# Patient Record
Sex: FEMALE | Race: WHITE | NOT HISPANIC OR LATINO | Employment: FULL TIME | URBAN - METROPOLITAN AREA
[De-identification: names, ages, dates, MRNs, and addresses within clinical notes are randomized per-mention and may not be internally consistent; named-entity substitution may affect disease eponyms.]

---

## 2022-03-27 ENCOUNTER — APPOINTMENT (OUTPATIENT)
Dept: CARDIOLOGY | Facility: CLINIC | Age: 52
End: 2022-03-27
Attending: EMERGENCY MEDICINE
Payer: COMMERCIAL

## 2022-03-27 ENCOUNTER — TRANSFERRED RECORDS (OUTPATIENT)
Dept: HEALTH INFORMATION MANAGEMENT | Facility: CLINIC | Age: 52
End: 2022-03-27

## 2022-03-27 ENCOUNTER — HOSPITAL ENCOUNTER (INPATIENT)
Facility: CLINIC | Age: 52
LOS: 22 days | Discharge: NURSING, REHAB OR LONG-TERM CARE | End: 2022-04-18
Attending: EMERGENCY MEDICINE | Admitting: STUDENT IN AN ORGANIZED HEALTH CARE EDUCATION/TRAINING PROGRAM
Payer: COMMERCIAL

## 2022-03-27 DIAGNOSIS — I16.1 HYPERTENSIVE EMERGENCY: ICD-10-CM

## 2022-03-27 DIAGNOSIS — I21.3 ST ELEVATION MI (STEMI) (H): ICD-10-CM

## 2022-03-27 DIAGNOSIS — Z95.1 S/P CABG X 4: ICD-10-CM

## 2022-03-27 DIAGNOSIS — I47.29 PAROXYSMAL VENTRICULAR TACHYCARDIA (H): Primary | ICD-10-CM

## 2022-03-27 DIAGNOSIS — I21.4 NSTEMI (NON-ST ELEVATED MYOCARDIAL INFARCTION) (H): ICD-10-CM

## 2022-03-27 LAB
ATRIAL RATE - MUSE: 85 BPM
CHOLEST SERPL-MCNC: 172 MG/DL
DIASTOLIC BLOOD PRESSURE - MUSE: NORMAL MMHG
HCG SER QL IA.RAPID: NEGATIVE
HDLC SERPL-MCNC: 57 MG/DL
HOLD SPECIMEN: NORMAL
INTERPRETATION ECG - MUSE: NORMAL
LDLC SERPL CALC-MCNC: 106 MG/DL
LVEF ECHO: NORMAL
NONHDLC SERPL-MCNC: 115 MG/DL
P AXIS - MUSE: 40 DEGREES
PR INTERVAL - MUSE: 162 MS
QRS DURATION - MUSE: 82 MS
QT - MUSE: 410 MS
QTC - MUSE: 487 MS
R AXIS - MUSE: -15 DEGREES
SYSTOLIC BLOOD PRESSURE - MUSE: NORMAL MMHG
T AXIS - MUSE: -87 DEGREES
TRIGL SERPL-MCNC: 47 MG/DL
TROPONIN I SERPL HS-MCNC: 4328 NG/L
TROPONIN I SERPL HS-MCNC: 7023 NG/L
TROPONIN I SERPL HS-MCNC: 7262 NG/L
UFH PPP CHRO-ACNC: 0.27 IU/ML
VENTRICULAR RATE- MUSE: 85 BPM

## 2022-03-27 PROCEDURE — P9016 RBC LEUKOCYTES REDUCED: HCPCS

## 2022-03-27 PROCEDURE — 99223 1ST HOSP IP/OBS HIGH 75: CPT | Mod: AI | Performed by: STUDENT IN AN ORGANIZED HEALTH CARE EDUCATION/TRAINING PROGRAM

## 2022-03-27 PROCEDURE — 80061 LIPID PANEL: CPT | Performed by: STUDENT IN AN ORGANIZED HEALTH CARE EDUCATION/TRAINING PROGRAM

## 2022-03-27 PROCEDURE — 258N000003 HC RX IP 258 OP 636: Performed by: EMERGENCY MEDICINE

## 2022-03-27 PROCEDURE — 96365 THER/PROPH/DIAG IV INF INIT: CPT

## 2022-03-27 PROCEDURE — 36415 COLL VENOUS BLD VENIPUNCTURE: CPT | Performed by: EMERGENCY MEDICINE

## 2022-03-27 PROCEDURE — 999N000208 ECHOCARDIOGRAM LIMITED

## 2022-03-27 PROCEDURE — 84702 CHORIONIC GONADOTROPIN TEST: CPT

## 2022-03-27 PROCEDURE — 86923 COMPATIBILITY TEST ELECTRIC: CPT

## 2022-03-27 PROCEDURE — 93321 DOPPLER ECHO F-UP/LMTD STD: CPT | Mod: 26 | Performed by: INTERNAL MEDICINE

## 2022-03-27 PROCEDURE — 210N000002 HC R&B HEART CARE

## 2022-03-27 PROCEDURE — 93308 TTE F-UP OR LMTD: CPT | Mod: 26 | Performed by: INTERNAL MEDICINE

## 2022-03-27 PROCEDURE — 250N000013 HC RX MED GY IP 250 OP 250 PS 637: Performed by: EMERGENCY MEDICINE

## 2022-03-27 PROCEDURE — 85520 HEPARIN ASSAY: CPT | Performed by: STUDENT IN AN ORGANIZED HEALTH CARE EDUCATION/TRAINING PROGRAM

## 2022-03-27 PROCEDURE — 93325 DOPPLER ECHO COLOR FLOW MAPG: CPT | Mod: 26 | Performed by: INTERNAL MEDICINE

## 2022-03-27 PROCEDURE — 84484 ASSAY OF TROPONIN QUANT: CPT | Performed by: EMERGENCY MEDICINE

## 2022-03-27 PROCEDURE — 250N000011 HC RX IP 250 OP 636: Performed by: EMERGENCY MEDICINE

## 2022-03-27 PROCEDURE — 93321 DOPPLER ECHO F-UP/LMTD STD: CPT

## 2022-03-27 PROCEDURE — 99291 CRITICAL CARE FIRST HOUR: CPT | Mod: 25

## 2022-03-27 PROCEDURE — 36415 COLL VENOUS BLD VENIPUNCTURE: CPT | Performed by: STUDENT IN AN ORGANIZED HEALTH CARE EDUCATION/TRAINING PROGRAM

## 2022-03-27 PROCEDURE — 99292 CRITICAL CARE ADDL 30 MIN: CPT

## 2022-03-27 PROCEDURE — 250N000011 HC RX IP 250 OP 636: Performed by: INTERNAL MEDICINE

## 2022-03-27 PROCEDURE — 250N000011 HC RX IP 250 OP 636: Performed by: STUDENT IN AN ORGANIZED HEALTH CARE EDUCATION/TRAINING PROGRAM

## 2022-03-27 PROCEDURE — 255N000002 HC RX 255 OP 636: Performed by: EMERGENCY MEDICINE

## 2022-03-27 PROCEDURE — 96368 THER/DIAG CONCURRENT INF: CPT

## 2022-03-27 PROCEDURE — 84484 ASSAY OF TROPONIN QUANT: CPT | Performed by: STUDENT IN AN ORGANIZED HEALTH CARE EDUCATION/TRAINING PROGRAM

## 2022-03-27 PROCEDURE — 96366 THER/PROPH/DIAG IV INF ADDON: CPT

## 2022-03-27 PROCEDURE — 99222 1ST HOSP IP/OBS MODERATE 55: CPT | Performed by: INTERNAL MEDICINE

## 2022-03-27 PROCEDURE — 93005 ELECTROCARDIOGRAM TRACING: CPT

## 2022-03-27 PROCEDURE — 250N000013 HC RX MED GY IP 250 OP 250 PS 637: Performed by: STUDENT IN AN ORGANIZED HEALTH CARE EDUCATION/TRAINING PROGRAM

## 2022-03-27 RX ORDER — ASPIRIN 81 MG/1
324 TABLET, CHEWABLE ORAL ONCE
Status: DISCONTINUED | OUTPATIENT
Start: 2022-03-27 | End: 2022-03-28

## 2022-03-27 RX ORDER — CARVEDILOL 6.25 MG/1
6.25 TABLET ORAL ONCE
Status: COMPLETED | OUTPATIENT
Start: 2022-03-27 | End: 2022-03-27

## 2022-03-27 RX ORDER — HEPARIN SODIUM 10000 [USP'U]/100ML
0-5000 INJECTION, SOLUTION INTRAVENOUS CONTINUOUS
Status: DISCONTINUED | OUTPATIENT
Start: 2022-03-27 | End: 2022-03-28

## 2022-03-27 RX ORDER — ASPIRIN 81 MG/1
81 TABLET, CHEWABLE ORAL DAILY
Status: DISCONTINUED | OUTPATIENT
Start: 2022-03-28 | End: 2022-03-30

## 2022-03-27 RX ORDER — ATORVASTATIN CALCIUM 40 MG/1
80 TABLET, FILM COATED ORAL DAILY
Status: DISCONTINUED | OUTPATIENT
Start: 2022-03-27 | End: 2022-04-05

## 2022-03-27 RX ORDER — ONDANSETRON 4 MG/1
4 TABLET, ORALLY DISINTEGRATING ORAL EVERY 6 HOURS PRN
Status: DISCONTINUED | OUTPATIENT
Start: 2022-03-27 | End: 2022-03-30

## 2022-03-27 RX ORDER — HEPARIN SODIUM 10000 [USP'U]/100ML
0-5000 INJECTION, SOLUTION INTRAVENOUS CONTINUOUS
Status: DISCONTINUED | OUTPATIENT
Start: 2022-03-27 | End: 2022-03-27

## 2022-03-27 RX ORDER — LORAZEPAM 2 MG/ML
0.5 INJECTION INTRAMUSCULAR EVERY 6 HOURS PRN
Status: DISCONTINUED | OUTPATIENT
Start: 2022-03-27 | End: 2022-03-30

## 2022-03-27 RX ORDER — NITROGLYCERIN 20 MG/100ML
10-200 INJECTION INTRAVENOUS CONTINUOUS
Status: DISCONTINUED | OUTPATIENT
Start: 2022-03-27 | End: 2022-03-28

## 2022-03-27 RX ORDER — LORAZEPAM 0.5 MG/1
0.5 TABLET ORAL EVERY 6 HOURS PRN
Status: DISCONTINUED | OUTPATIENT
Start: 2022-03-27 | End: 2022-03-30

## 2022-03-27 RX ORDER — MORPHINE SULFATE 2 MG/ML
1 INJECTION, SOLUTION INTRAMUSCULAR; INTRAVENOUS
Status: DISCONTINUED | OUTPATIENT
Start: 2022-03-27 | End: 2022-03-30

## 2022-03-27 RX ORDER — NALOXONE HYDROCHLORIDE 0.4 MG/ML
0.4 INJECTION, SOLUTION INTRAMUSCULAR; INTRAVENOUS; SUBCUTANEOUS
Status: DISCONTINUED | OUTPATIENT
Start: 2022-03-27 | End: 2022-03-30

## 2022-03-27 RX ORDER — CARVEDILOL 6.25 MG/1
6.25 TABLET ORAL 2 TIMES DAILY WITH MEALS
Status: DISCONTINUED | OUTPATIENT
Start: 2022-03-27 | End: 2022-03-29

## 2022-03-27 RX ORDER — NALOXONE HYDROCHLORIDE 0.4 MG/ML
0.2 INJECTION, SOLUTION INTRAMUSCULAR; INTRAVENOUS; SUBCUTANEOUS
Status: DISCONTINUED | OUTPATIENT
Start: 2022-03-27 | End: 2022-03-30

## 2022-03-27 RX ORDER — LIDOCAINE 40 MG/G
CREAM TOPICAL
Status: DISCONTINUED | OUTPATIENT
Start: 2022-03-27 | End: 2022-03-30

## 2022-03-27 RX ORDER — NITROGLYCERIN 20 MG/100ML
10-200 INJECTION INTRAVENOUS CONTINUOUS
Status: DISCONTINUED | OUTPATIENT
Start: 2022-03-27 | End: 2022-03-27

## 2022-03-27 RX ORDER — ONDANSETRON 2 MG/ML
4 INJECTION INTRAMUSCULAR; INTRAVENOUS EVERY 6 HOURS PRN
Status: DISCONTINUED | OUTPATIENT
Start: 2022-03-27 | End: 2022-03-30

## 2022-03-27 RX ORDER — ACETAMINOPHEN 325 MG/1
650 TABLET ORAL EVERY 6 HOURS PRN
Status: DISCONTINUED | OUTPATIENT
Start: 2022-03-27 | End: 2022-03-29

## 2022-03-27 RX ORDER — ACETAMINOPHEN 650 MG/1
650 SUPPOSITORY RECTAL EVERY 6 HOURS PRN
Status: DISCONTINUED | OUTPATIENT
Start: 2022-03-27 | End: 2022-03-29

## 2022-03-27 RX ADMIN — HUMAN ALBUMIN MICROSPHERES AND PERFLUTREN 9 ML: 10; .22 INJECTION, SOLUTION INTRAVENOUS at 11:34

## 2022-03-27 RX ADMIN — NITROGLYCERIN 10 MCG/MIN: 20 INJECTION INTRAVENOUS at 10:41

## 2022-03-27 RX ADMIN — NITROGLYCERIN 50 MCG/MIN: 20 INJECTION INTRAVENOUS at 19:37

## 2022-03-27 RX ADMIN — ATORVASTATIN CALCIUM 80 MG: 40 TABLET, FILM COATED ORAL at 14:58

## 2022-03-27 RX ADMIN — ONDANSETRON 4 MG: 4 TABLET, ORALLY DISINTEGRATING ORAL at 15:46

## 2022-03-27 RX ADMIN — HEPARIN SODIUM 1200 UNITS/HR: 1000 INJECTION INTRAVENOUS; SUBCUTANEOUS at 11:02

## 2022-03-27 RX ADMIN — CARVEDILOL 6.25 MG: 6.25 TABLET, FILM COATED ORAL at 17:05

## 2022-03-27 RX ADMIN — ACETAMINOPHEN 650 MG: 325 TABLET, FILM COATED ORAL at 14:57

## 2022-03-27 RX ADMIN — CARVEDILOL 6.25 MG: 6.25 TABLET, FILM COATED ORAL at 12:34

## 2022-03-27 RX ADMIN — ACETAMINOPHEN 650 MG: 325 TABLET, FILM COATED ORAL at 21:02

## 2022-03-27 ASSESSMENT — ENCOUNTER SYMPTOMS
CHEST TIGHTNESS: 1
NUMBNESS: 1

## 2022-03-27 ASSESSMENT — ACTIVITIES OF DAILY LIVING (ADL)
ADLS_ACUITY_SCORE: 7
ADLS_ACUITY_SCORE: 3
ADLS_ACUITY_SCORE: 7
ADLS_ACUITY_SCORE: 3
ADLS_ACUITY_SCORE: 7
ADLS_ACUITY_SCORE: 7

## 2022-03-27 NOTE — PHARMACY-ADMISSION MEDICATION HISTORY
Pharmacy Medication History  Admission medication history interview status for the 3/27/2022  admission is complete. See EPIC admission navigator for prior to admission medications     Location of Interview: Patient room  Medication history sources: Patient and Surescripts    In the past week, patient estimated taking medication this percent of the time: greater than 90%    Additional medication history information:   --Patient states not currently on any prescription medications.     Medication reconciliation completed by provider prior to medication history? No    Time spent in this activity: 5 min    Prior to Admission medications    Medication Sig Last Dose Taking? Auth Provider   aspirin-acetaminophen-caffeine (EXCEDRIN MIGRAINE) 250-250-65 MG tablet Take 1 tablet by mouth every 6 hours as needed for headaches 3/26/2022 at Unknown time Yes Reported, Patient   Multiple Vitamin (MULTIVITAMIN ADULT PO)  Past Week at Unknown time Yes Reported, Patient       The information provided in this note is only as accurate as the sources available at the time of update(s)

## 2022-03-27 NOTE — ED PROVIDER NOTES
History   Chief Complaint:  Chest Pain       HPI   Angelica Robledo is a 51 year old female with history of MI at age 25 with angioplasty who presents with chest pain that has been waxing and waning for the past few weeks, but worsened last night. The patient was at the [facility] in [place] before being transferred to the ED. The patient's work-up included a troponin which was elevated to 2.960, labs further detailed below, along with a chest x-ray and EKG, see below. The EKG displayed some mild ST elevation and depression. She was given 324 mg Aspirin, 4000 units Heparin, 0.5 mg dilaudid, 0.4 mg nitroglycerin x3 (total 1.2 mg), and 50 mcg fentanyl. EMS was called to the clinic for transport to the emergency department. EKG done by EMS had slight improvement but still lateral changes. Per EMS, they spoke to urgent care and the patient stated having a pain level of 7 but this was reduced to a 2-3 out of 10 after fentanyl was administered. Last night, her pain was an 8/10. She presents to the ED with chest tightness, arm numbness, and chest pain. She denies any swelling of her extremities. She states that her chest pain has been constant since last night. In the ED, she states that her symptoms are similar to those she experienced when she had a myocardial infarction (at age 25); at that time, she was living in [place] and went to [facility]. She denies any stent placement but reports that she had angioplasty at that time. In addition, the patient had a family history of myocardial infarction (father) and aortic aneurysm (mother). She denies taking medication at home.    [facility] Work-[facility]  COVID-19 Molecular: Negative  Troponin: 2.960 (high)   D-Dimer: 0.36 WNL   CBC: MCH 26.0 (low), RDW 18.0 (high), o/w WNL (WBC 9.8, HGB 12.5, )  BMP: Cl 108 (high), eGFR 90 (low), o/w WNL  Glucose, Random: 122 (high)    XR Chest 2 Views PA and Lateral  Negative chest  Reading per radiology          Review of Systems   Respiratory: Positive for chest tightness.    Cardiovascular: Positive for chest pain. Negative for leg swelling.   Neurological: Positive for numbness (arms).   All other systems reviewed and are negative.    Allergies:Amoxicillin    Medications:  aspirin-acetaminophen-caffeine (EXCEDRIN MIGRAINE) 250-250-65 MG tablet  Multiple Vitamin (MULTIVITAMIN ADULT PO)      Past Medical History:       Past Medical History:   Diagnosis Date     Bilateral external ear infections      H/O lithotripsy      Kidney stone      Myocardial infarction (H)      Patient Active Problem List   Diagnosis     NSTEMI (non-ST elevated myocardial infarction) (H)     Hypertensive emergency         Past Surgical History:      Past Surgical History:   Procedure Laterality Date     ANGIOPLASTY        SECTION      ,      MYRINGOTOMY, INSERT TUBE BILATERAL, COMBINED       ORTHOPEDIC SURGERY Left 1995    left ankle        Family History:    myocardial infarction (father)   aortic aneurysm (mother)      Social History:  Social History     Socioeconomic History     Marital status:      Spouse name: Not on file     Number of children: Not on file     Years of education: Not on file     Highest education level: Not on file   Occupational History     Not on file   Tobacco Use     Smoking status: Never Smoker     Smokeless tobacco: Never Used   Substance and Sexual Activity     Alcohol use: Yes     Comment: socially     Drug use: Never     Sexual activity: Yes     Partners: Male     Birth control/protection: None   Other Topics Concern     Not on file   Social History Narrative     Not on file     Social Determinants of Health     Financial Resource Strain: Not on file   Food Insecurity: Not on file   Transportation Needs: Not on file   Physical Activity: Not on file   Stress: Not on file   Social Connections: Not on file   Intimate Partner Violence: Not on file   Housing Stability: Not on file        Physical Exam     Patient Vitals for the past 24 hrs:   BP Temp Temp src Pulse Resp SpO2 Height Weight   03/27/22 1400 (!) 144/97 -- -- 74 9 98 % -- --   03/27/22 1355 (!) 159/99 -- -- 77 -- -- -- --   03/27/22 1350 (!) 156/98 -- -- 77 14 97 % -- --   03/27/22 1345 (!) 153/97 -- -- 78 14 96 % -- --   03/27/22 1340 (!) 155/97 -- -- 81 13 99 % -- --   03/27/22 1330 (!) 149/94 -- -- 79 15 97 % -- --   03/27/22 1325 (!) 141/97 -- -- 75 11 98 % -- --   03/27/22 1320 (!) 147/94 -- -- 73 13 98 % -- --   03/27/22 1315 (!) 149/91 -- -- 70 -- -- -- --   03/27/22 1310 (!) 140/95 -- -- 71 -- -- -- --   03/27/22 1255 (!) 145/96 -- -- 73 9 98 % -- --   03/27/22 1250 (!) 160/97 -- -- 79 (!) 7 96 % -- --   03/27/22 1245 (!) 145/102 -- -- 76 15 97 % -- --   03/27/22 1240 (!) 161/99 -- -- 77 14 96 % -- --   03/27/22 1230 (!) 145/96 -- -- -- 11 96 % -- --   03/27/22 1225 (!) 148/95 -- -- -- 13 97 % -- --   03/27/22 1220 (!) 146/97 -- -- -- 12 96 % -- --   03/27/22 1215 (!) 157/99 -- -- 74 13 96 % -- --   03/27/22 1210 (!) 153/97 -- -- 75 10 97 % -- --   03/27/22 1205 (!) 153/100 -- -- 79 20 96 % -- --   03/27/22 1200 (!) 145/98 -- -- 71 9 94 % -- --   03/27/22 1155 (!) 146/95 -- -- 70 13 95 % -- --   03/27/22 1150 (!) 151/96 -- -- 75 12 95 % -- --   03/27/22 1145 (!) 155/97 -- -- 69 12 95 % -- --   03/27/22 1139 (!) 154/100 -- -- 70 -- 95 % -- --   03/27/22 1133 (!) 160/106 -- -- 68 -- 94 % -- --   03/27/22 1118 (!) 169/104 -- -- 73 16 93 % -- --   03/27/22 1115 (!) 157/100 -- -- 71 9 92 % -- --   03/27/22 1112 (!) 164/105 -- -- 70 12 95 % -- --   03/27/22 1109 (!) 172/103 -- -- 73 12 93 % -- --   03/27/22 1106 (!) 180/114 -- -- 75 13 93 % -- --   03/27/22 1103 (!) 184/112 -- -- -- 12 94 % -- --   03/27/22 1100 (!) 176/111 -- -- 75 13 95 % -- --   03/27/22 1057 (!) 177/112 -- -- 71 10 96 % -- --   03/27/22 1054 (!) 185/112 -- -- 70 (!) 7 98 % -- --   03/27/22 1052 (!) 189/112 -- -- 76 18 97 % -- --   03/27/22 1050 (!) 187/108 --  "-- 77 14 97 % -- --   03/27/22 1048 (!) 194/114 -- -- 78 9 96 % -- --   03/27/22 1030 (!) 187/106 -- -- 79 14 -- -- --   03/27/22 1017 (!) 205/116 98.5  F (36.9  C) Oral 76 18 97 % 1.651 m (5' 5\") 98.4 kg (216 lb 14.9 oz)       Physical Exam  General: Well-nourished, appears to be in pain  Eyes: PERRL, conjunctivae pink no scleral icterus or conjunctival injection  ENT:  Moist mucus membranes, posterior oropharynx clear without erythema or exudates  Respiratory:  Lungs clear to auscultation bilaterally, no crackles/rubs/wheezes.  Good air movement  CV: Normal rate and rhythm, no murmurs/rubs/gallops  GI:  Abdomen soft and non-distended.  Normoactive BS.  No tenderness, guarding or rebound  Skin: Warm, dry.  No rashes or petechiae  Musculoskeletal: No peripheral edema or calf tenderness  Neuro: Alert and oriented to person/place/time  Psychiatric: Normal affect    Emergency Department Course   ECG  ECG obtained at 1016, ECG read at 0936  Sinus rhythm with occasional premature ventricular complexes  Left ventricular hypertrophy with repolarization abnormalities  Cannot rule out septal infarct, age undetermined  Abnormal ECG    Changes noted above as compared to prior, dated 03/27/22.  Sinus rhythm  Premature ventricular complexes  Horizontal axis  Possible septal infarct  Moderate high-lateral repolarization disturbance, consider ischemia or LV overload  Abnormal ECG   Rate 85 bpm. PA interval 162 ms. QRS duration 82 ms. QT/QTc 410/487 ms. P-R-T axes 40 -15 -87.     Imaging:  Echocardiogram Limited   Final Result        Report per radiology    Laboratory:  Labs Ordered and Resulted from Time of ED Arrival to Time of ED Departure   TROPONIN I - Abnormal       Result Value    Troponin I High Sensitivity 4,328 (*)    ISTAT HCG QUALITATIVE PREGNANCY POCT - Normal    HCG Qualitative POCT Negative          Procedures  none    Emergency Department Course:    Reviewed:  I reviewed nursing notes, vitals and past medical " history    Assessments:  1011 EMS transported the patient to stabilization room 3. I obtained history and examined the patient as noted above.   1017 I ordered an EKG.  1023 I rechecked the patient.  1041 I rechecked the patient and explained findings.     Consults:  1022 I spoke over the phone with cath lab.   1147 I consulted Dr. Valdez of Interventional Cardiology.   1205    I consulted with Dr. Valdez in the patient room.  1233    I spoke with Dr. Valdez via telephone regarding the patient.  1255    I spoke with the echo cardiologist who provided feedback on the echo and he is going to call Dr. Valdez regarding the patient and echo findings.    Interventions:  1041 nitroGLYcerin 50 mg in D5W 250 mL (adult std) infusion CENTRAL, IV   1102 heparin infusion 1,200 units/hour in D5W 250 mL ANTICOAGULANT, IV   1134 perflutren diluted 9 mL with saline (OPTISON) diluted injection, IV   1234 carvedilol (COREG) tablet 6.25 mg, PO     Disposition:  The patient was admitted to the hospital under the care of Dr. Brady.     Impression & Plan     CMS Diagnoses: None    Medical Decision Making:  Angelica Robledo is a 51 year old female transferred from the urgency room for concern of NSTEMI.  EKG shows some ST depression but does not meet criteria for STEMI.  Her troponin was significantly elevated.  A chest x-ray had been clear.  Her D-dimer was normal.  Covid testing was negative.  On arrival she was very hypertensive.  Repeat EKG was obtained and showed no findings of STEMI.  I contacted our cardiologist on-call given the persistence of her pain.  We started a nitroglycerin drip to treat her blood pressure and her pain.  Target blood pressure was under 140.  Her pain did resolve.  Given that her pain resolved, the plan is for admission with catheterization tomorrow.  We started her on oral carvedilol so that the nitro can be titrated off.  Dr. Valdez with cardiology was aware of the echo imaging and was in communication  with Dr. Thornton's guard of interventional cardiology.  If the pain were to return or persist despite medical management in the hospital, we will activate the Cath Lab for emergent angiogram.  However, at this time, she is stable and without pain.  She will be admitted to Dr. Brady.    Critical Care Time: was 45 minutes for this patient excluding procedures    Diagnosis:    ICD-10-CM    1. NSTEMI (non-ST elevated myocardial infarction) (H)  I21.4    2. Hypertensive emergency  I16.1      Scribe Disclosure:  I, Maikel Boone, am serving as a scribe at 10:21 AM on 3/27/2022 to document services personally performed by Eneida Patel MD based on my observations and the provider's statements to me.     I, Sue Rodriguez, am serving as a scribe at 1:09 PM on 3/27/2022 to document services personally performed by Eneida Patel MD based on my observations and the provider's statements to me.      Eneida Patel MD  03/27/22 0696

## 2022-03-27 NOTE — Clinical Note
The first balloon was inserted into the right coronary artery and middle right coronary artery.Max pressure = 12 tommy. Total duration = 20 seconds.

## 2022-03-27 NOTE — ED NOTES
[facility]  ED Nurse Handoff Report    ED Chief complaint: Chest Pain      ED Diagnosis:   Final diagnoses:   NSTEMI (non-ST elevated myocardial infarction) (H)   Hypertensive emergency       Code Status: Full Code    Allergies:   Allergies   Allergen Reactions     Amoxicillin Rash       Patient Story: Pt presented via EMS from the urgency room w/CP and EKG changes (ST depression in 3 leads). She was started on a heparin drip and nitroglycerin drip. Pain currently 0/10. Hx of MI in her 20's, but that occurred in [place] and records could not be obtained.   Focused Assessment:  A&Ox4, NSR. Desats while sleeping so placed on 2L O2 via NC. Otherwise independent.    Treatments and/or interventions provided: heparin, nitroglycerin  Patient's response to treatments and/or interventions: pain stopped    To be done/followed up on inpatient unit:  see inpatient orders    Does this patient have any cognitive concerns?: none    Activity level - Baseline/Home:  Independent  Activity Level - Current:   Independent    Patient's Preferred language: English   Needed?: No    Isolation: None  Infection: Not Applicable  Patient tested for COVID 19 prior to admission: NO - tested negative at UR  Bariatric?: No    Vital Signs:   Vitals:    03/27/22 1215 03/27/22 1220 03/27/22 1225 03/27/22 1230   BP: (!) 157/99 (!) 146/97 (!) 148/95 (!) 145/96   Pulse: 74      Resp: 13 12 13 11   Temp:       TempSrc:       SpO2: 96% 96% 97% 96%   Weight:       Height:           Cardiac Rhythm:Cardiac Rhythm: Normal sinus rhythm    Was the PSS-3 completed:   Yes  What interventions are required if any?               Family Comments: not present  OBS brochure/video discussed/provided to patient/family: N/A              Name of person given brochure if not patient: NA              Relationship to patient: NA    For the majority of the shift this patient's behavior was Green.   Behavioral interventions performed were NA.    ED NURSE  PHONE NUMBER: [phone]

## 2022-03-27 NOTE — PLAN OF CARE
[facility] Nursing Note    Patient Information  Name: Angelica Robledo  Age: 51 year old    Assessment  Orientation/Neuro: Alert and Oriented x4  Cardiac/Tele: NSR  Resp: WDL no shortness of breath  GI/: WDL No nausea, vomiting, abdominal pain, diarrhea, constipation or change in bowel habits  CMS: WDL   Mobility: walks with assist for IV assistance only  Pain: denies chest pain   Diet: Orders Placed This Encounter      Clear Liquid Diet      NPO per Anesthesia Guidelines for Procedure/Surgery Except for: Meds    Vital Signs  B/P: 129/95, T: 98.5, P: 66, R: 16, O2: 96 on RA    Plan  Weaning down nitroglycerin gtt as able, pt denies CP, angiogram tomorrow, NPO at midnight    Janet Calles RN

## 2022-03-27 NOTE — H&P
[facility]    History and Physical - Hospitalist Service       Date of Admission:  3/27/2022    Assessment & Plan      Angelica Robldeo is a 51 year old female admitted on 3/27/2022 for NSTEMI.    NSTEMI  Hypertensive emergency  Patient is visiting from [place]. She reports starting about Thursday she noticed intermittent chest pain and pressure that progressively worsened last night. She has ongoing hx of chest pain and pressure with elevated BP at home that she self medicates with OTC medications.   She presented to urgent care today which noticed elevated BP and positive troponin and she was sent urgently via EMS to the ER.   Within the ER here, BP >200/100. Initial troponin positive at ~4300. EKG with mild ST changes but no obvious elevations. Cardiology evaluated at bedside and recommended nitroglycerin gtt, heparin gtt and they will plan to take patient for angiogram tomorrow.    > will admit on nitroglycerin gtt for chest pain, also started on coreg by cardiology  > continue heparin gtt  > continue to trend troponin until it peaks  > continue to follow closely for changes in chest pain with repeat EKG and modify nitro.  > PRN morphine available  > cardiology consulted  > NPO at midnight    Suspect untreated HTN  Patient reports hx of BP reading from 150s-200s at home. Occasionally she will feel chest pain when her BP is high but it usually self resolves. She also takes an herbal supplement with aspirin to help relieve the pain  - follow treatment above and will need better BP control on discharge    Covid Negative           Diet: NPO for Medical/Clinical Reasons Except for: Meds    DVT Prophylaxis: heparin gtt  Cooper Catheter: Not present  Central Lines: None  Cardiac Monitoring: None  Code Status:  FULL    Clinically Significant Risk Factors Present on Admission                # Platelet Defect: home medication list includes an antiplatelet medication   # Obesity: Estimated body mass  "index is 36.1 kg/m  as calculated from the following:    Height as of this encounter: 1.651 m (5' 5\").    Weight as of this encounter: 98.4 kg (216 lb 14.9 oz).      Disposition Plan   Expected Discharge: > 2 nights   Anticipated discharge location:  Awaiting care coordination huddle  Delays:              Alla Brady DO  Hospitalist Service  [facility]  Securely message with the Vocera Web Console (learn more here)  Text page via memloom Paging/Directory         ______________________________________________________________________    Chief Complaint   Chest pain    History is obtained from the patient ER doc and patient    History of Present Illness   Angelica Robledo is a 51 year old female who presented for chest pain.    Patient is visiting from [place]. She reports starting about Thursday she noticed intermittent chest pain and pressure that progressively worsened last night. She has ongoing hx of chest pain and pressure with elevated BP at home that she self medicates with OTC medications.   She presented to urgent care today which noticed elevated BP and positive troponin and she was sent urgently via EMS to the ER.   Within the ER here, BP >200/100. Initial troponin positive at ~4300. EKG with mild ST changes but no obvious elevations.     Upon my assessment patient is lying in ER bed. She appears uncomfortable but reports she is actually feeling better than before. Mild headache but not bad. She is tired. She had some nausea earlier that has resolved. No recent illness. No cough. NO swelling in her legs. Chest pain is still present but better. No rashes. No reports hiking in the mountains as activity. No joint swelling.    Review of Systems    The 10 point Review of Systems is negative other than noted in the HPI or here.     Past Medical History    I have reviewed this patient's medical history and updated it with pertinent information if needed.   Past Medical History: "   Diagnosis Date     Bilateral external ear infections     frequent infections as child     H/O lithotripsy      Kidney stone      Myocardial infarction (H)        Past Surgical History   I have reviewed this patient's surgical history and updated it with pertinent information if needed.  Past Surgical History:   Procedure Laterality Date     ANGIOPLASTY        SECTION      ,      MYRINGOTOMY, INSERT TUBE BILATERAL, COMBINED       ORTHOPEDIC SURGERY Left     left ankle       Social History   I have reviewed this patient's social history and updated it with pertinent information if needed.  Social History     Tobacco Use     Smoking status: Never Smoker     Smokeless tobacco: Never Used   Substance Use Topics     Alcohol use: Yes     Comment: socially     Drug use: Never       Family History     No significant family history    Prior to Admission Medications   Prior to Admission Medications   Prescriptions Last Dose Informant Patient Reported? Taking?   Multiple Vitamin (MULTIVITAMIN ADULT PO) Past Week at Unknown time  Yes Yes   aspirin-acetaminophen-caffeine (EXCEDRIN MIGRAINE) 250-250-65 MG tablet 3/26/2022 at Unknown time  Yes Yes   Sig: Take 1 tablet by mouth every 6 hours as needed for headaches      Facility-Administered Medications: None     Allergies   Allergies   Allergen Reactions     Amoxicillin Rash       Physical Exam   Vital Signs: Temp: 98.5  F (36.9  C) Temp src: Oral BP: (!) 157/99 Pulse: 74   Resp: 13 SpO2: 96 % O2 Device: None (Room air)    Weight: 216 lbs 14.92 oz     Constitutional: Awake, alert, cooperative, appears uncomfortable  Eyes: Conjunctiva and pupils examined and normal.  HEENT: Moist mucous membranes, normal dentition.  Respiratory: Clear to auscultation bilaterally, no crackles or wheezing.  Cardiovascular: Regular rate and rhythm, normal S1 and S2, and no murmur noted.  GI: Soft, non-distended, non-tender, normal bowel sounds.  Skin: No rashes, no  cyanosis, no edema.  Musculoskeletal: No joint swelling, erythema or tenderness.  Neurologic: Cranial nerves 2-12 intact, normal strength and sensation.  Psychiatric: Alert, oriented to person, place and time, no obvious anxiety or depression.    Data   Data reviewed today: I reviewed all medications, new labs and imaging results over the last 24 hours. I personally reviewed EKG with mild ST changes. No FLORESITA    No lab results found in last 7 days.    Recent Results (from the past 24 hour(s))   Echocardiogram Limited   Result Value    LVEF  50-55%    Narrative    [id number]  [id number]  [id number]  [id number]^TIESHA     [facility]  Echocardiography Laboratory  [address] [place] [id number]     Name: KVNG EDOUARD  MRN: [id number]  : 1970  Study Date: 2022 11:08 AM  Age: 51 yrs  Gender: Female  Patient Location: [facility]  Reason For Study: Chest Pain  Ordering Physician: JACK PATEL  Referring Physician: JACK PATEL  Performed By: Deejay Baugh RDCS     BSA: 2.0 m2  Height: 65 in  Weight: 216 lb  HR: 76  BP: 184/112 mmHg  ______________________________________________________________________________  Procedure  Limited Portable Echo Adult.  ______________________________________________________________________________  Interpretation Summary     1. Left ventricular systolic function is low normal. Ejection fraction is 50-  55%.  2. There is hypokinesis of LV apex and apical segments. Differential diagnosis  includes LAD infarct versus Takotsubo cardiomyopathy.  3. No hemodynamically significant valvular disease on limited evaluation.  No prior study available for comparison.  Findings discussed with Dr. Patel and Courtney at 12:30 PM.  ______________________________________________________________________________  Left Ventricle  The left ventricle is normal in size. Left ventricular systolic function is  low normal. The visual ejection fraction is 50-55%. There is hypokinesis of LV  apex and  apical segments of all the walls. Differential diagnosis includes  coronary disease involving LAD versus Takotsubo cardiomyopathy.     Right Ventricle  The right ventricle is not well visualized.     Mitral Valve  The mitral valve leaflets appear normal. There is no evidence of stenosis,  fluttering, or prolapse.     Tricuspid Valve  Normal tricuspid valve. There is trace tricuspid regurgitation.     Aortic Valve  The aortic valve is not well visualized. No aortic stenosis is present.     Pulmonic Valve  The pulmonic valve is not well visualized.     Vessels  Normal size aorta. Inferior vena cava not well visualized for estimation of  right atrial pressure.     Pericardium  There is no pericardial effusion.     Rhythm  Sinus rhythm was noted.     ______________________________________________________________________________  MMode/2D Measurements & Calculations  IVSd: 1.5 cm  LVIDd: 4.4 cm  LVIDs: 3.5 cm  LVPWd: 1.4 cm  FS: 20.7 %  LV mass(C)d: 242.7 grams  LV mass(C)dI: 118.7 grams/m2     Ao root diam: 2.8 cm  LA dimension: 3.5 cm  LA/Ao: 1.3  RWT: 0.62     ______________________________________________________________________________  Report approved by: Chad Pope 03/27/2022 12:14 PM

## 2022-03-27 NOTE — Clinical Note
The first balloon was inserted into the right coronary artery and RPLB.Max pressure = 6 tommy. Total duration = 20 seconds.     Max pressure = 6 tommy. Total duration = 20 seconds.    Balloon reinflated a second time: Max pressure = 6 tommy. Total duration = 20 seconds.  Balloon reinflated a third time: Max pressure = 8 tommy. Total duration = 25 seconds.  Balloon reinflated a fourth time: Max pressure = 6 tommy. Total duration = 15 seconds.

## 2022-03-27 NOTE — ED NOTES
Bed: ST03  Expected date:   Expected time:   Means of arrival:   Comments:  [facility] transfer from urgency room, 51 F CP EKG changes ETA 1015

## 2022-03-27 NOTE — Clinical Note
Potential access sites were evaluated for patency using ultrasound.   The left femoral artery was selected. Access was obtained under with Sonosite guidance using a micropuncture 21 gauge needle with direct visualization of needle entry.

## 2022-03-27 NOTE — Clinical Note
The first balloon was inserted into the right coronary artery and middle right coronary artery.Max pressure = 8 tommy. Total duration = 25 seconds.     2.5.

## 2022-03-27 NOTE — Clinical Note
Hemodynamic equipment used: 5 lead ECG, [employer] With 3 Leads, Machine BP Cuff and pulse oximeter probe.

## 2022-03-27 NOTE — Clinical Note
Stent deployed in the middle right coronary artery. Max pressure = 10 tommy. Total duration = 30 seconds.

## 2022-03-27 NOTE — Clinical Note
Hemodynamic equipment used: 5 lead ECG, [employer] With 3 Leads, Machine BP Cuff and pulse oximeter probe.

## 2022-03-27 NOTE — ED NOTES
Patient's oxygen was dipping to 88%. Patient was sleepy with her mask on. 2L O2 was administered via nasal canula and mask was removed. Sats went up to 95%.

## 2022-03-28 ENCOUNTER — APPOINTMENT (OUTPATIENT)
Dept: ULTRASOUND IMAGING | Facility: CLINIC | Age: 52
End: 2022-03-28
Attending: SURGERY
Payer: COMMERCIAL

## 2022-03-28 ENCOUNTER — APPOINTMENT (OUTPATIENT)
Dept: GENERAL RADIOLOGY | Facility: CLINIC | Age: 52
End: 2022-03-28
Attending: SURGERY
Payer: COMMERCIAL

## 2022-03-28 LAB
ABO/RH(D): NORMAL
ACT BLD: 207 SECONDS (ref 74–150)
ALBUMIN SERPL-MCNC: 3.3 G/DL (ref 3.4–5)
ALBUMIN UR-MCNC: 20 MG/DL
ALP SERPL-CCNC: 67 U/L (ref 40–150)
ALT SERPL W P-5'-P-CCNC: 36 U/L (ref 0–50)
ANION GAP SERPL CALCULATED.3IONS-SCNC: 7 MMOL/L (ref 3–14)
ANTIBODY SCREEN: NEGATIVE
APPEARANCE UR: ABNORMAL
AST SERPL W P-5'-P-CCNC: 54 U/L (ref 0–45)
BACTERIA #/AREA URNS HPF: ABNORMAL /HPF
BASOPHILS # BLD AUTO: 0 10E3/UL (ref 0–0.2)
BASOPHILS NFR BLD AUTO: 0 %
BILIRUB DIRECT SERPL-MCNC: <0.1 MG/DL (ref 0–0.2)
BILIRUB SERPL-MCNC: 0.4 MG/DL (ref 0.2–1.3)
BILIRUB UR QL STRIP: NEGATIVE
BUN SERPL-MCNC: 5 MG/DL (ref 7–30)
CALCIUM SERPL-MCNC: 9.1 MG/DL (ref 8.5–10.1)
CHLORIDE BLD-SCNC: 107 MMOL/L (ref 94–109)
CO2 SERPL-SCNC: 24 MMOL/L (ref 20–32)
COLOR UR AUTO: ABNORMAL
CREAT SERPL-MCNC: 0.75 MG/DL (ref 0.52–1.04)
EOSINOPHIL # BLD AUTO: 0 10E3/UL (ref 0–0.7)
EOSINOPHIL NFR BLD AUTO: 0 %
ERYTHROCYTE [DISTWIDTH] IN BLOOD BY AUTOMATED COUNT: 17 % (ref 10–15)
ERYTHROCYTE [DISTWIDTH] IN BLOOD BY AUTOMATED COUNT: 17.2 % (ref 10–15)
GFR SERPL CREATININE-BSD FRML MDRD: >90 ML/MIN/1.73M2
GLUCOSE BLD-MCNC: 124 MG/DL (ref 70–99)
GLUCOSE UR STRIP-MCNC: NEGATIVE MG/DL
HBA1C MFR BLD: 5.7 % (ref 0–5.6)
HCT VFR BLD AUTO: 34.6 % (ref 35–47)
HCT VFR BLD AUTO: 37.1 % (ref 35–47)
HGB BLD-MCNC: 10.6 G/DL (ref 11.7–15.7)
HGB BLD-MCNC: 11.2 G/DL (ref 11.7–15.7)
HGB UR QL STRIP: ABNORMAL
IMM GRANULOCYTES # BLD: 0.1 10E3/UL
IMM GRANULOCYTES NFR BLD: 1 %
KETONES UR STRIP-MCNC: NEGATIVE MG/DL
LEUKOCYTE ESTERASE UR QL STRIP: ABNORMAL
LYMPHOCYTES # BLD AUTO: 1.2 10E3/UL (ref 0.8–5.3)
LYMPHOCYTES NFR BLD AUTO: 10 %
MCH RBC QN AUTO: 24.6 PG (ref 26.5–33)
MCH RBC QN AUTO: 25.2 PG (ref 26.5–33)
MCHC RBC AUTO-ENTMCNC: 30.2 G/DL (ref 31.5–36.5)
MCHC RBC AUTO-ENTMCNC: 30.6 G/DL (ref 31.5–36.5)
MCV RBC AUTO: 80 FL (ref 78–100)
MCV RBC AUTO: 84 FL (ref 78–100)
MONOCYTES # BLD AUTO: 1 10E3/UL (ref 0–1.3)
MONOCYTES NFR BLD AUTO: 8 %
MUCOUS THREADS #/AREA URNS LPF: PRESENT /LPF
NEUTROPHILS # BLD AUTO: 9.5 10E3/UL (ref 1.6–8.3)
NEUTROPHILS NFR BLD AUTO: 81 %
NITRATE UR QL: NEGATIVE
NRBC # BLD AUTO: 0 10E3/UL
NRBC BLD AUTO-RTO: 0 /100
PH UR STRIP: 6 [PH] (ref 5–7)
PLATELET # BLD AUTO: 228 10E3/UL (ref 150–450)
PLATELET # BLD AUTO: 249 10E3/UL (ref 150–450)
POTASSIUM BLD-SCNC: 3.3 MMOL/L (ref 3.4–5.3)
POTASSIUM BLD-SCNC: 3.5 MMOL/L (ref 3.4–5.3)
POTASSIUM BLD-SCNC: 3.6 MMOL/L (ref 3.4–5.3)
PROT SERPL-MCNC: 6.6 G/DL (ref 6.8–8.8)
RBC # BLD AUTO: 4.31 10E6/UL (ref 3.8–5.2)
RBC # BLD AUTO: 4.44 10E6/UL (ref 3.8–5.2)
RBC URINE: 32 /HPF
SODIUM SERPL-SCNC: 138 MMOL/L (ref 133–144)
SP GR UR STRIP: 1.04 (ref 1–1.03)
SPECIMEN EXPIRATION DATE: NORMAL
SQUAMOUS EPITHELIAL: 4 /HPF
TROPONIN I SERPL HS-MCNC: 4062 NG/L
UFH PPP CHRO-ACNC: 0.26 IU/ML
UROBILINOGEN UR STRIP-MCNC: NORMAL MG/DL
WBC # BLD AUTO: 11.8 10E3/UL (ref 4–11)
WBC # BLD AUTO: 8.9 10E3/UL (ref 4–11)
WBC URINE: >182 /HPF

## 2022-03-28 PROCEDURE — 93454 CORONARY ARTERY ANGIO S&I: CPT | Performed by: INTERNAL MEDICINE

## 2022-03-28 PROCEDURE — 250N000011 HC RX IP 250 OP 636: Performed by: STUDENT IN AN ORGANIZED HEALTH CARE EDUCATION/TRAINING PROGRAM

## 2022-03-28 PROCEDURE — 85027 COMPLETE CBC AUTOMATED: CPT | Performed by: PHYSICIAN ASSISTANT

## 2022-03-28 PROCEDURE — 250N000011 HC RX IP 250 OP 636: Performed by: PHYSICIAN ASSISTANT

## 2022-03-28 PROCEDURE — 87186 SC STD MICRODIL/AGAR DIL: CPT | Performed by: SURGERY

## 2022-03-28 PROCEDURE — 272N000001 HC OR GENERAL SUPPLY STERILE: Performed by: INTERNAL MEDICINE

## 2022-03-28 PROCEDURE — 99152 MOD SED SAME PHYS/QHP 5/>YRS: CPT | Performed by: INTERNAL MEDICINE

## 2022-03-28 PROCEDURE — 36415 COLL VENOUS BLD VENIPUNCTURE: CPT | Performed by: INTERNAL MEDICINE

## 2022-03-28 PROCEDURE — 84132 ASSAY OF SERUM POTASSIUM: CPT | Performed by: STUDENT IN AN ORGANIZED HEALTH CARE EDUCATION/TRAINING PROGRAM

## 2022-03-28 PROCEDURE — 93010 ELECTROCARDIOGRAM REPORT: CPT | Performed by: INTERNAL MEDICINE

## 2022-03-28 PROCEDURE — 82248 BILIRUBIN DIRECT: CPT | Performed by: SURGERY

## 2022-03-28 PROCEDURE — 85025 COMPLETE CBC W/AUTO DIFF WBC: CPT | Performed by: STUDENT IN AN ORGANIZED HEALTH CARE EDUCATION/TRAINING PROGRAM

## 2022-03-28 PROCEDURE — 85347 COAGULATION TIME ACTIVATED: CPT

## 2022-03-28 PROCEDURE — B2111ZZ FLUOROSCOPY OF MULTIPLE CORONARY ARTERIES USING LOW OSMOLAR CONTRAST: ICD-10-PCS | Performed by: INTERNAL MEDICINE

## 2022-03-28 PROCEDURE — 83036 HEMOGLOBIN GLYCOSYLATED A1C: CPT | Performed by: SURGERY

## 2022-03-28 PROCEDURE — 36415 COLL VENOUS BLD VENIPUNCTURE: CPT | Performed by: PHYSICIAN ASSISTANT

## 2022-03-28 PROCEDURE — 250N000011 HC RX IP 250 OP 636: Performed by: INTERNAL MEDICINE

## 2022-03-28 PROCEDURE — C1769 GUIDE WIRE: HCPCS | Performed by: INTERNAL MEDICINE

## 2022-03-28 PROCEDURE — 93931 UPPER EXTREMITY STUDY: CPT | Mod: LT

## 2022-03-28 PROCEDURE — 71046 X-RAY EXAM CHEST 2 VIEWS: CPT

## 2022-03-28 PROCEDURE — 99233 SBSQ HOSP IP/OBS HIGH 50: CPT | Performed by: STUDENT IN AN ORGANIZED HEALTH CARE EDUCATION/TRAINING PROGRAM

## 2022-03-28 PROCEDURE — 81001 URINALYSIS AUTO W/SCOPE: CPT | Performed by: SURGERY

## 2022-03-28 PROCEDURE — 258N000003 HC RX IP 258 OP 636: Performed by: PHYSICIAN ASSISTANT

## 2022-03-28 PROCEDURE — C1887 CATHETER, GUIDING: HCPCS | Performed by: INTERNAL MEDICINE

## 2022-03-28 PROCEDURE — 93880 EXTRACRANIAL BILAT STUDY: CPT

## 2022-03-28 PROCEDURE — C1894 INTRO/SHEATH, NON-LASER: HCPCS | Performed by: INTERNAL MEDICINE

## 2022-03-28 PROCEDURE — 99207 PR SC NO CHARGE VISIT: CPT | Performed by: INTERNAL MEDICINE

## 2022-03-28 PROCEDURE — 93458 L HRT ARTERY/VENTRICLE ANGIO: CPT | Performed by: INTERNAL MEDICINE

## 2022-03-28 PROCEDURE — 84484 ASSAY OF TROPONIN QUANT: CPT | Performed by: STUDENT IN AN ORGANIZED HEALTH CARE EDUCATION/TRAINING PROGRAM

## 2022-03-28 PROCEDURE — 93970 EXTREMITY STUDY: CPT

## 2022-03-28 PROCEDURE — 86850 RBC ANTIBODY SCREEN: CPT | Performed by: SURGERY

## 2022-03-28 PROCEDURE — 36415 COLL VENOUS BLD VENIPUNCTURE: CPT | Performed by: STUDENT IN AN ORGANIZED HEALTH CARE EDUCATION/TRAINING PROGRAM

## 2022-03-28 PROCEDURE — 99233 SBSQ HOSP IP/OBS HIGH 50: CPT | Mod: 25 | Performed by: INTERNAL MEDICINE

## 2022-03-28 PROCEDURE — 86923 COMPATIBILITY TEST ELECTRIC: CPT

## 2022-03-28 PROCEDURE — 4A023N7 MEASUREMENT OF CARDIAC SAMPLING AND PRESSURE, LEFT HEART, PERCUTANEOUS APPROACH: ICD-10-PCS | Performed by: INTERNAL MEDICINE

## 2022-03-28 PROCEDURE — 250N000013 HC RX MED GY IP 250 OP 250 PS 637: Performed by: INTERNAL MEDICINE

## 2022-03-28 PROCEDURE — 93005 ELECTROCARDIOGRAM TRACING: CPT

## 2022-03-28 PROCEDURE — 258N000003 HC RX IP 258 OP 636: Performed by: STUDENT IN AN ORGANIZED HEALTH CARE EDUCATION/TRAINING PROGRAM

## 2022-03-28 PROCEDURE — 93458 L HRT ARTERY/VENTRICLE ANGIO: CPT | Mod: 26 | Performed by: INTERNAL MEDICINE

## 2022-03-28 PROCEDURE — 258N000003 HC RX IP 258 OP 636: Performed by: INTERNAL MEDICINE

## 2022-03-28 PROCEDURE — 250N000013 HC RX MED GY IP 250 OP 250 PS 637: Performed by: STUDENT IN AN ORGANIZED HEALTH CARE EDUCATION/TRAINING PROGRAM

## 2022-03-28 PROCEDURE — 210N000002 HC R&B HEART CARE

## 2022-03-28 PROCEDURE — 82310 ASSAY OF CALCIUM: CPT | Performed by: STUDENT IN AN ORGANIZED HEALTH CARE EDUCATION/TRAINING PROGRAM

## 2022-03-28 PROCEDURE — C1725 CATH, TRANSLUMIN NON-LASER: HCPCS | Performed by: INTERNAL MEDICINE

## 2022-03-28 PROCEDURE — 99152 MOD SED SAME PHYS/QHP 5/>YRS: CPT | Mod: GC | Performed by: INTERNAL MEDICINE

## 2022-03-28 PROCEDURE — 85520 HEPARIN ASSAY: CPT | Performed by: INTERNAL MEDICINE

## 2022-03-28 PROCEDURE — 250N000009 HC RX 250: Performed by: INTERNAL MEDICINE

## 2022-03-28 RX ORDER — ACETAMINOPHEN 325 MG/1
650 TABLET ORAL EVERY 4 HOURS PRN
Status: DISCONTINUED | OUTPATIENT
Start: 2022-03-28 | End: 2022-03-29

## 2022-03-28 RX ORDER — NALOXONE HYDROCHLORIDE 0.4 MG/ML
0.2 INJECTION, SOLUTION INTRAMUSCULAR; INTRAVENOUS; SUBCUTANEOUS
Status: DISCONTINUED | OUTPATIENT
Start: 2022-03-28 | End: 2022-03-28

## 2022-03-28 RX ORDER — NALOXONE HYDROCHLORIDE 0.4 MG/ML
0.4 INJECTION, SOLUTION INTRAMUSCULAR; INTRAVENOUS; SUBCUTANEOUS
Status: DISCONTINUED | OUTPATIENT
Start: 2022-03-28 | End: 2022-03-28

## 2022-03-28 RX ORDER — NITROGLYCERIN 5 MG/ML
VIAL (ML) INTRAVENOUS
Status: DISCONTINUED | OUTPATIENT
Start: 2022-03-28 | End: 2022-03-28

## 2022-03-28 RX ORDER — FLUMAZENIL 0.1 MG/ML
0.2 INJECTION, SOLUTION INTRAVENOUS
Status: ACTIVE | OUTPATIENT
Start: 2022-03-28 | End: 2022-03-28

## 2022-03-28 RX ORDER — LORAZEPAM 2 MG/ML
0.5 INJECTION INTRAMUSCULAR
Status: DISCONTINUED | OUTPATIENT
Start: 2022-03-28 | End: 2022-03-29

## 2022-03-28 RX ORDER — POTASSIUM CHLORIDE 1500 MG/1
20 TABLET, EXTENDED RELEASE ORAL
Status: COMPLETED | OUTPATIENT
Start: 2022-03-28 | End: 2022-03-28

## 2022-03-28 RX ORDER — SODIUM CHLORIDE 9 MG/ML
INJECTION, SOLUTION INTRAVENOUS CONTINUOUS
Status: DISCONTINUED | OUTPATIENT
Start: 2022-03-28 | End: 2022-03-28

## 2022-03-28 RX ORDER — HEPARIN SODIUM 10000 [USP'U]/100ML
0-5000 INJECTION, SOLUTION INTRAVENOUS CONTINUOUS
Status: DISCONTINUED | OUTPATIENT
Start: 2022-03-28 | End: 2022-03-30

## 2022-03-28 RX ORDER — LIDOCAINE 40 MG/G
CREAM TOPICAL
Status: DISCONTINUED | OUTPATIENT
Start: 2022-03-28 | End: 2022-03-28

## 2022-03-28 RX ORDER — VERAPAMIL HYDROCHLORIDE 2.5 MG/ML
INJECTION, SOLUTION INTRAVENOUS
Status: DISCONTINUED | OUTPATIENT
Start: 2022-03-28 | End: 2022-03-30 | Stop reason: HOSPADM

## 2022-03-28 RX ORDER — ASPIRIN 81 MG/1
243 TABLET, CHEWABLE ORAL ONCE
Status: COMPLETED | OUTPATIENT
Start: 2022-03-28 | End: 2022-03-28

## 2022-03-28 RX ORDER — ATROPINE SULFATE 0.1 MG/ML
0.5 INJECTION INTRAVENOUS
Status: ACTIVE | OUTPATIENT
Start: 2022-03-28 | End: 2022-03-28

## 2022-03-28 RX ORDER — HEPARIN SODIUM 1000 [USP'U]/ML
INJECTION, SOLUTION INTRAVENOUS; SUBCUTANEOUS
Status: DISCONTINUED | OUTPATIENT
Start: 2022-03-28 | End: 2022-03-30 | Stop reason: HOSPADM

## 2022-03-28 RX ORDER — FENTANYL CITRATE 50 UG/ML
INJECTION, SOLUTION INTRAMUSCULAR; INTRAVENOUS
Status: DISCONTINUED | OUTPATIENT
Start: 2022-03-28 | End: 2022-03-30 | Stop reason: HOSPADM

## 2022-03-28 RX ORDER — LORAZEPAM 0.5 MG/1
0.5 TABLET ORAL
Status: DISCONTINUED | OUTPATIENT
Start: 2022-03-28 | End: 2022-03-29

## 2022-03-28 RX ORDER — IOPAMIDOL 755 MG/ML
INJECTION, SOLUTION INTRAVASCULAR
Status: DISCONTINUED | OUTPATIENT
Start: 2022-03-28 | End: 2022-03-30 | Stop reason: HOSPADM

## 2022-03-28 RX ORDER — OXYCODONE HYDROCHLORIDE 5 MG/1
5 TABLET ORAL EVERY 4 HOURS PRN
Status: DISCONTINUED | OUTPATIENT
Start: 2022-03-28 | End: 2022-03-30

## 2022-03-28 RX ORDER — SODIUM CHLORIDE 9 MG/ML
75 INJECTION, SOLUTION INTRAVENOUS CONTINUOUS
Status: ACTIVE | OUTPATIENT
Start: 2022-03-28 | End: 2022-03-28

## 2022-03-28 RX ORDER — ASPIRIN 325 MG
325 TABLET ORAL ONCE
Status: COMPLETED | OUTPATIENT
Start: 2022-03-28 | End: 2022-03-28

## 2022-03-28 RX ORDER — FENTANYL CITRATE 50 UG/ML
25 INJECTION, SOLUTION INTRAMUSCULAR; INTRAVENOUS
Status: DISCONTINUED | OUTPATIENT
Start: 2022-03-28 | End: 2022-03-30

## 2022-03-28 RX ORDER — OXYCODONE HYDROCHLORIDE 5 MG/1
10 TABLET ORAL EVERY 4 HOURS PRN
Status: DISCONTINUED | OUTPATIENT
Start: 2022-03-28 | End: 2022-03-30

## 2022-03-28 RX ADMIN — ATORVASTATIN CALCIUM 80 MG: 40 TABLET, FILM COATED ORAL at 08:08

## 2022-03-28 RX ADMIN — MORPHINE SULFATE 1 MG: 2 INJECTION, SOLUTION INTRAMUSCULAR; INTRAVENOUS at 00:29

## 2022-03-28 RX ADMIN — SODIUM CHLORIDE: 9 INJECTION, SOLUTION INTRAVENOUS at 08:04

## 2022-03-28 RX ADMIN — POTASSIUM CHLORIDE 20 MEQ: 1500 TABLET, EXTENDED RELEASE ORAL at 08:08

## 2022-03-28 RX ADMIN — HEPARIN SODIUM 1200 UNITS/HR: 1000 INJECTION INTRAVENOUS; SUBCUTANEOUS at 08:02

## 2022-03-28 RX ADMIN — CARVEDILOL 6.25 MG: 6.25 TABLET, FILM COATED ORAL at 19:09

## 2022-03-28 RX ADMIN — CARVEDILOL 6.25 MG: 6.25 TABLET, FILM COATED ORAL at 08:08

## 2022-03-28 RX ADMIN — ACETAMINOPHEN 650 MG: 325 TABLET, FILM COATED ORAL at 04:29

## 2022-03-28 RX ADMIN — ASPIRIN 325 MG ORAL TABLET 325 MG: 325 PILL ORAL at 08:08

## 2022-03-28 RX ADMIN — ONDANSETRON 4 MG: 2 INJECTION INTRAMUSCULAR; INTRAVENOUS at 00:29

## 2022-03-28 RX ADMIN — HEPARIN SODIUM 1200 UNITS/HR: 1000 INJECTION INTRAVENOUS; SUBCUTANEOUS at 18:56

## 2022-03-28 RX ADMIN — ACETAMINOPHEN 650 MG: 325 TABLET ORAL at 19:49

## 2022-03-28 ASSESSMENT — ACTIVITIES OF DAILY LIVING (ADL)
ADLS_ACUITY_SCORE: 3

## 2022-03-28 NOTE — PROGRESS NOTES
[facility]    Medicine Progress Note - Hospitalist Service    Date of Admission:  3/27/2022    Assessment & Plan         Angelica Robledo is a 51 year old female admitted on 3/27/2022 for NSTEMI.    NSTEMI  Hypertensive emergency  Severe 3 vessel CAD  Patient is visiting from [place]. She reports starting about Thursday she noticed intermittent chest pain and pressure that progressively worsened last night. She has ongoing hx of chest pain and pressure with elevated BP at home that she self medicates with OTC medications.   She presented to urgent care today which noticed elevated BP and positive troponin and she was sent urgently via EMS to the ER.   Within the ER here, BP >200/100. Initial troponin positive at ~4300. EKG with mild ST changes but no obvious elevations. Cardiology evaluated at bedside and recommended nitroglycerin gtt, heparin gtt and they will plan to take patient for angiogram tomorrow.    > angiogram today with severe 3 vessel disease and CV surgery consulted. Planning for CABG this hospitalization  > remains on heparin gtt however off nitroglycerin and she has been chest pain free since over the night.  > appreciate cardiology's assistance and comanagement    Suspect untreated HTN  Patient reports hx of BP reading from 150s-200s at home. Occasionally she will feel chest pain when her BP is high but it usually self resolves. She also takes an herbal supplement with aspirin to help relieve the pain  - follow treatment above and will need better BP control on discharge    Covid Negative         Diet: 2 Gram Sodium Diet    DVT Prophylaxis: Heparin gtt  Cooper Catheter: Not present  Central Lines: None  Cardiac Monitoring: ACTIVE order. Indication: Post- PCI/Angiogram (24 hours)  Code Status: Full Code      Disposition Plan   Expected Discharge: TBD post CBAG  Anticipated discharge location:  Awaiting care coordination huddle  Delays:            The patient's care was discussed  "with the patient and her family.    Alla Brady DO  Hospitalist Service  [facility]  Securely message with the [employer] Web Console (learn more here)  Text page via [email or web address] Paging/Directory         Clinically Significant Risk Factors Present on Admission             # Obesity: Estimated body mass index is 35.11 kg/m  as calculated from the following:    Height as of this encounter: 1.651 m (5' 5\").    Weight as of this encounter: 95.7 kg (211 lb).      ______________________________________________________________________    Interval History   Patient resting comfortably in bed. She is feeling much better since admission. No further chest pain since over the night. No nausea and she is hungry but aware she can't eat and fine with it. No cough. No edema.     Data reviewed today: I reviewed all medications, new labs and imaging results over the last 24 hours. I personally reviewed cath report.    Physical Exam   Vital Signs: Temp: 98  F (36.7  C) Temp src: Oral BP: 100/72 Pulse: 73   Resp: 18 SpO2: 95 % O2 Device: Nasal cannula Oxygen Delivery: 2 LPM  Weight: 211 lbs 0 oz     Constitutional: Awake, alert, cooperative, no apparent distress  Respiratory: Clear to auscultation bilaterally, no crackles or wheezing  Cardiovascular: Regular rate and rhythm, normal S1 and S2, and no murmur noted  GI: Normal bowel sounds, soft, non-distended, non-tender  Skin/Integumen: No rashes, no cyanosis, no edema  MSK no joint swelling or pain    Data   Recent Labs   Lab 03/28/22  0826 03/28/22  0600   WBC  --  11.8*   HGB  --  10.6*   MCV  --  80   PLT  --  228   NA  --  138   POTASSIUM 3.5 3.3*   CHLORIDE  --  107   CO2  --  24   BUN  --  5*   CR  --  0.75   ANIONGAP  --  7   TOMAS  --  9.1   GLC  --  124*   ALBUMIN 3.3*  --    PROTTOTAL 6.6*  --    BILITOTAL 0.4  --    ALKPHOS 67  --    ALT 36  --    AST 54*  --      Recent Results (from the past 24 hour(s))   Cardiac Catheterization    Narrative      " Multivessel coronary artery disease identified angiographically.    Left main: No significant lesion.    LAD: 50% lesion at the takeoff of the second diagonal and about 80%   lesion at the takeoff of the third diagonal. Proximal D3 has a 50% lesion.    LCx:  after takeoff of the first major obtuse marginal. Distal OM and   LPL branches are supplied by epicardial collaterals from the apical LAD.    RCA: 99% lesion in the mid RCA.    Left ventricular filling pressures are moderately elevated (about 25   mmHg).    Cardiovascular surgery has been consulted for CABG evaluation.        Medications     - MEDICATION INSTRUCTIONS -       - MEDICATION INSTRUCTIONS -       - MEDICATION INSTRUCTIONS -       - MEDICATION INSTRUCTIONS -       ACE/ARB/ARNI NOT PRESCRIBED       sodium chloride 75 mL/hr (03/28/22 1200)     sodium chloride 75 mL/hr (03/28/22 1442)       aspirin  81 mg Oral Daily     atorvastatin  80 mg Oral Daily     carvedilol  6.25 mg Oral BID w/meals     sodium chloride (PF)  3 mL Intracatheter Q8H

## 2022-03-28 NOTE — CONSULTS
Cardiology Consultation     Date of Admission:  3/27/2022    Assessment & Plan   Angelica Robledo is a 51 year old female who was admitted on 3/27/2022. I was asked to see the patient for a non-STEMI.    Patient has had stuttering chest pain has been accelerating for multiple weeks.  She presented to the ER with these complaints.  The patient was given nitroglycerin sublingual as well as Dilaudid and her pain was still 4 out of 10.  I was called to the emergency room.  In the emergency room the patient appeared comfortable but was still complaining of pain.  I noted that her blood pressure was 205 systolic.  I discussed with the ER providers that the EKG did not note a STEMI and that we should work to control her blood pressure.    In the emergency department we started a nitroglycerin infusion and obtain better control of the patient's blood pressure.  Her chest pain then abated.  I discussed with the patient that she will most likely need a coronary angiogram tomorrow.  She agrees to proceed with this.    It should be noted that in her 20s she also had a myocardial infarction.  The details of this are unclear.  She reports that she underwent angioplasty but no stenting was performed.  The details regarding this are very limited .    I then reassessed patient for.  She was chest pain-free and I visited with her and her  again.  Initial troponin was 4000 and has trended up to 7000.  The patient is hemodynamically stable without complaints.    Echocardiogram notes hypokinesis of the LV apex with an ejection fraction of 50 to 55%.    EKG noted slight ST depression in the V4 and I.    Problems:  Non-STEMI  Prior history of myocardial infarction in her 20s, details unclear  Hypertensive emergency    Plan:  Continue nitroglycerin infusion with a goal blood pressure of below 130 mmHg  Trend troponin until peak  Start carvedilol 6.25 twice daily, suspect patient has untreated  hypertension  Lipitor 80 mg daily  Aspirin 81 mg daily    Patient will need to have a coronary angiogram tomorrow.  I discussed the procedure in detail with patient.  She is keen to proceed.    Please call cardiology with any decompensation as we can emergently perform angiogram at any time if the patient worsens.    Magnus Valdez MD     Code Status    Full Code    Reason for Consult   Reason for consult: Non-STEMI    Primary Care Physician   Physician No Ref-Primary    Chief Complaint   Chest pain    History is obtained from the patient    History of Present Illness     Angelica Robledo is a 51 year old female who was admitted on 3/27/2022. I was asked to see the patient for a non-STEMI.    Patient has had stuttering chest pain has been accelerating for multiple weeks.  She presented to the ER with these complaints.  The patient was given nitroglycerin sublingual as well as Dilaudid and her pain was still 4 out of 10.  I was called to the emergency room.  In the emergency room the patient appeared comfortable but was still complaining of pain.  I noted that her blood pressure was 205 systolic.  I discussed with the ER providers that the EKG did not note a STEMI and that we should work to control her blood pressure.    In the emergency department we started a nitroglycerin infusion and obtain better control of the patient's blood pressure.  Her chest pain then abated.  I discussed with the patient that she will most likely need a coronary angiogram tomorrow.  She agrees to proceed with this.    It should be noted that in her 20s she also had a myocardial infarction.  The details of this are unclear.  She reports that she underwent angioplasty but no stenting was performed.  The details regarding this are very limited .    I then reassessed patient for.  She was chest pain-free and I visited with her and her  again.  Initial troponin was 4000 and has trended up to 7000.  The patient is hemodynamically  stable without complaints.    Echocardiogram notes hypokinesis of the LV apex with an ejection fraction of 50 to 55%.    EKG noted slight ST depression in the V4 and I.  Past Medical History   I have reviewed this patient's medical history and updated it with pertinent information if needed.   Past Medical History:   Diagnosis Date     Bilateral external ear infections     frequent infections as child     H/O lithotripsy      Kidney stone      Myocardial infarction (H)        Past Surgical History   I have reviewed this patient's surgical history and updated it with pertinent information if needed.  Past Surgical History:   Procedure Laterality Date     ANGIOPLASTY        SECTION      ,      MYRINGOTOMY, INSERT TUBE BILATERAL, COMBINED       ORTHOPEDIC SURGERY Left     left ankle       Prior to Admission Medications   Prior to Admission Medications   Prescriptions Last Dose Informant Patient Reported? Taking?   Multiple Vitamin (MULTIVITAMIN ADULT PO) Past Week at Unknown time  Yes Yes   aspirin-acetaminophen-caffeine (EXCEDRIN MIGRAINE) 250-250-65 MG tablet 3/26/2022 at Unknown time  Yes Yes   Sig: Take 1 tablet by mouth every 6 hours as needed for headaches      Facility-Administered Medications: None     Allergies   Allergies   Allergen Reactions     Amoxicillin Rash       Social History   I have reviewed this patient's social history and updated it with pertinent information if needed. Angelica Robledo  reports that she has never smoked. She has never used smokeless tobacco. She reports current alcohol use. She reports that she does not use drugs.    Family History   I have reviewed this patient's family history and updated it with pertinent information if needed.   History reviewed. No pertinent family history.    Review of Systems   The 12 point Review of Systems is negative other than noted in the HPI or here.     Physical Exam   Temp: 97.9  F (36.6  C) Temp src: Oral BP: 138/88  Pulse: 74   Resp: 15 SpO2: 96 % O2 Device: Nasal cannula Oxygen Delivery: 2 LPM  Vital Signs with Ranges  Temp:  [97.9  F (36.6  C)-98.5  F (36.9  C)] 97.9  F (36.6  C)  Pulse:  [64-86] 74  Resp:  [7-20] 15  BP: (129-205)/() 138/88  SpO2:  [84 %-100 %] 96 %  209 lbs 14.4 oz    GENERAL APPEARANCE: Alert and oriented x3. No acute distress.  SKIN: Inspection of the skin reveals no rashes, ulcerations, warm, dry  NECK: Supple and symmetric.   Normal JVP  LUNGS: Clear breath sounds throughout bilaterally, no wheezes, no rhonchi  CARDIOVASCULAR: S1, S2, regular rate and rhythm without any murmurs, gallops, rubs. .  ABDOMEN: Soft, non-tender, non-distended with normal bowel sounds.  No ascites noted.  EXTREMITIES: no edema.  NEUROLOGIC:  Normal mood and affect.  Sensation to touch was normal.      Data   Reviewed all labs and testing

## 2022-03-28 NOTE — PLAN OF CARE
Goal Outcome Evaluation:    Plan of Care Reviewed With: patient, daughter      Denies chest pain or sob. NPO  for cor. Angio. NS infusing at 75/hr. Family at bedside

## 2022-03-28 NOTE — PLAN OF CARE
Goal Outcome Evaluation:    Plan of Care Reviewed With: patient, daughter      Back from angio--has MVD. CV to see. Cont. On NS iv until taking adequate intake fluids. L wrist area c/d/I.  Air releasesd.

## 2022-03-28 NOTE — CONSULTS
Cardiovascular and Thoracic Surgery Consultation     Angelica Robledo MRN# [id number]   YOB: 1970 Age: 51 year old   Date of Admission: 3/27/2022     Reason for consult: Multivessel CAD           Assessment and Plan:   Coronary Angiogram: Circ , LAD and RCA lesions, final report pending.     Echo: Left ventricular systolic function is low normal. Ejection fraction is 50-  55%. There is hypokinesis of LV apex and apical segments. Differential diagnosis  includes LAD infarct versus Takotsubo cardiomyopathy. No hemodynamically significant valvular disease on limited evaluation. No prior study available for comparison. Findings discussed with Dr. Patel and Courtney at 12:30 PM.     Surgeon to see later this am               Chief Complaint:   Presented to the ED with Chest pain    History is obtained from the patient and Chart review         History of Present Illness:   Angelica Robledo is a 51 year old female with history of MI at age 25 with angioplasty who presents with chest pain that has been waxing and waning for the past few weeks, but worsened last night. The patient was at the [facility] in [place] before being transferred to the ED. The patient's work-up included a troponin which was elevated to 2.960, labs further detailed below, along with a chest x-ray and EKG, see below. The EKG displayed some mild ST elevation and depression. She was given 324 mg Aspirin, 4000 units Heparin, 0.5 mg dilaudid, 0.4 mg nitroglycerin x3 (total 1.2 mg), and 50 mcg fentanyl. EMS was called to the clinic for transport to the emergency department. EKG done by EMS had slight improvement but still lateral changes. Per EMS, they spoke to urgent care and the patient stated having a pain level of 7 but this was reduced to a 2-3 out of 10 after fentanyl was administered. Last night, her pain was an 8/10. She presents to the ED with chest tightness, arm numbness, and chest pain. She denies any swelling of her extremities.  She states that her chest pain has been constant since last night. In the ED, she states that her symptoms are similar to those she experienced when she had a myocardial infarction (at age 25); at that time, she was living in [place] and went to [facility]. She denies any stent placement but reports that she had angioplasty at that time. In addition, the patient had a family history of myocardial infarction (father) and aortic aneurysm (mother). She denies taking medication at home.            Past Medical History:          Birth History:   No birth history on file.            Past Surgical History:     Past Surgical History:   Procedure Laterality Date     ANGIOPLASTY        SECTION      ,      MYRINGOTOMY, INSERT TUBE BILATERAL, COMBINED       ORTHOPEDIC SURGERY Left     left ankle          Social History:     Social History     Tobacco Use     Smoking status: Never Smoker     Smokeless tobacco: Never Used   Substance Use Topics     Alcohol use: Yes     Comment: socially             Family History:   History reviewed. No pertinent family history.          Immunizations:     There is no immunization history on file for this patient.          Allergies:     Allergies   Allergen Reactions     Amoxicillin Rash             Medications:     Current Facility-Administered Medications Ordered in Epic   Medication Dose Route Frequency Last Rate Last Admin     acetaminophen (TYLENOL) tablet 650 mg  650 mg Oral Q6H PRN   650 mg at 22 0429    Or     acetaminophen (TYLENOL) Suppository 650 mg  650 mg Rectal Q6H PRN         aspirin (ASA) chewable tablet 81 mg  81 mg Oral Daily         atorvastatin (LIPITOR) tablet 80 mg  80 mg Oral Daily   80 mg at 22 0808     carvedilol (COREG) tablet 6.25 mg  6.25 mg Oral BID w/meals   6.25 mg at 22 0808     Continuing beta blocker from home medication list OR beta blocker order already placed during this visit   Does not apply DOES NOT  GO TO MAR         fentaNYL (PF) (SUBLIMAZE) injection    Once PRN   50 mcg at 03/28/22 1118     heparin (porcine) injection    Once PRN   3,000 Units at 03/28/22 1123     heparin infusion 25,000 units in D5W 250 mL ANTICOAGULANT  0-5,000 Units/hr Intravenous Continuous   Stopped at 03/28/22 1109     HOLD: nitroGLYcerin IF   Does not apply HOLD         iopamidol (ISOVUE-370) solution    Once PRN   40 mL at 03/28/22 1129     lidocaine (LMX4) cream   Topical Q1H PRN         lidocaine 1 % 0.1-1 mL  0.1-1 mL Other Q1H PRN         lidocaine 1 %    Once PRN   2 mL at 03/28/22 1118     LORazepam (ATIVAN) injection 0.5 mg  0.5 mg Intravenous Q2H PRN        Or     LORazepam (ATIVAN) tablet 0.5 mg  0.5 mg Oral Q2H PRN         LORazepam (ATIVAN) injection 0.5 mg  0.5 mg Intravenous Q6H PRN        Or     LORazepam (ATIVAN) tablet 0.5 mg  0.5 mg Oral Q6H PRN         medication instruction   Does not apply Continuous PRN         melatonin tablet 1 mg  1 mg Oral At Bedtime PRN         midazolam (VERSED) injection    Once PRN   1 mg at 03/28/22 1118     morphine (PF) injection 1 mg  1 mg Intravenous Q2H PRN   1 mg at 03/28/22 0029     naloxone (NARCAN) injection 0.2 mg  0.2 mg Intravenous Q2 Min PRN        Or     naloxone (NARCAN) injection 0.4 mg  0.4 mg Intravenous Q2 Min PRN        Or     naloxone (NARCAN) injection 0.2 mg  0.2 mg Intramuscular Q2 Min PRN        Or     naloxone (NARCAN) injection 0.4 mg  0.4 mg Intramuscular Q2 Min PRN         nitroGLYcerin 50 mg in D5W 250 mL (adult std) infusion CENTRAL   mcg/min Intravenous Continuous   Stopped at 03/28/22 0544     nitroGLYcerin in D5W injection    Once PRN   200 mcg at 03/28/22 1120     ondansetron (ZOFRAN-ODT) ODT tab 4 mg  4 mg Oral Q6H PRN   4 mg at 03/27/22 1546    Or     ondansetron (ZOFRAN) injection 4 mg  4 mg Intravenous Q6H PRN   4 mg at 03/28/22 0029     Patient is already receiving anticoagulation with heparin, enoxaparin (LOVENOX), warfarin (COUMADIN)  or  other anticoagulant medication   Does not apply Continuous PRN         Patient is NOT allergic to contrast dye OR was given pre-procedure oral steroids for treatment   Does not apply DOES NOT GO TO MAR         Reason ACE/ARB/ARNI order not selected   Other DOES NOT GO TO MAR         sodium chloride (PF) 0.9% PF flush 3 mL  3 mL Intracatheter Q1H PRN         sodium chloride (PF) 0.9% PF flush 3 mL  3 mL Intracatheter Q8H   3 mL at 03/28/22 0028     sodium chloride (PF) 0.9% PF flush 3 mL  3 mL Intracatheter q1 min prn         sodium chloride 0.9% infusion   Intravenous Continuous 75 mL/hr at 03/28/22 1100 75 mL/hr at 03/28/22 1100     verapamil (ISOPTIN) injection    Once PRN   2.5 mg at 03/28/22 1120     No current [facility]-ordered outpatient medications on file.             Review of Systems:   The 5 point Review of Systems is negative other than noted in the HPI           Physical Exam:   Vitals were reviewed  Temp: 98.1  F (36.7  C) Temp src: Oral BP: (!) 151/89 Pulse: 78   Resp: 18 SpO2: 95 % O2 Device: Nasal cannula Oxygen Delivery: 2 LPM  Constitutional:   awake, alert, cooperative, no apparent distress, and appears stated age     Eyes:   Lids and lashes normal, pupils equal      Neck:   Supple, symmetrical, trachea midline      Hematologic / Lymphatic:   no cervical lymphadenopathy     Back:   Symmetric, no curvature      Lungs:   No increased work of breathing, good air exchange, clear to auscultation bilaterally      Cardiovascular:   Normal apical impulse, regular rate and rhythm, normal S1 and S2, no S3 or S4, and no murmur noted     Abdomen:   No scars, normal bowel sounds, soft, non-distended, non-tender              Data:     Lab Results   Component Value Date    WBC 11.8 (H) 03/28/2022    HGB 10.6 (L) 03/28/2022    HCT 34.6 (L) 03/28/2022     03/28/2022     03/28/2022    POTASSIUM 3.5 03/28/2022    CHLORIDE 107 03/28/2022    CO2 24 03/28/2022    BUN 5 (L) 03/28/2022    CR 0.75 03/28/2022      (H) 03/28/2022

## 2022-03-29 ENCOUNTER — PREP FOR PROCEDURE (OUTPATIENT)
Dept: CARDIOLOGY | Facility: CLINIC | Age: 52
End: 2022-03-29
Payer: COMMERCIAL

## 2022-03-29 ENCOUNTER — APPOINTMENT (OUTPATIENT)
Dept: PHYSICAL THERAPY | Facility: CLINIC | Age: 52
End: 2022-03-29
Attending: STUDENT IN AN ORGANIZED HEALTH CARE EDUCATION/TRAINING PROGRAM
Payer: COMMERCIAL

## 2022-03-29 DIAGNOSIS — I25.10 CAD (CORONARY ARTERY DISEASE): Primary | ICD-10-CM

## 2022-03-29 LAB
ANION GAP SERPL CALCULATED.3IONS-SCNC: 5 MMOL/L (ref 3–14)
BUN SERPL-MCNC: 8 MG/DL (ref 7–30)
CALCIUM SERPL-MCNC: 8.8 MG/DL (ref 8.5–10.1)
CHLORIDE BLD-SCNC: 109 MMOL/L (ref 94–109)
CO2 SERPL-SCNC: 25 MMOL/L (ref 20–32)
CREAT SERPL-MCNC: 0.82 MG/DL (ref 0.52–1.04)
GFR SERPL CREATININE-BSD FRML MDRD: 87 ML/MIN/1.73M2
GLUCOSE BLD-MCNC: 108 MG/DL (ref 70–99)
POTASSIUM BLD-SCNC: 3.5 MMOL/L (ref 3.4–5.3)
SODIUM SERPL-SCNC: 139 MMOL/L (ref 133–144)
UFH PPP CHRO-ACNC: 0.24 IU/ML
UFH PPP CHRO-ACNC: 0.44 IU/ML
UFH PPP CHRO-ACNC: <0.1 IU/ML
UFH PPP CHRO-ACNC: <0.1 IU/ML

## 2022-03-29 PROCEDURE — 97530 THERAPEUTIC ACTIVITIES: CPT | Mod: GP

## 2022-03-29 PROCEDURE — 85520 HEPARIN ASSAY: CPT | Performed by: INTERNAL MEDICINE

## 2022-03-29 PROCEDURE — 258N000003 HC RX IP 258 OP 636: Performed by: PHYSICIAN ASSISTANT

## 2022-03-29 PROCEDURE — 250N000011 HC RX IP 250 OP 636: Performed by: INTERNAL MEDICINE

## 2022-03-29 PROCEDURE — 85520 HEPARIN ASSAY: CPT | Performed by: STUDENT IN AN ORGANIZED HEALTH CARE EDUCATION/TRAINING PROGRAM

## 2022-03-29 PROCEDURE — 36415 COLL VENOUS BLD VENIPUNCTURE: CPT | Performed by: STUDENT IN AN ORGANIZED HEALTH CARE EDUCATION/TRAINING PROGRAM

## 2022-03-29 PROCEDURE — 250N000013 HC RX MED GY IP 250 OP 250 PS 637: Performed by: PHYSICIAN ASSISTANT

## 2022-03-29 PROCEDURE — 36415 COLL VENOUS BLD VENIPUNCTURE: CPT | Performed by: INTERNAL MEDICINE

## 2022-03-29 PROCEDURE — 87641 MR-STAPH DNA AMP PROBE: CPT | Performed by: SURGERY

## 2022-03-29 PROCEDURE — 80048 BASIC METABOLIC PNL TOTAL CA: CPT | Performed by: STUDENT IN AN ORGANIZED HEALTH CARE EDUCATION/TRAINING PROGRAM

## 2022-03-29 PROCEDURE — 250N000011 HC RX IP 250 OP 636: Performed by: PHYSICIAN ASSISTANT

## 2022-03-29 PROCEDURE — 99233 SBSQ HOSP IP/OBS HIGH 50: CPT | Performed by: STUDENT IN AN ORGANIZED HEALTH CARE EDUCATION/TRAINING PROGRAM

## 2022-03-29 PROCEDURE — 250N000013 HC RX MED GY IP 250 OP 250 PS 637: Performed by: STUDENT IN AN ORGANIZED HEALTH CARE EDUCATION/TRAINING PROGRAM

## 2022-03-29 PROCEDURE — 97162 PT EVAL MOD COMPLEX 30 MIN: CPT | Mod: GP

## 2022-03-29 PROCEDURE — 210N000002 HC R&B HEART CARE

## 2022-03-29 PROCEDURE — 99233 SBSQ HOSP IP/OBS HIGH 50: CPT | Performed by: INTERNAL MEDICINE

## 2022-03-29 RX ORDER — FAMOTIDINE 20 MG/1
20 TABLET, FILM COATED ORAL
Status: COMPLETED | OUTPATIENT
Start: 2022-03-30 | End: 2022-03-30

## 2022-03-29 RX ORDER — HYDRALAZINE HYDROCHLORIDE 20 MG/ML
10 INJECTION INTRAMUSCULAR; INTRAVENOUS EVERY 4 HOURS PRN
Status: DISCONTINUED | OUTPATIENT
Start: 2022-03-29 | End: 2022-03-30

## 2022-03-29 RX ORDER — ASPIRIN 81 MG/1
162 TABLET, CHEWABLE ORAL
Status: COMPLETED | OUTPATIENT
Start: 2022-03-30 | End: 2022-03-30

## 2022-03-29 RX ORDER — POLYETHYLENE GLYCOL 3350 17 G/17G
17 POWDER, FOR SOLUTION ORAL DAILY
Status: DISCONTINUED | OUTPATIENT
Start: 2022-03-29 | End: 2022-03-30

## 2022-03-29 RX ORDER — CHLORHEXIDINE GLUCONATE ORAL RINSE 1.2 MG/ML
10 SOLUTION DENTAL ONCE
Status: COMPLETED | OUTPATIENT
Start: 2022-03-30 | End: 2022-03-30

## 2022-03-29 RX ORDER — ACETAMINOPHEN 650 MG/1
650 SUPPOSITORY RECTAL EVERY 6 HOURS PRN
Status: DISCONTINUED | OUTPATIENT
Start: 2022-03-29 | End: 2022-03-30

## 2022-03-29 RX ORDER — ACETAMINOPHEN 325 MG/1
975 TABLET ORAL EVERY 6 HOURS PRN
Status: DISCONTINUED | OUTPATIENT
Start: 2022-03-29 | End: 2022-03-30

## 2022-03-29 RX ORDER — CARVEDILOL 6.25 MG/1
6.25 TABLET ORAL ONCE
Status: COMPLETED | OUTPATIENT
Start: 2022-03-29 | End: 2022-03-29

## 2022-03-29 RX ORDER — CHLORHEXIDINE GLUCONATE ORAL RINSE 1.2 MG/ML
10 SOLUTION DENTAL ONCE
Status: DISCONTINUED | OUTPATIENT
Start: 2022-03-29 | End: 2022-03-29

## 2022-03-29 RX ORDER — LIDOCAINE 40 MG/G
CREAM TOPICAL
Status: DISCONTINUED | OUTPATIENT
Start: 2022-03-29 | End: 2022-03-30 | Stop reason: HOSPADM

## 2022-03-29 RX ORDER — CARVEDILOL 6.25 MG/1
12.5 TABLET ORAL 2 TIMES DAILY WITH MEALS
Status: DISCONTINUED | OUTPATIENT
Start: 2022-03-29 | End: 2022-03-30

## 2022-03-29 RX ORDER — DEXTROSE MONOHYDRATE 25 G/50ML
25-50 INJECTION, SOLUTION INTRAVENOUS
Status: DISCONTINUED | OUTPATIENT
Start: 2022-03-29 | End: 2022-03-30

## 2022-03-29 RX ORDER — NICOTINE POLACRILEX 4 MG
15-30 LOZENGE BUCCAL
Status: DISCONTINUED | OUTPATIENT
Start: 2022-03-29 | End: 2022-03-30

## 2022-03-29 RX ORDER — ISOSORBIDE DINITRATE 20 MG/1
20 TABLET ORAL
Status: DISCONTINUED | OUTPATIENT
Start: 2022-03-29 | End: 2022-03-29

## 2022-03-29 RX ORDER — LISINOPRIL 2.5 MG/1
2.5 TABLET ORAL DAILY
Status: DISCONTINUED | OUTPATIENT
Start: 2022-03-29 | End: 2022-03-30

## 2022-03-29 RX ORDER — ASPIRIN 81 MG/1
81 TABLET, CHEWABLE ORAL
Status: COMPLETED | OUTPATIENT
Start: 2022-03-30 | End: 2022-03-30

## 2022-03-29 RX ADMIN — ATORVASTATIN CALCIUM 80 MG: 40 TABLET, FILM COATED ORAL at 08:45

## 2022-03-29 RX ADMIN — ACETAMINOPHEN 650 MG: 325 TABLET, FILM COATED ORAL at 08:58

## 2022-03-29 RX ADMIN — ASPIRIN 81 MG 81 MG: 81 TABLET ORAL at 08:44

## 2022-03-29 RX ADMIN — CARVEDILOL 12.5 MG: 12.5 TABLET, FILM COATED ORAL at 18:34

## 2022-03-29 RX ADMIN — OXYCODONE HYDROCHLORIDE 10 MG: 5 TABLET ORAL at 04:46

## 2022-03-29 RX ADMIN — HYDRALAZINE HYDROCHLORIDE 10 MG: 20 INJECTION INTRAMUSCULAR; INTRAVENOUS at 06:10

## 2022-03-29 RX ADMIN — CARVEDILOL 6.25 MG: 6.25 TABLET, FILM COATED ORAL at 08:45

## 2022-03-29 RX ADMIN — POLYETHYLENE GLYCOL 3350 17 G: 17 POWDER, FOR SOLUTION ORAL at 11:54

## 2022-03-29 RX ADMIN — AMIODARONE HYDROCHLORIDE 1 MG/MIN: 50 INJECTION, SOLUTION INTRAVENOUS at 16:36

## 2022-03-29 RX ADMIN — CARVEDILOL 6.25 MG: 6.25 TABLET, FILM COATED ORAL at 11:53

## 2022-03-29 RX ADMIN — LISINOPRIL 2.5 MG: 2.5 TABLET ORAL at 11:53

## 2022-03-29 RX ADMIN — HEPARIN SODIUM 1500 UNITS/HR: 1000 INJECTION INTRAVENOUS; SUBCUTANEOUS at 13:35

## 2022-03-29 ASSESSMENT — ACTIVITIES OF DAILY LIVING (ADL)
ADLS_ACUITY_SCORE: 3

## 2022-03-29 NOTE — PLAN OF CARE
.Goal Outcome Evaluation:    Plan of Care Reviewed With: patient, family     Overall Patient Progress: no change     Neuro:intact  CV/Rhythm:SR, HTN  Resp/02:RA  GI/Diet:LSF  :UA sent  Skin/Incisions/Sites:intact, R TR site CDI tender  Pulses/CMS:intact  Edema:none  Activity/Falls Risk:SBA  Lines/Drains/IVs:PIV, old R TR site  Labs/BGM:K 3.5 recheck at 2000  Test/Procedures:US/CXR done, angio today 3 VD  VS/Pain:HTN, denies pain  DC Plan:[facility] saw, would like surgery in [place]  Other:family at bedside

## 2022-03-29 NOTE — PROGRESS NOTES
"Cards quick note      Missed page (while in clinic) from RN stating that pt had a 38 beat run of VT at 2:20 this afternoon. Reviewed tele, -150 bpm with VT. Called RN who interviewed pt with me on the phone, pt states she noted palpitations and \"heart beating out of my chest\" at the time, no worsening chest tightness. Of note, her Coreg was increased earlier today.     Dr. Davenport reviewed this with Dr. Sotelo who is planning to take her surgery for tomorrow.     In the meantime, will start amiodarone drip at 1 mg/hr x 6h, then reduce to 0.5 mg/hr.     If she develops worsening symptoms overnight please notify CV surgery urgently.     Melania Chung PA-C  [facility]  Pager: [phone]  Text Page  (7:30am - 4pm M-F)     "

## 2022-03-29 NOTE — PLAN OF CARE
Pt c/o persistent HA throughout the day. 38 beat run VT @1420, cardiology made aware. Amiodarone gtt initiated, heparin gtt continued. Plan for surgery tomorrow, likely afternoon per CT surgery. Chest pain has remained mild throughout the day. BP, HR stable, no SOB.

## 2022-03-29 NOTE — PROGRESS NOTES
[facility] Cardiology Progress Note  Date of Service: 03/29/2022  Primary Cardiologist: Will be Dr. Davenport    Angelica Robledo is a 51 year old female without routine medical care and on no medications, who was admitted on 3/27/2022 with chest pain since Thursday of last week.    Subjective: Slight persistent chest tightness, palpitations, and progressive headache. Isordil started earlier today.    Assessment:  1. Severe Multivessel CAD. Bypass surgery recommended.   2. Non-STEMI, troponin peak @ 7200.  3. Prior history of myocardial infarction in her 20s, details unclear.  4. Hypertensive emergency, previously untreated hypertension.  5. LV apical hypokinesis, LAD infarct versus Takotsubo cardiomyopathy.  EF 50-55%.  6. .  7. Mild Anemia.  Denies symptoms of bleeding.    Plan:   1. STOP isordil, INCREASE Coreg to 12.5 mg BID.  2. Continue aspirin, statin, ACEI, heparin.  3. Hospital service to work-up anemia.  4. Bedrest with  privileges.   5. CV surgery date pending.   6. Will ask SW to assist family in arranging cardiology follow ups in [facility].     Plan was formulated under direction of Dr. Davenport.   Melania Chung PA-C  [facility]  Pager: [phone]  Text Page  (7:30am - 4pm M-F)   __________________________________________________________________________  Physical Exam   Temp: 98.8  F (37.1  C) Temp src: Oral BP: (!) 153/102 Pulse: 73   Resp: 8 SpO2: 95 % O2 Device: None (Room air) Oxygen Delivery: 2 LPM  Vitals:    03/27/22 1452 03/28/22 0500 03/29/22 0500   Weight: 95.2 kg (209 lb 14.4 oz) 95.7 kg (211 lb) 95.8 kg (211 lb 3.2 oz)       GENERAL:  The patient is in no apparent distress.   NECK: CVP appears normal, no masses or thyromegaly.  PULMONARY:  There is a normal respiratory effort. Clear lungs to auscultation bilaterally.   CARDIOVASCULAR:  RRR, normal S1 S2, no m/r/g.  GI:  Non tender abdomen with normoactive bowel sounds and no  hepatosplenomegaly. There are no masses palpable.   EXTREMITIES:  No clubbing, cyanosis or edema.  VASCULAR: 2+ Pulses bilaterally in upper and lower extremities.    Medications     - MEDICATION INSTRUCTIONS -       heparin 1,500 Units/hr (03/29/22 0850)     - MEDICATION INSTRUCTIONS -       - MEDICATION INSTRUCTIONS -       ACE/ARB/ARNI NOT PRESCRIBED         aspirin  81 mg Oral Daily     atorvastatin  80 mg Oral Daily     carvedilol  12.5 mg Oral BID w/meals     carvedilol  6.25 mg Oral Once     heparin ANTICOAGULANT Loading dose  60 Units/kg Intravenous Once     lisinopril  2.5 mg Oral Daily     polyethylene glycol  17 g Oral Daily     sodium chloride (PF)  3 mL Intracatheter Q8H       Data   Most Recent 3 CBC's:  Recent Labs   Lab Test 03/28/22 1953 03/28/22  0600   WBC 8.9 11.8*   HGB 11.2* 10.6*   MCV 84 80    228     Most Recent 3 BMP's:  Recent Labs   Lab Test 03/29/22  0547 03/28/22 1953 03/28/22  0826 03/28/22  0600     --   --  138   POTASSIUM 3.5 3.6 3.5 3.3*   CHLORIDE 109  --   --  107   CO2 25  --   --  24   BUN 8  --   --  5*   CR 0.82  --   --  0.75   ANIONGAP 5  --   --  7   TOMAS 8.8  --   --  9.1   *  --   --  124*     Most Recent Cholesterol Panel:  Recent Labs   Lab Test 03/27/22  1028   CHOL 172   *   HDL 57   TRIG 47     Most Recent Hemoglobin A1c:  Recent Labs   Lab Test 03/28/22  0600   A1C 5.7*       A total of 30 minutes was spent face-to-face or coordinating care of Angelica Robledo. Over 50% of our time was spent counseling the patient and/or coordinating care.

## 2022-03-29 NOTE — PROVIDER NOTIFICATION
"Hospitalist Cross Cover  3/29/2022    Notified of /106. Chart reviewed.     /89 (BP Location: Left arm)   Pulse 84   Temp 97.9  F (36.6  C) (Oral)   Resp 11   Ht 1.651 m (5' 5\")   Wt 95.8 kg (211 lb 3.2 oz)   LMP 02/27/2022   SpO2 98%   BMI 35.15 kg/m      Plan: hydralazine 10 mg IV q 4 hours prn for SBP>170 and DBP>100. Defer to primary team later today for further medication adjustments.     Mary Mancilla MD        "

## 2022-03-29 NOTE — PROGRESS NOTES
[facility]    Medicine Progress Note - Hospitalist Service    Date of Admission:  3/27/2022    Assessment & Plan         Angelica Robledo is a 51 year old female admitted on 3/27/2022 for NSTEMI.    NSTEMI  Hypertensive emergency  Severe 3 vessel CAD, premature  Patient is visiting from [place]. She reports starting about Thursday she noticed intermittent chest pain and pressure that progressively worsened. She has ongoing hx of stuttering chest pain typically associated with elevated BP at home.  She presented to urgent care 03/27 which noticed elevated BP and positive troponin and she was sent urgently via EMS to the ER.   Within the ER here, BP >200/100. Initial troponin positive at ~4300. EKG with mild ST changes but no obvious elevations. Cardiology evaluated at bedside and recommended admission for NSTEMI  - echo returned with EF of 55-60% and hypokinesis of the LV apex and apical segements  - angiogram returned with  of LCx, 99% occlusion or RCA and up to 80% occlusion of LAD    > CV surgery evaluating for CABG. Date TBD  > remains on heparin gtt  > BP remains uncontrolled and she was given IV hydralazine over the night. Currently on coreg with addition of low dose lisinopril today. Cardiology following, appreciate their management assistance.  > continue statin    Prediabetes  A1C at 5.7   - with her significant cardiac hx patient will need aggressive lifestyle changes on discharge to reduced risk of diabetes.    Suspect untreated HTN  Patient reports hx of BP reading from 150s-200s at home. Occasionally she will feel chest pain when her BP is high but it usually self resolves. She also takes an herbal supplement with aspirin to help relieve the pain  - will need better BP control on discharge    Covid Negative         Diet: 2 Gram Sodium Diet    DVT Prophylaxis: Heparin gtt  Cooper Catheter: Not present  Central Lines: None  Cardiac Monitoring: ACTIVE order. Indication: Post-  "PCI/Angiogram (24 hours)  Code Status: Full Code      Disposition Plan   Expected Discharge: TBD post CBAG  Anticipated discharge location:  Awaiting care coordination huddle  Delays:            The patient's care was discussed with the patient and her nurse.    Alla Brady DO  Hospitalist Service  [facility]  Securely message with the Vocera Web Console (learn more here)  Text page via [employer] Paging/Directory         Clinically Significant Risk Factors Present on Admission             # Obesity: Estimated body mass index is 35.15 kg/m  as calculated from the following:    Height as of this encounter: 1.651 m (5' 5\").    Weight as of this encounter: 95.8 kg (211 lb 3.2 oz).      ______________________________________________________________________    Interval History   Patient lying in bed. She reports mild chest pain over the night that improved on its own. Probably associated with high BP. No GI complaints however no BM since Saturday. No cough. No dizziness. She was updated on her severe disease.    Data reviewed today: I reviewed all medications, new labs and imaging results over the last 24 hours.     Physical Exam   Vital Signs: Temp: 98.8  F (37.1  C) Temp src: Oral BP: (!) 153/102 Pulse: 73   Resp: 8 SpO2: 95 % O2 Device: None (Room air) Oxygen Delivery: 2 LPM  Weight: 211 lbs 3.2 oz     Constitutional: Awake, alert, cooperative, no apparent distress  Respiratory: Clear to auscultation bilaterally, no crackles or wheezing  Cardiovascular: Regular rate and rhythm, normal S1 and S2, and no murmur noted  GI: Normal bowel sounds, soft, non-distended, non-tender  Skin/Integumen: No rashes, no cyanosis, no edema  MSK no joint swelling or pain    Data   Recent Labs   Lab 03/29/22  0547 03/28/22  1953 03/28/22  0826 03/28/22  0600   WBC  --  8.9  --  11.8*   HGB  --  11.2*  --  10.6*   MCV  --  84  --  80   PLT  --  249  --  228     --   --  138   POTASSIUM 3.5 3.6 3.5 3.3* "   CHLORIDE 109  --   --  107   CO2 25  --   --  24   BUN 8  --   --  5*   CR 0.82  --   --  0.75   ANIONGAP 5  --   --  7   TOMAS 8.8  --   --  9.1   *  --   --  124*   ALBUMIN  --   --  3.3*  --    PROTTOTAL  --   --  6.6*  --    BILITOTAL  --   --  0.4  --    ALKPHOS  --   --  67  --    ALT  --   --  36  --    AST  --   --  54*  --      Recent Results (from the past 24 hour(s))   Cardiac Catheterization    Narrative      Multivessel coronary artery disease identified angiographically.    Left main: No significant lesion.    LAD: 50% lesion at the takeoff of the second diagonal and about 80%   lesion at the takeoff of the third diagonal. Proximal D3 has a 50% lesion.    LCx:  after takeoff of the first major obtuse marginal. Distal OM and   LPL branches are supplied by epicardial collaterals from the apical LAD.    RCA: 99% lesion in the mid RCA.    Left ventricular filling pressures are moderately elevated (about 25   mmHg).    Cardiovascular surgery has been consulted for CABG evaluation.      XR Chest 2 Views    Narrative    EXAM: XR CHEST 2 VW  LOCATION: [facility]  DATE/TIME: 3/28/2022 6:41 PM    INDICATION: Pre op.  COMPARISON: Chest x-ray 03/27/2022.      Impression    IMPRESSION: No acute cardiopulmonary disease. Minor bibasilar atelectasis.   US Carotid Bilateral    Narrative    EXAM: US CAROTID BILATERAL  LOCATION: [facility]  DATE/TIME: 3/28/2022 5:55 PM    INDICATION: Coronary artery disease. Preoperative evaluation prior to planned coronary artery bypass grafting  COMPARISON: None.  TECHNIQUE: Duplex exam performed utilizing 2D gray-scale imaging, Doppler interrogation with color-flow and spectral waveform analysis. The percent diameter stenosis is determined using NASCET criteria and Society of Radiologists in Ultrasound Consensus   Criteria.    FINDINGS:    RIGHT: Mild plaque at the bifurcation. The peak systolic velocity in the ICA is less  than 125 cm/sec, consistent with less than 50% stenosis. Normal velocities in the ECA. Antegrade flow within the vertebral artery.     LEFT: Moderate plaque at the bifurcation. The peak systolic velocity in the ICA is 125-230 cm/sec, consistent with 50-69% stenosis. Normal velocities in the ECA. Antegrade flow within the vertebral artery.    VELOCITY CHART:  CCA   Right: 122 cm/s   Left: 128 cm/s  ICA   Right: 113 cm/s   Left: 149 cm/s  ECA   Right: 84 cm/s   Left: 99 cm/s  ICA/CCA PSV Ratio   Right: 0.9   Left: 1.2      Impression    IMPRESSION:  1.  Right carotid: Mild plaque formation, velocities consistent with less than 50% stenosis in the right internal carotid artery.  2.  Left carotid: Moderate plaque formation, velocities consistent with 50-69% stenosis in the left internal carotid artery.  3.  Flow within the vertebral arteries is antegrade.   US Lower Extremity Venous Mapping Bilateral    Narrative      EXAM: Bilateral lower extremity superficial venous mapping  LOCATION: [facility]  DATE/RYAN: 3/28/2022 7:15 PM    INDICATION: Coronary artery disease, preoperative evaluation for great saphenous vein bypass prior to coronary artery bypass grafting.      TECHNIQUE: Grayscale imaging was performed of the bilateral great and small saphenous veins.    FINDINGS: Patent and compressible bilateral great saphenous veins visualized from the upper thighs to the ankles. Patent and compressible bilateral small saphenous veins visualized from the upper calf to the ankles. Mild bilateral lower calf edema.    VENOUS DIAMETERS    RIGHT GREATER SAPHENOUS VEIN    Upper Thigh: 2.6 mm  Mid Thigh: 3.8 mm  Lower Thigh: 3.2 mm  Knee: 2.6 mm  Upper Calf: 2.0 mm  Mid Calf: 1.7 mm  Lower Calf: 2.1 mm  Ankle: 2.5 mm    LEFT GREATER SAPHENOUS VEIN    Upper Thigh: 4.3 mm  Mid Thigh: 3.1 mm  Lower Thigh: 3.5 mm  Knee: 3.3 mm  Upper Calf: 2.9 mm  Mid Calf: 2.2 mm  Lower Calf: 2.8 mm  Ankle: 3.3 mm         Impression    IMPRESSION:   The great saphenous veins are patent and compressible bilaterally. Venous diameters as described above.     US Upper Extremity Arterial Duplex Left    Narrative    EXAM: US UPPER EXTREMITY ARTERIAL DUPLEX LEFT  LOCATION: [facility]  DATE/TIME: 3/28/2022 5:56 PM    INDICATION: Severe coronary artery disease, preoperative evaluation of brachial and radial arteries for bypass grafting wire to planned coronary artery bypass graft.      COMPARISON: None.  TECHNIQUE: Arterial Duplex ultrasound of the left arm. Color flow and spectral Doppler with waveform analysis performed.    FINDINGS:  Waveforms in the left brachial, radial, and ulnar arteries are normal and multiphasic. No significant atherosclerotic plaque in the radial or ulnar arteries.    DIAMETERS ARE AS FOLLOWS:  Radial artery proximal forearm: 2.2 mm  mid forearm: 1.8-2.3 mm  distal forearm: poorly visualized  wrist: 2.3 mm    PEAK SYSTOLIC VELOCITIES ARE AS FOLLOWS:  Brachial artery: 74 7 m/s  radial artery wrist: 41 cm/s  ulnar artery wrist: 42 7 m/s        Impression    IMPRESSION:   1.  The left radial artery is widely patent; diameters as described above.     Medications     - MEDICATION INSTRUCTIONS -       heparin 1,500 Units/hr (03/29/22 0850)     - MEDICATION INSTRUCTIONS -       - MEDICATION INSTRUCTIONS -       ACE/ARB/ARNI NOT PRESCRIBED         aspirin  81 mg Oral Daily     atorvastatin  80 mg Oral Daily     carvedilol  6.25 mg Oral BID w/meals     heparin ANTICOAGULANT Loading dose  60 Units/kg Intravenous Once     sodium chloride (PF)  3 mL Intracatheter Q8H

## 2022-03-29 NOTE — PROGRESS NOTES
03/29/22 0849   Quick Adds   Type of Visit Initial PT Evaluation   Living Environment   People in Home spouse;child(costa), adult   Current Living Arrangements house   Transportation Anticipated car, drives self;family or friend will provide   Living Environment Comments Pt lives with spouse and 2 sons ages 22 and 19 years old in home, does have stairs but no difficulty with them at this time.   Self-Care   Usual Activity Tolerance good   Current Activity Tolerance fair   Regular Exercise No   Equipment Currently Used at Home none   Fall history within last six months no   Activity/Exercise/Self-Care Comment Pt is independent and active at baseline, no formal exercise but does walk dog daily, works as  for [employer] school in [place].   General Information   Onset of Illness/Injury or Date of Surgery 03/27/22   Referring Physician Alla Brady, DO   Patient/Family Therapy Goals Statement (PT) To get better and go home   Pertinent History of Current Problem (include personal factors and/or comorbidities that impact the POC) Pt is 51 year old female adm on 3/27/22 with chest pain, underwent angio which showed multi-vessel disease. Pt has a history of MI at age 25 and has a family history of heart disease. Pt seen by CV surgery, plans to have surgery this admission vs returning to [place] for surgery.   Existing Precautions/Restrictions cardiac   Cognition   Affect/Mental Status (Cognition) WFL   Orientation Status (Cognition) oriented x 4   Pain Assessment   Patient Currently in Pain No   Posture    Posture Not impaired   Range of Motion (ROM)   ROM Comment B LE ROM WFL   Strength (Manual Muscle Testing)   Strength Comments B LE strength WFL   Bed Mobility   Bed Mobility no deficits identified   Transfers   Transfers no deficits identified   Gait/Stairs (Locomotion)   Nobles Level (Gait) independent   Balance   Balance Comments No LOB with functional tasks   Clinical Impression    Criteria for Skilled Therapeutic Intervention Yes, treatment indicated   PT Diagnosis (PT) Impaired mobility   Influenced by the following impairments Decreased activity tolerance   Functional limitations due to impairments Decreased ability to participate in daily tasks   Clinical Presentation (PT Evaluation Complexity) Evolving/Changing   Clinical Presentation Rationale Current presentation, PMH, need for CV surgery   Clinical Decision Making (Complexity) low complexity   Planned Therapy Interventions (PT) home exercise program;patient/family education   Risk & Benefits of therapy have been explained evaluation/treatment results reviewed;care plan/treatment goals reviewed;risks/benefits reviewed;current/potential barriers reviewed;participants voiced agreement with care plan;participants included;patient   PT Discharge Planning   PT Discharge Recommendation (DC Rec) home with assist;home with outpatient cardiac rehab   PT Rationale for DC Rec At this time pt is independent with mobility, denies cardiac symptoms with ambulation in room. Will sign off for cardiac rehab at this time and re-evaluate post-surgery if pt has surgery this hospital stay. Pt will benefit from phase II outpatient cardiac rehab after surgery whether here or in [place].   PT Brief overview of current status Indepndent   Total Evaluation Time   Total Evaluation Time (Minutes) 10   Physical Therapy Goals   PT Frequency One time eval and treatment only   PT Predicated Duration/Target Date for Goal Attainment 03/29/22   PT Goals Cardiac Phase 1   PT: Understanding of cardiac education to maximize quality of life, condition management, and health outcomes Patient;Verbalize;Demonstrate   PT: Perform aerobic activity with stable cardiovascular response continuous;5 minutes   PT: Functional/aerobic ambulation tolerance with stable cardiovascular response in order to return to home and community environment Independent   PT: Navigation of stairs  simulating home set up with stable cardiovascular response in order to return to home and community environment Independent

## 2022-03-30 ENCOUNTER — ANESTHESIA EVENT (OUTPATIENT)
Dept: SURGERY | Facility: CLINIC | Age: 52
End: 2022-03-30
Payer: COMMERCIAL

## 2022-03-30 ENCOUNTER — ANESTHESIA (OUTPATIENT)
Dept: SURGERY | Facility: CLINIC | Age: 52
End: 2022-03-30
Payer: COMMERCIAL

## 2022-03-30 ENCOUNTER — APPOINTMENT (OUTPATIENT)
Dept: GENERAL RADIOLOGY | Facility: CLINIC | Age: 52
End: 2022-03-30
Attending: SURGERY
Payer: COMMERCIAL

## 2022-03-30 PROBLEM — I47.29 PAROXYSMAL VENTRICULAR TACHYCARDIA (H): Status: ACTIVE | Noted: 2022-03-30

## 2022-03-30 LAB
ALBUMIN SERPL-MCNC: 2.8 G/DL (ref 3.4–5)
ALP SERPL-CCNC: 54 U/L (ref 40–150)
ALT SERPL W P-5'-P-CCNC: 50 U/L (ref 0–50)
ANION GAP SERPL CALCULATED.3IONS-SCNC: 5 MMOL/L (ref 3–14)
ANION GAP SERPL CALCULATED.3IONS-SCNC: 6 MMOL/L (ref 3–14)
ANION GAP SERPL CALCULATED.3IONS-SCNC: 6 MMOL/L (ref 3–14)
APTT PPP: 24 SECONDS (ref 22–38)
APTT PPP: 25 SECONDS (ref 22–38)
AST SERPL W P-5'-P-CCNC: 142 U/L (ref 0–45)
BACTERIA UR CULT: ABNORMAL
BASE EXCESS BLDA CALC-SCNC: -2.6 MMOL/L (ref -9–1.8)
BASE EXCESS BLDV CALC-SCNC: -1.1 MMOL/L (ref -7.7–1.9)
BILIRUB SERPL-MCNC: 0.8 MG/DL (ref 0.2–1.3)
BUN SERPL-MCNC: 6 MG/DL (ref 7–30)
BUN SERPL-MCNC: 6 MG/DL (ref 7–30)
BUN SERPL-MCNC: 7 MG/DL (ref 7–30)
CA-I BLD-MCNC: 4.6 MG/DL (ref 4.4–5.2)
CALCIUM SERPL-MCNC: 9 MG/DL (ref 8.5–10.1)
CALCIUM SERPL-MCNC: 9.3 MG/DL (ref 8.5–10.1)
CALCIUM SERPL-MCNC: 9.3 MG/DL (ref 8.5–10.1)
CHLORIDE BLD-SCNC: 108 MMOL/L (ref 94–109)
CHLORIDE BLD-SCNC: 113 MMOL/L (ref 94–109)
CHLORIDE BLD-SCNC: 115 MMOL/L (ref 94–109)
CO2 SERPL-SCNC: 23 MMOL/L (ref 20–32)
CO2 SERPL-SCNC: 23 MMOL/L (ref 20–32)
CO2 SERPL-SCNC: 25 MMOL/L (ref 20–32)
CREAT SERPL-MCNC: 0.76 MG/DL (ref 0.52–1.04)
CREAT SERPL-MCNC: 0.8 MG/DL (ref 0.52–1.04)
CREAT SERPL-MCNC: 0.81 MG/DL (ref 0.52–1.04)
ERYTHROCYTE [DISTWIDTH] IN BLOOD BY AUTOMATED COUNT: 17.2 % (ref 10–15)
ERYTHROCYTE [DISTWIDTH] IN BLOOD BY AUTOMATED COUNT: 17.3 % (ref 10–15)
FIBRINOGEN PPP-MCNC: 353 MG/DL (ref 170–490)
GFR SERPL CREATININE-BSD FRML MDRD: 88 ML/MIN/1.73M2
GFR SERPL CREATININE-BSD FRML MDRD: 89 ML/MIN/1.73M2
GFR SERPL CREATININE-BSD FRML MDRD: >90 ML/MIN/1.73M2
GLUCOSE BLD-MCNC: 105 MG/DL (ref 70–99)
GLUCOSE BLD-MCNC: 175 MG/DL (ref 70–99)
GLUCOSE BLD-MCNC: 175 MG/DL (ref 70–99)
GLUCOSE BLDC GLUCOMTR-MCNC: 137 MG/DL (ref 70–99)
GLUCOSE BLDC GLUCOMTR-MCNC: 140 MG/DL (ref 70–99)
GLUCOSE BLDC GLUCOMTR-MCNC: 175 MG/DL (ref 70–99)
HCO3 BLD-SCNC: 21 MMOL/L (ref 21–28)
HCO3 BLDV-SCNC: 24 MMOL/L (ref 21–28)
HCT VFR BLD AUTO: 25.2 % (ref 35–47)
HCT VFR BLD AUTO: 30.1 % (ref 35–47)
HGB BLD-MCNC: 7.7 G/DL (ref 11.7–15.7)
HGB BLD-MCNC: 9.3 G/DL (ref 11.7–15.7)
INR PPP: 1.24 (ref 0.85–1.15)
INR PPP: 1.44 (ref 0.85–1.15)
LACTATE SERPL-SCNC: 1.4 MMOL/L (ref 0.7–2)
MAGNESIUM SERPL-MCNC: 2.6 MG/DL (ref 1.6–2.3)
MCH RBC QN AUTO: 24.9 PG (ref 26.5–33)
MCH RBC QN AUTO: 25.3 PG (ref 26.5–33)
MCHC RBC AUTO-ENTMCNC: 30.6 G/DL (ref 31.5–36.5)
MCHC RBC AUTO-ENTMCNC: 30.9 G/DL (ref 31.5–36.5)
MCV RBC AUTO: 82 FL (ref 78–100)
MCV RBC AUTO: 82 FL (ref 78–100)
MRSA DNA SPEC QL NAA+PROBE: NEGATIVE
O2/TOTAL GAS SETTING VFR VENT: 80 %
O2/TOTAL GAS SETTING VFR VENT: 80 %
OXYHGB MFR BLD: 96 % (ref 92–100)
OXYHGB MFR BLDV: 48 % (ref 70–75)
PCO2 BLD: 31 MM HG (ref 35–45)
PCO2 BLDV: 38 MM HG (ref 40–50)
PH BLD: 7.44 [PH] (ref 7.35–7.45)
PH BLDV: 7.4 [PH] (ref 7.32–7.43)
PHOSPHATE SERPL-MCNC: 3.1 MG/DL (ref 2.5–4.5)
PLATELET # BLD AUTO: 156 10E3/UL (ref 150–450)
PLATELET # BLD AUTO: 177 10E3/UL (ref 150–450)
PO2 BLD: 87 MM HG (ref 80–105)
PO2 BLDV: 28 MM HG (ref 25–47)
POTASSIUM BLD-SCNC: 3.5 MMOL/L (ref 3.4–5.3)
POTASSIUM BLD-SCNC: 3.7 MMOL/L (ref 3.4–5.3)
POTASSIUM BLD-SCNC: 3.9 MMOL/L (ref 3.4–5.3)
POTASSIUM BLD-SCNC: 3.9 MMOL/L (ref 3.4–5.3)
PROT SERPL-MCNC: 5.8 G/DL (ref 6.8–8.8)
RBC # BLD AUTO: 3.09 10E6/UL (ref 3.8–5.2)
RBC # BLD AUTO: 3.68 10E6/UL (ref 3.8–5.2)
SA TARGET DNA: POSITIVE
SODIUM SERPL-SCNC: 139 MMOL/L (ref 133–144)
SODIUM SERPL-SCNC: 142 MMOL/L (ref 133–144)
SODIUM SERPL-SCNC: 143 MMOL/L (ref 133–144)
UFH PPP CHRO-ACNC: <0.1 IU/ML
WBC # BLD AUTO: 13.5 10E3/UL (ref 4–11)
WBC # BLD AUTO: 14.3 10E3/UL (ref 4–11)

## 2022-03-30 PROCEDURE — 85610 PROTHROMBIN TIME: CPT | Performed by: SURGERY

## 2022-03-30 PROCEDURE — 250N000009 HC RX 250: Performed by: SURGERY

## 2022-03-30 PROCEDURE — 250N000013 HC RX MED GY IP 250 OP 250 PS 637: Performed by: INTERNAL MEDICINE

## 2022-03-30 PROCEDURE — 999N000141 HC STATISTIC PRE-PROCEDURE NURSING ASSESSMENT: Performed by: SURGERY

## 2022-03-30 PROCEDURE — 258N000003 HC RX IP 258 OP 636: Performed by: SURGERY

## 2022-03-30 PROCEDURE — 02100Z9 BYPASS CORONARY ARTERY, ONE ARTERY FROM LEFT INTERNAL MAMMARY, OPEN APPROACH: ICD-10-PCS | Performed by: SURGERY

## 2022-03-30 PROCEDURE — 82330 ASSAY OF CALCIUM: CPT | Performed by: SURGERY

## 2022-03-30 PROCEDURE — 93010 ELECTROCARDIOGRAM REPORT: CPT | Performed by: INTERNAL MEDICINE

## 2022-03-30 PROCEDURE — 250N000011 HC RX IP 250 OP 636: Performed by: INTERNAL MEDICINE

## 2022-03-30 PROCEDURE — 85520 HEPARIN ASSAY: CPT | Performed by: STUDENT IN AN ORGANIZED HEALTH CARE EDUCATION/TRAINING PROGRAM

## 2022-03-30 PROCEDURE — 250N000011 HC RX IP 250 OP 636

## 2022-03-30 PROCEDURE — 80048 BASIC METABOLIC PNL TOTAL CA: CPT | Performed by: SURGERY

## 2022-03-30 PROCEDURE — 84132 ASSAY OF SERUM POTASSIUM: CPT | Performed by: SURGERY

## 2022-03-30 PROCEDURE — 999N000065 XR ABDOMEN PORT 1 VIEWS

## 2022-03-30 PROCEDURE — 250N000024 HC ISOFLURANE, PER MIN: Performed by: SURGERY

## 2022-03-30 PROCEDURE — 250N000011 HC RX IP 250 OP 636: Performed by: ANESTHESIOLOGY

## 2022-03-30 PROCEDURE — 250N000011 HC RX IP 250 OP 636: Performed by: SURGERY

## 2022-03-30 PROCEDURE — 250N000009 HC RX 250

## 2022-03-30 PROCEDURE — 33518 CABG ARTERY-VEIN TWO: CPT | Performed by: SURGERY

## 2022-03-30 PROCEDURE — 999N000157 HC STATISTIC RCP TIME EA 10 MIN

## 2022-03-30 PROCEDURE — 03BC4ZZ EXCISION OF LEFT RADIAL ARTERY, PERCUTANEOUS ENDOSCOPIC APPROACH: ICD-10-PCS | Performed by: SURGERY

## 2022-03-30 PROCEDURE — 258N000003 HC RX IP 258 OP 636

## 2022-03-30 PROCEDURE — 5A1221Z PERFORMANCE OF CARDIAC OUTPUT, CONTINUOUS: ICD-10-PCS | Performed by: SURGERY

## 2022-03-30 PROCEDURE — 258N000003 HC RX IP 258 OP 636: Performed by: ANESTHESIOLOGY

## 2022-03-30 PROCEDURE — 83605 ASSAY OF LACTIC ACID: CPT | Performed by: SURGERY

## 2022-03-30 PROCEDURE — 200N000001 HC R&B ICU

## 2022-03-30 PROCEDURE — 250N000012 HC RX MED GY IP 250 OP 636 PS 637: Performed by: SURGERY

## 2022-03-30 PROCEDURE — 33534 CABG ARTERIAL TWO: CPT | Performed by: SURGERY

## 2022-03-30 PROCEDURE — 36415 COLL VENOUS BLD VENIPUNCTURE: CPT | Performed by: ANESTHESIOLOGY

## 2022-03-30 PROCEDURE — 250N000013 HC RX MED GY IP 250 OP 250 PS 637: Performed by: SURGERY

## 2022-03-30 PROCEDURE — 021009W BYPASS CORONARY ARTERY, ONE ARTERY FROM AORTA WITH AUTOLOGOUS VENOUS TISSUE, OPEN APPROACH: ICD-10-PCS | Performed by: SURGERY

## 2022-03-30 PROCEDURE — 82805 BLOOD GASES W/O2 SATURATION: CPT | Performed by: SURGERY

## 2022-03-30 PROCEDURE — 85384 FIBRINOGEN ACTIVITY: CPT | Performed by: SURGERY

## 2022-03-30 PROCEDURE — 82040 ASSAY OF SERUM ALBUMIN: CPT | Performed by: SURGERY

## 2022-03-30 PROCEDURE — 94002 VENT MGMT INPAT INIT DAY: CPT

## 2022-03-30 PROCEDURE — 99291 CRITICAL CARE FIRST HOUR: CPT | Mod: 24 | Performed by: INTERNAL MEDICINE

## 2022-03-30 PROCEDURE — P9041 ALBUMIN (HUMAN),5%, 50ML: HCPCS

## 2022-03-30 PROCEDURE — 85027 COMPLETE CBC AUTOMATED: CPT | Performed by: SURGERY

## 2022-03-30 PROCEDURE — 250N000009 HC RX 250: Performed by: ANESTHESIOLOGY

## 2022-03-30 PROCEDURE — 85730 THROMBOPLASTIN TIME PARTIAL: CPT | Performed by: SURGERY

## 2022-03-30 PROCEDURE — 06BQ4ZZ EXCISION OF LEFT SAPHENOUS VEIN, PERCUTANEOUS ENDOSCOPIC APPROACH: ICD-10-PCS | Performed by: SURGERY

## 2022-03-30 PROCEDURE — 021109W BYPASS CORONARY ARTERY, TWO ARTERIES FROM AORTA WITH AUTOLOGOUS VENOUS TISSUE, OPEN APPROACH: ICD-10-PCS | Performed by: SURGERY

## 2022-03-30 PROCEDURE — 250N000013 HC RX MED GY IP 250 OP 250 PS 637: Performed by: STUDENT IN AN ORGANIZED HEALTH CARE EDUCATION/TRAINING PROGRAM

## 2022-03-30 PROCEDURE — 83735 ASSAY OF MAGNESIUM: CPT | Performed by: SURGERY

## 2022-03-30 PROCEDURE — 999N000063 XR CHEST PORT 1 VIEW

## 2022-03-30 PROCEDURE — 410N000005 HC PER-PERFUSION, SH ONLY, 1ST 30 MIN: Performed by: SURGERY

## 2022-03-30 PROCEDURE — 272N000001 HC OR GENERAL SUPPLY STERILE: Performed by: SURGERY

## 2022-03-30 PROCEDURE — 410N000006: Performed by: SURGERY

## 2022-03-30 PROCEDURE — 82310 ASSAY OF CALCIUM: CPT | Performed by: ANESTHESIOLOGY

## 2022-03-30 PROCEDURE — 93005 ELECTROCARDIOGRAM TRACING: CPT

## 2022-03-30 PROCEDURE — 370N000017 HC ANESTHESIA TECHNICAL FEE, PER MIN: Performed by: SURGERY

## 2022-03-30 PROCEDURE — 84100 ASSAY OF PHOSPHORUS: CPT | Performed by: SURGERY

## 2022-03-30 PROCEDURE — 360N000079 HC SURGERY LEVEL 6, PER MIN: Performed by: SURGERY

## 2022-03-30 PROCEDURE — 250N000013 HC RX MED GY IP 250 OP 250 PS 637: Performed by: PHYSICIAN ASSISTANT

## 2022-03-30 RX ORDER — HEPARIN SODIUM 5000 [USP'U]/.5ML
5000 INJECTION, SOLUTION INTRAVENOUS; SUBCUTANEOUS EVERY 8 HOURS
Status: DISCONTINUED | OUTPATIENT
Start: 2022-03-31 | End: 2022-04-06

## 2022-03-30 RX ORDER — LIDOCAINE HYDROCHLORIDE 10 MG/ML
INJECTION, SOLUTION INFILTRATION; PERINEURAL
Status: COMPLETED | OUTPATIENT
Start: 2022-03-30 | End: 2022-03-30

## 2022-03-30 RX ORDER — GLYCOPYRROLATE 0.2 MG/ML
INJECTION, SOLUTION INTRAMUSCULAR; INTRAVENOUS PRN
Status: DISCONTINUED | OUTPATIENT
Start: 2022-03-30 | End: 2022-03-30

## 2022-03-30 RX ORDER — NITROGLYCERIN 10 MG/100ML
INJECTION INTRAVENOUS PRN
Status: DISCONTINUED | OUTPATIENT
Start: 2022-03-30 | End: 2022-03-30

## 2022-03-30 RX ORDER — METHOCARBAMOL 750 MG/1
750 TABLET, FILM COATED ORAL EVERY 6 HOURS PRN
Status: DISCONTINUED | OUTPATIENT
Start: 2022-03-30 | End: 2022-04-18 | Stop reason: HOSPADM

## 2022-03-30 RX ORDER — NALOXONE HYDROCHLORIDE 0.4 MG/ML
0.4 INJECTION, SOLUTION INTRAMUSCULAR; INTRAVENOUS; SUBCUTANEOUS
Status: DISCONTINUED | OUTPATIENT
Start: 2022-03-30 | End: 2022-04-18 | Stop reason: HOSPADM

## 2022-03-30 RX ORDER — MUPIROCIN 20 MG/G
0.5 OINTMENT TOPICAL 2 TIMES DAILY
Status: DISPENSED | OUTPATIENT
Start: 2022-03-30 | End: 2022-04-04

## 2022-03-30 RX ORDER — OXYCODONE HYDROCHLORIDE 5 MG/1
5 TABLET ORAL EVERY 4 HOURS PRN
Status: DISCONTINUED | OUTPATIENT
Start: 2022-03-30 | End: 2022-04-16

## 2022-03-30 RX ORDER — BISACODYL 10 MG
10 SUPPOSITORY, RECTAL RECTAL DAILY PRN
Status: DISCONTINUED | OUTPATIENT
Start: 2022-03-30 | End: 2022-04-18 | Stop reason: HOSPADM

## 2022-03-30 RX ORDER — PHENYLEPHRINE HCL IN 0.9% NACL 50MG/250ML
.1-4 PLASTIC BAG, INJECTION (ML) INTRAVENOUS CONTINUOUS PRN
Status: DISCONTINUED | OUTPATIENT
Start: 2022-03-30 | End: 2022-04-03

## 2022-03-30 RX ORDER — ACETAMINOPHEN 325 MG/1
650 TABLET ORAL EVERY 4 HOURS PRN
Status: DISCONTINUED | OUTPATIENT
Start: 2022-03-30 | End: 2022-04-18 | Stop reason: HOSPADM

## 2022-03-30 RX ORDER — PROTAMINE SULFATE 10 MG/ML
INJECTION, SOLUTION INTRAVENOUS PRN
Status: DISCONTINUED | OUTPATIENT
Start: 2022-03-30 | End: 2022-03-30

## 2022-03-30 RX ORDER — NICOTINE POLACRILEX 4 MG
15-30 LOZENGE BUCCAL
Status: DISCONTINUED | OUTPATIENT
Start: 2022-03-30 | End: 2022-04-18 | Stop reason: HOSPADM

## 2022-03-30 RX ORDER — ONDANSETRON 4 MG/1
4 TABLET, ORALLY DISINTEGRATING ORAL EVERY 6 HOURS PRN
Status: DISCONTINUED | OUTPATIENT
Start: 2022-03-30 | End: 2022-04-18 | Stop reason: HOSPADM

## 2022-03-30 RX ORDER — AMOXICILLIN 250 MG
1 CAPSULE ORAL 2 TIMES DAILY
Status: DISCONTINUED | OUTPATIENT
Start: 2022-03-30 | End: 2022-04-04

## 2022-03-30 RX ORDER — NITROGLYCERIN 20 MG/100ML
10-200 INJECTION INTRAVENOUS CONTINUOUS PRN
Status: DISCONTINUED | OUTPATIENT
Start: 2022-03-30 | End: 2022-04-03

## 2022-03-30 RX ORDER — PROPOFOL 10 MG/ML
INJECTION, EMULSION INTRAVENOUS PRN
Status: DISCONTINUED | OUTPATIENT
Start: 2022-03-30 | End: 2022-03-30

## 2022-03-30 RX ORDER — DEXMEDETOMIDINE HYDROCHLORIDE 4 UG/ML
.2-.7 INJECTION, SOLUTION INTRAVENOUS CONTINUOUS
Status: DISCONTINUED | OUTPATIENT
Start: 2022-03-30 | End: 2022-04-03

## 2022-03-30 RX ORDER — ACETAMINOPHEN 650 MG/1
650 SUPPOSITORY RECTAL EVERY 4 HOURS PRN
Status: DISCONTINUED | OUTPATIENT
Start: 2022-03-30 | End: 2022-04-18 | Stop reason: HOSPADM

## 2022-03-30 RX ORDER — LIDOCAINE 40 MG/G
CREAM TOPICAL
Status: DISCONTINUED | OUTPATIENT
Start: 2022-03-30 | End: 2022-03-30 | Stop reason: HOSPADM

## 2022-03-30 RX ORDER — FENTANYL CITRATE 50 UG/ML
25-50 INJECTION, SOLUTION INTRAMUSCULAR; INTRAVENOUS
Status: DISCONTINUED | OUTPATIENT
Start: 2022-03-30 | End: 2022-04-02

## 2022-03-30 RX ORDER — SODIUM CHLORIDE, SODIUM LACTATE, POTASSIUM CHLORIDE, CALCIUM CHLORIDE 600; 310; 30; 20 MG/100ML; MG/100ML; MG/100ML; MG/100ML
INJECTION, SOLUTION INTRAVENOUS CONTINUOUS PRN
Status: DISCONTINUED | OUTPATIENT
Start: 2022-03-30 | End: 2022-03-30

## 2022-03-30 RX ORDER — NALOXONE HYDROCHLORIDE 0.4 MG/ML
0.2 INJECTION, SOLUTION INTRAMUSCULAR; INTRAVENOUS; SUBCUTANEOUS
Status: DISCONTINUED | OUTPATIENT
Start: 2022-03-30 | End: 2022-04-18 | Stop reason: HOSPADM

## 2022-03-30 RX ORDER — DEXTROSE MONOHYDRATE, SODIUM CHLORIDE, AND POTASSIUM CHLORIDE 50; 1.49; 4.5 G/1000ML; G/1000ML; G/1000ML
INJECTION, SOLUTION INTRAVENOUS CONTINUOUS
Status: DISCONTINUED | OUTPATIENT
Start: 2022-03-30 | End: 2022-04-03

## 2022-03-30 RX ORDER — IPRATROPIUM BROMIDE AND ALBUTEROL SULFATE 2.5; .5 MG/3ML; MG/3ML
3 SOLUTION RESPIRATORY (INHALATION) EVERY 4 HOURS PRN
Status: DISCONTINUED | OUTPATIENT
Start: 2022-03-30 | End: 2022-04-18 | Stop reason: HOSPADM

## 2022-03-30 RX ORDER — OXYCODONE HYDROCHLORIDE 5 MG/1
10 TABLET ORAL EVERY 4 HOURS PRN
Status: DISCONTINUED | OUTPATIENT
Start: 2022-03-30 | End: 2022-04-16

## 2022-03-30 RX ORDER — PANTOPRAZOLE SODIUM 40 MG/1
40 TABLET, DELAYED RELEASE ORAL DAILY
Status: DISCONTINUED | OUTPATIENT
Start: 2022-03-30 | End: 2022-04-18 | Stop reason: HOSPADM

## 2022-03-30 RX ORDER — DEXTROSE MONOHYDRATE 25 G/50ML
25-50 INJECTION, SOLUTION INTRAVENOUS
Status: DISCONTINUED | OUTPATIENT
Start: 2022-03-30 | End: 2022-04-18 | Stop reason: HOSPADM

## 2022-03-30 RX ORDER — HEPARIN SODIUM 1000 [USP'U]/ML
INJECTION, SOLUTION INTRAVENOUS; SUBCUTANEOUS PRN
Status: DISCONTINUED | OUTPATIENT
Start: 2022-03-30 | End: 2022-03-30

## 2022-03-30 RX ORDER — ACETAMINOPHEN 325 MG/1
975 TABLET ORAL EVERY 8 HOURS
Status: DISPENSED | OUTPATIENT
Start: 2022-03-30 | End: 2022-04-02

## 2022-03-30 RX ORDER — LIDOCAINE 40 MG/G
CREAM TOPICAL
Status: DISCONTINUED | OUTPATIENT
Start: 2022-03-30 | End: 2022-04-18 | Stop reason: HOSPADM

## 2022-03-30 RX ORDER — CLINDAMYCIN PHOSPHATE 900 MG/50ML
900 INJECTION, SOLUTION INTRAVENOUS SEE ADMIN INSTRUCTIONS
Status: DISCONTINUED | OUTPATIENT
Start: 2022-03-30 | End: 2022-03-30 | Stop reason: HOSPADM

## 2022-03-30 RX ORDER — SODIUM CHLORIDE, SODIUM LACTATE, POTASSIUM CHLORIDE, CALCIUM CHLORIDE 600; 310; 30; 20 MG/100ML; MG/100ML; MG/100ML; MG/100ML
INJECTION, SOLUTION INTRAVENOUS CONTINUOUS
Status: DISCONTINUED | OUTPATIENT
Start: 2022-03-30 | End: 2022-03-30 | Stop reason: HOSPADM

## 2022-03-30 RX ORDER — GABAPENTIN 100 MG/1
100 CAPSULE ORAL 3 TIMES DAILY
Status: DISCONTINUED | OUTPATIENT
Start: 2022-03-30 | End: 2022-04-02

## 2022-03-30 RX ORDER — EPHEDRINE SULFATE 50 MG/ML
INJECTION, SOLUTION INTRAMUSCULAR; INTRAVENOUS; SUBCUTANEOUS PRN
Status: DISCONTINUED | OUTPATIENT
Start: 2022-03-30 | End: 2022-03-30

## 2022-03-30 RX ORDER — SODIUM CHLORIDE 9 MG/ML
INJECTION, SOLUTION INTRAVENOUS CONTINUOUS
Status: DISCONTINUED | OUTPATIENT
Start: 2022-03-30 | End: 2022-04-18 | Stop reason: HOSPADM

## 2022-03-30 RX ORDER — CALCIUM GLUCONATE 20 MG/ML
2 INJECTION, SOLUTION INTRAVENOUS
Status: ACTIVE | OUTPATIENT
Start: 2022-03-30 | End: 2022-04-02

## 2022-03-30 RX ORDER — FENTANYL CITRATE 0.05 MG/ML
50 INJECTION, SOLUTION INTRAMUSCULAR; INTRAVENOUS
Status: DISCONTINUED | OUTPATIENT
Start: 2022-03-30 | End: 2022-03-30 | Stop reason: HOSPADM

## 2022-03-30 RX ORDER — NEOSTIGMINE METHYLSULFATE 1 MG/ML
VIAL (ML) INJECTION PRN
Status: DISCONTINUED | OUTPATIENT
Start: 2022-03-30 | End: 2022-03-30

## 2022-03-30 RX ORDER — EPINEPHRINE IN 0.9 % SOD CHLOR 5 MG/250ML
.01-.1 PLASTIC BAG, INJECTION (ML) INTRAVENOUS CONTINUOUS
Status: DISCONTINUED | OUTPATIENT
Start: 2022-03-30 | End: 2022-04-03

## 2022-03-30 RX ORDER — ASPIRIN 81 MG/1
162 TABLET, CHEWABLE ORAL DAILY
Status: DISCONTINUED | OUTPATIENT
Start: 2022-03-31 | End: 2022-04-01

## 2022-03-30 RX ORDER — SODIUM CHLORIDE 9 MG/ML
INJECTION, SOLUTION INTRAVENOUS CONTINUOUS PRN
Status: DISCONTINUED | OUTPATIENT
Start: 2022-03-30 | End: 2022-03-30

## 2022-03-30 RX ORDER — HYDROMORPHONE HCL IN WATER/PF 6 MG/30 ML
0.4 PATIENT CONTROLLED ANALGESIA SYRINGE INTRAVENOUS
Status: DISCONTINUED | OUTPATIENT
Start: 2022-03-30 | End: 2022-04-16

## 2022-03-30 RX ORDER — PROPOFOL 10 MG/ML
5-75 INJECTION, EMULSION INTRAVENOUS CONTINUOUS
Status: DISCONTINUED | OUTPATIENT
Start: 2022-03-30 | End: 2022-04-03

## 2022-03-30 RX ORDER — ACETAMINOPHEN 325 MG/1
650 TABLET ORAL EVERY 4 HOURS PRN
Status: DISCONTINUED | OUTPATIENT
Start: 2022-04-02 | End: 2022-03-30

## 2022-03-30 RX ORDER — NOREPINEPHRINE BITARTRATE 0.06 MG/ML
.01-.4 INJECTION, SOLUTION INTRAVENOUS CONTINUOUS
Status: DISCONTINUED | OUTPATIENT
Start: 2022-03-30 | End: 2022-04-03

## 2022-03-30 RX ORDER — CHLORHEXIDINE GLUCONATE ORAL RINSE 1.2 MG/ML
15 SOLUTION DENTAL EVERY 12 HOURS
Status: DISCONTINUED | OUTPATIENT
Start: 2022-03-30 | End: 2022-04-18 | Stop reason: HOSPADM

## 2022-03-30 RX ORDER — CALCIUM GLUCONATE 20 MG/ML
1 INJECTION, SOLUTION INTRAVENOUS
Status: ACTIVE | OUTPATIENT
Start: 2022-03-30 | End: 2022-04-02

## 2022-03-30 RX ORDER — CLINDAMYCIN PHOSPHATE 900 MG/50ML
900 INJECTION, SOLUTION INTRAVENOUS EVERY 8 HOURS
Status: COMPLETED | OUTPATIENT
Start: 2022-03-30 | End: 2022-03-31

## 2022-03-30 RX ORDER — LIDOCAINE HYDROCHLORIDE 20 MG/ML
INJECTION, SOLUTION INFILTRATION; PERINEURAL PRN
Status: DISCONTINUED | OUTPATIENT
Start: 2022-03-30 | End: 2022-03-30

## 2022-03-30 RX ORDER — ONDANSETRON 2 MG/ML
4 INJECTION INTRAMUSCULAR; INTRAVENOUS EVERY 6 HOURS PRN
Status: DISCONTINUED | OUTPATIENT
Start: 2022-03-30 | End: 2022-04-18 | Stop reason: HOSPADM

## 2022-03-30 RX ORDER — CLINDAMYCIN PHOSPHATE 900 MG/50ML
900 INJECTION, SOLUTION INTRAVENOUS
Status: COMPLETED | OUTPATIENT
Start: 2022-03-30 | End: 2022-03-30

## 2022-03-30 RX ORDER — POLYETHYLENE GLYCOL 3350 17 G/17G
17 POWDER, FOR SOLUTION ORAL DAILY
Status: DISCONTINUED | OUTPATIENT
Start: 2022-03-31 | End: 2022-04-04

## 2022-03-30 RX ORDER — HYDROMORPHONE HCL IN WATER/PF 6 MG/30 ML
0.2 PATIENT CONTROLLED ANALGESIA SYRINGE INTRAVENOUS
Status: DISCONTINUED | OUTPATIENT
Start: 2022-03-30 | End: 2022-04-16

## 2022-03-30 RX ADMIN — ASPIRIN 81 MG 162 MG: 81 TABLET ORAL at 04:19

## 2022-03-30 RX ADMIN — PROPOFOL 25 MCG/KG/MIN: 10 INJECTION, EMULSION INTRAVENOUS at 15:27

## 2022-03-30 RX ADMIN — FENTANYL CITRATE 100 MCG: 50 INJECTION, SOLUTION INTRAMUSCULAR; INTRAVENOUS at 08:59

## 2022-03-30 RX ADMIN — ROCURONIUM BROMIDE 50 MG: 50 INJECTION, SOLUTION INTRAVENOUS at 08:57

## 2022-03-30 RX ADMIN — GLYCOPYRROLATE 0.8 MG: 0.2 INJECTION, SOLUTION INTRAMUSCULAR; INTRAVENOUS at 15:45

## 2022-03-30 RX ADMIN — ROCURONIUM BROMIDE 10 MG: 50 INJECTION, SOLUTION INTRAVENOUS at 12:31

## 2022-03-30 RX ADMIN — SODIUM CHLORIDE: 9 INJECTION, SOLUTION INTRAVENOUS at 11:04

## 2022-03-30 RX ADMIN — CLINDAMYCIN IN 5 PERCENT DEXTROSE 900 MG: 18 INJECTION, SOLUTION INTRAVENOUS at 22:00

## 2022-03-30 RX ADMIN — FAMOTIDINE 20 MG: 20 TABLET ORAL at 04:19

## 2022-03-30 RX ADMIN — HEPARIN SODIUM 38000 UNITS: 1000 INJECTION INTRAVENOUS; SUBCUTANEOUS at 10:47

## 2022-03-30 RX ADMIN — CHLORHEXIDINE GLUCONATE 15 ML: 1.2 RINSE ORAL at 19:57

## 2022-03-30 RX ADMIN — AMINOCAPROIC ACID 5 G/HR: 250 INJECTION, SOLUTION INTRAVENOUS at 08:00

## 2022-03-30 RX ADMIN — HEPARIN SODIUM 3000 UNITS: 1000 INJECTION INTRAVENOUS; SUBCUTANEOUS at 09:20

## 2022-03-30 RX ADMIN — PROPOFOL 10 MG: 10 INJECTION, EMULSION INTRAVENOUS at 11:04

## 2022-03-30 RX ADMIN — Medication 10 MG: at 08:35

## 2022-03-30 RX ADMIN — NITROGLYCERIN 20 MCG: 10 INJECTION INTRAVENOUS at 14:32

## 2022-03-30 RX ADMIN — PROPOFOL 50 MG: 10 INJECTION, EMULSION INTRAVENOUS at 07:59

## 2022-03-30 RX ADMIN — FENTANYL CITRATE 250 MCG: 50 INJECTION, SOLUTION INTRAMUSCULAR; INTRAVENOUS at 07:59

## 2022-03-30 RX ADMIN — PROPOFOL 10 MG: 10 INJECTION, EMULSION INTRAVENOUS at 11:09

## 2022-03-30 RX ADMIN — MIDAZOLAM 1 MG: 1 INJECTION INTRAMUSCULAR; INTRAVENOUS at 06:57

## 2022-03-30 RX ADMIN — MUPIROCIN 0.5 G: 20 OINTMENT TOPICAL at 19:57

## 2022-03-30 RX ADMIN — DEXMEDETOMIDINE HYDROCHLORIDE 0.2 MCG/KG/HR: 100 INJECTION, SOLUTION INTRAVENOUS at 08:18

## 2022-03-30 RX ADMIN — PROPOFOL 10 MG: 10 INJECTION, EMULSION INTRAVENOUS at 11:02

## 2022-03-30 RX ADMIN — ACETAMINOPHEN 650 MG: 650 SUPPOSITORY RECTAL at 22:17

## 2022-03-30 RX ADMIN — CLINDAMYCIN PHOSPHATE 900 MG: 900 INJECTION, SOLUTION INTRAVENOUS at 14:02

## 2022-03-30 RX ADMIN — NICARDIPINE HYDROCHLORIDE 1 MG/HR: 0.2 INJECTION INTRAVENOUS at 14:19

## 2022-03-30 RX ADMIN — NITROGLYCERIN 20 MCG: 10 INJECTION INTRAVENOUS at 14:34

## 2022-03-30 RX ADMIN — PHENYLEPHRINE HYDROCHLORIDE 100 MCG: 10 INJECTION INTRAVENOUS at 08:14

## 2022-03-30 RX ADMIN — ROCURONIUM BROMIDE 20 MG: 50 INJECTION, SOLUTION INTRAVENOUS at 10:16

## 2022-03-30 RX ADMIN — NEOSTIGMINE METHYLSULFATE 5 MG: 1 INJECTION, SOLUTION INTRAVENOUS at 15:45

## 2022-03-30 RX ADMIN — SODIUM CHLORIDE: 9 INJECTION, SOLUTION INTRAVENOUS at 22:37

## 2022-03-30 RX ADMIN — PHENYLEPHRINE HYDROCHLORIDE 100 MCG: 10 INJECTION INTRAVENOUS at 11:17

## 2022-03-30 RX ADMIN — PROTAMINE SULFATE 240 MG: 10 INJECTION, SOLUTION INTRAVENOUS at 14:32

## 2022-03-30 RX ADMIN — SODIUM CHLORIDE, SODIUM LACTATE, POTASSIUM CHLORIDE, CALCIUM CHLORIDE: 600; 310; 30; 20 INJECTION, SOLUTION INTRAVENOUS at 07:50

## 2022-03-30 RX ADMIN — SODIUM CHLORIDE 1 UNITS/HR: 9 INJECTION, SOLUTION INTRAVENOUS at 22:38

## 2022-03-30 RX ADMIN — MIDAZOLAM 2 MG: 1 INJECTION INTRAMUSCULAR; INTRAVENOUS at 14:08

## 2022-03-30 RX ADMIN — EPINEPHRINE 0.02 MCG/KG/MIN: 1 INJECTION INTRAMUSCULAR; INTRAVENOUS; SUBCUTANEOUS at 14:08

## 2022-03-30 RX ADMIN — PROPOFOL 30 MCG/KG/MIN: 10 INJECTION, EMULSION INTRAVENOUS at 19:56

## 2022-03-30 RX ADMIN — NITROGLYCERIN 20 MCG: 10 INJECTION INTRAVENOUS at 14:37

## 2022-03-30 RX ADMIN — SODIUM CHLORIDE: 9 INJECTION, SOLUTION INTRAVENOUS at 07:57

## 2022-03-30 RX ADMIN — PHENYLEPHRINE HYDROCHLORIDE 0.3 MCG/KG/MIN: 10 INJECTION INTRAVENOUS at 08:26

## 2022-03-30 RX ADMIN — ROCURONIUM BROMIDE 50 MG: 50 INJECTION, SOLUTION INTRAVENOUS at 07:59

## 2022-03-30 RX ADMIN — SODIUM CHLORIDE, POTASSIUM CHLORIDE, SODIUM LACTATE AND CALCIUM CHLORIDE: 600; 310; 30; 20 INJECTION, SOLUTION INTRAVENOUS at 07:57

## 2022-03-30 RX ADMIN — LIDOCAINE HYDROCHLORIDE 100 MG: 20 INJECTION, SOLUTION INFILTRATION; PERINEURAL at 07:59

## 2022-03-30 RX ADMIN — LIDOCAINE HYDROCHLORIDE 2 ML: 10 INJECTION, SOLUTION INFILTRATION; PERINEURAL at 07:24

## 2022-03-30 RX ADMIN — FENTANYL CITRATE 100 MCG: 50 INJECTION, SOLUTION INTRAMUSCULAR; INTRAVENOUS at 10:05

## 2022-03-30 RX ADMIN — FENTANYL CITRATE 50 MCG: 50 INJECTION, SOLUTION INTRAMUSCULAR; INTRAVENOUS at 11:00

## 2022-03-30 RX ADMIN — FENTANYL CITRATE 50 MCG: 50 INJECTION, SOLUTION INTRAMUSCULAR; INTRAVENOUS at 06:59

## 2022-03-30 RX ADMIN — NITROGLYCERIN 20 MCG: 10 INJECTION INTRAVENOUS at 14:39

## 2022-03-30 RX ADMIN — POTASSIUM CHLORIDE, DEXTROSE MONOHYDRATE AND SODIUM CHLORIDE: 150; 5; 450 INJECTION, SOLUTION INTRAVENOUS at 16:29

## 2022-03-30 RX ADMIN — Medication 5 MG: at 10:57

## 2022-03-30 RX ADMIN — CHLORHEXIDINE GLUCONATE 10 ML: 1.2 SOLUTION ORAL at 06:37

## 2022-03-30 RX ADMIN — CARVEDILOL 12.5 MG: 12.5 TABLET, FILM COATED ORAL at 04:20

## 2022-03-30 RX ADMIN — FENTANYL CITRATE 50 MCG: 50 INJECTION, SOLUTION INTRAMUSCULAR; INTRAVENOUS at 10:32

## 2022-03-30 RX ADMIN — MIDAZOLAM 5 MG: 1 INJECTION INTRAMUSCULAR; INTRAVENOUS at 07:59

## 2022-03-30 RX ADMIN — FENTANYL CITRATE 50 MCG: 50 INJECTION, SOLUTION INTRAMUSCULAR; INTRAVENOUS at 11:09

## 2022-03-30 RX ADMIN — CLINDAMYCIN PHOSPHATE 900 MG: 900 INJECTION, SOLUTION INTRAVENOUS at 08:04

## 2022-03-30 RX ADMIN — FENTANYL CITRATE 100 MCG: 50 INJECTION, SOLUTION INTRAMUSCULAR; INTRAVENOUS at 15:00

## 2022-03-30 RX ADMIN — FENTANYL CITRATE 100 MCG: 50 INJECTION, SOLUTION INTRAMUSCULAR; INTRAVENOUS at 09:29

## 2022-03-30 ASSESSMENT — ACTIVITIES OF DAILY LIVING (ADL)
ADLS_ACUITY_SCORE: 3
ADLS_ACUITY_SCORE: 7
ADLS_ACUITY_SCORE: 3
ADLS_ACUITY_SCORE: 3
ADLS_ACUITY_SCORE: 7
ADLS_ACUITY_SCORE: 3
ADLS_ACUITY_SCORE: 7
ADLS_ACUITY_SCORE: 3
ADLS_ACUITY_SCORE: 7

## 2022-03-30 ASSESSMENT — LIFESTYLE VARIABLES: TOBACCO_USE: 0

## 2022-03-30 NOTE — PLAN OF CARE
Goal Outcome Evaluation:       Patient stable overnight. Denies pain, VSS. Heparin gtt stopped at 0400. Amio gg continues. Preop meds given at 0400. Patient went down to OR at 0600, accompanied by 3 family members and transport team. All belongings sent with family.

## 2022-03-30 NOTE — ANESTHESIA POSTPROCEDURE EVALUATION
Patient: Angelica Robledo    Procedure: Procedure(s):  CORONARY ARTERY BYPASS GRAFT X4, Endoscopic left radial artery harvest , endoscopic left leg vein harvest.TARGETS: LAD, LPL, RAMUS,PDA  ON CBP, WITH YAMEL READ BY ANESTHESIOLOGIST.       Anesthesia Type:  General    Note:  Disposition: ICU            ICU Sign Out: Anesthesiologist/ICU physician sign out WAS performed   Postop Pain Control:    PONV:    Neuro/Psych:    Airway/Respiratory:             Sign Out: AIRWAY IN SITU/Resp. Support               Airway in situ/Resp. Support: ETT   CV/Hemodynamics: Uneventful            Sign Out: Acceptable CV status; No obvious hypovolemia; No obvious fluid overload   Other NRE: NONE   DID A NON-ROUTINE EVENT OCCUR? No    Event details/Postop Comments:  Patient transported from the OR to the ICU, intubated and sedated, while monitored.  Sign-out was given to the MD, RN and RT.  All questions were answered.  Transport was without incident.      Faraz Gilbert MD             Last vitals:  Vitals Value Taken Time   BP     Temp 36.9  C (98.42  F) 03/30/22 1628   Pulse 80 03/30/22 1628   Resp 0 03/30/22 1628   SpO2 83 % 03/30/22 1628   Vitals shown include unvalidated device data.    Electronically Signed By: Faraz Gilbert MD  March 30, 2022  4:29 PM

## 2022-03-30 NOTE — PROGRESS NOTES
Supplemental Progress Note    After initial evaluation, patient desaturated to mid-80s.  CXR was reviewed without any etiology.  ABG consistent with oxygen saturations.  PEEP increased from 5 to10 and oxygen to 100%.  Her saturations improved and FiO2 was decreased to 80%.  Despite PEEP at 10 for over an hour, she has not made much progress from an oxygenation standpoint.  We will forego the fast track pathway to extubation.    Initiate veletri 20 ng/kg/min.

## 2022-03-30 NOTE — ANESTHESIA PROCEDURE NOTES
Central Line/PA Catheter Placement    Pre-Procedure   Staff -        Anesthesiologist:  Faraz Gilbert MD       Performed By: Anesthesiologist       Location: pre-op       Pre-Anesthestic Checklist: patient identified, IV checked, site marked, risks and benefits discussed, informed consent, monitors and equipment checked, pre-op evaluation and at physician/surgeon's request  Timeout:       Correct Patient: Yes        Correct Procedure: Yes        Correct Site: Yes        Correct Position: Yes        Correct Laterality: Yes   Line Placement:   This line was placed Pre Induction starting at 3/30/2022 7:09 AM and ending at 3/30/2022 7:20 AM    Procedure   Procedure: central line, new line and elective       Laterality: right       Insertion Site: right, internal jugular.       Patient Position: Trendelenburg  Sterile Prep        All elements of maximal sterile barrier technique followed       Patient Prep/Sterile Barriers: draped, hand hygiene, gloves , hat , mask , draped, gown, sterile gel and probe cover       Skin prep: Chloraprep  Insertion/Injection        Local skin infiltrated with 5 mL of 1% lidocaine.        Technique: ultrasound guided and Seldinger Technique        1. Ultrasound was used to evaluate the access site.       2. Vein evaluated via ultrasound for patency/adequacy.       3. Using real-time ultrasound the needle/catheter was observed entering the artery/vein.       4. Permanent image was captured and entered into the patient's record.       5. The visualized structures were anatomically normal.       6. There were no apparent abnormal pathologic findings.       Introducer Type: 9 Fr, 7 cm        Type: PA/CVC with Introducer       PA Catheter Type: Thermodilution         Appropriate RV, RA and PA waveforms noted:  Yes  Narrative         Secured by: suture       Tegaderm and Biopatch dressing used.       Complications: None apparent,        blood aspirated from all lumens,        All lumens  flushed: Yes       Verification method: Ultrasound, Placement to be verified post-op and X-ray (CXR in ICU)   Comments:  Ultrasound Interpretation, central venous    1. Ultrasound guidance was used to evaluate potential access sites.  2. Ultrasound was also used to verify the patency of the vessel specified above.   3. Ultrasound was used to visualize the needle entering the vessel.   4. The visualized structures were anatomically normal.  5. There were no apparent abnormal pathological findings.  6. A permanent ultrasound image was saved in the patient's record.

## 2022-03-30 NOTE — PROGRESS NOTES
[facility] ICU RESPIRATORY NOTE      Date of Admission: 3/27/2022    Date of Intubation (most recent): 3/30/22    Reason for Mechanical Ventilation: Post-op    Number of Days on Mechanical Ventilation: 1    Met Criteria for Spontaneous Breathing Trial: No  Reason for No Spontaneous Breathing Trial: PEEP>8    Significant Events Today: Desaturated after receiving pt from OR Initiate veletri 20 ng/kg/min.    ABG Results:   Recent Labs   Lab 03/30/22  1714 03/30/22  1708   PH  --  7.44   PCO2  --  31*   PO2  --  87   HCO3  --  21   O2PER 80 80     Current Vent Settings: Vent Mode: CMV/AC  (Continuous Mandatory Ventilation/ Assist Control)  Resp Rate (Set): (S) 20 breaths/min (per MD. )  Tidal Volume (Set, mL): 500 mL  PEEP (cm H2O): 5 cmH2O  Resp: (!) 0      Skin Assessment: clean and dry.     Plan: VENT overnight.     Colby Ashby, RRT

## 2022-03-30 NOTE — CONSULTS
"Critical Care Services Progress Note:  I have evaluated all laboratory values and imaging studies from the past 24 hours.  We are consulted by Dr. Butterfield of the CV surgery service for management post-procedure.  CC:  s/p CABG x4  HPI: Angelica Robledo is a 51 year old female with pertinent PMHx of prior MI at age 24-25 (in 1996) s/p angioplasty, , HTN, anemia, and NSTEMI.  She presented on 3/27 with new onset chest pain similar to her previous MI.  Workup was consistent with NSTEMI.  Cardiac cath demonstrated severe multivessel CAD.  She was seen by CT surgery and CABG was scheduled for 3/30.  She underwent CABG x4 left radial, LIMA, left saph vein x2 (FAUSTINO taken down, but elected to not be used due to size) .  No blood products intraop.  No intraop complications.  Post-echo with no remarkable findings.    The patient is intubated and sedated which precludes direct history taking.  PMH/PSH/meds/all/SH/FH reviewed and as noted below.  ROS:  Unable as pt is intubated post procedure.  Exam:  BP (!) 176/90   Pulse 71   Temp 98.2  F (36.8  C) (Temporal)   Resp 16   Ht 1.651 m (5' 5\")   Wt 95.7 kg (211 lb)   LMP 02/27/2022   SpO2 97%   BMI 35.11 kg/m    GEN: no acute distress, intubated/sedated  HEENT: head ncat, sclera anicteric, OP patent, trachea midline, pupils reactive  PULM: unlabored synchronous with vent, clear anteriorly    CV/Chest: RRR, S1S2 no gallop,  No rub, no murmur appreciated.  Midline incision dressed, c/d/i. CTs: scant sanguinous output, no air leak.  ABD: soft, nontender  EXT:   No Edema,  warm x4. Left arm: dressed, compartments soft, distal perfusion appreciated. Left leg: compartments soft.  NEURO: sedated, no gross deficit apparent  SKIN: no obvious rash  LINES: clean, dry intact    Assessment/plan:  51 year old female s/p CABG x4 in the setting of severe multivessel CAD.    Neurology/Psychiatry:   1. Analgesia -- tylenol, prn narcotics  2.  Anxiety -- wean sedative drips to extubate.  No " acute anxiety needs at this time.    Cardiovascular:   1.  S/p CABG x4 - left radial, LIMA, left saph vein x2 (FAUSTINO taken down, but elected to not be used due to size) -- ASA, statin  2. Continue nicardipine overnight until CV surgery clears for discontinuation 3/31 -- vasopressors as needed for MAP >65 goal  3. Hx of MI at age 24-25  4. HTN - likely untreated prior to hospitalization - presented with hypertension emergency - nicardipine on for radial artery graft bypass and for treatment goal; nitroglycerin PRN    Pulmonary/Ventilator Management:   1. Airway -- intubated post-op.  Weaning to extubate per pathway.  2. No underlying pulmonary problems on medical history or cxr.     GI and Nutrition :   1. Diet when cleared by CV surgery   2. Acid suppression for PUD prophy    Renal/Fluids/Electrolytes:   1. Monitor UOP/creatinine post-op.  2. Replete electrolytes prn  3. IVF and albumin administration for volume prn per protocol    Infectious Disease:   1. Prophylactic abx post-op per CV surgery    Endocrine:   1. Monitor for stress induced hyperglycemia. ICU insulin protocol, goal sugar <180     Hematology/Oncology:   1. Hgb/plts stable post op.  pRBC for goal hgb 8.0 or as indicated by CV surgery     IV/Access:   1. Venous access - Central access from OR  2. Arterial access - femoral a line - remove A-line when off vasoactives and extubated    ICU Prophylaxis:   1. DVT: Hep Subq/mechanical  2. VAP: HOB 30 degrees, chlorhexidine rinse  3. Stress Ulcer: PPI blocker  4. Restraints: Nonviolent soft two point restraints required and necessary for patient safety and continued cares and good effect as patient continues to pull at necessary lines, tubes despite education and distraction. Will readdress daily and when extubated.   5. IV Access - central access required and necessary for continued patient cares  6. Disposition - ICU      Adi Bazan MD  Surgical Critical Care Fellow     PMH:  Past Medical History:    Diagnosis Date     Bilateral external ear infections     frequent infections as child     H/O lithotripsy      Kidney stone      Myocardial infarction (H)        PSH:  Past Surgical History:   Procedure Laterality Date     ANGIOPLASTY  1996      SECTION      ,      CV CORONARY ANGIOGRAM N/A 3/28/2022    Procedure: Coronary Angiogram;  Surgeon: Sylvia Cruz MD;  Location:  [facility]     CV LEFT HEART CATH N/A 3/28/2022    Procedure: Left Heart Catheterization;  Surgeon: Sylvia Cruz MD;  Location:  [facility]     MYRINGOTOMY, INSERT TUBE BILATERAL, COMBINED       ORTHOPEDIC SURGERY Left     left ankle       Meds:  Current Facility-Administered Medications   Medication     [START ON 2022] acetaminophen (TYLENOL) tablet 650 mg     acetaminophen (TYLENOL) tablet 975 mg     aminocaproic acid (AMICAR) 7.5 g in sodium chloride 0.9 % 150 mL infusion     [START ON 3/31/2022] aspirin (ASA) chewable tablet 162 mg     atorvastatin (LIPITOR) tablet 80 mg     bisacodyl (DULCOLAX) Suppository 10 mg     calcium gluconate 1 g in 50 mL sodium chloride intermittent infusion (premix)     calcium gluconate 2 g in 100 mL sodium chloride intermittent infusion (premix)     calcium gluconate 3 g in sodium chloride 0.9 % 100 mL intermittent infusion     chlorhexidine (PERIDEX) 0.12 % solution 15 mL     clindamycin (CLEOCIN) infusion 900 mg     dexmedetomidine (PRECEDEX) 400 mcg in 0.9% sodium chloride 100 mL     dextrose 5% and 0.45% NaCl + KCl 20 mEq/L infusion     glucose gel 15-30 g    Or     dextrose 50 % injection 25-50 mL    Or     glucagon injection 1 mg     EPINEPHrine (ADRENALIN) 5 mg in  mL infusion     fentaNYL (PF) (SUBLIMAZE) injection 25-50 mcg     gabapentin (NEURONTIN) capsule 100 mg     [START ON 3/31/2022] heparin ANTICOAGULANT injection 5,000 Units     HYDROmorphone (DILAUDID) injection 0.2 mg    Or     HYDROmorphone (DILAUDID) injection 0.4 mg      insulin 1 unit/mL in saline (NovoLIN, HumuLIN Regular) drip - ADULT IV Infusion     ipratropium - albuterol 0.5 mg/2.5 mg/3 mL (DUONEB) neb solution 3 mL     lactated ringers BOLUS 250 mL     lidocaine (LMX4) cream     lidocaine 1 % 0.1-1 mL     magnesium hydroxide (MILK OF MAGNESIA) suspension 30 mL     propofol (DIPRIVAN) infusion    And     propofol (DIPRIVAN) bolus from infusion pump 10-20 mg    And     Medication Instruction     melatonin tablet 1 mg     methocarbamol (ROBAXIN) tablet 750 mg     mupirocin (BACTROBAN) 2 % ointment 0.5 g     naloxone (NARCAN) injection 0.2 mg    Or     naloxone (NARCAN) injection 0.4 mg    Or     naloxone (NARCAN) injection 0.2 mg    Or     naloxone (NARCAN) injection 0.4 mg     niCARdipine 40 mg in 200 mL NS (CARDENE) infusion     nitroGLYcerin 50 mg in D5W 250 mL (adult std) infusion CENTRAL     norepinephrine (LEVOPHED) 16 mg in  mL infusion MAX CONC CENTRAL LINE     ondansetron (ZOFRAN-ODT) ODT tab 4 mg    Or     ondansetron (ZOFRAN) injection 4 mg     oxyCODONE (ROXICODONE) tablet 5 mg    Or     oxyCODONE (ROXICODONE) tablet 10 mg     pantoprazole (PROTONIX) 2 mg/mL suspension 40 mg    Or     pantoprazole (PROTONIX) EC tablet 40 mg     phenylephrine (CYNDEE-SYNEPHRINE) 50 mg in NaCl 0.9 % 250 mL infusion     [START ON 3/31/2022] polyethylene glycol (MIRALAX) Packet 17 g     Reason beta blocker order not selected     senna-docusate (SENOKOT-S/PERICOLACE) 8.6-50 MG per tablet 1 tablet     sodium chloride (PF) 0.9% PF flush 3 mL     sodium chloride (PF) 0.9% PF flush 3 mL       Allergies:  Allergies   Allergen Reactions     Amoxicillin Rash       Social Hx:  Social History     Socioeconomic History     Marital status:      Spouse name: Not on file     Number of children: Not on file     Years of education: Not on file     Highest education level: Not on file   Occupational History     Not on file   Tobacco Use     Smoking status: Never Smoker     Smokeless tobacco:  Never Used   Substance and Sexual Activity     Alcohol use: Yes     Comment: socially     Drug use: Never     Sexual activity: Yes     Partners: Male     Birth control/protection: None   Other Topics Concern     Not on file   Social History Narrative     Not on file     Social Determinants of Health     Financial Resource Strain: Not on file   Food Insecurity: Not on file   Transportation Needs: Not on file   Physical Activity: Not on file   Stress: Not on file   Social Connections: Not on file   Intimate Partner Violence: Not on file   Housing Stability: Not on file       Family Hx:  History reviewed. No pertinent family history.

## 2022-03-30 NOTE — ANESTHESIA PREPROCEDURE EVALUATION
Anesthesia Pre-Procedure Evaluation    Patient: Angelica Robledo   MRN: [id number] : 1970        Procedure : Procedure(s):  CORONARY ARTERY BYPASS GRAFT (CABG) and  Left radial artery harvest          Past Medical History:   Diagnosis Date     Bilateral external ear infections     frequent infections as child     H/O lithotripsy      Kidney stone      Myocardial infarction (H)       Past Surgical History:   Procedure Laterality Date     ANGIOPLASTY        SECTION      ,      CV CORONARY ANGIOGRAM N/A 3/28/2022    Procedure: Coronary Angiogram;  Surgeon: Sylvia Cruz MD;  Location:  [facility]     CV LEFT HEART CATH N/A 3/28/2022    Procedure: Left Heart Catheterization;  Surgeon: Sylvia Cruz MD;  Location:  [facility]     MYRINGOTOMY, INSERT TUBE BILATERAL, COMBINED       ORTHOPEDIC SURGERY Left     left ankle      Allergies   Allergen Reactions     Amoxicillin Rash      Social History     Tobacco Use     Smoking status: Never Smoker     Smokeless tobacco: Never Used   Substance Use Topics     Alcohol use: Yes     Comment: socially      Wt Readings from Last 1 Encounters:   22 95.8 kg (211 lb 3.2 oz)        Anesthesia Evaluation            ROS/MED HX  ENT/Pulmonary:     (+) LORENA risk factors, snores loudly, hypertension, obese,  (-) tobacco use and sleep apnea   Neurologic:       Cardiovascular: Comment: MI at age 25, s/p angioplasty    (+) hypertension--CAD -past MI --Previous cardiac testing   Echo: Date: 3/27/22 Results:  Interpretation Summary     1. Left ventricular systolic function is low normal. Ejection fraction is 50-  55%.  2. There is hypokinesis of LV apex and apical segments. Differential diagnosis  includes LAD infarct versus Takotsubo cardiomyopathy.  3. No hemodynamically significant valvular disease on limited evaluation.  No prior study available for comparison.  Findings discussed with Dr. Sherman at 12:30  PM.  ______________________________________________________________________________  Left Ventricle  The left ventricle is normal in size. Left ventricular systolic function is  low normal. The visual ejection fraction is 50-55%. There is hypokinesis of LV  apex and apical segments of all the walls. Differential diagnosis includes  coronary disease involving LAD versus Takotsubo cardiomyopathy.     Right Ventricle  The right ventricle is not well visualized.     Mitral Valve  The mitral valve leaflets appear normal. There is no evidence of stenosis,  fluttering, or prolapse.     Tricuspid Valve  Normal tricuspid valve. There is trace tricuspid regurgitation.     Aortic Valve  The aortic valve is not well visualized. No aortic stenosis is present.     Pulmonic Valve  The pulmonic valve is not well visualized.     Vessels  Normal size aorta. Inferior vena cava not well visualized for estimation of  right atrial pressure.     Pericardium  There is no pericardial effusion.     Rhythm  Sinus rhythm was noted.  Stress Test: Date: Results:    ECG Reviewed: Date: 3/28/22 Results:  Sinus rhythm   Left ventricular hypertrophy with repolarization abnormality   Cath:  Date: 3/28/22 Results:  Conclusion      Multivessel coronary artery disease identified angiographically.    Left main: No significant lesion.    LAD: 50% lesion at the takeoff of the second diagonal and about 80% lesion at the takeoff of the third diagonal. Proximal D3 has a 50% lesion.    LCx:  after takeoff of the first major obtuse marginal. Distal OM and LPL branches are supplied by epicardial collaterals from the apical LAD.    RCA: 99% lesion in the mid RCA.    Left ventricular filling pressures are moderately elevated (about 25 mmHg).    Cardiovascular surgery has been consulted for CABG evaluation.        METS/Exercise Tolerance:     Hematologic:       Musculoskeletal:       GI/Hepatic:    (-) GERD   Renal/Genitourinary:     (+) Nephrolithiasis ,      Endo:     (+) Obesity,     Psychiatric/Substance Use:       Infectious Disease:       Malignancy:       Other:            Physical Exam    Airway        Mallampati: III   TM distance: > 3 FB   Neck ROM: full   Mouth opening: > 3 cm    Respiratory Devices and Support         Dental  no notable dental history         Cardiovascular   cardiovascular exam normal          Pulmonary   pulmonary exam normal                OUTSIDE LABS:  CBC:   Lab Results   Component Value Date    WBC 8.9 03/28/2022    WBC 11.8 (H) 03/28/2022    HGB 11.2 (L) 03/28/2022    HGB 10.6 (L) 03/28/2022    HCT 37.1 03/28/2022    HCT 34.6 (L) 03/28/2022     03/28/2022     03/28/2022     BMP:   Lab Results   Component Value Date     03/30/2022     03/29/2022    POTASSIUM 3.5 03/30/2022    POTASSIUM 3.5 03/29/2022    CHLORIDE 108 03/30/2022    CHLORIDE 109 03/29/2022    CO2 25 03/30/2022    CO2 25 03/29/2022    BUN 6 (L) 03/30/2022    BUN 8 03/29/2022    CR 0.76 03/30/2022    CR 0.82 03/29/2022     (H) 03/30/2022     (H) 03/29/2022     COAGS: No results found for: PTT, INR, FIBR  POC: No results found for: BGM, HCG, HCGS  HEPATIC:   Lab Results   Component Value Date    ALBUMIN 3.3 (L) 03/28/2022    PROTTOTAL 6.6 (L) 03/28/2022    ALT 36 03/28/2022    AST 54 (H) 03/28/2022    ALKPHOS 67 03/28/2022    BILITOTAL 0.4 03/28/2022     OTHER:   Lab Results   Component Value Date    A1C 5.7 (H) 03/28/2022    TOMAS 9.3 03/30/2022       Anesthesia Plan    ASA Status:  3   NPO Status:  NPO Appropriate    Anesthesia Type: General.     - Airway: ETT   Induction: Intravenous.   Maintenance: Inhalation.   Techniques and Equipment:     - Airway: Video-Laryngoscope     - Lines/Monitors: Arterial Line, Central Line, PAC, CVP, YAMEL            YAMEL Absolute Contra-indication: NONE            YAMEL Relative Contra-indication: NONE     - Blood: Blood in Room, PRBC     - Drips/Meds: Dexmed. infusion     Consents    Anesthesia Plan(s) and  associated risks, benefits, and realistic alternatives discussed. Questions answered and patient/representative(s) expressed understanding.    - Discussed:     - Discussed with:  Patient, Spouse      - Extended Intubation/Ventilatory Support Discussed: Yes.    Use of blood products discussed: Yes.     - Discussed with: Patient.     - Consented: consented to blood products            Reason for refusal: other.     Postoperative Care    Pain management: IV analgesics, Oral pain medications.   PONV prophylaxis: Ondansetron (or other 5HT-3)     Comments:    Other Comments: Induction with Lidocaine, Fentanyl, Versed, Lew and Propofol  Precedex gtt after induction  50mL bag of NS and micro-dripper for Protamine admin  Propofol gtt for transport            Faraz Gilbert MD

## 2022-03-30 NOTE — ANESTHESIA CARE TRANSFER NOTE
Patient: Angelica Robledo    Procedure: Procedure(s):  CORONARY ARTERY BYPASS GRAFT X4, Endoscopic left radial artery harvest , endoscopic left leg vein harvest.TARGETS: LAD, LPL, RAMUS,  ON CBP, WITH YAMEL READ BY ANESTHESIOLOGIST.       Diagnosis: CAD (coronary artery disease) [I25.10]  Diagnosis Additional Information: No value filed.    Anesthesia Type:   General     Note:    Oropharynx: endotracheal tube in place  Level of Consciousness: iatrogenic sedation    Level of Supplemental Oxygen (L/min / FiO2): 10  Independent Airway: airway patency not satisfactory and stable  Dentition: dentition unchanged  Vital Signs Stable: post-procedure vital signs reviewed and stable  Report to RN Given: handoff report given  Patient transferred to: ICU  Comments: Patient connected to SpO2, EKG, and arterial blood pressure transport monitors and accompanied by anesthesiologist to ICU room. Patient ventilated by CRNA with ambu via ETT with O2 at 8 liters per minute during transport.     On arrival to ICU, endotracheal tube position unchanged, equal, bilateral breath sounds auscultated in ICU room, patient placed on ICU ventilator by respiratory therapist, teeth and oral mucosa intact and unchanged at handoff of care. At anesthesia handoff of care, clinical monitors and alarms on and functioning, report on patient's clinical status given to ICU RN, report on patient's clinical status given to intensivist, ICU staff questions answered.  ICU Handoff: Call for PAUSE to initiate/utilize ICU HANDOFF, Identified Patient, Identified Responsible Provider, Reviewed the Pertinent Medical History, Discussed Surgical Course, Reviewed Intra-OP Anesthesia Management and Issues during Anesthesia, Set Expectations for Post Procedure Period and Allowed Opportunity for Questions and Acknowledgement of Understanding      Vitals:  Vitals Value Taken Time   BP     Temp 36.7  C (98.06  F) 03/30/22 1557   Pulse 80 03/30/22 1557   Resp 0 03/30/22 1557    SpO2 96 % 03/30/22 1557   Vitals shown include unvalidated device data.    Electronically Signed By: YRIS Pavon CRNA  March 30, 2022  3:58 PM

## 2022-03-30 NOTE — PROGRESS NOTES
Pt in OR this am. Please alert cardiology if additional assistance is needed. Otherwise patient will be followed by CV surgery. Please schedule outpatient follow up after discharge depending on clinical course.

## 2022-03-30 NOTE — ANESTHESIA PROCEDURE NOTES
Arterial Line Procedure Note    Pre-Procedure   Staff -        Anesthesiologist:  Faraz Gilbert MD       Performed By: Anesthesiologist       Location: pre-op       Pre-Anesthestic Checklist: patient identified, IV checked, site marked, risks and benefits discussed, informed consent, monitors and equipment checked, pre-op evaluation and at physician/surgeon's request  Timeout:       Correct Patient: Yes        Correct Procedure: Yes        Correct Site: Yes        Correct Position: Yes   Line Placement:   This line was placed Pre Induction starting at 3/30/2022 7:00 AM and ending at 3/30/2022 7:07 AM  Procedure   Procedure: arterial line       Laterality: right       Insertion Site: radial (Radial).  Sterile Prep        All elements of maximal sterile barrier technique followed       Patient Prep/Sterile Barriers: hand hygiene, sterile gloves, hat, mask, draped, sterile gown, draped       Skin prep: Chloraprep  Insertion/Injection        Technique: Seldinger Technique        Catheter Type/Size: 20 gauge, 1.75 in/4.5 cm quick cath (integral wire)  Narrative         Secured by: suture       Tegaderm dressing used.       Arterial waveform: Yes        IBP within 10% of NIBP: Yes

## 2022-03-30 NOTE — PROGRESS NOTES
Chart reviewed  Patient in OR today for CABG  Anticipate patient will arrive in ICU intubated postop and will be evaluated by intensivist  Will reassess patient tomorrow once extubated and stable.

## 2022-03-30 NOTE — PROGRESS NOTES
CV Surgery    Discussed with patient via phone plans for CABG surgery tomorrow am with Dr. Butterfield. Pre op orders in place. Patient in agreement. L radial for CABG. All questions answered to patient's satisfaction. Heparin off at 0400 as able.     Ngozi Bocanegra PA-C

## 2022-03-30 NOTE — OP NOTE
DATE OF SERVICE: 3/30/2022.     PREOPERATIVE DIAGNOSES:  1.  Severe multivessel coronary artery disease.  2.  Non-ST elevation myocardial infarction.     POSTOPERATIVE DIAGNOSES:  1.  Severe multivessel coronary artery disease.  2.  Non-ST elevation myocardial infarction.     PROCEDURE PERFORMED:  1.  Coronary artery bypass grafting x 4 (radial artery graft to the right posterior descending coronary artery, reversed saphenous vein graft to the obtuse marginal branch of the left circumflex coronary artery, reversed saphenous vein graft to the first diagonal branch of the left anterior descending coronary artery, and pedicled left internal mammary artery to left anterior descending coronary artery).  2.  Endoscopic vein harvest of the greater saphenous vein from the left lower extremity.  3.  Endoscopic left radial artery harvest.     SURGEON:  Mark Butterfield MD, PhD.     FIRST ASSISTANT:  Felicia Emerson PA-C.    SECOND ASSISTANT: Ngozi Bocanegra PA-C.     ANESTHESIA:  General endotracheal anesthesia.     ANESTHESIOLOGIST:  Faraz Gilbert MD     ESTIMATED BLOOD LOSS:  500 mL     SPECIMEN:  None.     INDICATIONS FOR PROCEDURE: Angelica Robledo is a 51 year old female with history of MI at age 25 with angioplasty who presents with a non-ST elevation myocardial infarction. Coronary angiography demonstrates severe multivessel coronary artery disease. Echocardiography demonstrates preserved left ventricular function with severe apical hypokinesis and no significant valvular abnormality. The patient understands the risks and benefits of the procedure and wishes to undergo the operation.     OPERATIVE FINDINGS:  The left internal mammary artery was 1.5 mm in diameter and had excellent flow. The right internal mammary artery was 1 mm in diameter and had excellent flow but it would not reach to the diagonal branches or through the transverse sinus to the left posterolateral branch so it was taken as a free graft. However, the  internal diameter was noted to be quite small (barely admitting a 1 mm probe), so it was discarded in favor of using the reversed saphenous vein graft. The greater saphenous vein from the left lower extremity was 3-4 mm in diameter and suitable for conduit.  The ascending aorta was free of calcified plaque.  The right posterior descending coronary artery was 1.75 mm in diameter and free of disease at the site of anastomosis. The obtuse marginal branch of the left circumflex coronary artery was 1.5 mm in diameter and free of disease at the site of anastomosis. The first diagonal branch of the left anterior descending coronary artery was 1.5 mm in diameter and free of disease at the site anastomosis.  The left anterior descending coronary artery 1.75 mm in diameter and free of disease at the site of anastomosis.  After reperfusion and defibrillation, sinus rhythm resumed.  Left ventricular function was normal overall with inferior and apical hypokinesis preoperatively and unchanged after bypass without inotropic support.     OPERATIVE DESCRIPTION IN DETAIL:  After obtaining informed consent, the patient was brought to the operating room and placed in the supine position on the operating room table.  Appropriate lines and devices for monitoring were placed by the anesthesia team.  The patient underwent smooth induction of general anesthesia, and the trachea was intubated orally.  A right internal jugular Cordis introducer was placed, and a Son[place]-Kim catheter was placed through this.  The patient was prepped and draped, and a timeout was performed to confirm the correct patient identity, as well as the procedure to be performed.  A median sternotomy was performed and the left internal mammary artery was harvested on a pedicle, and then the right internal mammary artery was harvested in skeletonized fashion.  The greater saphenous vein was harvested from the left lower extremity using endoscopic vein harvesting by the  physician assistant, Felicia Emerson PA-C. The left radial artery was harvested endoscopically by Ngozi Bocanegra PA-C.     The patient was given full dose heparin to achieve an activated clotting time over 480 seconds, and after cannulation of the distal ascending aorta and the right atrium, cardiopulmonary bypass was commenced.  Antegrade and retrograde cardioplegia cannulae were placed, and the heart was arrested with 1 liter of cold antegrade blood cardioplegia. Subsequent maintenance doses of 300 mL of cold retrograde blood cardioplegia were given approximately every 20 minutes or after each distal anastomosis.  Topical cold saline slush was applied for additional protection.     The following grafts were constructed in end-to-side fashion using running 7-0 Prolene:  A radial artery graft was sewn to the proximal right posterior descending coronary artery (the distal right coronary artery was initially grafted but the flow was suboptimal so this was taken down and closed with a vein patch), a reversed saphenous vein graft was sewn to the proximal left posterolateral branch of the left circumflex coronary artery, and a reversed saphenous vein graft was sewn to the mid diagonal branch of the left anterior descending coronary artery. The pedicled left internal mammary artery was sewn to the mid left anterior descending coronary artery in end-to-side fashion using running 8-0 Prolene.  The proximal anastomoses of the 2 vein grafts and the radial artery graft were constructed on the ascending aorta in end-to-side fashion using running 6-0 Prolene under a single crossclamp.  The crossclamp was released, and the heart was resuscitated.       All bleeding points were controlled.  A bipolar ventricular pacing lead was placed and brought out through a separate stab incision. A bipolar right atrial pacing lead was placed and brought out through a separate stab incision.  The patient weaned from cardiopulmonary bypass, was  given protamine and decannulated.  A 24-Bruneian Sarkis drain was placed in the left pleural space and brought out through a separate stab incision.  A 28-Bruneian chest tube was placed in the anterior mediastinum and brought out through a separate stab incision.  The sternal edges were reapposed with 3 interrupted #6 stainless steel sternal wires in the manubrium, 3 double wires in the body of the sternum and 1 additional #6 stainless steel sternal wire at the lower aspect of the sternum.  The muscle, fascia, and skin were closed in layers with absorbable suture.  Dermabond was applied.  All sponge, needle and instrument counts were correct at the end of the case.       CHASITY SCHNEIDER MD

## 2022-03-30 NOTE — BRIEF OP NOTE
[facility]    Brief Operative Note    Pre-operative diagnosis: CAD (coronary artery disease) [I25.10]  Post-operative diagnosis Same as pre-operative diagnosis    Procedure: Procedure(s):  CORONARY ARTERY BYPASS GRAFT X4, Endoscopic left radial artery harvest , endoscopic left leg vein harvest. LIMA-LAD, RA- SV-  ON CBP, WITH YAMEL READ BY ANESTHESIOLOGIST.  Surgeon: Surgeon(s) and Role:     * Mark Butterfield MD - Primary     * Ngozi Bocanegra PA-C - Assisting     * Felicia Emerson PA-C - Assisting  Anesthesia: General   Estimated Blood Loss: 500 ml    Drains: Chest tubes x 3.  Specimens:   ID Type Source Tests Collected by Time Destination   A : STAT LABS POST CPB Blood Line, arterial CBC WITH PLATELETS, BASIC METABOLIC PANEL, FIBRINOGEN ACTIVITY, PARTIAL THROMBOPLASTIN TIME, INR Xiomy Covington APRN CRNA 3/30/2022  2:45 PM      Findings:   None.  Complications: None.  Implants: A/V  Temp pacing wires

## 2022-03-31 ENCOUNTER — APPOINTMENT (OUTPATIENT)
Dept: GENERAL RADIOLOGY | Facility: CLINIC | Age: 52
End: 2022-03-31
Attending: NURSE PRACTITIONER
Payer: COMMERCIAL

## 2022-03-31 ENCOUNTER — APPOINTMENT (OUTPATIENT)
Dept: CT IMAGING | Facility: CLINIC | Age: 52
End: 2022-03-31
Attending: NURSE PRACTITIONER
Payer: COMMERCIAL

## 2022-03-31 ENCOUNTER — APPOINTMENT (OUTPATIENT)
Dept: GENERAL RADIOLOGY | Facility: CLINIC | Age: 52
End: 2022-03-31
Attending: SURGERY
Payer: COMMERCIAL

## 2022-03-31 ENCOUNTER — APPOINTMENT (OUTPATIENT)
Dept: CARDIOLOGY | Facility: CLINIC | Age: 52
End: 2022-03-31
Attending: SURGERY
Payer: COMMERCIAL

## 2022-03-31 ENCOUNTER — HOSPITAL ENCOUNTER (OUTPATIENT)
Dept: NEUROLOGY | Facility: CLINIC | Age: 52
Discharge: HOME | End: 2022-03-31
Attending: PSYCHIATRY & NEUROLOGY
Payer: COMMERCIAL

## 2022-03-31 ENCOUNTER — APPOINTMENT (OUTPATIENT)
Dept: CT IMAGING | Facility: CLINIC | Age: 52
End: 2022-03-31
Attending: PSYCHIATRY & NEUROLOGY
Payer: COMMERCIAL

## 2022-03-31 LAB
ABO/RH(D): NORMAL
ACT BLD: 258 SECONDS (ref 74–150)
ALBUMIN SERPL-MCNC: 2.4 G/DL (ref 3.4–5)
ALBUMIN SERPL-MCNC: 2.4 G/DL (ref 3.4–5)
ALBUMIN SERPL-MCNC: 2.6 G/DL (ref 3.4–5)
ALP SERPL-CCNC: 47 U/L (ref 40–150)
ALP SERPL-CCNC: 51 U/L (ref 40–150)
ALP SERPL-CCNC: 62 U/L (ref 40–150)
ALT SERPL W P-5'-P-CCNC: 50 U/L (ref 0–50)
ALT SERPL W P-5'-P-CCNC: 59 U/L (ref 0–50)
ALT SERPL W P-5'-P-CCNC: 59 U/L (ref 0–50)
ANION GAP SERPL CALCULATED.3IONS-SCNC: 10 MMOL/L (ref 3–14)
ANION GAP SERPL CALCULATED.3IONS-SCNC: 7 MMOL/L (ref 3–14)
ANION GAP SERPL CALCULATED.3IONS-SCNC: 9 MMOL/L (ref 3–14)
ANTIBODY SCREEN: NEGATIVE
APTT PPP: 36 SECONDS (ref 22–38)
AST SERPL W P-5'-P-CCNC: 107 U/L (ref 0–45)
AST SERPL W P-5'-P-CCNC: 115 U/L (ref 0–45)
AST SERPL W P-5'-P-CCNC: 89 U/L (ref 0–45)
BASE EXCESS BLDA CALC-SCNC: -2.2 MMOL/L (ref -9–1.8)
BASE EXCESS BLDA CALC-SCNC: -2.3 MMOL/L (ref -9–1.8)
BASE EXCESS BLDA CALC-SCNC: 0.2 MMOL/L (ref -9–1.8)
BASE EXCESS BLDV CALC-SCNC: -0.9 MMOL/L (ref -7.7–1.9)
BASE EXCESS BLDV CALC-SCNC: -1.9 MMOL/L (ref -7.7–1.9)
BASE EXCESS BLDV CALC-SCNC: -6.1 MMOL/L (ref -7.7–1.9)
BASOPHILS # BLD AUTO: 0.1 10E3/UL (ref 0–0.2)
BASOPHILS NFR BLD AUTO: 0 %
BILIRUB SERPL-MCNC: 0.4 MG/DL (ref 0.2–1.3)
BUN SERPL-MCNC: 8 MG/DL (ref 7–30)
BUN SERPL-MCNC: 9 MG/DL (ref 7–30)
BUN SERPL-MCNC: 9 MG/DL (ref 7–30)
CALCIUM SERPL-MCNC: 11.9 MG/DL (ref 8.5–10.1)
CALCIUM SERPL-MCNC: 8.7 MG/DL (ref 8.5–10.1)
CALCIUM SERPL-MCNC: 8.8 MG/DL (ref 8.5–10.1)
CHLORIDE BLD-SCNC: 114 MMOL/L (ref 94–109)
CHLORIDE BLD-SCNC: 115 MMOL/L (ref 94–109)
CHLORIDE BLD-SCNC: 116 MMOL/L (ref 94–109)
CO2 SERPL-SCNC: 20 MMOL/L (ref 20–32)
CO2 SERPL-SCNC: 21 MMOL/L (ref 20–32)
CO2 SERPL-SCNC: 22 MMOL/L (ref 20–32)
CREAT SERPL-MCNC: 0.73 MG/DL (ref 0.52–1.04)
CREAT SERPL-MCNC: 0.81 MG/DL (ref 0.52–1.04)
CREAT SERPL-MCNC: 1.06 MG/DL (ref 0.52–1.04)
EOSINOPHIL # BLD AUTO: 0 10E3/UL (ref 0–0.7)
EOSINOPHIL NFR BLD AUTO: 0 %
ERYTHROCYTE [DISTWIDTH] IN BLOOD BY AUTOMATED COUNT: 17.7 % (ref 10–15)
ERYTHROCYTE [DISTWIDTH] IN BLOOD BY AUTOMATED COUNT: 17.9 % (ref 10–15)
ERYTHROCYTE [DISTWIDTH] IN BLOOD BY AUTOMATED COUNT: 18.1 % (ref 10–15)
GFR SERPL CREATININE-BSD FRML MDRD: 64 ML/MIN/1.73M2
GFR SERPL CREATININE-BSD FRML MDRD: 88 ML/MIN/1.73M2
GFR SERPL CREATININE-BSD FRML MDRD: >90 ML/MIN/1.73M2
GLUCOSE BLD-MCNC: 178 MG/DL (ref 70–99)
GLUCOSE BLD-MCNC: 210 MG/DL (ref 70–99)
GLUCOSE BLD-MCNC: 263 MG/DL (ref 70–99)
GLUCOSE BLDC GLUCOMTR-MCNC: 132 MG/DL (ref 70–99)
GLUCOSE BLDC GLUCOMTR-MCNC: 135 MG/DL (ref 70–99)
GLUCOSE BLDC GLUCOMTR-MCNC: 137 MG/DL (ref 70–99)
GLUCOSE BLDC GLUCOMTR-MCNC: 140 MG/DL (ref 70–99)
GLUCOSE BLDC GLUCOMTR-MCNC: 143 MG/DL (ref 70–99)
GLUCOSE BLDC GLUCOMTR-MCNC: 148 MG/DL (ref 70–99)
GLUCOSE BLDC GLUCOMTR-MCNC: 153 MG/DL (ref 70–99)
GLUCOSE BLDC GLUCOMTR-MCNC: 160 MG/DL (ref 70–99)
GLUCOSE BLDC GLUCOMTR-MCNC: 165 MG/DL (ref 70–99)
GLUCOSE BLDC GLUCOMTR-MCNC: 200 MG/DL (ref 70–99)
GLUCOSE BLDC GLUCOMTR-MCNC: 91 MG/DL (ref 70–99)
GLUCOSE BLDC GLUCOMTR-MCNC: 99 MG/DL (ref 70–99)
HCO3 BLD-SCNC: 21 MMOL/L (ref 21–28)
HCO3 BLD-SCNC: 21 MMOL/L (ref 21–28)
HCO3 BLD-SCNC: 22 MMOL/L (ref 21–28)
HCO3 BLDV-SCNC: 20 MMOL/L (ref 21–28)
HCO3 BLDV-SCNC: 22 MMOL/L (ref 21–28)
HCO3 BLDV-SCNC: 22 MMOL/L (ref 21–28)
HCT VFR BLD AUTO: 25.5 % (ref 35–47)
HCT VFR BLD AUTO: 25.9 % (ref 35–47)
HCT VFR BLD AUTO: 31.2 % (ref 35–47)
HGB BLD-MCNC: 8 G/DL (ref 11.7–15.7)
HGB BLD-MCNC: 8.2 G/DL (ref 11.7–15.7)
HGB BLD-MCNC: 8.2 G/DL (ref 11.7–15.7)
HGB BLD-MCNC: 9.3 G/DL (ref 11.7–15.7)
IMM GRANULOCYTES # BLD: 0.4 10E3/UL
IMM GRANULOCYTES NFR BLD: 2 %
INR PPP: 1.37 (ref 0.85–1.15)
LACTATE SERPL-SCNC: 1.2 MMOL/L (ref 0.7–2)
LACTATE SERPL-SCNC: 2.2 MMOL/L (ref 0.7–2)
LACTATE SERPL-SCNC: 6.8 MMOL/L (ref 0.7–2)
LVEF ECHO: NORMAL
LYMPHOCYTES # BLD AUTO: 3.7 10E3/UL (ref 0.8–5.3)
LYMPHOCYTES NFR BLD AUTO: 16 %
MAGNESIUM SERPL-MCNC: 2.6 MG/DL (ref 1.6–2.3)
MCH RBC QN AUTO: 24.4 PG (ref 26.5–33)
MCH RBC QN AUTO: 25.2 PG (ref 26.5–33)
MCH RBC QN AUTO: 25.4 PG (ref 26.5–33)
MCHC RBC AUTO-ENTMCNC: 29.8 G/DL (ref 31.5–36.5)
MCHC RBC AUTO-ENTMCNC: 31.4 G/DL (ref 31.5–36.5)
MCHC RBC AUTO-ENTMCNC: 31.7 G/DL (ref 31.5–36.5)
MCV RBC AUTO: 79 FL (ref 78–100)
MCV RBC AUTO: 81 FL (ref 78–100)
MCV RBC AUTO: 82 FL (ref 78–100)
MONOCYTES # BLD AUTO: 1 10E3/UL (ref 0–1.3)
MONOCYTES NFR BLD AUTO: 4 %
NEUTROPHILS # BLD AUTO: 17.6 10E3/UL (ref 1.6–8.3)
NEUTROPHILS NFR BLD AUTO: 78 %
NRBC # BLD AUTO: 0 10E3/UL
NRBC BLD AUTO-RTO: 0 /100
O2/TOTAL GAS SETTING VFR VENT: 100 %
O2/TOTAL GAS SETTING VFR VENT: 30 %
O2/TOTAL GAS SETTING VFR VENT: 30 %
O2/TOTAL GAS SETTING VFR VENT: 40 %
OXYHGB MFR BLD: 97 % (ref 92–100)
OXYHGB MFR BLD: 98 % (ref 92–100)
OXYHGB MFR BLD: 99 % (ref 92–100)
OXYHGB MFR BLDV: 61 % (ref 70–75)
PCO2 BLD: 27 MM HG (ref 35–45)
PCO2 BLD: 27 MM HG (ref 35–45)
PCO2 BLD: 31 MM HG (ref 35–45)
PCO2 BLDV: 30 MM HG (ref 40–50)
PCO2 BLDV: 33 MM HG (ref 40–50)
PCO2 BLDV: 40 MM HG (ref 40–50)
PH BLD: 7.45 [PH] (ref 7.35–7.45)
PH BLD: 7.49 [PH] (ref 7.35–7.45)
PH BLD: 7.53 [PH] (ref 7.35–7.45)
PH BLDV: 7.3 [PH] (ref 7.32–7.43)
PH BLDV: 7.43 [PH] (ref 7.32–7.43)
PH BLDV: 7.48 [PH] (ref 7.32–7.43)
PHOSPHATE SERPL-MCNC: 2 MG/DL (ref 2.5–4.5)
PHOSPHATE SERPL-MCNC: 3.2 MG/DL (ref 2.5–4.5)
PHOSPHATE SERPL-MCNC: 3.6 MG/DL (ref 2.5–4.5)
PLATELET # BLD AUTO: 183 10E3/UL (ref 150–450)
PLATELET # BLD AUTO: 190 10E3/UL (ref 150–450)
PLATELET # BLD AUTO: 255 10E3/UL (ref 150–450)
PO2 BLD: 117 MM HG (ref 80–105)
PO2 BLD: 143 MM HG (ref 80–105)
PO2 BLD: 92 MM HG (ref 80–105)
PO2 BLDV: 127 MM HG (ref 25–47)
PO2 BLDV: 34 MM HG (ref 25–47)
PO2 BLDV: 73 MM HG (ref 25–47)
POTASSIUM BLD-SCNC: 3.2 MMOL/L (ref 3.4–5.3)
POTASSIUM BLD-SCNC: 3.4 MMOL/L (ref 3.4–5.3)
POTASSIUM BLD-SCNC: 3.6 MMOL/L (ref 3.4–5.3)
POTASSIUM BLD-SCNC: 3.8 MMOL/L (ref 3.4–5.3)
PROT SERPL-MCNC: 5.2 G/DL (ref 6.8–8.8)
PROT SERPL-MCNC: 5.3 G/DL (ref 6.8–8.8)
PROT SERPL-MCNC: 5.6 G/DL (ref 6.8–8.8)
RBC # BLD AUTO: 3.15 10E6/UL (ref 3.8–5.2)
RBC # BLD AUTO: 3.26 10E6/UL (ref 3.8–5.2)
RBC # BLD AUTO: 3.81 10E6/UL (ref 3.8–5.2)
SODIUM SERPL-SCNC: 144 MMOL/L (ref 133–144)
SODIUM SERPL-SCNC: 145 MMOL/L (ref 133–144)
SODIUM SERPL-SCNC: 145 MMOL/L (ref 133–144)
SPECIMEN EXPIRATION DATE: NORMAL
TROPONIN I SERPL HS-MCNC: ABNORMAL NG/L
WBC # BLD AUTO: 15.8 10E3/UL (ref 4–11)
WBC # BLD AUTO: 17.8 10E3/UL (ref 4–11)
WBC # BLD AUTO: 22.7 10E3/UL (ref 4–11)

## 2022-03-31 PROCEDURE — 999N000185 HC STATISTIC TRANSPORT TIME EA 15 MIN

## 2022-03-31 PROCEDURE — 85018 HEMOGLOBIN: CPT | Performed by: SURGERY

## 2022-03-31 PROCEDURE — 250N000013 HC RX MED GY IP 250 OP 250 PS 637: Performed by: SURGERY

## 2022-03-31 PROCEDURE — 250N000011 HC RX IP 250 OP 636: Performed by: STUDENT IN AN ORGANIZED HEALTH CARE EDUCATION/TRAINING PROGRAM

## 2022-03-31 PROCEDURE — 82805 BLOOD GASES W/O2 SATURATION: CPT | Performed by: SURGERY

## 2022-03-31 PROCEDURE — 83605 ASSAY OF LACTIC ACID: CPT

## 2022-03-31 PROCEDURE — 94003 VENT MGMT INPAT SUBQ DAY: CPT

## 2022-03-31 PROCEDURE — 250N000011 HC RX IP 250 OP 636: Performed by: INTERNAL MEDICINE

## 2022-03-31 PROCEDURE — 3E043XZ INTRODUCTION OF VASOPRESSOR INTO CENTRAL VEIN, PERCUTANEOUS APPROACH: ICD-10-PCS | Performed by: SURGERY

## 2022-03-31 PROCEDURE — 83735 ASSAY OF MAGNESIUM: CPT | Performed by: SURGERY

## 2022-03-31 PROCEDURE — 250N000011 HC RX IP 250 OP 636: Performed by: SURGERY

## 2022-03-31 PROCEDURE — 84484 ASSAY OF TROPONIN QUANT: CPT | Performed by: STUDENT IN AN ORGANIZED HEALTH CARE EDUCATION/TRAINING PROGRAM

## 2022-03-31 PROCEDURE — 99232 SBSQ HOSP IP/OBS MODERATE 35: CPT | Performed by: INTERNAL MEDICINE

## 2022-03-31 PROCEDURE — 999N000157 HC STATISTIC RCP TIME EA 10 MIN

## 2022-03-31 PROCEDURE — 258N000003 HC RX IP 258 OP 636: Performed by: SURGERY

## 2022-03-31 PROCEDURE — C9460 INJECTION, CANGRELOR: HCPCS | Performed by: INTERNAL MEDICINE

## 2022-03-31 PROCEDURE — 85730 THROMBOPLASTIN TIME PARTIAL: CPT | Performed by: STUDENT IN AN ORGANIZED HEALTH CARE EDUCATION/TRAINING PROGRAM

## 2022-03-31 PROCEDURE — 99291 CRITICAL CARE FIRST HOUR: CPT | Mod: 25 | Performed by: PSYCHIATRY & NEUROLOGY

## 2022-03-31 PROCEDURE — C1894 INTRO/SHEATH, NON-LASER: HCPCS | Performed by: INTERNAL MEDICINE

## 2022-03-31 PROCEDURE — 85027 COMPLETE CBC AUTOMATED: CPT | Performed by: SURGERY

## 2022-03-31 PROCEDURE — 95708 EEG WO VID EA 12-26HR UNMNTR: CPT

## 2022-03-31 PROCEDURE — 250N000009 HC RX 250: Performed by: STUDENT IN AN ORGANIZED HEALTH CARE EDUCATION/TRAINING PROGRAM

## 2022-03-31 PROCEDURE — 2Y41X5Z PACKING OF NASAL REGION USING PACKING MATERIAL: ICD-10-PCS | Performed by: NURSE PRACTITIONER

## 2022-03-31 PROCEDURE — 86901 BLOOD TYPING SEROLOGIC RH(D): CPT | Performed by: STUDENT IN AN ORGANIZED HEALTH CARE EDUCATION/TRAINING PROGRAM

## 2022-03-31 PROCEDURE — 82803 BLOOD GASES ANY COMBINATION: CPT | Performed by: STUDENT IN AN ORGANIZED HEALTH CARE EDUCATION/TRAINING PROGRAM

## 2022-03-31 PROCEDURE — 027034Z DILATION OF CORONARY ARTERY, ONE ARTERY WITH DRUG-ELUTING INTRALUMINAL DEVICE, PERCUTANEOUS APPROACH: ICD-10-PCS | Performed by: INTERNAL MEDICINE

## 2022-03-31 PROCEDURE — 83605 ASSAY OF LACTIC ACID: CPT | Performed by: NURSE PRACTITIONER

## 2022-03-31 PROCEDURE — 200N000001 HC R&B ICU

## 2022-03-31 PROCEDURE — B2111ZZ FLUOROSCOPY OF MULTIPLE CORONARY ARTERIES USING LOW OSMOLAR CONTRAST: ICD-10-PCS | Performed by: INTERNAL MEDICINE

## 2022-03-31 PROCEDURE — 84100 ASSAY OF PHOSPHORUS: CPT | Performed by: SURGERY

## 2022-03-31 PROCEDURE — 99291 CRITICAL CARE FIRST HOUR: CPT | Mod: 24 | Performed by: INTERNAL MEDICINE

## 2022-03-31 PROCEDURE — 250N000009 HC RX 250: Performed by: NURSE PRACTITIONER

## 2022-03-31 PROCEDURE — 255N000002 HC RX 255 OP 636: Performed by: STUDENT IN AN ORGANIZED HEALTH CARE EDUCATION/TRAINING PROGRAM

## 2022-03-31 PROCEDURE — C9460 INJECTION, CANGRELOR: HCPCS | Performed by: PHYSICIAN ASSISTANT

## 2022-03-31 PROCEDURE — C1887 CATHETER, GUIDING: HCPCS | Performed by: INTERNAL MEDICINE

## 2022-03-31 PROCEDURE — C1725 CATH, TRANSLUMIN NON-LASER: HCPCS | Performed by: INTERNAL MEDICINE

## 2022-03-31 PROCEDURE — C1769 GUIDE WIRE: HCPCS | Performed by: INTERNAL MEDICINE

## 2022-03-31 PROCEDURE — 02703ZZ DILATION OF CORONARY ARTERY, ONE ARTERY, PERCUTANEOUS APPROACH: ICD-10-PCS | Performed by: INTERNAL MEDICINE

## 2022-03-31 PROCEDURE — 93455 CORONARY ART/GRFT ANGIO S&I: CPT | Mod: 26 | Performed by: INTERNAL MEDICINE

## 2022-03-31 PROCEDURE — 258N000003 HC RX IP 258 OP 636: Performed by: INTERNAL MEDICINE

## 2022-03-31 PROCEDURE — 71045 X-RAY EXAM CHEST 1 VIEW: CPT

## 2022-03-31 PROCEDURE — 250N000011 HC RX IP 250 OP 636: Performed by: PHYSICIAN ASSISTANT

## 2022-03-31 PROCEDURE — 93308 TTE F-UP OR LMTD: CPT | Mod: 26 | Performed by: INTERNAL MEDICINE

## 2022-03-31 PROCEDURE — 85347 COAGULATION TIME ACTIVATED: CPT

## 2022-03-31 PROCEDURE — C1874 STENT, COATED/COV W/DEL SYS: HCPCS | Performed by: INTERNAL MEDICINE

## 2022-03-31 PROCEDURE — 95719 EEG PHYS/QHP EA INCR W/O VID: CPT | Performed by: PSYCHIATRY & NEUROLOGY

## 2022-03-31 PROCEDURE — 70496 CT ANGIOGRAPHY HEAD: CPT

## 2022-03-31 PROCEDURE — 80053 COMPREHEN METABOLIC PANEL: CPT | Performed by: SURGERY

## 2022-03-31 PROCEDURE — C1760 CLOSURE DEV, VASC: HCPCS | Performed by: INTERNAL MEDICINE

## 2022-03-31 PROCEDURE — 999N000065 XR ABDOMEN PORT 1 VIEWS

## 2022-03-31 PROCEDURE — 84155 ASSAY OF PROTEIN SERUM: CPT | Performed by: NURSE PRACTITIONER

## 2022-03-31 PROCEDURE — C1753 CATH, INTRAVAS ULTRASOUND: HCPCS | Performed by: INTERNAL MEDICINE

## 2022-03-31 PROCEDURE — 250N000013 HC RX MED GY IP 250 OP 250 PS 637: Performed by: PHYSICIAN ASSISTANT

## 2022-03-31 PROCEDURE — 93325 DOPPLER ECHO COLOR FLOW MAPG: CPT

## 2022-03-31 PROCEDURE — 250N000009 HC RX 250: Performed by: INTERNAL MEDICINE

## 2022-03-31 PROCEDURE — 93325 DOPPLER ECHO COLOR FLOW MAPG: CPT | Mod: 26 | Performed by: INTERNAL MEDICINE

## 2022-03-31 PROCEDURE — 272N000001 HC OR GENERAL SUPPLY STERILE: Performed by: INTERNAL MEDICINE

## 2022-03-31 PROCEDURE — 258N000003 HC RX IP 258 OP 636: Performed by: PHYSICIAN ASSISTANT

## 2022-03-31 PROCEDURE — 85027 COMPLETE CBC AUTOMATED: CPT | Performed by: NURSE PRACTITIONER

## 2022-03-31 PROCEDURE — C9606 PERC D-E COR REVASC W AMI S: HCPCS | Performed by: INTERNAL MEDICINE

## 2022-03-31 PROCEDURE — 85610 PROTHROMBIN TIME: CPT | Performed by: STUDENT IN AN ORGANIZED HEALTH CARE EDUCATION/TRAINING PROGRAM

## 2022-03-31 PROCEDURE — 70450 CT HEAD/BRAIN W/O DYE: CPT

## 2022-03-31 PROCEDURE — 84155 ASSAY OF PROTEIN SERUM: CPT | Performed by: SURGERY

## 2022-03-31 PROCEDURE — 85025 COMPLETE CBC W/AUTO DIFF WBC: CPT | Performed by: STUDENT IN AN ORGANIZED HEALTH CARE EDUCATION/TRAINING PROGRAM

## 2022-03-31 PROCEDURE — B240ZZ3 ULTRASONOGRAPHY OF SINGLE CORONARY ARTERY, INTRAVASCULAR: ICD-10-PCS | Performed by: INTERNAL MEDICINE

## 2022-03-31 PROCEDURE — 93321 DOPPLER ECHO F-UP/LMTD STD: CPT | Mod: 26 | Performed by: INTERNAL MEDICINE

## 2022-03-31 PROCEDURE — 92978 ENDOLUMINL IVUS OCT C 1ST: CPT | Mod: 26 | Performed by: INTERNAL MEDICINE

## 2022-03-31 PROCEDURE — 93455 CORONARY ART/GRFT ANGIO S&I: CPT | Performed by: INTERNAL MEDICINE

## 2022-03-31 PROCEDURE — 92941 PRQ TRLML REVSC TOT OCCL AMI: CPT | Mod: RC | Performed by: INTERNAL MEDICINE

## 2022-03-31 PROCEDURE — 250N000009 HC RX 250: Performed by: SURGERY

## 2022-03-31 PROCEDURE — 84132 ASSAY OF SERUM POTASSIUM: CPT | Performed by: SURGERY

## 2022-03-31 PROCEDURE — 83605 ASSAY OF LACTIC ACID: CPT | Performed by: SURGERY

## 2022-03-31 DEVICE — CLOSURE ANGIOSEAL 6FR 610130: Type: IMPLANTABLE DEVICE | Status: FUNCTIONAL

## 2022-03-31 DEVICE — STENT CORONARY DES SYNERGY XD MR US 4.00X16MM H7493941816400: Type: IMPLANTABLE DEVICE | Status: FUNCTIONAL

## 2022-03-31 RX ORDER — POTASSIUM CHLORIDE 20MEQ/15ML
40 LIQUID (ML) ORAL ONCE
Status: COMPLETED | OUTPATIENT
Start: 2022-03-31 | End: 2022-03-31

## 2022-03-31 RX ORDER — OXYCODONE HYDROCHLORIDE 5 MG/1
10 TABLET ORAL EVERY 4 HOURS PRN
Status: CANCELLED | OUTPATIENT
Start: 2022-03-31

## 2022-03-31 RX ORDER — ASPIRIN 81 MG/1
81 TABLET, CHEWABLE ORAL ONCE
Status: CANCELLED | OUTPATIENT
Start: 2022-03-31 | End: 2022-03-31

## 2022-03-31 RX ORDER — ATROPINE SULFATE 0.1 MG/ML
0.5 INJECTION INTRAVENOUS
Status: CANCELLED | OUTPATIENT
Start: 2022-03-31 | End: 2022-03-31

## 2022-03-31 RX ORDER — NALOXONE HYDROCHLORIDE 0.4 MG/ML
0.2 INJECTION, SOLUTION INTRAMUSCULAR; INTRAVENOUS; SUBCUTANEOUS
Status: CANCELLED | OUTPATIENT
Start: 2022-03-31 | End: 2022-03-31

## 2022-03-31 RX ORDER — METOPROLOL TARTRATE 1 MG/ML
5 INJECTION, SOLUTION INTRAVENOUS
Status: CANCELLED | OUTPATIENT
Start: 2022-03-31

## 2022-03-31 RX ORDER — ONDANSETRON 2 MG/ML
4 INJECTION INTRAMUSCULAR; INTRAVENOUS EVERY 6 HOURS PRN
Status: CANCELLED | OUTPATIENT
Start: 2022-03-31

## 2022-03-31 RX ORDER — IOPAMIDOL 755 MG/ML
INJECTION, SOLUTION INTRAVASCULAR
Status: DISCONTINUED | OUTPATIENT
Start: 2022-03-31 | End: 2022-03-31 | Stop reason: HOSPADM

## 2022-03-31 RX ORDER — HEPARIN SODIUM 1000 [USP'U]/ML
INJECTION, SOLUTION INTRAVENOUS; SUBCUTANEOUS
Status: DISCONTINUED | OUTPATIENT
Start: 2022-03-31 | End: 2022-03-31 | Stop reason: HOSPADM

## 2022-03-31 RX ORDER — FENTANYL CITRATE 50 UG/ML
25 INJECTION, SOLUTION INTRAMUSCULAR; INTRAVENOUS
Status: CANCELLED | OUTPATIENT
Start: 2022-03-31

## 2022-03-31 RX ORDER — SODIUM CHLORIDE 9 MG/ML
INJECTION, SOLUTION INTRAVENOUS CONTINUOUS
Status: CANCELLED | OUTPATIENT
Start: 2022-03-31 | End: 2022-03-31

## 2022-03-31 RX ORDER — NALOXONE HYDROCHLORIDE 0.4 MG/ML
0.4 INJECTION, SOLUTION INTRAMUSCULAR; INTRAVENOUS; SUBCUTANEOUS
Status: CANCELLED | OUTPATIENT
Start: 2022-03-31 | End: 2022-03-31

## 2022-03-31 RX ORDER — CLOPIDOGREL BISULFATE 75 MG/1
75 TABLET ORAL DAILY
Status: DISCONTINUED | OUTPATIENT
Start: 2022-03-31 | End: 2022-03-31

## 2022-03-31 RX ORDER — HYDRALAZINE HYDROCHLORIDE 20 MG/ML
10 INJECTION INTRAMUSCULAR; INTRAVENOUS EVERY 4 HOURS PRN
Status: CANCELLED | OUTPATIENT
Start: 2022-03-31

## 2022-03-31 RX ORDER — MAGNESIUM SULFATE HEPTAHYDRATE 40 MG/ML
2 INJECTION, SOLUTION INTRAVENOUS ONCE
Status: COMPLETED | OUTPATIENT
Start: 2022-03-31 | End: 2022-03-31

## 2022-03-31 RX ORDER — ACETAMINOPHEN 325 MG/1
650 TABLET ORAL EVERY 4 HOURS PRN
Status: CANCELLED | OUTPATIENT
Start: 2022-03-31

## 2022-03-31 RX ORDER — ONDANSETRON 4 MG/1
4 TABLET, ORALLY DISINTEGRATING ORAL EVERY 6 HOURS PRN
Status: CANCELLED | OUTPATIENT
Start: 2022-03-31

## 2022-03-31 RX ORDER — FLUMAZENIL 0.1 MG/ML
0.2 INJECTION, SOLUTION INTRAVENOUS
Status: CANCELLED | OUTPATIENT
Start: 2022-03-31 | End: 2022-03-31

## 2022-03-31 RX ORDER — ASPIRIN 81 MG/1
81 TABLET ORAL DAILY
Status: CANCELLED | OUTPATIENT
Start: 2022-04-01

## 2022-03-31 RX ORDER — CLOPIDOGREL 300 MG/1
600 TABLET, FILM COATED ORAL ONCE
Status: DISCONTINUED | OUTPATIENT
Start: 2022-03-31 | End: 2022-03-31

## 2022-03-31 RX ORDER — OXYCODONE HYDROCHLORIDE 5 MG/1
5 TABLET ORAL EVERY 4 HOURS PRN
Status: CANCELLED | OUTPATIENT
Start: 2022-03-31

## 2022-03-31 RX ORDER — ATORVASTATIN CALCIUM 80 MG/1
80 TABLET, FILM COATED ORAL DAILY
Qty: 90 TABLET | Refills: 3 | Status: SHIPPED | OUTPATIENT
Start: 2022-03-31 | End: 2022-04-17

## 2022-03-31 RX ORDER — SODIUM CHLORIDE 9 MG/ML
75 INJECTION, SOLUTION INTRAVENOUS CONTINUOUS
Status: CANCELLED | OUTPATIENT
Start: 2022-03-31 | End: 2022-03-31

## 2022-03-31 RX ORDER — IOPAMIDOL 755 MG/ML
70 INJECTION, SOLUTION INTRAVASCULAR ONCE
Status: COMPLETED | OUTPATIENT
Start: 2022-03-31 | End: 2022-03-31

## 2022-03-31 RX ORDER — ATROPINE SULFATE 0.1 MG/ML
INJECTION INTRAVENOUS
Status: DISCONTINUED
Start: 2022-03-31 | End: 2022-03-31 | Stop reason: HOSPADM

## 2022-03-31 RX ORDER — NITROGLYCERIN 0.4 MG/1
0.4 TABLET SUBLINGUAL EVERY 5 MIN PRN
Status: CANCELLED | OUTPATIENT
Start: 2022-03-31

## 2022-03-31 RX ORDER — HYDRALAZINE HYDROCHLORIDE 20 MG/ML
10-20 INJECTION INTRAMUSCULAR; INTRAVENOUS EVERY 4 HOURS PRN
Status: DISCONTINUED | OUTPATIENT
Start: 2022-03-31 | End: 2022-04-16

## 2022-03-31 RX ADMIN — TICAGRELOR 180 MG: 90 TABLET ORAL at 15:08

## 2022-03-31 RX ADMIN — ASPIRIN 81 MG CHEWABLE TABLET 162 MG: 81 TABLET CHEWABLE at 08:54

## 2022-03-31 RX ADMIN — HEPARIN SODIUM 5000 UNITS: 5000 INJECTION, SOLUTION INTRAVENOUS; SUBCUTANEOUS at 20:33

## 2022-03-31 RX ADMIN — HEPARIN SODIUM 5000 UNITS: 5000 INJECTION, SOLUTION INTRAVENOUS; SUBCUTANEOUS at 13:40

## 2022-03-31 RX ADMIN — CLINDAMYCIN IN 5 PERCENT DEXTROSE 900 MG: 18 INJECTION, SOLUTION INTRAVENOUS at 13:40

## 2022-03-31 RX ADMIN — SODIUM CHLORIDE 90 ML: 900 INJECTION INTRAVENOUS at 17:08

## 2022-03-31 RX ADMIN — MAGNESIUM SULFATE HEPTAHYDRATE 2 G: 40 INJECTION, SOLUTION INTRAVENOUS at 00:25

## 2022-03-31 RX ADMIN — MUPIROCIN 0.5 G: 20 OINTMENT TOPICAL at 20:50

## 2022-03-31 RX ADMIN — IOPAMIDOL 70 ML: 755 INJECTION, SOLUTION INTRAVENOUS at 17:07

## 2022-03-31 RX ADMIN — ACETAMINOPHEN 975 MG: 325 TABLET ORAL at 08:54

## 2022-03-31 RX ADMIN — HUMAN ALBUMIN MICROSPHERES AND PERFLUTREN 9 ML: 10; .22 INJECTION, SOLUTION INTRAVENOUS at 03:29

## 2022-03-31 RX ADMIN — AMIODARONE HYDROCHLORIDE 0.5 MG/MIN: 50 INJECTION, SOLUTION INTRAVENOUS at 23:52

## 2022-03-31 RX ADMIN — CANGRELOR 4 MCG/KG/MIN: 50 INJECTION, POWDER, LYOPHILIZED, FOR SOLUTION INTRAVENOUS at 01:54

## 2022-03-31 RX ADMIN — NICARDIPINE HYDROCHLORIDE 7.5 MG/HR: 0.2 INJECTION INTRAVENOUS at 18:51

## 2022-03-31 RX ADMIN — CLINDAMYCIN IN 5 PERCENT DEXTROSE 900 MG: 18 INJECTION, SOLUTION INTRAVENOUS at 05:26

## 2022-03-31 RX ADMIN — ONDANSETRON 4 MG: 2 INJECTION INTRAMUSCULAR; INTRAVENOUS at 09:25

## 2022-03-31 RX ADMIN — CANGRELOR 4 MCG/KG/MIN: 50 INJECTION, POWDER, LYOPHILIZED, FOR SOLUTION INTRAVENOUS at 15:26

## 2022-03-31 RX ADMIN — SODIUM CHLORIDE, POTASSIUM CHLORIDE, SODIUM LACTATE AND CALCIUM CHLORIDE 1000 ML: 600; 310; 30; 20 INJECTION, SOLUTION INTRAVENOUS at 03:53

## 2022-03-31 RX ADMIN — POLYETHYLENE GLYCOL 3350 17 G: 17 POWDER, FOR SOLUTION ORAL at 08:54

## 2022-03-31 RX ADMIN — POTASSIUM CHLORIDE 40 MEQ: 20 SOLUTION ORAL at 08:53

## 2022-03-31 RX ADMIN — CHLORHEXIDINE GLUCONATE 15 ML: 1.2 RINSE ORAL at 08:54

## 2022-03-31 RX ADMIN — POTASSIUM CHLORIDE 40 MEQ: 20 SOLUTION ORAL at 13:57

## 2022-03-31 RX ADMIN — Medication 40 MG: at 08:54

## 2022-03-31 RX ADMIN — NICARDIPINE HYDROCHLORIDE 12 MG/HR: 0.2 INJECTION INTRAVENOUS at 15:02

## 2022-03-31 RX ADMIN — SODIUM CHLORIDE, POTASSIUM CHLORIDE, SODIUM LACTATE AND CALCIUM CHLORIDE 250 ML: 600; 310; 30; 20 INJECTION, SOLUTION INTRAVENOUS at 00:28

## 2022-03-31 RX ADMIN — NICARDIPINE HYDROCHLORIDE 2 MG/HR: 0.2 INJECTION INTRAVENOUS at 08:35

## 2022-03-31 RX ADMIN — CHLORHEXIDINE GLUCONATE 15 ML: 1.2 RINSE ORAL at 20:33

## 2022-03-31 RX ADMIN — ACETAMINOPHEN 975 MG: 325 TABLET ORAL at 15:08

## 2022-03-31 RX ADMIN — ATORVASTATIN CALCIUM 80 MG: 40 TABLET, FILM COATED ORAL at 08:54

## 2022-03-31 RX ADMIN — GABAPENTIN 100 MG: 100 CAPSULE ORAL at 08:54

## 2022-03-31 RX ADMIN — PROPOFOL 30 MCG/KG/MIN: 10 INJECTION, EMULSION INTRAVENOUS at 00:47

## 2022-03-31 RX ADMIN — PROPOFOL 30 MCG/KG/MIN: 10 INJECTION, EMULSION INTRAVENOUS at 08:00

## 2022-03-31 RX ADMIN — SODIUM PHOSPHATE, MONOBASIC, MONOHYDRATE 15 MMOL: 276; 142 INJECTION, SOLUTION INTRAVENOUS at 10:15

## 2022-03-31 RX ADMIN — AMIODARONE HYDROCHLORIDE 1 MG/MIN: 50 INJECTION, SOLUTION INTRAVENOUS at 00:25

## 2022-03-31 ASSESSMENT — ACTIVITIES OF DAILY LIVING (ADL)
ADLS_ACUITY_SCORE: 7
ADLS_ACUITY_SCORE: 9
ADLS_ACUITY_SCORE: 11
ADLS_ACUITY_SCORE: 9
ADLS_ACUITY_SCORE: 7
ADLS_ACUITY_SCORE: 7
ADLS_ACUITY_SCORE: 11
ADLS_ACUITY_SCORE: 7
ADLS_ACUITY_SCORE: 7
ADLS_ACUITY_SCORE: 11

## 2022-03-31 NOTE — PHARMACY-RX INSURANCE COVERAGE
Antiplatelet coverage check.  Patient has BCBS through an employer.    Brilinta: $419/mo.  Upon receipt of RX, [facility] can provide copay savings card to decrease this to $219/mo.   Prasugrel: $42/mo.   Clopidogrel: $15/mo.       -ESEQUIEL Osborn, Pharmacy Technician/Liaison, [facility] [phone]

## 2022-03-31 NOTE — PROGRESS NOTES
Progress Note/ICU code blue note  Attention drawn to patient who was found in PEA arrest and V fib at 23:55 pm.  51 year old female with pertinent PMHx of prior MI at age [age] (in 1996) s/p angioplasty. She presented on 3/27 with new onset chest pain similar to her previous MI.  Workup was consistent with NSTEMI.  Cardiac cath demonstrated severe multivessel CAD.  She was seen by CT surgery and 4 vessel bypass was performed on 3/30 and she remained intubated.  Oxygen requirements had increased earlier during the day requiring adjustments in vent settings.  Required shock x 3, epi x 2, CPR x 1, Amiodarone 300 mg x1 with eventual ROSC at 12:05 am (see code blue note).  Chest tubes were milked and SVO2 > 65 and continued on Epi and amiodarone drip.  Appeared to be dry and was given a fluid bolus.  STAT echo ordered. CT surgery notified and cath lab activated.  Interventions: see code record  Family notified Yes but unreachable   here No       Loretta Hawthorne MD  Pulmonary, Critical Care and Sleep Medicine  [email or web address]  Pager: [phone]

## 2022-03-31 NOTE — CONSULTS
CARDIAC SURGERY NUTRITION CONSULT    Received standing order to assess and educate patient.    Will follow and complete assessment once patient is extubated and/or is transferred to medical unit.    Patient will receive nutrition education during the Outpatient Cardiac Rehab Program (nutrition classes/dietitian counseling).    Sarah Esposito RD, LD, [facility]   Clinical Dietitian - [facility]

## 2022-03-31 NOTE — PROGRESS NOTES
[facility] ICU RESPIRATORY NOTE        Date of Admission: 3/27/2022    Date of Intubation (most recent): 3/30/22    Reason for Mechanical Ventilation: Post op CABG    Number of Days on Mechanical Ventilation: 2    Reason for No Spontaneous Breathing Trial: Per MD    Significant Events Today: Pt had PEA arrest overnight and was able to get ROSC back after Shocking 3 and 1 round of CPR.    ABG Results:   Recent Labs   Lab 03/31/22  1605 03/31/22  1244 03/31/22  0603 03/31/22  0309 03/30/22  1714 03/30/22  1708   PH  --  7.53* 7.49*  --   --  7.44   PCO2  --  27* 27*  --   --  31*   PO2  --  92 143*  --   --  87   HCO3  --  22 21  --   --  21   O2PER 40 30 30 40   < > 80    < > = values in this interval not displayed.       Current Vent Settings: Vent Mode: CMV/AC  (Continuous Mandatory Ventilation/ Assist Control)  FiO2 (%): 30 %  Resp Rate (Set): 18 breaths/min  Tidal Volume (Set, mL): 450 mL  PEEP (cm H2O): 5 cmH2O  Resp: 14        Plan: Continue full ventilator support and assess in the morning for daily weaning trial.     Chris Gutierrez, RT  3/31/2022

## 2022-03-31 NOTE — PROGRESS NOTES
Assessment and Plan:     Angelica Robledo is a 51 year old female without routine medical care and on no medications, who was admitted on 3/27/2022 with chest pain.  Patient lives in [place] and is visiting her friends.  She was noted to have severe multivessel coronary disease on her angiogram.  Was treated medically and then eventually underwent bypass surgery on 3/30/2022.  Postoperatively however the patient had an episode of VF arrest a few hours later.  CPR was performed.  She was urgently taken to the Cath Lab and was noted to have an acute graft occlusion of the radial to the RPDA segment.  She underwent native right coronary artery PCI.  Cardiology was reconsulted due to findings overnight.  EF subsequently was about 40 to 45% on echocardiogram with inferior wall motion abnormality.    1. Severe multivessel coronary artery disease status post bypass surgery 3/30/2022  2. Postsurgical VF arrest due to radial to RPDA graft occlusion status post native RCA PCI 3/31/2022  3. Mild ischemic cardiomyopathy with EF of 40 to 45%  4. Hypertension anemia obesity    Plan and recommendations  1. Dual antiplatelet therapy from a cardiac standpoint with aspirin Brilinta.  Patient has been given Cangrelor yesterday and now on Brilinta and aspirin.  Continue other medical therapy when okay from an ICU and CV surgery standpoint that would include beta-blocker and statin therapy.  Should initiate as soon as possible.  2. Remainder of management per primary teams. ICU and CV surgery team likely going to image neurologically given cardiac arrest last evening and neuro status today.  Defer management to them.  Consider neurologic neuro critical care consult.  3. No further cardiolgy recommendations at this time.  Will be available for assistance as needed.  4. Consider hematology evaluation for hypercoagulable work-up given severe premature coronary artery disease and acute graft failure post bypass surgery.    Fidelia Davenport,  MD        Interval History:     Cardiology was reconsulted today because of events that happened postoperatively.  Patient currently is intubated in the ICU.  On nicardipine infusion.  Remains off sedation.          Medications:       acetaminophen  975 mg Oral Q8H     aspirin  162 mg Oral or NG Tube Daily     atorvastatin  80 mg Oral Daily     chlorhexidine  15 mL Mouth/Throat Q12H     gabapentin  100 mg Oral TID     heparin ANTICOAGULANT  5,000 Units Subcutaneous Q8H     lactated ringers  250 mL Intravenous Once     lactated ringers  250 mL Intravenous Once     mupirocin  0.5 g Both Nostrils BID     pantoprazole  40 mg Oral or NG Tube Daily    Or     pantoprazole  40 mg Oral Daily     perflutren diluted 1mL to 2mL with saline  9 mL Intravenous Once     polyethylene glycol  17 g Oral Daily     senna-docusate  1 tablet Oral BID     sodium chloride (PF)  10 mL Intravenous Once     sodium chloride (PF)  3 mL Intracatheter Q8H     [START ON 4/1/2022] ticagrelor  90 mg Oral BID            Physical Exam:     Patient Vitals for the past 24 hrs:   BP Temp Temp src Pulse Resp SpO2   03/31/22 1400 -- 99.9  F (37.7  C) -- 80 14 93 %   03/31/22 1345 -- 99.7  F (37.6  C) -- 80 -- 94 %   03/31/22 1330 -- 99.7  F (37.6  C) -- 80 18 96 %   03/31/22 1315 -- 99.7  F (37.6  C) -- 80 -- 96 %   03/31/22 1300 -- 99.7  F (37.6  C) -- 80 18 96 %   03/31/22 1245 -- 99.7  F (37.6  C) -- 80 -- 96 %   03/31/22 1230 -- 99.7  F (37.6  C) -- 80 18 96 %   03/31/22 1215 -- 99.7  F (37.6  C) -- 80 -- 97 %   03/31/22 1200 -- 99.9  F (37.7  C) Bladder 80 18 97 %   03/31/22 1146 -- -- -- -- -- 97 %   03/31/22 1145 -- 99.9  F (37.7  C) -- 80 -- 97 %   03/31/22 1130 -- 99.9  F (37.7  C) -- 80 18 97 %   03/31/22 1115 -- 99.9  F (37.7  C) -- 80 18 97 %   03/31/22 1100 -- 99.9  F (37.7  C) -- 79 18 97 %   03/31/22 1045 -- 99.9  F (37.7  C) -- 80 18 98 %   03/31/22 1030 -- 99.7  F (37.6  C) -- 80 18 98 %   03/31/22 1015 -- 99.7  F (37.6  C) -- 80 18 98 %    03/31/22 1000 -- 99.5  F (37.5  C) Bladder 80 16 98 %   03/31/22 0945 -- 99.3  F (37.4  C) -- 80 20 98 %   03/31/22 0930 -- 99.1  F (37.3  C) -- 80 18 98 %   03/31/22 0915 -- 99  F (37.2  C) -- 80 18 98 %   03/31/22 0900 -- 98.8  F (37.1  C) -- 100 18 99 %   03/31/22 0845 -- 98.8  F (37.1  C) -- 100 18 98 %   03/31/22 0830 -- 98.6  F (37  C) -- 100 18 96 %   03/31/22 0825 -- -- -- -- -- 97 %   03/31/22 0815 -- 98.6  F (37  C) -- 100 18 97 %   03/31/22 0800 -- 98.4  F (36.9  C) Bladder 100 18 97 %   03/31/22 0630 -- 98.4  F (36.9  C) -- 100 18 99 %   03/31/22 0615 -- 98.4  F (36.9  C) -- 100 18 99 %   03/31/22 0600 -- 98.4  F (36.9  C) -- 100 18 99 %   03/31/22 0545 -- -- -- -- 18 --   03/31/22 0530 -- 98.2  F (36.8  C) -- 100 18 99 %   03/31/22 0515 -- 98.2  F (36.8  C) -- 100 18 99 %   03/31/22 0500 -- 98.2  F (36.8  C) -- 100 18 98 %   03/31/22 0445 -- 98.4  F (36.9  C) -- 100 18 98 %   03/31/22 0430 -- 98.4  F (36.9  C) -- 100 18 98 %   03/31/22 0415 -- 98.4  F (36.9  C) -- 100 18 98 %   03/31/22 0400 -- -- -- 100 18 98 %   03/31/22 0345 -- -- -- 100 18 97 %   03/31/22 0330 -- -- -- 100 18 97 %   03/31/22 0315 -- -- -- 100 18 99 %   03/31/22 0300 -- -- -- 100 18 99 %   03/31/22 0245 -- -- -- 100 18 99 %   03/31/22 0205 -- -- -- -- 20 --   03/31/22 0157 -- -- -- -- 20 --   03/31/22 0147 -- -- -- -- 20 --   03/31/22 0137 -- -- -- -- 20 --   03/31/22 0127 -- -- -- -- 20 --   03/31/22 0117 -- -- -- -- 24 --   03/31/22 0108 -- -- -- -- 16 --   03/31/22 0045 -- -- -- 100 18 98 %   03/31/22 0030 -- (!) 101.3  F (38.5  C) -- 100 17 96 %   03/31/22 0015 -- (!) 101.3  F (38.5  C) -- 79 -- 100 %   03/30/22 2345 -- (!) 101.3  F (38.5  C) -- 78 18 96 %   03/30/22 2330 -- (!) 101.5  F (38.6  C) -- 79 18 96 %   03/30/22 2315 -- (!) 101.5  F (38.6  C) -- 79 18 96 %   03/30/22 2300 -- -- -- -- 18 --   03/30/22 2250 -- -- -- 79 -- 96 %   03/30/22 2245 -- (!) 101.5  F (38.6  C) -- 79 18 95 %   03/30/22 2230 -- (!) 101.5  F (38.6   C) -- 79 18 96 %   03/30/22 2215 -- (!) 101.5  F (38.6  C) -- 79 18 96 %   03/30/22 2200 -- (!) 101.7  F (38.7  C) -- 79 18 96 %   03/30/22 2145 -- (!) 101.7  F (38.7  C) -- 79 18 96 %   03/30/22 2130 -- (!) 101.7  F (38.7  C) -- 78 18 96 %   03/30/22 2115 -- (!) 101.8  F (38.8  C) -- 77 18 95 %   03/30/22 2100 -- (!) 102  F (38.9  C) -- 78 18 95 %   03/30/22 2045 -- (!) 102  F (38.9  C) -- 79 18 94 %   03/30/22 2030 -- (!) 101.8  F (38.8  C) -- 80 18 95 %   03/30/22 2015 -- (!) 101.7  F (38.7  C) -- 80 18 95 %   03/30/22 2000 -- (!) 101.5  F (38.6  C) Bladder 80 18 95 %   03/30/22 1945 -- (!) 101.5  F (38.6  C) -- 79 18 96 %   03/30/22 1930 -- (!) 101.1  F (38.4  C) -- 79 18 96 %   03/30/22 1915 -- (!) 100.9  F (38.3  C) -- 80 18 100 %   03/30/22 1900 -- (!) 100.8  F (38.2  C) -- 81 18 97 %   03/30/22 1845 -- (!) 100.6  F (38.1  C) -- 80 18 98 %   03/30/22 1840 -- 100.4  F (38  C) -- 81 18 100 %   03/30/22 1830 (!) 89/67 100.2  F (37.9  C) -- 80 (!) 60 99 %   03/30/22 1815 (!) 89/70 99.9  F (37.7  C) -- 80 16 100 %   03/30/22 1800 93/68 99.7  F (37.6  C) -- 80 13 (!) 71 %   03/30/22 1745 -- 99.5  F (37.5  C) -- 78 (!) 0 (!) 78 %   03/30/22 1730 (!) 82/68 99.3  F (37.4  C) -- 75 (!) 0 95 %   03/30/22 1715 (!) 88/66 99  F (37.2  C) -- 74 (!) 0 97 %   03/30/22 1700 (!) 89/58 98.8  F (37.1  C) -- 73 (!) 0 96 %   03/30/22 1645 -- 98.6  F (37  C) -- 80 (!) 0 95 %   03/30/22 1630 -- 98.4  F (36.9  C) -- 80 (!) 0 95 %   03/30/22 1615 -- 98.2  F (36.8  C) -- 80 (!) 7 (!) 85 %   03/30/22 1600 -- 98.1  F (36.7  C) -- 80 14 96 %     Vitals:    03/27/22 1017 03/27/22 1452 03/28/22 0500 03/29/22 0500   Weight: 98.4 kg (216 lb 14.9 oz) 95.2 kg (209 lb 14.4 oz) 95.7 kg (211 lb) 95.8 kg (211 lb 3.2 oz)    03/30/22 0600   Weight: 95.7 kg (211 lb)         Intake/Output Summary (Last 24 hours) at 3/31/2022 1548  Last data filed at 3/31/2022 1500  Gross per 24 hour   Intake 3063.16 ml   Output 2455 ml   Net 608.16 ml       03/26 1500  - 03/31 1459  In: 7027.99 [P.O.:1060; I.V.:4807.99]  Out: 3440 [Urine:3090]  Net: 3568.99    Exam:  General the patient is intubated.  Had a Gazelle-Kim catheter.  PA pressure is normal.  Hemodynamically stable on the Cardene infusion.  Extremities are warm but good pulses bilaterally.  Has trace to mild bilateral lower extremity edema.  Abdomen is soft no rebound guarding.  Lungs have mild crackles anteriorly but essentially clear.  Cardiac sounds are regular soft systolic murmur no rubs or gallops. Neurologic patient is hard to wake up.         Data:   LABS (Last four results)  CMP  Recent Labs   Lab 03/31/22  1420 03/31/22  1243 03/31/22  1239 03/31/22  1029 03/31/22  0820 03/31/22  0604 03/31/22  0414 03/31/22  0022 03/30/22  1658 03/30/22  1622   NA  --  144  --   --   --  145*  --  145*  --  143   POTASSIUM  --  3.4  --   --   --  3.2*  --  3.6  --  3.9  3.9   CHLORIDE  --  115*  --   --   --  116*  --  114*  --  115*   CO2  --  22  --   --   --  20  --  21  --  23   ANIONGAP  --  7  --   --   --  9  --  10  --  5   * 178* 160* 132*   < > 210*   < > 263*   < > 175*   BUN  --  9  --   --   --  8  --  9  --  7   CR  --  0.73  --   --   --  0.81  --  1.06*  --  0.81   GFRESTIMATED  --  >90  --   --   --  88  --  64  --  88   TOMAS  --  8.8  --   --   --  8.7  --  11.9*  --  9.0   MAG  --   --   --   --   --  2.6*  --   --   --  2.6*   PHOS  --  3.2  --   --   --  2.0*  --   --   --  3.1   PROTTOTAL  --  5.6*  --   --   --  5.2*  --  5.3*  --  5.8*   ALBUMIN  --  2.4*  --   --   --  2.4*  --  2.6*  --  2.8*   BILITOTAL  --  0.4  --   --   --  0.4  --  0.4  --  0.8   ALKPHOS  --  62  --   --   --  47  --  51  --  54   AST  --  89*  --   --   --  107*  --  115*  --  142*   ALT  --  59*  --   --   --  59*  --  50  --  50    < > = values in this interval not displayed.     CBC  Recent Labs   Lab 03/31/22  1243 03/31/22  0604 03/31/22  0022 03/30/22  1622   WBC 17.8* 15.8* 22.7* 13.5*   RBC 3.26* 3.15* 3.81 3.68*    HGB 8.2* 8.0* 9.3* 9.3*   HCT 25.9* 25.5* 31.2* 30.1*   MCV 79 81 82 82   MCH 25.2* 25.4* 24.4* 25.3*   MCHC 31.7 31.4* 29.8* 30.9*   RDW 18.1* 17.7* 17.9* 17.3*    183 255 177     INR  Recent Labs   Lab 03/31/22  0022 03/30/22  1622 03/30/22  1445   INR 1.37* 1.24* 1.44*     TROPONINS No results found for: TROPI, TROPONIN, TROPR, TROPN      Clinically Significant Risk Factors Present on Admission                                                                                                                                                            Fidelia Davenport MD

## 2022-03-31 NOTE — PROGRESS NOTES
[facility]  Cardiovascular and Thoracic Surgery Daily Note          Assessment and Plan:   sepideh Robledo is a 51 year old female with history of MI at age 25 s/p angioplasty who presented to NSTEMI. Coronary angio demonstrated severe multivessel CAD. Echocardiogram with preserved biventricular function.    PMH includes: NSTEMI, CAD with previous coronary stents, HTN    Course has been complicated by PEA arrest with ventricular fibrillation on POD 0 with ROSC after defibrillation x3, 2 mg epinephrine and one round of CPR. Was taken to cath lab where radial artery graft was found to be occluded. PCI with DANA placement to mid RCA and angioplasty of the rPLB was performed. Additionally, due to antiplatelet therapy post PCI, patient developed epistaxis during attempted NG placement requiring placement of b/l rhino rockets.     POD #1 s/p:  1.  Coronary artery bypass grafting x 4 (radial artery graft to the right posterior descending coronary artery, reversed saphenous vein graft to the obtuse marginal branch of the left circumflex coronary artery, reversed saphenous vein graft to the first diagonal branch of the left anterior descending coronary artery, and pedicled left internal mammary artery to left anterior descending coronary artery).  2.  Endoscopic vein harvest of the greater saphenous vein from the left lower extremity.  3.  Endoscopic left radial artery harvest on 3/30/22 with Dr. Butterfield.    CVS:   NSTEMI and severe multivessel CAD s/p CABG x4 with radial grafts as above  HTN  PEA and Vfib arrest s/p ROSC and DANA to mid RCA and angioplasty of the rPLB on 3/31  Currently on Epi and nicardipine gtts to maintain ordered hemodynamic parameters  Advanced hemodynamics: CVP 9-14, CI 2.1 with , PAP 26/18, SVO2 58-61%; lactate 2.2  Currently paced, underlying rhythm NSR with 1st degree AVB rates 50s  - Goal MAP >65, SBP <140  - Continue nicardipine gtt given radial graft until able to transition  to PO Amlodipine  - On Cangrelor gtt until able to transition to plavix  - Continues on Amiodarone gtt until able to transition to PO taper  -  daily  - Atorvastatin 80 mg daily  - Repeat lactate at 12 noon  - CTs in to suction, leave in, TPWs with current pacing needs    Resp:   Postoperative ventilator management  - Current vent settings:  Vent Mode: CMV/AC  (Continuous Mandatory Ventilation/ Assist Control)  FiO2 (%): 30 %  Resp Rate (Set): 18 breaths/min  Tidal Volume (Set, mL): 450 mL  PEEP (cm H2O): 5 cmH2O  Resp: 18  - Leave intubated today, titrate PEEP as able  - Continue pulm hygiene efforts: IS/flutter valve/mobility  - Titrate O2 to maintain sats >92%  - CXR stable, wires appear intact    Neuro:    Acute postoperative pain  Hx migraines  Sedation  Encephalopathy   Did receive CPR on 3/31, will likely have pain from this also  - On propofol gtt this AM  - PRN fentanyl and dilaudid available  - PRN oxycodone and methocarbamol  - Scheduled tylenol and gabapentin  - Plans to awaken and evaluate neuro status today    Renal/Electrolyte:   Lactic acidosis, mild  Creat stable, no weight today  Na 145, K+3.2  - Strict I/O, daily weights  - Avoid/limit nephrotoxins as able  - Replete lytes per protocol  - Trend lactate    GI/Nutrition:    Epistaxis  Elevated LFTs, likely shock liver 2/2 arrest  Orders Placed This Encounter      NPO for Medical/Clinical Reasons Except for: Other; Specify: NPO until Extubated      Advance Diet as Tolerated: Clear Liquid Diet  - NG in place for meds and decompression, no e/o blood in output  - Rhino rockets in place, no further bleeding noted, NG tube without blood   - Continue bowel regimen, no BM yet  - PPI  - Trend CMP    :    - Cooper in place for strict I/O  - Currently having menses    Endo:  Stress induced hyperglycemia   Preop A1c   Lab Results   Component Value Date    A1C 5.7 03/28/2022   - Continue insulin infusion  - Goal BG <180 for optimal healing    ID:  Stress  "induced leukocytosis  WBC 15.8, Tmax 102; no s/sx infection  - Completing perioperative ABX  - Continue to follow fever curve, WBC and inflammatory markers as appropriate    Heme:  Acute blood loss anemia  Acute blood loss thrombocytopenia  Hgb 8, Plts 183; no s/sx active bleeding  - CBC in AM  - Goal Hgb >7    -Proph: PPI, subcutaneous heparin, SCDs  -Dispo: Continue in ICU          Interval History:   Noted cardiac arrest with subsequent intervention. Remains sedated on ventilator this AM.   given update via phone.          Medications:       acetaminophen  975 mg Oral Q8H     aspirin  162 mg Oral or NG Tube Daily     atorvastatin  80 mg Oral Daily     chlorhexidine  15 mL Mouth/Throat Q12H     clindamycin  900 mg Intravenous Q8H     gabapentin  100 mg Oral TID     heparin ANTICOAGULANT  5,000 Units Subcutaneous Q8H     lactated ringers  250 mL Intravenous Once     lactated ringers  250 mL Intravenous Once     mupirocin  0.5 g Both Nostrils BID     pantoprazole  40 mg Oral or NG Tube Daily    Or     pantoprazole  40 mg Oral Daily     perflutren diluted 1mL to 2mL with saline  9 mL Intravenous Once     polyethylene glycol  17 g Oral Daily     potassium chloride  40 mEq Oral or Feeding Tube Once     senna-docusate  1 tablet Oral BID     sodium chloride (PF)  10 mL Intravenous Once     sodium chloride (PF)  3 mL Intracatheter Q8H     ticagrelor  180 mg Oral Once             Physical Exam:   Vitals were reviewed  Blood pressure (Abnormal) 89/67, pulse 100, temperature 98.4  F (36.9  C), resp. rate 18, height 1.651 m (5' 5\"), weight 95.7 kg (211 lb), last menstrual period 02/27/2022, SpO2 99 %.  Rhythm: Paced    Lungs: CTA anterior, synchronous with ventilator    Cardiovascular: S1, S2, RRR, distant, no m/r/g    Abdomen: S/ND, NG in place    Extremeties: + edema, no pitting, warm and well perfused    Incision: CDI    CT: to suction, no air leak, no crepitus    Weight:   Vitals:    03/27/22 1017 03/27/22 1452 " 03/28/22 0500 03/29/22 0500   Weight: 98.4 kg (216 lb 14.9 oz) 95.2 kg (209 lb 14.4 oz) 95.7 kg (211 lb) 95.8 kg (211 lb 3.2 oz)    03/30/22 0600   Weight: 95.7 kg (211 lb)            Data:   Labs:   Lab Results   Component Value Date    WBC 15.8 03/31/2022     Lab Results   Component Value Date    RBC 3.15 03/31/2022     Lab Results   Component Value Date    HGB 8.0 03/31/2022     Lab Results   Component Value Date    HCT 25.5 03/31/2022     No components found for: MCT  Lab Results   Component Value Date    MCV 81 03/31/2022     Lab Results   Component Value Date    MCH 25.4 03/31/2022     Lab Results   Component Value Date    MCHC 31.4 03/31/2022     Lab Results   Component Value Date    RDW 17.7 03/31/2022     Lab Results   Component Value Date     03/31/2022       Last Basic Metabolic Panel:  Lab Results   Component Value Date     03/31/2022      Lab Results   Component Value Date    POTASSIUM 3.2 03/31/2022     Lab Results   Component Value Date    CHLORIDE 116 03/31/2022     Lab Results   Component Value Date    TOMAS 8.7 03/31/2022     Lab Results   Component Value Date    CO2 20 03/31/2022     Lab Results   Component Value Date    BUN 8 03/31/2022     Lab Results   Component Value Date    CR 0.81 03/31/2022     Lab Results   Component Value Date     03/31/2022     03/31/2022       Imaging:  Recent Results (from the past 24 hour(s))   XR Chest Port 1 View    Narrative    CHEST ONE VIEW PORTABLE   3/30/2022 4:35 PM     HISTORY: Postop CVTS surgery.    COMPARISON: 3/28/2022      Impression    IMPRESSION:   New median sternotomy wires. The tip of the endotracheal tube is 4.4  cm above the mook. Nasogastric tube continues below the left  hemidiaphragm. There is a pulmonary arterial catheter with the tip  projected over the distal main pulmonary artery. Left basilar chest  tube in place. No pneumothorax or pleural effusion on either side. No  significant airspace  consolidation.    MARCELLUS GODINEZ MD         SYSTEM ID:  [id number]   XR Abdomen Port 1 View    Narrative    ABDOMEN ONE VIEW PORTABLE  3/30/2022 4:45 PM     HISTORY: Check NG/OG tube placement    COMPARISON: None.      Impression    IMPRESSION: Tip of the nasogastric tube and proximal sidehole are  projected over the stomach. There are multiple chest drains in place.  Nonobstructive gas pattern.    MARCELLUS GODINEZ MD         SYSTEM ID:  [id number]   Cardiac Catheterization    Narrative    1. 3 vessel CAD s/p 4v CABG  2. Acute closure of LRA-PDA  3. Successful PCI of the mid RCA with a 4.0x16mm Synergy DANA. Successful   POBA of RPL.  4. Patent LIMA-LAD, SVG-RI, SVG-OM2. Stable native disease from prior   study   Echo Limited   Result Value    LVEF  40-45%    Narrative    [id number]  [id number]  [id number]  [id number]^JENNIE^CHASITY^PAMELA     [facility]  Echocardiography Laboratory  [address] [place] [address]     Name: KVNG EDOUARD  MRN: [id number]  : 1970  Study Date: 2022 02:14 AM  Age: 51 yrs  Gender: Female  Patient Location: [facility]  Reason For Study: Vfib  Ordering Physician: CHASITY SCHNEIDER  Referring Physician: JERZY PMD  Performed By: Amado Paniagua RDCS     BSA: 2.0 m2  Height: 65 in  Weight: 211 lb  BP: 89/67 mmHg  ______________________________________________________________________________  Procedure  Limited Portable Echo Adult. (Emergent exam, abbreviated study performed).  Optison (NDC #[id number]) given intravenously.  ______________________________________________________________________________  Interpretation Summary     1.The left ventricle is normal in size. Left ventricular systolic function is  mild to moderately reduced. The visual ejection fraction is 40-45%. There is  mild to moderate global hypokinesis of the left ventricle with severe  hypokinesis of the mid anterior and anteroseptal walls, the entire inferior  wall and all apical segments  of the left ventricle. Septal motion is  consistent with post-operative state. There is no thrombus seen in the left  ventricle  2. There is a catheter/pacemaker lead seen in the right ventricle. The right  ventricular systolic function is mild to moderately reduced.  3. No pericardial effusion.  4. In direct comparison to the previous study dated 03/27/2022, there has been  an interval deterioration in biventricular systolic function.  ______________________________________________________________________________  Left Ventricle  The left ventricle is normal in size. Left ventricular systolic function is  mild to moderately reduced. The visual ejection fraction is 40-45%. There is  mild to moderate global hypokinesis of the left ventricle with severe  hypokinesis of the mid anterior and anteroseptal walls, the entire inferior  wall and all apical segments of the left ventricle. Septal motion is  consistent with post-operative state. There is no thrombus seen in the left  ventricle.     Right Ventricle  There is a catheter/pacemaker lead seen in the right ventricle. The right  ventricular systolic function is mild to moderately reduced.     Atria  Normal left atrial size. Right atrial size is normal.     Pericardium  There is no pericardial effusion.     ______________________________________________________________________________  Report approved by: Chad Gottlieb 03/31/2022 03:59 AM     ______________________________________________________________________________      XR Chest Port 1 View    Narrative    EXAM: XR CHEST PORT 1 VIEW  LOCATION: [facility]  DATE/TIME: 3/31/2022 5:20 AM    INDICATION: Post Op CVTS Surgery  COMPARISON: 03/03/2022      Impression    IMPRESSION: Right heart catheter, bilateral chest tubes, mediastinal drain, and endotracheal tube remain in place. Nasogastric tube appears to have been removed in the interval. Decreasing left-sided discoid atelectasis. No  pneumothorax or CHF. No acute   bony abnormality.        Rounded and discussed with Dr. Babita Tenorio, YRIS, ACNPC-AG, CCRN  Nurse Practitioner  Cardiothoracic Surgery  Pager: [phone]  [facility]  Cardiovascular and Thoracic Surgery Daily Note

## 2022-03-31 NOTE — PROGRESS NOTES
Overnight events reviewed  Patient had PEA and V. fib arrest last night and was taken for emergent angiogram, noted to have graft occlusion, status post balloon angioplasty  Patient is still intubated.  Discussed with Dr. Perez, likely will be intubated until Saturday  We will sign off  Please call once extubated and stable.

## 2022-03-31 NOTE — PROGRESS NOTES
Critical Care Progress Note      03/31/2022    Name: Angelica Robledo MRN#: [id number]   Age: 51 year old YOB: 1970     Hsptl Day# 4  ICU DAY #2    MV DAY #2             Problem List:   Active Problems:    NSTEMI (non-ST elevated myocardial infarction) (H)    Hypertensive emergency    Paroxysmal ventricular tachycardia (H)           Summary/Hospital Course:   Angelica Robledo is a 51 year old female with pertinent PMHx of prior MI at age 24-25 (in 1996) s/p angioplasty, , HTN, anemia, and NSTEMI.  She presented on 3/27 with new onset chest pain similar to her previous MI.  Workup was consistent with NSTEMI.  Cardiac cath demonstrated severe multivessel CAD.  She was seen by CT surgery and CABG was scheduled for 3/30.  She underwent CABG x4 left radial, LIMA, left saph vein x2 (FAUSTINO taken down, but elected to not be used due to size).    No family history of clotting disorders.  Siblings without cardiovascular disease. Father passed away of cardiac causes at an advanced age.  Mother had cardiac disease as well as an aortic aneurysm, but not at a young age.    4V CABG as follows: left radial artery graft to the right posterior descending coronary artery, reversed saphenous vein graft to the obtuse marginal branch of the left circumflex coronary artery, reversed saphenous vein graft to the first diagonal branch of the left anterior descending coronary artery, and pedicled left internal mammary artery to left anterior descending coronary.    POD 0 - initial hypoxia resolved with vent changes.  At 2355 patient suffered a PEA and Vfib arrest and underwent 3 shocks, 2 rounds of epi, and 1 round of CPR. ROSC obtained at 0005.  300 mg amiodarone bolus administered.  STAT cardiac cath lab performed demonstrating LIMA-LAD, VG-LADD1, and VG-LPLB were patent. The left radial artery to RDPA graft had an occlusion.  The mid RCA was treated with a DANA stent and a balloon angioplasty performed at the [facility].  Excellent flow  was noted on completion coronary angiogram.  Echo demonstrated a drop in LVEF to 40-45% with aspects of hypokinesis. RV demonstrated mild-moderate RV function reduction.          Assessment and plan :     Angelica Robledo IS a 51 year old female admitted on 3/27/2022 for chest pain with workup demonstrating severe multivessel CAD.  Patient underwent CABG x4.  POD#0 patient had a PEA arrest with ROSC obtained at 10 minutes.  Emergent coronary cath demonstrated radial artery occlusion.  A DANA was placed into the RCA and balloon angioplasty of the RPLB was performed with excellent completion angiography flow.     I have personally reviewed the daily labs, imaging studies, cultures and discussed the case with referring physician and consulting physicians.     My assessment and plan by system for this patient is as follows:    Neurology/Psychiatry:   1. Analgesia -- tylenol, prn narcotics  2. Sedation -- propofol  3. Wean sedation to assess underlying neuro status following PEA arrest     Cardiovascular:   1.  S/p CABG x4 - left radial, LIMA, left saph vein x2 (FAUSTINO taken down, but elected to not be used due to size) -- ASA, statin  2. POD#0 PEA and Vfib arrest -- s/p cardiac cath demonstrated radial artery graft to RPDA occlusion -- intervention with DANA to RCA and balloon angioplasty RPLA -- on Kangrelor, continue for now and transition to PO regimen when indicated by CV  2. Continue nicardipine until CV surgery clears for discontinuation  3. Hx of MI at age 24-25  4. HTN - likely untreated prior to hospitalization - presented with hypertension emergency - nicardipine on for radial artery graft bypass and for treatment goal SBP<140, MAP >65.     Pulmonary/Ventilator Management:   1. Airway -- intubated -- wean vent settings for SBT this afternoon as appropriate  2. No underlying pulmonary problems on medical history or cxr.      GI and Nutrition :   1. Diet when cleared by CV surgery     2. Acid suppression for PUD  prophy     Renal/Fluids/Electrolytes:   1. Monitor UOP/creatinine post-op.  2. Replete electrolytes PRN per protocol  3. IVF and albumin administration for volume prn per protocol  4. Cooper indicated for accurate I/Os     Infectious Disease:   1. Prophylactic abx post-op per CV surgery     Endocrine:   1. Monitor for stress induced hyperglycemia. ICU insulin protocol, goal sugar <180      Hematology/Oncology:   1. Chronic anemia -- Hgb/plts stable post op.  pRBC for goal hgb 8.0 or as indicated by CV surgery     IV/Access:   1. Venous access - CVC  2. Arterial access - femoral  - central access required and necessary      ICU Prophylaxis:   1. DVT: Hep Subq/mechanical  2. VAP: HOB 30 degrees, chlorhexidine rinse  3. Stress Ulcer: H2 blocker  4. Restraints: Nonviolent soft two point restraints required and necessary for patient safety and continued cares and good effect as patient continues to pull at necessary lines, tubes despite education and distraction. Will readdress daily.   5. Wound care - per unit routine   6. Feeding - NA  7. Family Update:  updated by CV surgery - will be at bedside later  8. Disposition - ICU        Key goals for next 24 hours:   1. Assess neuro status this AM  2. SBT if appropriate  3. Continue kangrelor until CV clears for oral anti-platelet meds           Interim History:   At 2355 patient suffered a PEA arrest and underwent 3 shocks, 2 rounds of epi, and 1 round of CPR. ROSC obtained at 0005.  300 mg amiodarone bolus administered.  STAT cardiac cath lab performed demonstrating LIMA-LAD, VG-LADD1, and VG-LPLB were patent. The left radial artery to RDPA graft had an occlusion.  The mid RCA was treated with a DANA stent and a balloon angioplasty performed at the [facility].  Excellent flow was noted on completion coronary angiogram.  Echo demonstrated a drop in LVEF to 40-45% with aspects of hypokinesis. RV demonstrated mild-moderate RV function reduction.    Stable since return to the  ICU following cath lab intervention.    OGT had been removed post op due to location concern on initial evaluation.  Attempts at NGT placement post cath lab resulted in epistaxis. Rhino rockets placed.           Key Medications:       acetaminophen  975 mg Oral Q8H     aspirin  162 mg Oral or NG Tube Daily     atorvastatin  80 mg Oral Daily     chlorhexidine  15 mL Mouth/Throat Q12H     clindamycin  900 mg Intravenous Q8H     gabapentin  100 mg Oral TID     heparin ANTICOAGULANT  5,000 Units Subcutaneous Q8H     lactated ringers  250 mL Intravenous Once     lactated ringers  250 mL Intravenous Once     mupirocin  0.5 g Both Nostrils BID     pantoprazole  40 mg Oral or NG Tube Daily    Or     pantoprazole  40 mg Oral Daily     perflutren diluted 1mL to 2mL with saline  9 mL Intravenous Once     polyethylene glycol  17 g Oral Daily     senna-docusate  1 tablet Oral BID     sodium chloride (PF)  10 mL Intravenous Once     sodium chloride (PF)  3 mL Intracatheter Q8H     sodium phosphate  15 mmol Intravenous Once       aminocaproic acid (AMICAR) infusion 7.5gm/150mL ADULT Stopped (03/30/22 1629)     amiodarone 0.5 mg/min (03/31/22 0613)     cangrelor (KENGREAL) infusion ADULT for PCI 4 mcg/kg/min (03/31/22 0154)     dexmedetomidine Stopped (03/30/22 1611)     dextrose 5% and 0.45% NaCl + KCl 20 mEq/L 30 mL/hr at 03/30/22 1745     EPINEPHrine 0.02 mcg/kg/min (03/31/22 0610)     insulin regular 1 Units/hr (03/31/22 0820)     - MEDICATION INSTRUCTIONS -       propofol (DIPRIVAN) infusion 30 mcg/kg/min (03/31/22 0800)    And     - MEDICATION INSTRUCTIONS -       niCARdipine 2 mg/hr (03/31/22 0835)     nitroGLYcerin       norepinephrine Stopped (03/30/22 1612)     Percutaneous Coronary Intervention orders placed (this is information for BPA alerting)       phenylephrine       BETA BLOCKER NOT PRESCRIBED       sodium chloride 20 mL/hr at 03/30/22 2237               Physical Examination:   Temp:  [98.1  F (36.7  C)-102  F  (38.9  C)] 98.4  F (36.9  C)  Pulse:  [] 100  Resp:  [0-60] 18  BP: (82-93)/(58-70) 89/67  MAP:  [62 mmHg-93 mmHg] 71 mmHg  Arterial Line BP: ()/(45-76) 102/58  FiO2 (%):  [30 %-50 %] 30 %  SpO2:  [71 %-100 %] 97 %    Intake/Output Summary (Last 24 hours) at 3/31/2022 0921  Last data filed at 3/31/2022 0700  Gross per 24 hour   Intake 3676.54 ml   Output 2825 ml   Net 851.54 ml     Wt Readings from Last 4 Encounters:   03/30/22 95.7 kg (211 lb)     Arterial Line BP: ()/(45-76) 102/58  MAP:  [62 mmHg-93 mmHg] 71 mmHg  BP - Mean:  [65-78] 76  CVP:  [4 mmHg-19 mmHg] 11 mmHg  SVO2:  [46 %-51 %] 51 %  Vent Mode: CMV/AC  (Continuous Mandatory Ventilation/ Assist Control)  FiO2 (%): 30 %  Resp Rate (Set): 18 breaths/min  Tidal Volume (Set, mL): 450 mL  PEEP (cm H2O): (S) 8 cmH2O  Resp: 18    Recent Labs   Lab 03/31/22  0603 03/31/22  0309 03/31/22  0022 03/30/22  1714 03/30/22  1708   PH 7.49*  --   --   --  7.44   PCO2 27*  --   --   --  31*   PO2 143*  --   --   --  87   HCO3 21  --   --   --  21   O2PER 30 40 100 80 80       GEN: no acute distress   HEENT: head ncat, sclera anicteric, ETT present   PULM: unlabored, synchronous with vent, clear anteriorly    CV/Chest: RRR, S1S2 no gallop,  No rub, no murmur appreciated. Midline dressing CDI. CTs: scant serosang output, no air leak.   ABD: soft, nontender  EXT:  Trace edema,  warm  NEURO: Sedated. Pupillary reflex. No focal signs appreciated.   : nathan present, clear urine output  SKIN: no obvious rash  LINES: clean, dry intact         Data:   All data and imaging reviewed     ROUTINE ICU LABS (Last four results)  CMP  Recent Labs   Lab 03/31/22  0820 03/31/22  0604 03/31/22  0601 03/31/22  0414 03/31/22  0022 03/30/22  1658 03/30/22  1622 03/30/22  1445 03/28/22  1953 03/28/22  0826   NA  --  145*  --   --  145*  --  143 142   < >  --    POTASSIUM  --  3.2*  --   --  3.6  --  3.9  3.9 3.7   < > 3.5   CHLORIDE  --  116*  --   --  114*  --  115* 113*    < >  --    CO2  --  20  --   --  21  --  23 23   < >  --    ANIONGAP  --  9  --   --  10  --  5 6   < >  --    * 210* 200* 99 263*   < > 175* 175*   < >  --    BUN  --  8  --   --  9  --  7 6*   < >  --    CR  --  0.81  --   --  1.06*  --  0.81 0.80   < >  --    GFRESTIMATED  --  88  --   --  64  --  88 89   < >  --    TOMAS  --  8.7  --   --  11.9*  --  9.0 9.3   < >  --    MAG  --  2.6*  --   --   --   --  2.6*  --   --   --    PHOS  --  2.0*  --   --   --   --  3.1  --   --   --    PROTTOTAL  --  5.2*  --   --  5.3*  --  5.8*  --   --  6.6*   ALBUMIN  --  2.4*  --   --  2.6*  --  2.8*  --   --  3.3*   BILITOTAL  --  0.4  --   --  0.4  --  0.8  --   --  0.4   ALKPHOS  --  47  --   --  51  --  54  --   --  67   AST  --  107*  --   --  115*  --  142*  --   --  54*   ALT  --  59*  --   --  50  --  50  --   --  36    < > = values in this interval not displayed.     CBC  Recent Labs   Lab 03/31/22  0604 03/31/22  0022 03/30/22  1622 03/30/22  1445   WBC 15.8* 22.7* 13.5* 14.3*   RBC 3.15* 3.81 3.68* 3.09*   HGB 8.0* 9.3* 9.3* 7.7*   HCT 25.5* 31.2* 30.1* 25.2*   MCV 81 82 82 82   MCH 25.4* 24.4* 25.3* 24.9*   MCHC 31.4* 29.8* 30.9* 30.6*   RDW 17.7* 17.9* 17.3* 17.2*    255 177 156     INR  Recent Labs   Lab 03/31/22  0022 03/30/22  1622 03/30/22  1445   INR 1.37* 1.24* 1.44*     Arterial Blood Gas  Recent Labs   Lab 03/31/22  0603 03/31/22  0309 03/31/22  0022 03/30/22  1714 03/30/22  1708   PH 7.49*  --   --   --  7.44   PCO2 27*  --   --   --  31*   PO2 143*  --   --   --  87   HCO3 21  --   --   --  21   O2PER 30 40 100 80 80       All cultures:  No results for input(s): CULT in the last 168 hours.  Recent Results (from the past 24 hour(s))   XR Chest Port 1 View    Narrative    CHEST ONE VIEW PORTABLE   3/30/2022 4:35 PM     HISTORY: Postop CVTS surgery.    COMPARISON: 3/28/2022      Impression    IMPRESSION:   New median sternotomy wires. The tip of the endotracheal tube is 4.4  cm above the mook.  Nasogastric tube continues below the left  hemidiaphragm. There is a pulmonary arterial catheter with the tip  projected over the distal main pulmonary artery. Left basilar chest  tube in place. No pneumothorax or pleural effusion on either side. No  significant airspace consolidation.    MARCELLUS GODINEZ MD         SYSTEM ID:  [id number]   XR Abdomen Port 1 View    Narrative    ABDOMEN ONE VIEW PORTABLE  3/30/2022 4:45 PM     HISTORY: Check NG/OG tube placement    COMPARISON: None.      Impression    IMPRESSION: Tip of the nasogastric tube and proximal sidehole are  projected over the stomach. There are multiple chest drains in place.  Nonobstructive gas pattern.    MARCELLUS GODINEZ MD         SYSTEM ID:  [id number]   Cardiac Catheterization    Narrative    1. 3 vessel CAD s/p 4v CABG  2. Acute closure of LRA-PDA  3. Successful PCI of the mid RCA with a 4.0x16mm Synergy DANA. Successful   POBA of RPL.  4. Patent LIMA-LAD, SVG-RI, SVG-OM2. Stable native disease from prior   study   Echo Limited   Result Value    LVEF  40-45%    Narrative    [id number]  [id number]  [id number]  [id number]^JENNIE^CHASITY^PAMELA     [facility]  Echocardiography Laboratory  [address] [place] [address]     Name: KVNG EDOUARD  MRN: [id number]  : 1970  Study Date: 2022 02:14 AM  Age: 51 yrs  Gender: Female  Patient Location: [facility]  Reason For Study: Vfib  Ordering Physician: CHASITY SCHNEIDER  Referring Physician: JERZY PMD  Performed By: Amado Paniagua RDCS     BSA: 2.0 m2  Height: 65 in  Weight: 211 lb  BP: 89/67 mmHg  ______________________________________________________________________________  Procedure  Limited Portable Echo Adult. (Emergent exam, abbreviated study performed).  Optison (NDC #[id number]) given intravenously.  ______________________________________________________________________________  Interpretation Summary     1.The left ventricle is normal in size. Left ventricular  systolic function is  mild to moderately reduced. The visual ejection fraction is 40-45%. There is  mild to moderate global hypokinesis of the left ventricle with severe  hypokinesis of the mid anterior and anteroseptal walls, the entire inferior  wall and all apical segments of the left ventricle. Septal motion is  consistent with post-operative state. There is no thrombus seen in the left  ventricle  2. There is a catheter/pacemaker lead seen in the right ventricle. The right  ventricular systolic function is mild to moderately reduced.  3. No pericardial effusion.  4. In direct comparison to the previous study dated 03/27/2022, there has been  an interval deterioration in biventricular systolic function.  ______________________________________________________________________________  Left Ventricle  The left ventricle is normal in size. Left ventricular systolic function is  mild to moderately reduced. The visual ejection fraction is 40-45%. There is  mild to moderate global hypokinesis of the left ventricle with severe  hypokinesis of the mid anterior and anteroseptal walls, the entire inferior  wall and all apical segments of the left ventricle. Septal motion is  consistent with post-operative state. There is no thrombus seen in the left  ventricle.     Right Ventricle  There is a catheter/pacemaker lead seen in the right ventricle. The right  ventricular systolic function is mild to moderately reduced.     Atria  Normal left atrial size. Right atrial size is normal.     Pericardium  There is no pericardial effusion.     ______________________________________________________________________________  Report approved by: Chad Gottlieb 03/31/2022 03:59 AM     ______________________________________________________________________________      XR Chest Port 1 View    Narrative    EXAM: XR CHEST PORT 1 VIEW  LOCATION: [facility]  DATE/TIME: 3/31/2022 5:20 AM    INDICATION: Post Op CVTS  Surgery  COMPARISON: 03/03/2022      Impression    IMPRESSION: Right heart catheter, bilateral chest tubes, mediastinal drain, and endotracheal tube remain in place. Nasogastric tube appears to have been removed in the interval. Decreasing left-sided discoid atelectasis. No pneumothorax or CHF. No acute   bony abnormality.   XR Abdomen Port 1 View    Narrative    XR ABDOMEN PORT 1 VIEWS 3/31/2022 8:35 AM    HISTORY: OG placement    COMPARISON: 3/30/2022      Impression    IMPRESSION:New OG tube in the stomach. Bowel gas pattern is  nonobstructed.    JEANNETTE COOPER MD         SYSTEM ID:  [id number]           Adi Bazan MD  Surgical Critical Care Fellow

## 2022-03-31 NOTE — PROGRESS NOTES
Cardiac surgery    The patient had a PEA arrest with ventricular fibrillation at 23:55. She had defibrillation x 3, 2 mg epinephrine, and one round of CPR with return of spontaneous circulation at 00:05. She was started on amiodarone and an epinephrine drip at 0.05 mc/kg/min and taken to the cardiac catheterization laboratory. The LIMA-LAD, VG-LADD1, and VG-LPLB were patent. The radial artery graft to the RPDA had occlusion in the proximal 1/3 of the graft. PCI with DANA placement in the mid RCA was performed and balloon angioplasty of the RPLB was performed as well. The post procedure coronary angiogram demonstrates excellent flow in the RCA distribution. The patient was stable throughout the procedure. The patient was given heparin during the procedure and started on Plavix. I called the  of the patient and left a message on voicemail.    Mark Butterfield MD

## 2022-03-31 NOTE — PROGRESS NOTES
Pt got to ICU just before 1600. Did well first 30 mins, arrived on no drips other than Nicardipine. Pt desated, chest x-ray done. Required PEEP of 10, and 100% O2. ABG matched desaturation. Dr. Butterfield updated and now plan to keep patient intubated overnight. Currently weaning FiO2 to 40% and now will wean PEEP per intensivist. Minimal CT output. Good UO

## 2022-03-31 NOTE — PROGRESS NOTES
Progress Note    Significant epistaxis noted after trial of NG tube insertion.  Currently on heparin and Plavix  Rhino Rocket nasal packing done on both nostrils.      Loretta Hawthorne MD  Pulmonary, Critical Care and Sleep Medicine  [facility]  Pager: [phone]

## 2022-04-01 ENCOUNTER — APPOINTMENT (OUTPATIENT)
Dept: MRI IMAGING | Facility: CLINIC | Age: 52
End: 2022-04-01
Attending: NURSE PRACTITIONER
Payer: COMMERCIAL

## 2022-04-01 ENCOUNTER — APPOINTMENT (OUTPATIENT)
Dept: GENERAL RADIOLOGY | Facility: CLINIC | Age: 52
End: 2022-04-01
Attending: NURSE PRACTITIONER
Payer: COMMERCIAL

## 2022-04-01 LAB
ALBUMIN SERPL-MCNC: 2.3 G/DL (ref 3.4–5)
ALBUMIN UR-MCNC: NEGATIVE MG/DL
ALP SERPL-CCNC: 65 U/L (ref 40–150)
ALT SERPL W P-5'-P-CCNC: 50 U/L (ref 0–50)
ANION GAP SERPL CALCULATED.3IONS-SCNC: 5 MMOL/L (ref 3–14)
APPEARANCE UR: CLEAR
AST SERPL W P-5'-P-CCNC: 47 U/L (ref 0–45)
BACTERIA #/AREA URNS HPF: ABNORMAL /HPF
BASE EXCESS BLDV CALC-SCNC: -0.4 MMOL/L (ref -7.7–1.9)
BASE EXCESS BLDV CALC-SCNC: -1.2 MMOL/L (ref -7.7–1.9)
BILIRUB SERPL-MCNC: 0.4 MG/DL (ref 0.2–1.3)
BILIRUB UR QL STRIP: NEGATIVE
BUN SERPL-MCNC: 11 MG/DL (ref 7–30)
CA-I BLD-MCNC: 4.9 MG/DL (ref 4.4–5.2)
CALCIUM SERPL-MCNC: 8.5 MG/DL (ref 8.5–10.1)
CHLORIDE BLD-SCNC: 118 MMOL/L (ref 94–109)
CO2 SERPL-SCNC: 23 MMOL/L (ref 20–32)
COLOR UR AUTO: ABNORMAL
CREAT SERPL-MCNC: 0.79 MG/DL (ref 0.52–1.04)
ERYTHROCYTE [DISTWIDTH] IN BLOOD BY AUTOMATED COUNT: 18.1 % (ref 10–15)
FACTOR 2 INTERPRETATION: NORMAL
FACTOR V INTERPRETATION: NORMAL
FIBRINOGEN PPP-MCNC: 729 MG/DL (ref 170–490)
GFR SERPL CREATININE-BSD FRML MDRD: >90 ML/MIN/1.73M2
GLUCOSE BLD-MCNC: 151 MG/DL (ref 70–99)
GLUCOSE BLDC GLUCOMTR-MCNC: 105 MG/DL (ref 70–99)
GLUCOSE BLDC GLUCOMTR-MCNC: 119 MG/DL (ref 70–99)
GLUCOSE BLDC GLUCOMTR-MCNC: 121 MG/DL (ref 70–99)
GLUCOSE BLDC GLUCOMTR-MCNC: 124 MG/DL (ref 70–99)
GLUCOSE BLDC GLUCOMTR-MCNC: 129 MG/DL (ref 70–99)
GLUCOSE BLDC GLUCOMTR-MCNC: 130 MG/DL (ref 70–99)
GLUCOSE BLDC GLUCOMTR-MCNC: 131 MG/DL (ref 70–99)
GLUCOSE BLDC GLUCOMTR-MCNC: 134 MG/DL (ref 70–99)
GLUCOSE BLDC GLUCOMTR-MCNC: 136 MG/DL (ref 70–99)
GLUCOSE BLDC GLUCOMTR-MCNC: 145 MG/DL (ref 70–99)
GLUCOSE BLDC GLUCOMTR-MCNC: 145 MG/DL (ref 70–99)
GLUCOSE BLDC GLUCOMTR-MCNC: 153 MG/DL (ref 70–99)
GLUCOSE UR STRIP-MCNC: NEGATIVE MG/DL
HCO3 BLDV-SCNC: 24 MMOL/L (ref 21–28)
HCO3 BLDV-SCNC: 24 MMOL/L (ref 21–28)
HCT VFR BLD AUTO: 26.2 % (ref 35–47)
HGB BLD-MCNC: 8 G/DL (ref 11.7–15.7)
HGB UR QL STRIP: NEGATIVE
HYALINE CASTS: 4 /LPF
KETONES UR STRIP-MCNC: NEGATIVE MG/DL
LAB DIRECTOR COMMENTS: NORMAL
LAB DIRECTOR DISCLAIMER: NORMAL
LAB DIRECTOR INTERPRETATION: NORMAL
LAB DIRECTOR METHODOLOGY: NORMAL
LAB DIRECTOR RESULTS: NORMAL
LACTATE SERPL-SCNC: 0.9 MMOL/L (ref 0.7–2)
LEUKOCYTE ESTERASE UR QL STRIP: ABNORMAL
MAGNESIUM SERPL-MCNC: 2 MG/DL (ref 1.6–2.3)
MAGNESIUM SERPL-MCNC: 2.3 MG/DL (ref 1.6–2.3)
MCH RBC QN AUTO: 24.9 PG (ref 26.5–33)
MCHC RBC AUTO-ENTMCNC: 30.5 G/DL (ref 31.5–36.5)
MCV RBC AUTO: 82 FL (ref 78–100)
MUCOUS THREADS #/AREA URNS LPF: PRESENT /LPF
NITRATE UR QL: NEGATIVE
O2/TOTAL GAS SETTING VFR VENT: 40 %
O2/TOTAL GAS SETTING VFR VENT: 40 %
OXYHGB MFR BLDV: 63 % (ref 70–75)
OXYHGB MFR BLDV: 66 % (ref 70–75)
PCO2 BLDV: 37 MM HG (ref 40–50)
PCO2 BLDV: 38 MM HG (ref 40–50)
PH BLDV: 7.4 [PH] (ref 7.32–7.43)
PH BLDV: 7.42 [PH] (ref 7.32–7.43)
PH UR STRIP: 5 [PH] (ref 5–7)
PHOSPHATE SERPL-MCNC: 3.5 MG/DL (ref 2.5–4.5)
PLATELET # BLD AUTO: 198 10E3/UL (ref 150–450)
PO2 BLDV: 35 MM HG (ref 25–47)
PO2 BLDV: 37 MM HG (ref 25–47)
POTASSIUM BLD-SCNC: 3.7 MMOL/L (ref 3.4–5.3)
POTASSIUM BLD-SCNC: 3.8 MMOL/L (ref 3.4–5.3)
PROT SERPL-MCNC: 5.5 G/DL (ref 6.8–8.8)
RBC # BLD AUTO: 3.21 10E6/UL (ref 3.8–5.2)
RBC URINE: 2 /HPF
SODIUM SERPL-SCNC: 146 MMOL/L (ref 133–144)
SP GR UR STRIP: 1.01 (ref 1–1.03)
SPECIMEN DESCRIPTION: NORMAL
SQUAMOUS EPITHELIAL: 1 /HPF
UROBILINOGEN UR STRIP-MCNC: NORMAL MG/DL
WBC # BLD AUTO: 20.3 10E3/UL (ref 4–11)
WBC URINE: 4 /HPF

## 2022-04-01 PROCEDURE — 85300 ANTITHROMBIN III ACTIVITY: CPT | Performed by: INTERNAL MEDICINE

## 2022-04-01 PROCEDURE — 85613 RUSSELL VIPER VENOM DILUTED: CPT | Performed by: INTERNAL MEDICINE

## 2022-04-01 PROCEDURE — 80053 COMPREHEN METABOLIC PANEL: CPT | Performed by: NURSE PRACTITIONER

## 2022-04-01 PROCEDURE — 255N000002 HC RX 255 OP 636: Performed by: STUDENT IN AN ORGANIZED HEALTH CARE EDUCATION/TRAINING PROGRAM

## 2022-04-01 PROCEDURE — 94003 VENT MGMT INPAT SUBQ DAY: CPT

## 2022-04-01 PROCEDURE — 250N000013 HC RX MED GY IP 250 OP 250 PS 637: Performed by: SURGERY

## 2022-04-01 PROCEDURE — 250N000013 HC RX MED GY IP 250 OP 250 PS 637: Performed by: NURSE PRACTITIONER

## 2022-04-01 PROCEDURE — 99222 1ST HOSP IP/OBS MODERATE 55: CPT | Performed by: INTERNAL MEDICINE

## 2022-04-01 PROCEDURE — 83605 ASSAY OF LACTIC ACID: CPT | Performed by: NURSE PRACTITIONER

## 2022-04-01 PROCEDURE — 999N000157 HC STATISTIC RCP TIME EA 10 MIN

## 2022-04-01 PROCEDURE — 82805 BLOOD GASES W/O2 SATURATION: CPT | Performed by: NURSE PRACTITIONER

## 2022-04-01 PROCEDURE — 84100 ASSAY OF PHOSPHORUS: CPT | Performed by: NURSE PRACTITIONER

## 2022-04-01 PROCEDURE — 86316 IMMUNOASSAY TUMOR OTHER: CPT | Performed by: NURSE PRACTITIONER

## 2022-04-01 PROCEDURE — 200N000001 HC R&B ICU

## 2022-04-01 PROCEDURE — 85027 COMPLETE CBC AUTOMATED: CPT | Performed by: NURSE PRACTITIONER

## 2022-04-01 PROCEDURE — 85306 CLOT INHIBIT PROT S FREE: CPT | Performed by: INTERNAL MEDICINE

## 2022-04-01 PROCEDURE — 81003 URINALYSIS AUTO W/O SCOPE: CPT

## 2022-04-01 PROCEDURE — 85390 FIBRINOLYSINS SCREEN I&R: CPT | Mod: 26 | Performed by: PATHOLOGY

## 2022-04-01 PROCEDURE — 82330 ASSAY OF CALCIUM: CPT | Performed by: SURGERY

## 2022-04-01 PROCEDURE — 85303 CLOT INHIBIT PROT C ACTIVITY: CPT | Performed by: INTERNAL MEDICINE

## 2022-04-01 PROCEDURE — 250N000011 HC RX IP 250 OP 636: Performed by: NURSE PRACTITIONER

## 2022-04-01 PROCEDURE — 83735 ASSAY OF MAGNESIUM: CPT | Performed by: NURSE PRACTITIONER

## 2022-04-01 PROCEDURE — 99291 CRITICAL CARE FIRST HOUR: CPT | Mod: 24 | Performed by: INTERNAL MEDICINE

## 2022-04-01 PROCEDURE — 70553 MRI BRAIN STEM W/O & W/DYE: CPT

## 2022-04-01 PROCEDURE — 81240 F2 GENE: CPT | Performed by: INTERNAL MEDICINE

## 2022-04-01 PROCEDURE — 258N000003 HC RX IP 258 OP 636: Performed by: SURGERY

## 2022-04-01 PROCEDURE — 86146 BETA-2 GLYCOPROTEIN ANTIBODY: CPT | Performed by: INTERNAL MEDICINE

## 2022-04-01 PROCEDURE — 83735 ASSAY OF MAGNESIUM: CPT | Performed by: SURGERY

## 2022-04-01 PROCEDURE — A9585 GADOBUTROL INJECTION: HCPCS | Performed by: STUDENT IN AN ORGANIZED HEALTH CARE EDUCATION/TRAINING PROGRAM

## 2022-04-01 PROCEDURE — 250N000011 HC RX IP 250 OP 636: Performed by: SURGERY

## 2022-04-01 PROCEDURE — 86147 CARDIOLIPIN ANTIBODY EA IG: CPT | Performed by: INTERNAL MEDICINE

## 2022-04-01 PROCEDURE — 85384 FIBRINOGEN ACTIVITY: CPT | Performed by: INTERNAL MEDICINE

## 2022-04-01 PROCEDURE — 250N000009 HC RX 250: Performed by: NURSE PRACTITIONER

## 2022-04-01 PROCEDURE — G0452 MOLECULAR PATHOLOGY INTERPR: HCPCS | Mod: 26 | Performed by: PATHOLOGY

## 2022-04-01 PROCEDURE — 84132 ASSAY OF SERUM POTASSIUM: CPT | Performed by: SURGERY

## 2022-04-01 PROCEDURE — 71045 X-RAY EXAM CHEST 1 VIEW: CPT

## 2022-04-01 PROCEDURE — 250N000013 HC RX MED GY IP 250 OP 250 PS 637: Performed by: PHYSICIAN ASSISTANT

## 2022-04-01 PROCEDURE — 250N000012 HC RX MED GY IP 250 OP 636 PS 637: Performed by: SURGERY

## 2022-04-01 RX ORDER — AMIODARONE HYDROCHLORIDE 200 MG/1
200 TABLET ORAL DAILY
Status: DISCONTINUED | OUTPATIENT
Start: 2022-04-08 | End: 2022-04-05

## 2022-04-01 RX ORDER — GUAIFENESIN 600 MG/1
15 TABLET, EXTENDED RELEASE ORAL DAILY
Status: DISCONTINUED | OUTPATIENT
Start: 2022-04-01 | End: 2022-04-11 | Stop reason: CLARIF

## 2022-04-01 RX ORDER — MAGNESIUM SULFATE HEPTAHYDRATE 40 MG/ML
2 INJECTION, SOLUTION INTRAVENOUS ONCE
Status: COMPLETED | OUTPATIENT
Start: 2022-04-01 | End: 2022-04-01

## 2022-04-01 RX ORDER — POTASSIUM CHLORIDE 1.5 G/1.58G
20 POWDER, FOR SOLUTION ORAL ONCE
Status: COMPLETED | OUTPATIENT
Start: 2022-04-01 | End: 2022-04-01

## 2022-04-01 RX ORDER — POTASSIUM CHLORIDE 20MEQ/15ML
20 LIQUID (ML) ORAL ONCE
Status: COMPLETED | OUTPATIENT
Start: 2022-04-01 | End: 2022-04-01

## 2022-04-01 RX ORDER — AMLODIPINE BESYLATE 2.5 MG/1
2.5 TABLET ORAL DAILY
Status: DISCONTINUED | OUTPATIENT
Start: 2022-04-01 | End: 2022-04-05

## 2022-04-01 RX ORDER — GADOBUTROL 604.72 MG/ML
10 INJECTION INTRAVENOUS ONCE
Status: COMPLETED | OUTPATIENT
Start: 2022-04-01 | End: 2022-04-01

## 2022-04-01 RX ORDER — AMIODARONE HYDROCHLORIDE 200 MG/1
400 TABLET ORAL 2 TIMES DAILY
Status: DISCONTINUED | OUTPATIENT
Start: 2022-04-01 | End: 2022-04-05

## 2022-04-01 RX ORDER — FUROSEMIDE 10 MG/ML
40 INJECTION INTRAMUSCULAR; INTRAVENOUS EVERY 8 HOURS
Status: COMPLETED | OUTPATIENT
Start: 2022-04-01 | End: 2022-04-01

## 2022-04-01 RX ORDER — ASPIRIN 81 MG/1
81 TABLET, CHEWABLE ORAL DAILY
Status: DISCONTINUED | OUTPATIENT
Start: 2022-04-01 | End: 2022-04-15

## 2022-04-01 RX ADMIN — CHLORHEXIDINE GLUCONATE 15 ML: 1.2 RINSE ORAL at 07:43

## 2022-04-01 RX ADMIN — SENNOSIDES AND DOCUSATE SODIUM 1 TABLET: 50; 8.6 TABLET ORAL at 21:29

## 2022-04-01 RX ADMIN — FUROSEMIDE 40 MG: 10 INJECTION, SOLUTION INTRAVENOUS at 08:55

## 2022-04-01 RX ADMIN — AMIODARONE HYDROCHLORIDE 400 MG: 200 TABLET ORAL at 10:42

## 2022-04-01 RX ADMIN — ASPIRIN 81 MG CHEWABLE TABLET 81 MG: 81 TABLET CHEWABLE at 09:00

## 2022-04-01 RX ADMIN — GABAPENTIN 100 MG: 100 CAPSULE ORAL at 22:25

## 2022-04-01 RX ADMIN — ACETAMINOPHEN 975 MG: 325 TABLET ORAL at 23:28

## 2022-04-01 RX ADMIN — HYDRALAZINE HYDROCHLORIDE 10 MG: 20 INJECTION INTRAMUSCULAR; INTRAVENOUS at 18:48

## 2022-04-01 RX ADMIN — HYDRALAZINE HYDROCHLORIDE 10 MG: 20 INJECTION INTRAMUSCULAR; INTRAVENOUS at 23:09

## 2022-04-01 RX ADMIN — ATORVASTATIN CALCIUM 80 MG: 40 TABLET, FILM COATED ORAL at 09:00

## 2022-04-01 RX ADMIN — HEPARIN SODIUM 5000 UNITS: 5000 INJECTION, SOLUTION INTRAVENOUS; SUBCUTANEOUS at 12:06

## 2022-04-01 RX ADMIN — Medication 40 MG: at 09:00

## 2022-04-01 RX ADMIN — GADOBUTROL 10 ML: 604.72 INJECTION INTRAVENOUS at 20:56

## 2022-04-01 RX ADMIN — NICARDIPINE HYDROCHLORIDE 10 MG/HR: 0.2 INJECTION INTRAVENOUS at 03:28

## 2022-04-01 RX ADMIN — ACETAMINOPHEN 975 MG: 325 TABLET ORAL at 16:50

## 2022-04-01 RX ADMIN — Medication 15 ML: at 12:06

## 2022-04-01 RX ADMIN — HEPARIN SODIUM 5000 UNITS: 5000 INJECTION, SOLUTION INTRAVENOUS; SUBCUTANEOUS at 19:26

## 2022-04-01 RX ADMIN — METOPROLOL TARTRATE 12.5 MG: 25 TABLET, FILM COATED ORAL at 09:20

## 2022-04-01 RX ADMIN — MUPIROCIN 0.5 G: 20 OINTMENT TOPICAL at 21:29

## 2022-04-01 RX ADMIN — MUPIROCIN 0.5 G: 20 OINTMENT TOPICAL at 09:29

## 2022-04-01 RX ADMIN — POLYETHYLENE GLYCOL 3350 17 G: 17 POWDER, FOR SOLUTION ORAL at 09:00

## 2022-04-01 RX ADMIN — MAGNESIUM SULFATE HEPTAHYDRATE 2 G: 40 INJECTION, SOLUTION INTRAVENOUS at 06:06

## 2022-04-01 RX ADMIN — AMLODIPINE BESYLATE 2.5 MG: 2.5 TABLET ORAL at 09:20

## 2022-04-01 RX ADMIN — AMIODARONE HYDROCHLORIDE 400 MG: 200 TABLET ORAL at 21:29

## 2022-04-01 RX ADMIN — ACETAMINOPHEN 975 MG: 325 TABLET ORAL at 00:12

## 2022-04-01 RX ADMIN — POTASSIUM CHLORIDE, DEXTROSE MONOHYDRATE AND SODIUM CHLORIDE: 150; 5; 450 INJECTION, SOLUTION INTRAVENOUS at 04:41

## 2022-04-01 RX ADMIN — NICARDIPINE HYDROCHLORIDE 15 MG/HR: 0.2 INJECTION INTRAVENOUS at 06:12

## 2022-04-01 RX ADMIN — POTASSIUM CHLORIDE 20 MEQ: 20 SOLUTION ORAL at 18:02

## 2022-04-01 RX ADMIN — NICARDIPINE HYDROCHLORIDE 10 MG/HR: 0.2 INJECTION INTRAVENOUS at 00:00

## 2022-04-01 RX ADMIN — NICARDIPINE HYDROCHLORIDE 15 MG/HR: 0.2 INJECTION INTRAVENOUS at 08:56

## 2022-04-01 RX ADMIN — HEPARIN SODIUM 5000 UNITS: 5000 INJECTION, SOLUTION INTRAVENOUS; SUBCUTANEOUS at 04:18

## 2022-04-01 RX ADMIN — TICAGRELOR 90 MG: 90 TABLET ORAL at 16:50

## 2022-04-01 RX ADMIN — CHLORHEXIDINE GLUCONATE 15 ML: 1.2 RINSE ORAL at 19:26

## 2022-04-01 RX ADMIN — SENNOSIDES AND DOCUSATE SODIUM 1 TABLET: 50; 8.6 TABLET ORAL at 09:00

## 2022-04-01 RX ADMIN — POTASSIUM CHLORIDE 20 MEQ: 1.5 POWDER, FOR SOLUTION ORAL at 06:07

## 2022-04-01 RX ADMIN — HYDROMORPHONE HYDROCHLORIDE 0.2 MG: 0.2 INJECTION, SOLUTION INTRAMUSCULAR; INTRAVENOUS; SUBCUTANEOUS at 23:31

## 2022-04-01 RX ADMIN — SODIUM CHLORIDE 2 UNITS/HR: 9 INJECTION, SOLUTION INTRAVENOUS at 10:56

## 2022-04-01 RX ADMIN — TICAGRELOR 90 MG: 90 TABLET ORAL at 04:18

## 2022-04-01 RX ADMIN — FUROSEMIDE 40 MG: 10 INJECTION, SOLUTION INTRAVENOUS at 16:49

## 2022-04-01 RX ADMIN — ACETAMINOPHEN 975 MG: 325 TABLET ORAL at 07:43

## 2022-04-01 RX ADMIN — METOPROLOL TARTRATE 12.5 MG: 25 TABLET, FILM COATED ORAL at 16:50

## 2022-04-01 ASSESSMENT — ACTIVITIES OF DAILY LIVING (ADL)
ADLS_ACUITY_SCORE: 11
ADLS_ACUITY_SCORE: 9
ADLS_ACUITY_SCORE: 11
ADLS_ACUITY_SCORE: 9
ADLS_ACUITY_SCORE: 11
ADLS_ACUITY_SCORE: 9
ADLS_ACUITY_SCORE: 9
ADLS_ACUITY_SCORE: 11
ADLS_ACUITY_SCORE: 9
ADLS_ACUITY_SCORE: 11
ADLS_ACUITY_SCORE: 11
ADLS_ACUITY_SCORE: 9
ADLS_ACUITY_SCORE: 9
ADLS_ACUITY_SCORE: 11
ADLS_ACUITY_SCORE: 9
ADLS_ACUITY_SCORE: 11
ADLS_ACUITY_SCORE: 9
ADLS_ACUITY_SCORE: 11

## 2022-04-01 NOTE — PROGRESS NOTES
EEG CLINICAL NEUROPHYSIOLOGY PRELIMINARY REPORT    First 2 hours for video EEG reviewed.  As best as can be determined from electronic medical record, patient not currently treated with sedative drips.  At times near continuous polymorphic 50 to 80  V delta.  At times severe attenuation of background activity lasting up to 4 seconds.  Stimulation associated with routine cares consistently elicits runs of generalized periodic discharges recurring at a rate 1 to 2 Hz but these generally do not last longer than 30 to 40 seconds.  These probably constitute stimulus induced repetitive periodic discharges (SIRPDs).  These are generally considered to be an abnormal arousal response rather than an indicator of seizures or seizure tendency.  Electrographic seizures not seen in the study.    Study consistent with moderate-to-severe, sometimes a severe diffuse encephalopathy.  No seizures.  Findings do not meet excepted criteria for nonconvulsive status epilepticus.  We will continue video EEG monitoring.  Full report to follow.    Trey Sutherland MD  Contact information for physicians covering Epilepsy and EEG is available on [facility].  Click search, enter neurology adult/[facility] in group name box, click on neurology adult/[facility], then click Staff Epilepsy and EEG.

## 2022-04-01 NOTE — CONSULTS
"CLINICAL NUTRITION SERVICES  -  ASSESSMENT NOTE      RECOMMENDATIONS FOR MD/PROVIDER TO ORDER:   Recommend discontinue the D5 containing IVF as TF rate advances towards goal.   Recommendations Ordered by Registered Dietitian (RD):   Today, begin TF Promote (no fiber) at 10 mL/hr = 240 kcals, 15 gm pro (0.3 gm/kg), 200 mL H20, 31 gm CHO, no fiber.  Total (TF + D5 containing IVF):  362 kcals (3 kcal/kg)  Free H20 60 mL every 4 hrs  MVI-M Liquid daily    After 24 hrs, increase TF to 20 mL/hr and then advance slowly by 15 mL every 12 hrs to goal 65 mL/hr = 1560 kcals (15 kcal/kg), 97 gm pro (1.7 gm/kg), 1309 mL H20, 203 gm CHO, no fiber   Malnutrition:   % Weight Loss:  Unable to determine without recent weight history  % Intake:  Unable to determine without nutrition history  Subcutaneous Fat Loss:  None observed  Muscle Loss:  None observed  Fluid Retention:  Mild     Malnutrition Diagnosis: Unable to determine due to lack of nutrition history and recent weight history        REASON FOR ASSESSMENT  Angelica Robledo is a 51 year old female seen by Registered Dietitian for Provider Order - Registered Dietitian to Assess and Order TF per Medical Nutrition Therapy Protocol      NUTRITION HISTORY  - Unable to obtain nutrition history as patient is intubated and no family available.  No food allergies/intolerances noted.    CURRENT NUTRITION ORDERS  Diet Order:     NPO   Was asked to initiate TF for patient, \"Please start and progress very slowly via OG.\"    Current Intake/Tolerance:  Prior to surgery, patient was eating 75% meals when meals received.  NPO x 3 days.    3/28:  Angio = Severe multivessel CAD   3/30:  Surgery = CABG x 4  3/30:  Intubated  3/30:  PEA arrest, Vfib --> defibrillation x 3, CPR  3/30:  AXR = Tip of the nasogastric tube and proximal sidehole are projected over the stomach  3/31:  Angio = Successful PCI of the mid RCA with a 4.0x16mm Synergy DANA. Successful POBA of RPL  3/31:  AXR = New OG tube in the " "stomach. Bowel gas pattern is nonobstructed OG)  4/1:  EEG = severe diffuse encephalopathy      NUTRITION FOCUSED PHYSICAL ASSESSMENT FOR DIAGNOSING MALNUTRITION)  Yes                Observed:    Fluid retention (refer to documentation in Malnutrition section)    Obtained from Chart/Interdisciplinary Team:  Edema - trace    ANTHROPOMETRICS  Height: 5' 5\"  Admit Wt:  98.4 kg  Current Wt:  96.9 kg  Body mass index is 35.55 kg/m .  Weight Status:  Obesity Grade I BMI 30-34.9  IBW: 56.8 kg  % IBW: 171%  Weight History: Unknown    LABS  Labs reviewed  Na 147 (H)  BGM: 135-153 range    MEDICATIONS  Medications reviewed  Lasix BID  Miralax and Senokot  D5 1/2 NaCl + 20 KCl at 30 mL/hr = 36 gm CHO, 122 kcals  Both Amiodorone and Nicardipine discontinued today      ASSESSED NUTRITION NEEDS PER APPROVED PRACTICE GUIDELINES:    Dosing Weight: 96.9 kg (4/1 wt for energy), 56.8 kg (IBW for protein)   Estimated Energy Needs: 5446-4507 kcals (14-17 Kcal/Kg)  Justification: obese and vented  Estimated Protein Needs:  grams protein (1.5-1.8 g pro/Kg)  Justification: post-op, hypercatabolism with critical illness, obesity guidelines  and preservation of lean body mass  Estimated Fluid Needs:  Per provider pending fluid status    MALNUTRITION:  % Weight Loss:  Unable to determine without recent weight history  % Intake:  Unable to determine without nutrition history  Subcutaneous Fat Loss:  None observed  Muscle Loss:  None observed  Fluid Retention:  Mild     Malnutrition Diagnosis: Unable to determine due to lack of nutrition history and recent weight history    NUTRITION DIAGNOSIS:  Inadequate protein-energy intake related to intubation as evidenced by NPO status, D5 containing IVF meeting 9% energy and 0% protein needs, and TF planned      NUTRITION INTERVENTIONS  Recommendations / Nutrition Prescription  Today, begin TF Promote (no fiber) at 10 mL/hr = 240 kcals, 15 gm pro (0.3 gm/kg), 200 mL H20, 31 gm CHO, no " fiber.  Total (TF + D5 containing IVF):  362 kcals (3 kcal/kg)  Free H20 60 mL every 4 hrs  MVI-M Liquid daily    After 24 hrs, increase TF to 20 mL/hr and then advance slowly by 15 mL every 12 hrs to goal 65 mL/hr = 1560 kcals (15 kcal/kg), 97 gm pro (1.7 gm/kg), 1309 mL H20, 203 gm CHO, no fiber    Recommend discontinue the D5 containing IVF as TF rate advances towards goal.    Implementation  Nutrition education: Not appropriate at this time due to patient condition  Collaboration and Referral of Nutrition care - Pt was discussed during ICU interdisciplinary rounds this morning.  EN Composition, EN Schedule, Feeding Tube Flush, and Medical Food Supplement - TF orders entered in Epic as above    Nutrition Goals  TF will meet % estimated needs in the next 72 hrs.    MONITORING AND EVALUATION:  Progress towards goals will be monitored and evaluated per protocol and Practice Guidelines    Jazmin Conroy, RD, LD, [facility]

## 2022-04-01 NOTE — PROGRESS NOTES
Critical Care Progress Note      04/01/2022    Name: Angelica Robledo MRN#: [id number]   Age: 51 year old YOB: 1970     Hsptl Day# 5  ICU DAY #2    MV DAY #2             Problem List:   Active Problems:    NSTEMI (non-ST elevated myocardial infarction) (H)    Hypertensive emergency    Paroxysmal ventricular tachycardia (H)           Summary/Hospital Course:   Angelica Robledo is a 51 year old female with pertinent PMHx of prior MI at age 24-25 (in 1996) s/p angioplasty, HTN, anemia, and NSTEMI.  She presented on 3/27 with new onset chest pain similar to her previous MI.  Workup was consistent with NSTEMI.  Cardiac cath demonstrated severe multivessel CAD.  She was seen by CT surgery and CABG was scheduled for 3/30.  She underwent CABG x4 left radial, LIMA, left saph vein x2 (FAUSTINO taken down, but elected to not be used due to size).    No family history of clotting disorders.  No known history of DVT/PE/stroke. Siblings without cardiovascular disease. Father passed away of cardiac causes at an advanced age.  Mother had cardiac disease as well as an aortic aneurysm, but not at a young age.     4V CABG as follows: left radial artery graft to the right posterior descending coronary artery, reversed saphenous vein graft to the obtuse marginal branch of the left circumflex coronary artery, reversed saphenous vein graft to the first diagonal branch of the left anterior descending coronary artery, and pedicled left internal mammary artery to left anterior descending coronary.    POD 0 - initial hypoxia resolved with vent changes.  At 2355 patient suffered a PEA and Vfib arrest and underwent 3 shocks, 2 rounds of epi, and 1 round of CPR. ROSC obtained at 0005.  300 mg amiodarone bolus administered.  STAT cardiac cath lab performed demonstrating LIMA-LAD, VG-LADD1, and VG-LPLB were patent. The left radial artery to RDPA graft had an occlusion.  The mid RCA was treated with a DANA stent and a balloon angioplasty  performed at the [facility].  Excellent flow was noted on completion coronary angiogram.  Echo demonstrated a drop in LVEF to 40-45% with aspects of hypokinesis. RV demonstrated mild-moderate RV function reduction.    POD1 - weaned from sedation with minimal arousal limited to briefly opening eyes to pain and occasionally sound, minimal extremity pain response.  CT head/CTA head and neck unremarkable for acute insult.  Ceribell placed overnight.          Assessment and plan :     Angelica Robledo IS a 51 year old female admitted on 3/27/2022 for chest pain with workup demonstrating severe multivessel CAD.  Patient underwent CABG x4.  POD#0 patient had a PEA arrest with ROSC obtained at 10 minutes.  Emergent coronary cath demonstrated radial artery occlusion.  A DANA was placed into the RCA and balloon angioplasty of the [facility]PL[facility] was performed with excellent completion angiography flow.     I have personally reviewed the daily labs, imaging studies, cultures and discussed the case with referring physician and consulting physicians.     My assessment and plan by system for this patient is as follows:    Neurology/Psychiatry:   1. Analgesia -- tylenol, prn narcotics - minimize narcotics during phase of trying to allow her to be more alert  2. Sedation -- hold all sedation  3. Encephalopathy - head CT negative -- Neurology consult -- ceribell overnight      Cardiovascular:   1.  S/p CABG x4 - left radial, LIMA, left saph vein x2 (FAUSTINO taken down, but elected to not be used due to size) -- ASA, statin  2. POD#0 PEA and Vfib arrest -- s/p cardiac cath demonstrated radial artery graft to RPDA occlusion -- intervention with DANA to RCA and balloon angioplasty RPLA -- on Kangrelor 3/31, now transitioned to aspirin and Brilinta with discussion per CV surgery and interventional cardiology  -- okay from ICU standpoint to discontinue amiodarone gtt as etiology seems resolved - pending discussion with cards  2. Continue nicardipine until CV  surgery clears for discontinuation  3. Hx of MI at age 24-25  4. HTN - likely untreated prior to hospitalization - presented with hypertension emergency - nicardipine on for radial artery graft bypass and for treatment goal SBP<140, MAP >65.     Pulmonary/Ventilator Management:   1. Airway -- intubated -- settings weaned to basic values  2. No underlying pulmonary problems on medical history or cxr.      GI and Nutrition :   1. Diet when cleared by CV surgery -- okay with starting enteral feeds per OGT  2. Acid suppression for PUD prophy     Renal/Fluids/Electrolytes:   1. Monitor UOP/creatinine post-op -- edema -- diuresis today  2. Replete electrolytes PRN per protocol  3. IVF and albumin administration for volume prn per protocol  4. Cooper indicated for accurate I/Os     Infectious Disease:   1. UTI with E coli on admission -- recheck UA today, if positive, will treat with course of abx     Endocrine:   1. Monitor for stress induced hyperglycemia. ICU insulin protocol, goal sugar <180   2. Incidental thyroid nodule -- follow-up outpatient per discharge referral     Hematology/Oncology:   1. Chronic anemia -- Hgb/plts stable post op.  pRBC for goal hgb 8.0 or as indicated by CV surgery     IV/Access:   1. Venous access - CVC  2. Arterial access - femoral  - central access required and necessary      ICU Prophylaxis:   1. DVT: Hep Subq/mechanical  2. VAP: HOB 30 degrees, chlorhexidine rinse  3. Stress Ulcer: H2 blocker  4. Restraints: Nonviolent soft two point restraints required and necessary for patient safety and continued cares and good effect as patient continues to pull at necessary lines, tubes despite education and distraction. Will readdress daily.   5. Wound care - per unit routine   6. Feeding - enteral feeds  7. Family Update:  and family will be at bedside later for update  8. Disposition - ICU        Key goals for next 24 hours:   1. Await neuro recs - EEG monitoring  2. Continue serial neuro  checks           Interim History:   At 2355 patient suffered a PEA arrest and underwent 3 shocks, 2 rounds of epi, and 1 round of CPR. ROSC obtained at 0005.  300 mg amiodarone bolus administered.  STAT cardiac cath lab performed demonstrating LIMA-LAD, VG-LADD1, and VG-LPLB were patent. The left radial artery to RDPA graft had an occlusion.  The mid RCA was treated with a DANA stent [facility]  Excellent flow was noted on completion coronary angiogram.  Echo demonstrated a drop in LVEF to 40-45% with aspects of hypokinesis. RV demonstrated mild-moderate RV function reduction.    Stable since return to the ICU following cath lab intervention.    OGT had been removed post op due to location concern on initial evaluation.  Attempts at NGT placement post cath lab resulted in epistaxis. Rhino rockets placed.           Key Medications:       acetaminophen  975 mg Oral Q8H     aspirin  162 mg Oral or NG Tube Daily     atorvastatin  80 mg Oral Daily     chlorhexidine  15 mL Mouth/Throat Q12H     furosemide  40 mg Intravenous Q8H     gabapentin  100 mg Oral TID     heparin ANTICOAGULANT  5,000 Units Subcutaneous Q8H     lactated ringers  250 mL Intravenous Once     lactated ringers  250 mL Intravenous Once     mupirocin  0.5 g Both Nostrils BID     pantoprazole  40 mg Oral or NG Tube Daily    Or     pantoprazole  40 mg Oral Daily     perflutren diluted 1mL to 2mL with saline  9 mL Intravenous Once     polyethylene glycol  17 g Oral Daily     senna-docusate  1 tablet Oral BID     sodium chloride (PF)  10 mL Intravenous Once     sodium chloride (PF)  3 mL Intracatheter Q8H     ticagrelor  90 mg Oral BID       amiodarone 0.5 mg/min (04/01/22 0800)     dexmedetomidine Stopped (03/30/22 1611)     dextrose 5% and 0.45% NaCl + KCl 20 mEq/L 30 mL/hr at 04/01/22 0441     EPINEPHrine Stopped (03/31/22 1030)     insulin regular 3 Units/hr (04/01/22 0812)     - MEDICATION INSTRUCTIONS -        propofol (DIPRIVAN) infusion Stopped (03/31/22 1000)    And     - MEDICATION INSTRUCTIONS -       niCARdipine 15 mg/hr (04/01/22 0800)     nitroGLYcerin       norepinephrine Stopped (03/30/22 1612)     Percutaneous Coronary Intervention orders placed (this is information for BPA alerting)       phenylephrine       BETA BLOCKER NOT PRESCRIBED       sodium chloride 20 mL/hr at 04/01/22 0000               Physical Examination:   Temp:  [98.8  F (37.1  C)-100.2  F (37.9  C)] 99.5  F (37.5  C)  Pulse:  [] 70  Resp:  [12-42] 14  MAP:  [75 mmHg-94 mmHg] 80 mmHg  Arterial Line BP: ()/(57-85) 127/60  FiO2 (%):  [30 %-40 %] 40 %  SpO2:  [90 %-99 %] 97 %    Intake/Output Summary (Last 24 hours) at 3/31/2022 0921  Last data filed at 3/31/2022 0700  Gross per 24 hour   Intake 3676.54 ml   Output 2825 ml   Net 851.54 ml     Wt Readings from Last 4 Encounters:   04/01/22 96.9 kg (213 lb 10 oz)     Arterial Line BP: ()/(57-85) 127/60  MAP:  [75 mmHg-94 mmHg] 80 mmHg  CVP:  [11 mmHg-20 mmHg] 18 mmHg  SVO2:  [40 %-64 %] 61 %  Vent Mode: CMV/AC  (Continuous Mandatory Ventilation/ Assist Control)  FiO2 (%): 40 %  Resp Rate (Set): 14 breaths/min  Tidal Volume (Set, mL): 450 mL  PEEP (cm H2O): 5 cmH2O  Resp: 14    Recent Labs   Lab 04/01/22  0416 03/31/22  2341 03/31/22  2115 03/31/22  1605 03/31/22  1244 03/31/22  0603 03/30/22  1714 03/30/22  1708   PH  --   --  7.45  --  7.53* 7.49*  --  7.44   PCO2  --   --  31*  --  27* 27*  --  31*   PO2  --   --  117*  --  92 143*  --  87   HCO3  --   --  21  --  22 21  --  21   O2PER 40 40 40 40 30 30   < > 80    < > = values in this interval not displayed.       GEN: no acute distress   HEENT: head ncat, sclera anicteric, ETT present   PULM: unlabored, synchronous with vent, clear anteriorly    CV/Chest: RRR, S1S2 no gallop,  No rub, no murmur appreciated. Midline dressing CDI. CTs: low serosang output, no air leak.   ABD: soft, nontender  EXT:  Edema,  warm  NEURO: Off  sedation. Opens eyes to stimulus, occasionally sound. Pupillary light reflex brisk. No focal signs appreciated. Subtle BLE response to pain stimulus.  : nathan present, clear urine output  SKIN: no obvious rash  LINES: clean, dry intact         Data:   All data and imaging reviewed     ROUTINE ICU LABS (Last four results)  CMP  Recent Labs   Lab 04/01/22  0811 04/01/22  0644 04/01/22  0419 04/01/22  0416 03/31/22  2158 03/31/22  2108 03/31/22  1420 03/31/22  1243 03/31/22  0820 03/31/22  0604 03/31/22  0414 03/31/22  0022 03/30/22  1658 03/30/22  1622   NA  --   --   --  146*  --   --   --  144  --  145*  --  145*  --  143   POTASSIUM  --   --   --  3.7  --  3.8  --  3.4  --  3.2*  --  3.6  --  3.9  3.9   CHLORIDE  --   --   --  118*  --   --   --  115*  --  116*  --  114*  --  115*   CO2  --   --   --  23  --   --   --  22  --  20  --  21  --  23   ANIONGAP  --   --   --  5  --   --   --  7  --  9  --  10  --  5   * 153* 145* 151*   < >  --    < > 178*   < > 210*   < > 263*   < > 175*   BUN  --   --   --  11  --   --   --  9  --  8  --  9  --  7   CR  --   --   --  0.79  --   --   --  0.73  --  0.81  --  1.06*  --  0.81   GFRESTIMATED  --   --   --  >90  --   --   --  >90  --  88  --  64  --  88   TOMAS  --   --   --  8.5  --   --   --  8.8  --  8.7  --  11.9*  --  9.0   MAG  --   --   --  2.0  --   --   --   --   --  2.6*  --   --   --  2.6*   PHOS  --   --   --  3.5  --  3.6  --  3.2  --  2.0*  --   --   --  3.1   PROTTOTAL  --   --   --  5.5*  --   --   --  5.6*  --  5.2*  --  5.3*  --  5.8*   ALBUMIN  --   --   --  2.3*  --   --   --  2.4*  --  2.4*  --  2.6*  --  2.8*   BILITOTAL  --   --   --  0.4  --   --   --  0.4  --  0.4  --  0.4  --  0.8   ALKPHOS  --   --   --  65  --   --   --  62  --  47  --  51  --  54   AST  --   --   --  47*  --   --   --  89*  --  107*  --  115*  --  142*   ALT  --   --   --  50  --   --   --  59*  --  59*  --  50  --  50    < > = values in this interval not displayed.      CBC  Recent Labs   Lab 04/01/22  0416 03/31/22  2341 03/31/22  1243 03/31/22  0604 03/31/22  0022   WBC 20.3*  --  17.8* 15.8* 22.7*   RBC 3.21*  --  3.26* 3.15* 3.81   HGB 8.0* 8.2* 8.2* 8.0* 9.3*   HCT 26.2*  --  25.9* 25.5* 31.2*   MCV 82  --  79 81 82   MCH 24.9*  --  25.2* 25.4* 24.4*   MCHC 30.5*  --  31.7 31.4* 29.8*   RDW 18.1*  --  18.1* 17.7* 17.9*     --  190 183 255     INR  Recent Labs   Lab 03/31/22  0022 03/30/22  1622 03/30/22  1445   INR 1.37* 1.24* 1.44*     Arterial Blood Gas  Recent Labs   Lab 04/01/22  0416 03/31/22  2341 03/31/22  2115 03/31/22  1605 03/31/22  1244 03/31/22  0603 03/30/22  1714 03/30/22  1708   PH  --   --  7.45  --  7.53* 7.49*  --  7.44   PCO2  --   --  31*  --  27* 27*  --  31*   PO2  --   --  117*  --  92 143*  --  87   HCO3  --   --  21  --  22 21  --  21   O2PER 40 40 40 40 30 30   < > 80    < > = values in this interval not displayed.       All cultures:  No results for input(s): CULT in the last 168 hours.  Recent Results (from the past 24 hour(s))   CT Head w/o Contrast    Narrative    EXAM: CT HEAD W/O CONTRAST  LOCATION: [facility]  DATE/TIME: 3/31/2022 5:09 PM    INDICATION: Status post cardiac arrest. Cerebral hemorrhage suspected  COMPARISON: None.  TECHNIQUE: Routine CT Head without IV contrast. Multiplanar reformats. Dose reduction techniques were used.    FINDINGS:  INTRACRANIAL CONTENTS: No intracranial hemorrhage, extraaxial collection, or mass effect.  No definite CT findings of acute infarct. Minimal patchy nonspecific hypodensity in the periventricular cerebral white matter along the frontal horns of the   lateral ventricles on both sides, and also probably in the left frontal subcortical white matter, nonspecific. This may represent sequela of mild chronic small vessel ischemic changes. Normal ventricles and sulci. Atherosclerotic calcifications of the   carotid siphons bilaterally.    VISUALIZED  ORBITS/SINUSES/MASTOIDS: No intraorbital abnormality. Moderate scattered paranasal sinus mucosal thickening, with small amount of layering fluid in the left maxillary and bilateral sphenoid sinuses. Small amount of fluid/membrane thickening in   the left greater than right mastoid air cells.    BONES/SOFT TISSUES: No acute abnormality of the calvarium or skull base identified. Partially visualized tube entry into the left nasal cavity, incompletely evaluated. On the  images, the patient is intubated. There is stigmata of previous median   sternotomy and multiple wires/devices project over the thorax and neck, incompletely evaluated.      Impression    IMPRESSION:  1.  No definite CT findings of acute intracranial process. Specifically, no acute intracranial hemorrhage, extra-axial fluid collection, or mass effect.  2.  Mild patchy nonspecific hypodensity in the cerebral white matter, which may represent sequela of chronic small vessel ischemic change.  3.  Scattered mucosal thickening and layering fluid in the paranasal sinuses, as described.   CTA Head Neck with Contrast    Narrative    EXAM: CTA  HEAD NECK WITH CONTRAST  LOCATION: [facility]  DATE/TIME: 3/31/2022 5:09 PM    INDICATION: Stroke/TIA, assess intracranial arteries  COMPARISON: CT head of same date.  CONTRAST: 70mL Isovue 370  TECHNIQUE: Head and neck CT angiogram with IV contrast. Axial helical CT images of the head and neck vessels obtained during the arterial phase of intravenous contrast administration. Axial 2D reconstructed images and multiplanar 3D MIP reconstructed   images of the head and neck vessels were performed by the technologist. Dose reduction techniques were used. All stenosis measurements made according to NASCET criteria unless otherwise specified.    FINDINGS:     HEAD CTA:  ANTERIOR CIRCULATION: There is mild atherosclerotic stenosis of the paraclinoid left internal carotid artery. Otherwise, no  stenosis of the intracranial internal carotid arteries. The major proximal branches of the anterior cerebral and middle cerebral   artery appear patent. No aneurysm or high flow vascular malformation identified. Fetal origin of the right posterior cerebral artery from the anterior circulation, a normal variant, without significant stenosis.    POSTERIOR CIRCULATION: No stenosis/occlusion, aneurysm, or high flow vascular malformation. Dominant right and smaller left vertebral artery contribute to a normal basilar artery.     DURAL VENOUS SINUSES: Expected enhancement of the major dural venous sinuses.    NECK CTA:  RIGHT CAROTID: No measurable stenosis or dissection.    LEFT CAROTID: No measurable stenosis or dissection.    VERTEBRAL ARTERIES: No focal stenosis or dissection. Dominant right and smaller left vertebral arteries.    AORTIC ARCH: Classic aortic arch anatomy with no significant stenosis at the origin of the great vessels.    NONVASCULAR STRUCTURES: Stigmata of recent cardiac surgery noted. The patient is intubated, with endotracheal tube terminating in the mid thoracic trachea. An oral enteric tube is in place, at least extending into the mid thoracic esophagus. Partially   visualized right internal jugular vein approach central venous catheter extending into the superior vena cava, incompletely evaluated. Partially visualized catheter in the main/right pulmonary artery. Small bilateral pleural effusions are present. Small   amount of scattered presumed postsurgical air in the mediastinum and low neck region. Heterogeneous thyroid gland with a hypodense nodular lesion measuring up to 1.5 cm in the axial plane (series 5 image 47). Scattered mucosal thickening noted in the   paranasal sinuses.      Impression    IMPRESSION:     HEAD CTA:   1.  No high-grade stenosis or large vessel occlusion involving the major intracranial arteries.  2.  Mild atherosclerosis involving the left carotid siphon.    NECK  CTA:  1.  Unremarkable CT angiogram of the neck.  2.  Partially visualized stigmata of recent cardiac surgery, as described.  3.  Heterogeneous thyroid gland with hypodense nodule in the left thyroid lobe measuring 1.5 cm in axial plane. Follow-up thyroid ultrasound would typically be recommended for further evaluation.           Adi Bazan MD  Surgical Critical Care Fellow

## 2022-04-01 NOTE — PLAN OF CARE
Neuro: Patient opens eyes gaze upward, does not track does not fallow commands.. PERRL, cough, no gag, does not withdraw to pain  CT/CTA per neuro critical care updated with above information Cerebell EEG continuous.  CV: remains AV paced via epicardial wires at rate of 80 dropped from 100. Underlying SR with 1 AVB rate 60s, BP unchanged with intrinsic rhythm. On nicardipine for BP control, PA cath calculations remain stable, SvO2 40s CVS team and Cardiology teams aware. Edematous, afebrile  Pulmonary: titrating FiO2 and PEEP,moderate tan creamy bronchial secretions, small amount of crepitus upper chest, CT x 3 no airleak -20mmHg thinning to serosang drainage, ABGs stable  GI/: tolerating meds via OG with minimal residuals, rare BS, abd soft no BM this shift, Cooper catheter with adequate UOP  INTEG: rhino rockets in L nares to remain in place until 4/1, R nares without any obvious bleeding.  Continue with plan of care.

## 2022-04-01 NOTE — PROGRESS NOTES
[facility]  Cardiovascular and Thoracic Surgery Daily Note          Assessment and Plan:   sepideh Robledo is a 51 year old female with history of MI at age 25 s/p angioplasty who presented to the hospital with hypertensive emergency and NSTEMI. Coronary angio demonstrated severe multivessel CAD. Echocardiogram preoperatively with preserved biventricular function.    PMH includes: NSTEMI, CAD with previous coronary stents, HTN    Course has been complicated by PEA arrest with ventricular fibrillation on POD 0 with ROSC after defibrillation x3, 2 mg epinephrine and one round of CPR. Was taken to cath lab where radial artery graft was found to be occluded. PCI with DANA placement to mid RCA and angioplasty of the rPLB was performed. Echocardiogram at the conclusion of PCI demonstrated LVEF 40-45% with mild to moderate global hypokinesis of the left ventricle with severe hypokinesis of the mid anterior and anteroseptal walls, the entire inferior wall and all apical segments of the left ventricle; no pericardial effusion. Additionally, due to antiplatelet therapy post PCI, patient developed epistaxis during attempted NG placement requiring placement of b/l rhino rockets. Now, diffuse severe encephalopathy with minimal neurologic response.     POD #2 s/p:  1.  Coronary artery bypass grafting x 4 (radial artery graft to the right posterior descending coronary artery, reversed saphenous vein graft to the obtuse marginal branch of the left circumflex coronary artery, reversed saphenous vein graft to the first diagonal branch of the left anterior descending coronary artery, and pedicled left internal mammary artery to left anterior descending coronary artery).  2.  Endoscopic vein harvest of the greater saphenous vein from the left lower extremity.  3.  Endoscopic left radial artery harvest on 3/30/22 with Dr. Butterfield.    CVS:   NSTEMI and severe multivessel CAD s/p CABG x4 with radial grafts as  above  HTN  PEA and Vfib arrest s/p ROSC and DANA to mid RCA and angioplasty of the rPLB on 3/31  Currently on nicardipine gtts to maintain ordered hemodynamic parameters  Advanced hemodynamics: CVP 14-20, CI 3.1 with , PAP 36/21, SVO2 66%; lactate 0.9  Currently paced, underlying rhythm NSR rates 70s  - Goal MAP >65, SBP <140  - Continue nicardipine gtt   - Start amlodipine 2.5 mg daily for radial graft (at least 3-6 months)  - Start metoprolol tartrate 12.5 mg q8h with hold parameters (was placed on carvedilol preop, will look to transition to this in the coming days)  - Brilinta load and daily per Cardiology  - Continues on Amiodarone gtt, will transition to PO taper per EP/Cards recs  - ASA 81 daily  - Atorvastatin 80 mg daily  - CTs in to suction, leave in, TPWs with backup of 50    Resp:   Postoperative ventilator management  - Current vent settings:  Vent Mode: CMV/AC  (Continuous Mandatory Ventilation/ Assist Control)  FiO2 (%): 40 %  Resp Rate (Set): 14 breaths/min  Tidal Volume (Set, mL): 450 mL  PEEP (cm H2O): 5 cmH2O  Resp: 14  - Currently needs intubation while neuro status as below, oxygenating well  - Once extubated: pulm hygiene efforts: IS/flutter valve/mobility  - Titrate O2 to maintain sats >92%  - CXR this AM pending    Neuro:    Acute postoperative pain  Hx migraines  Encephalopathy   Did receive CPR on 3/31, will likely have pain from this also  Propofol turned off on 3/31 at 9 am, patient has not yet awakened; has upward gaze, pupils 5mm/brisk, does not withdraw to pain.   CT head 3/31 negative for hemorrhage; CTA negative for strokes, vessels patent  - PRN fentanyl and dilaudid available  - PRN oxycodone and methocarbamol available  - Scheduled tylenol and gabapentin  - Neuro critical care consulted and following  - Ceribell EEG placed 3/31    Renal/Electrolyte:   Lactic acidosis, resolved  Volume overload  Creat stable, up 2 lbs overnight, +edema; CVP 14-20  Na 146, K+3.7; lactate  0.9  - Strict I/O, daily weights  - Avoid/limit nephrotoxins as able  - Replete lytes per protocol  - Lasix 40 mg IV BID x 2 today, goal negative ~1L/24h    GI/Nutrition:    Epistaxis, resolved  Elevated LFTs, likely shock liver 2/2 arrest, improving  Orders Placed This Encounter      NPO for Medical/Clinical Reasons Except for: Other; Specify: NPO until Extubated      Advance Diet as Tolerated: Clear Liquid Diet  - OG in place for meds and decompression, no e/o blood in output  - Consult nutrition and cautiously start enteral feeds today, appreciate  - Rhino rockets in place, no further bleeding noted, remove today per protocol  - Continue bowel regimen, no BM yet  - PPI  - Trend CMP    :    - Cooper in place for strict I/O  - Currently having menses    Endo:  Stress induced hyperglycemia  Thyroid nodule   Preop A1c   Lab Results   Component Value Date    A1C 5.7 03/28/2022   - Continue insulin infusion  - Goal BG <180 for optimal healing  - Thyroid nodule noted on CT on 3/31, will have this worked up on an outpatient basis    ID:  Stress induced leukocytosis  E.Coli UTI preoperatively  WBC 20.3, Tmax 100.2; no s/sx infection  - Completed perioperative ABX  - Continue to follow fever curve, WBC and inflammatory markers as appropriate  - Repeat UA today 4/1    Heme:  Acute blood loss anemia  Acute blood loss thrombocytopenia  Hgb 8, Plts 198; no s/sx active bleeding  - CBC in AM  - Goal Hgb >7  - Consult hematology given severe premature CAD and acute graft failure- appreciate recommendations    -Proph: PPI, subcutaneous heparin, SCDs  -Dispo: Continue in ICU          Interval History:   No acute events overnight. Still not waking up, upward gaze noted. HD stable.         Medications:       acetaminophen  975 mg Oral Q8H     aspirin  162 mg Oral or NG Tube Daily     atorvastatin  80 mg Oral Daily     chlorhexidine  15 mL Mouth/Throat Q12H     furosemide  40 mg Intravenous Q8H     gabapentin  100 mg Oral TID      "heparin ANTICOAGULANT  5,000 Units Subcutaneous Q8H     lactated ringers  250 mL Intravenous Once     lactated ringers  250 mL Intravenous Once     metoprolol tartrate  12.5 mg Oral Q8H     mupirocin  0.5 g Both Nostrils BID     pantoprazole  40 mg Oral or NG Tube Daily    Or     pantoprazole  40 mg Oral Daily     perflutren diluted 1mL to 2mL with saline  9 mL Intravenous Once     polyethylene glycol  17 g Oral Daily     senna-docusate  1 tablet Oral BID     sodium chloride (PF)  10 mL Intravenous Once     sodium chloride (PF)  3 mL Intracatheter Q8H     ticagrelor  90 mg Oral BID             Physical Exam:   Vitals were reviewed  Blood pressure (Abnormal) 89/67, pulse 70, temperature 99.5  F (37.5  C), temperature source Bladder, resp. rate 14, height 1.651 m (5' 5\"), weight 96.9 kg (213 lb 10 oz), last menstrual period 02/27/2022, SpO2 97 %.  Rhythm: NSR    Lungs: CTA anterior, synchronous with ventilator, minimal vent support    Cardiovascular: S1, S2, RRR, distant, no m/r/g    Abdomen: S/ND, OG in place    Extremeties: + edema, no pitting, warm and well perfused    Incision: CDI    CT: to suction, no air leak, no crepitus    Weight:   Vitals:    03/27/22 1452 03/28/22 0500 03/29/22 0500 03/30/22 0600   Weight: 95.2 kg (209 lb 14.4 oz) 95.7 kg (211 lb) 95.8 kg (211 lb 3.2 oz) 95.7 kg (211 lb)    04/01/22 0430   Weight: 96.9 kg (213 lb 10 oz)            Data:   Labs:   Lab Results   Component Value Date    WBC 15.8 03/31/2022     Lab Results   Component Value Date    RBC 3.15 03/31/2022     Lab Results   Component Value Date    HGB 8.0 03/31/2022     Lab Results   Component Value Date    HCT 25.5 03/31/2022     No components found for: MCT  Lab Results   Component Value Date    MCV 81 03/31/2022     Lab Results   Component Value Date    MCH 25.4 03/31/2022     Lab Results   Component Value Date    MCHC 31.4 03/31/2022     Lab Results   Component Value Date    RDW 17.7 03/31/2022     Lab Results   Component Value " Date     03/31/2022       Last Basic Metabolic Panel:  Lab Results   Component Value Date     03/31/2022      Lab Results   Component Value Date    POTASSIUM 3.2 03/31/2022     Lab Results   Component Value Date    CHLORIDE 116 03/31/2022     Lab Results   Component Value Date    TOMAS 8.7 03/31/2022     Lab Results   Component Value Date    CO2 20 03/31/2022     Lab Results   Component Value Date    BUN 8 03/31/2022     Lab Results   Component Value Date    CR 0.81 03/31/2022     Lab Results   Component Value Date     03/31/2022     03/31/2022       Imaging:  Recent Results (from the past 24 hour(s))   CT Head w/o Contrast    Narrative    EXAM: CT HEAD W/O CONTRAST  LOCATION: [facility]  DATE/TIME: 3/31/2022 5:09 PM    INDICATION: Status post cardiac arrest. Cerebral hemorrhage suspected  COMPARISON: None.  TECHNIQUE: Routine CT Head without IV contrast. Multiplanar reformats. Dose reduction techniques were used.    FINDINGS:  INTRACRANIAL CONTENTS: No intracranial hemorrhage, extraaxial collection, or mass effect.  No definite CT findings of acute infarct. Minimal patchy nonspecific hypodensity in the periventricular cerebral white matter along the frontal horns of the   lateral ventricles on both sides, and also probably in the left frontal subcortical white matter, nonspecific. This may represent sequela of mild chronic small vessel ischemic changes. Normal ventricles and sulci. Atherosclerotic calcifications of the   carotid siphons bilaterally.    VISUALIZED ORBITS/SINUSES/MASTOIDS: No intraorbital abnormality. Moderate scattered paranasal sinus mucosal thickening, with small amount of layering fluid in the left maxillary and bilateral sphenoid sinuses. Small amount of fluid/membrane thickening in   the left greater than right mastoid air cells.    BONES/SOFT TISSUES: No acute abnormality of the calvarium or skull base identified. Partially visualized tube  entry into the left nasal cavity, incompletely evaluated. On the  images, the patient is intubated. There is stigmata of previous median   sternotomy and multiple wires/devices project over the thorax and neck, incompletely evaluated.      Impression    IMPRESSION:  1.  No definite CT findings of acute intracranial process. Specifically, no acute intracranial hemorrhage, extra-axial fluid collection, or mass effect.  2.  Mild patchy nonspecific hypodensity in the cerebral white matter, which may represent sequela of chronic small vessel ischemic change.  3.  Scattered mucosal thickening and layering fluid in the paranasal sinuses, as described.   CTA Head Neck with Contrast    Narrative    EXAM: CTA  HEAD NECK WITH CONTRAST  LOCATION: [facility]  DATE/TIME: 3/31/2022 5:09 PM    INDICATION: Stroke/TIA, assess intracranial arteries  COMPARISON: CT head of same date.  CONTRAST: 70mL Isovue 370  TECHNIQUE: Head and neck CT angiogram with IV contrast. Axial helical CT images of the head and neck vessels obtained during the arterial phase of intravenous contrast administration. Axial 2D reconstructed images and multiplanar 3D MIP reconstructed   images of the head and neck vessels were performed by the technologist. Dose reduction techniques were used. All stenosis measurements made according to NASCET criteria unless otherwise specified.    FINDINGS:     HEAD CTA:  ANTERIOR CIRCULATION: There is mild atherosclerotic stenosis of the paraclinoid left internal carotid artery. Otherwise, no stenosis of the intracranial internal carotid arteries. The major proximal branches of the anterior cerebral and middle cerebral   artery appear patent. No aneurysm or high flow vascular malformation identified. Fetal origin of the right posterior cerebral artery from the anterior circulation, a normal variant, without significant stenosis.    POSTERIOR CIRCULATION: No stenosis/occlusion, aneurysm, or high  flow vascular malformation. Dominant right and smaller left vertebral artery contribute to a normal basilar artery.     DURAL VENOUS SINUSES: Expected enhancement of the major dural venous sinuses.    NECK CTA:  RIGHT CAROTID: No measurable stenosis or dissection.    LEFT CAROTID: No measurable stenosis or dissection.    VERTEBRAL ARTERIES: No focal stenosis or dissection. Dominant right and smaller left vertebral arteries.    AORTIC ARCH: Classic aortic arch anatomy with no significant stenosis at the origin of the great vessels.    NONVASCULAR STRUCTURES: Stigmata of recent cardiac surgery noted. The patient is intubated, with endotracheal tube terminating in the mid thoracic trachea. An oral enteric tube is in place, at least extending into the mid thoracic esophagus. Partially   visualized right internal jugular vein approach central venous catheter extending into the superior vena cava, incompletely evaluated. Partially visualized catheter in the main/right pulmonary artery. Small bilateral pleural effusions are present. Small   amount of scattered presumed postsurgical air in the mediastinum and low neck region. Heterogeneous thyroid gland with a hypodense nodular lesion measuring up to 1.5 cm in the axial plane (series 5 image 47). Scattered mucosal thickening noted in the   paranasal sinuses.      Impression    IMPRESSION:     HEAD CTA:   1.  No high-grade stenosis or large vessel occlusion involving the major intracranial arteries.  2.  Mild atherosclerosis involving the left carotid siphon.    NECK CTA:  1.  Unremarkable CT angiogram of the neck.  2.  Partially visualized stigmata of recent cardiac surgery, as described.  3.  Heterogeneous thyroid gland with hypodense nodule in the left thyroid lobe measuring 1.5 cm in axial plane. Follow-up thyroid ultrasound would typically be recommended for further evaluation.        Rounded and discussed with Dr. Jc and Dr. Leonor Tenorio, APRN, [facility]-,  CCRN  Nurse Practitioner  Cardiothoracic Surgery  Pager: [phone]  [facility]

## 2022-04-01 NOTE — CONSULTS
"[facility]    Neuro Critical Care Consultation Note      REASON FOR CRITICAL CARE ADMISSION: Neurological Prognostication     Date of Service:  04/01/2022  Date of Admission:  3/27/2022  Hospital Day: 6    Problem List  Patient Active Problem List   Diagnosis     NSTEMI (non-ST elevated myocardial infarction) (H)     Hypertensive emergency     Paroxysmal ventricular tachycardia (H)       Assessment/Plan  51 year old female with pertinent PMHx of prior MI at age 24-25 (in 1996) s/p angioplasty, HTN, anemia, and NSTEMI.  She presented on 3/27 with new onset chest pain similar to her previous MI.  Workup was consistent with NSTEMI.  Cardiac cath demonstrated severe multivessel CAD.  She was seen by CT surgery and CABG was scheduled for 3/30. She underwent CABG x4 left radial, LIMA, left saph vein x2 (FAUSTINO taken down, but elected to not be used due to size). Post Op had Cardiac Arrest s/p resuscitation patient underwent Cardiac cath for stent placement. Neurology was consulted for prognostication post-arrest.     Recommendations:  Neuro prognostication.   - Stat CT/CTA --> Reviewed - no abnormalities  - cvEEG overnight (if unable to hook up overnight then Ceribell Device may be used)  - Keep off sedation  - Continue to monitor for signs of clinical improvement.       Thank you for this consult.     The Stroke Staff is Dr. Chawla.    Manjeet Freeman MD  Vascular Neurology Fellow  To page me or covering stroke neurology team member, click here: AMCOM   Choose \"On Call\" tab at top, then search dropdown box for \"Neurology Adult\", select location, press Enter, then look for stroke/neuro ICU/telestroke.               History of Present Illness:  Angelica Robledo is a 51 year old female with pertinent PMHx of prior MI at age 24-25 (in 1996) s/p angioplasty, HTN, anemia, and NSTEMI.  She presented on 3/27 with new onset chest pain similar to her previous MI.  Workup was consistent with NSTEMI.  Cardiac " cath demonstrated severe multivessel CAD.  She was seen by CT surgery and CABG was scheduled for 3/30.  She underwent CABG x4 left radial, LIMA, left saph vein x2 (FAUSTINO taken down, but elected to not be used due to size).  No family history of clotting disorders.  No known history of DVT/PE/stroke. Siblings without cardiovascular disease. Father passed away of cardiac causes at an advanced age.  Mother had cardiac disease as well as an aortic aneurysm, but not at a young age.    4V CABG as follows: left radial artery graft to the right posterior descending coronary artery, reversed saphenous vein graft to the obtuse marginal branch of the left circumflex coronary artery, reversed saphenous vein graft to the first diagonal branch of the left anterior descending coronary artery, and pedicled left internal mammary artery to left anterior descending coronary.   POD 0 - initial hypoxia resolved with vent changes.  At 2355 patient suffered a PEA and Vfib arrest and underwent 3 shocks, 2 rounds of epi, and 1 round of CPR. ROSC obtained at 0005.  300 mg amiodarone bolus administered.  STAT cardiac cath lab performed demonstrating LIMA-LAD, VG-LADD1, and VG-LPLB were patent. The left radial artery to RDPA graft had an occlusion.  The mid RCA was treated with a DANA stent and [facility]  Excellent flow was noted on completion coronary angiogram.  Echo demonstrated a drop in LVEF to 40-45% with aspects of hypokinesis. RV demonstrated mild-moderate RV function reduction.    Neurocritical Care Team was consulted for Prognostication post-cardiac arrest. Patient was not cooled due to rapid ROSC achieved.            Clinically Significant Risk Factors Present on Admission                  Past Medical History   Past Medical History:   Diagnosis Date     Bilateral external ear infections     frequent infections as child     H/O lithotripsy      Kidney stone      Myocardial infarction (H) 1996      Past Surgical History   Past Surgical History:   Procedure Laterality Date     ANGIOPLASTY       BYPASS GRAFT ARTERY CORONARY N/A 3/30/2022    Procedure: CORONARY ARTERY BYPASS GRAFT X4, Endoscopic left radial artery harvest , endoscopic left leg vein harvest.TARGETS: LAD, LPL, RAMUS,PDA  ON CBP, WITH YAMEL READ BY ANESTHESIOLOGIST.;  Surgeon: Mark Butterfield MD;  Location:  OR      SECTION      ,      CV CORONARY ANGIOGRAM N/A 3/28/2022    Procedure: Coronary Angiogram;  Surgeon: Sylvia Cruz MD;  Location:  HEART CARDIAC CATH LAB     CV CORONARY ANGIOGRAM N/A 3/31/2022    Procedure: Coronary Angiogram;  Surgeon: Mayank Skinner MD;  Location:  HEART CARDIAC CATH LAB     CV INTRAVASULAR ULTRASOUND N/A 3/31/2022    Procedure: Intravascular Ultrasound;  Surgeon: Mayank Skinner MD;  Location:  HEART CARDIAC CATH LAB     CV LEFT HEART CATH N/A 3/28/2022    Procedure: Left Heart Catheterization;  Surgeon: Sylvia Cruz MD;  Location:  HEART CARDIAC CATH LAB     CV PCI N/A 3/31/2022    Procedure: Percutaneous Coronary Intervention;  Surgeon: Mayank Skinner MD;  Location:  HEART CARDIAC CATH LAB     MYRINGOTOMY, INSERT TUBE BILATERAL, COMBINED       ORTHOPEDIC SURGERY Left     left ankle          Allergies   Allergen Reactions     Amoxicillin Rash     Medications   Home Meds  Prior to Admission medications    Medication Sig Start Date End Date Taking? Authorizing Provider   aspirin (ASA) 81 MG EC tablet Take 1 tablet (81 mg) by mouth daily Start tomorrow. 22  Yes Lamberto Alarcon MD   aspirin-acetaminophen-caffeine (EXCEDRIN MIGRAINE) 250-250-65 MG tablet Take 1 tablet by mouth every 6 hours as needed for headaches   Yes Reported, Patient   atorvastatin (LIPITOR) 80 MG tablet Take 1 tablet (80 mg) by mouth daily 3/31/22  Yes Lamberto Alarcon MD   Multiple Vitamin (MULTIVITAMIN ADULT PO)    Yes Reported, Patient       Scheduled Meds    acetaminophen  975  mg Oral Q8H     [START ON 4/8/2022] amiodarone  200 mg Oral Daily     amiodarone  400 mg Oral BID     amLODIPine  2.5 mg Oral Daily     aspirin  81 mg Oral or NG Tube Daily     atorvastatin  80 mg Oral Daily     chlorhexidine  15 mL Mouth/Throat Q12H     furosemide  40 mg Intravenous Q8H     gabapentin  100 mg Oral TID     heparin ANTICOAGULANT  5,000 Units Subcutaneous Q8H     lactated ringers  250 mL Intravenous Once     lactated ringers  250 mL Intravenous Once     metoprolol tartrate  12.5 mg Oral Q8H     multivitamins w/minerals  15 mL Per Feeding Tube Daily     mupirocin  0.5 g Both Nostrils BID     pantoprazole  40 mg Oral or NG Tube Daily    Or     pantoprazole  40 mg Oral Daily     perflutren diluted 1mL to 2mL with saline  9 mL Intravenous Once     polyethylene glycol  17 g Oral Daily     senna-docusate  1 tablet Oral BID     sodium chloride (PF)  10 mL Intravenous Once     sodium chloride (PF)  3 mL Intracatheter Q8H     ticagrelor  90 mg Oral BID       Infusion Meds    dexmedetomidine Stopped (03/30/22 1611)     dextrose 5% and 0.45% NaCl + KCl 20 mEq/L 30 mL/hr at 04/01/22 0441     EPINEPHrine Stopped (03/31/22 1030)     insulin regular 0.5 Units/hr (04/01/22 1111)     - MEDICATION INSTRUCTIONS -       propofol (DIPRIVAN) infusion Stopped (03/31/22 1000)    And     - MEDICATION INSTRUCTIONS -       niCARdipine Stopped (04/01/22 1038)     nitroGLYcerin       norepinephrine Stopped (03/30/22 1612)     Percutaneous Coronary Intervention orders placed (this is information for BPA alerting)       phenylephrine       BETA BLOCKER NOT PRESCRIBED       sodium chloride Stopped (04/01/22 1200)       PRN Meds  acetaminophen **OR** acetaminophen, bisacodyl, calcium gluconate, calcium gluconate, calcium gluconate, glucose **OR** dextrose **OR** glucagon, fentaNYL, hydrALAZINE, HYDROmorphone **OR** HYDROmorphone, ipratropium - albuterol 0.5 mg/2.5 mg/3 mL, lidocaine 4%, lidocaine (buffered or not buffered), magnesium  hydroxide, - MEDICATION INSTRUCTIONS -, propofol (DIPRIVAN) infusion **AND** propofol **AND** CK total **AND** Triglycerides **AND** - MEDICATION INSTRUCTIONS - **AND** Notify Physician, melatonin, methocarbamol, naloxone **OR** naloxone **OR** naloxone **OR** naloxone, nitroGLYcerin, ondansetron **OR** ondansetron, oxyCODONE **OR** oxyCODONE, Percutaneous Coronary Intervention orders placed (this is information for BPA alerting), phenylephrine, BETA BLOCKER NOT PRESCRIBED, sodium chloride (PF)    Allergies   Allergies   Allergen Reactions     Amoxicillin Rash     Family History   History reviewed. No pertinent family history.  Social History   Social History     Tobacco Use     Smoking status: Never Smoker     Smokeless tobacco: Never Used   Substance Use Topics     Alcohol use: Yes     Comment: socially     Drug use: Never       Review of Systems   The 5 point Review of Systems is negative other than noted in the HPI or here.        PHYSICAL EXAMINATION  Temp:  [98.8  F (37.1  C)-100.2  F (37.9  C)] 99.1  F (37.3  C)  Pulse:  [61-80] 65  Resp:  [12-42] 16  MAP:  [68 mmHg-94 mmHg] 81 mmHg  Arterial Line BP: ()/(50-85) 120/60  FiO2 (%):  [40 %] 40 %  SpO2:  [90 %-99 %] 95 %     24 Hour Vital Signs Summary:  Vitals:    04/01/22 1145 04/01/22 1200 04/01/22 1215 04/01/22 1230   BP:       BP Location:       Pulse: 63 65 65 65   Resp: 15 17 16 16   Temp: 99.3  F (37.4  C) 99.3  F (37.4  C) 99.3  F (37.4  C) 99.1  F (37.3  C)   TempSrc:  Bladder     SpO2: 98% 97% 99% 95%   Weight:       Height:            Respiratory monitoring:   Vent Mode: CMV/AC  (Continuous Mandatory Ventilation/ Assist Control)  FiO2 (%): 40 %  Resp Rate (Set): 14 breaths/min  Tidal Volume (Set, mL): 450 mL  PEEP (cm H2O): 5 cmH2O  Resp: 16        Intake/Output Summary (Last 24 hours) at 4/1/2022 1257  Last data filed at 4/1/2022 1200  Gross per 24 hour   Intake 3238.47 ml   Output 1850 ml   Net 1388.47 ml         Physical Examination:  Vitals:  "BP (!) 89/67   Pulse 65   Temp 99.1  F (37.3  C)   Resp 16   Ht 1.651 m (5' 5\")   Wt 96.9 kg (213 lb 10 oz)   LMP 02/27/2022   SpO2 95%   BMI 35.55 kg/m        Neuroexam  Patient is Intubated    Off sedation for 2 hours  Does not open eye to voice or to Opens eyes to noxious stimulus  Eyes were open at baseline with a forced gaze deviation to left upper corner  Does not follow command    Pupils: 5mm round and reactive to light and equal bilaterally  Oculocephalics - intact  Corneal Reflex - intact  Grimace: Does not grimace to pain  Cough: intact  Gag: intact    Withdrawal to Painful stimulus:   RUE: No withdrawal  LUE: No withdrawal    RLE: No withdrawal  LLE: Triple flexion    Plantar Reflexes: mute on the right, upgoing on the left.     Labs/Studies:  Lab Results Data   CBC  Recent Labs   Lab 04/01/22  0416 03/31/22  2341 03/31/22  1243 03/31/22  0604   WBC 20.3*  --  17.8* 15.8*   RBC 3.21*  --  3.26* 3.15*   HGB 8.0* 8.2* 8.2* 8.0*   HCT 26.2*  --  25.9* 25.5*     --  190 183     Basic Metabolic Panel    Recent Labs   Lab 04/01/22  1213 04/01/22  1110 04/01/22  1000 04/01/22  0419 04/01/22  0416 03/31/22  2158 03/31/22  2108 03/31/22  1420 03/31/22  1243 03/31/22  0820 03/31/22  0604   NA  --   --   --   --  146*  --   --   --  144  --  145*   POTASSIUM  --   --   --   --  3.7  --  3.8  --  3.4  --  3.2*   CHLORIDE  --   --   --   --  118*  --   --   --  115*  --  116*   CO2  --   --   --   --  23  --   --   --  22  --  20   BUN  --   --   --   --  11  --   --   --  9  --  8   CR  --   --   --   --  0.79  --   --   --  0.73  --  0.81   * 105* 145*   < > 151*   < >  --    < > 178*   < > 210*   TOMAS  --   --   --   --  8.5  --   --   --  8.8  --  8.7    < > = values in this interval not displayed.     Liver Panel  Recent Labs   Lab 04/01/22  0416 03/31/22  1243 03/31/22  0604   PROTTOTAL 5.5* 5.6* 5.2*   ALBUMIN 2.3* 2.4* 2.4*   BILITOTAL 0.4 0.4 0.4   ALKPHOS 65 62 47   AST 47* 89* 107* "   ALT 50 59* 59*     INR    Recent Labs   Lab Test 03/31/22  0022 03/30/22  1622 03/30/22  1445   INR 1.37* 1.24* 1.44*      Lipid Profile    Recent Labs   Lab Test 03/27/22  1028   CHOL 172   HDL 57   *   TRIG 47     A1C    Recent Labs   Lab Test 03/28/22  0600   A1C 5.7*     Troponin I  No results for input(s): TROPI in the last 168 hours.       Imaging:  Results for orders placed or performed during the hospital encounter of 03/27/22   US Carotid Bilateral    Impression    IMPRESSION:  1.  Right carotid: Mild plaque formation, velocities consistent with less than 50% stenosis in the right internal carotid artery.  2.  Left carotid: Moderate plaque formation, velocities consistent with 50-69% stenosis in the left internal carotid artery.  3.  Flow within the vertebral arteries is antegrade.   US Lower Extremity Venous Mapping Bilateral    Impression    IMPRESSION:   The great saphenous veins are patent and compressible bilaterally. Venous diameters as described above.     XR Chest 2 Views    Impression    IMPRESSION: No acute cardiopulmonary disease. Minor bibasilar atelectasis.   US Upper Extremity Arterial Duplex Left    Impression    IMPRESSION:   1.  The left radial artery is widely patent; diameters as described above.   XR Abdomen Port 1 View    Impression    IMPRESSION: Tip of the nasogastric tube and proximal sidehole are  projected over the stomach. There are multiple chest drains in place.  Nonobstructive gas pattern.    MARCELLUS GODINEZ MD         SYSTEM ID:  [id number]   XR Chest Port 1 View    Impression    IMPRESSION:   New median sternotomy wires. The tip of the endotracheal tube is 4.4  cm above the mook. Nasogastric tube continues below the left  hemidiaphragm. There is a pulmonary arterial catheter with the tip  projected over the distal main pulmonary artery. Left basilar chest  tube in place. No pneumothorax or pleural effusion on either side. No  significant airspace  consolidation.    MARCELLUS GODINEZ MD         SYSTEM ID:  [id number]   XR Chest Port 1 View    Impression    IMPRESSION: Right heart catheter, bilateral chest tubes, mediastinal drain, and endotracheal tube remain in place. Nasogastric tube appears to have been removed in the interval. Decreasing left-sided discoid atelectasis. No pneumothorax or CHF. No acute   bony abnormality.   XR Abdomen Port 1 View    Impression    IMPRESSION:New OG tube in the stomach. Bowel gas pattern is  nonobstructed.    JEANNETTE COOPER MD         SYSTEM ID:  [id number]   CT Head w/o Contrast    Impression    IMPRESSION:  1.  No definite CT findings of acute intracranial process. Specifically, no acute intracranial hemorrhage, extra-axial fluid collection, or mass effect.  2.  Mild patchy nonspecific hypodensity in the cerebral white matter, which may represent sequela of chronic small vessel ischemic change.  3.  Scattered mucosal thickening and layering fluid in the paranasal sinuses, as described.   CTA Head Neck with Contrast    Impression    IMPRESSION:     HEAD CTA:   1.  No high-grade stenosis or large vessel occlusion involving the major intracranial arteries.  2.  Mild atherosclerosis involving the left carotid siphon.    NECK CTA:  1.  Unremarkable CT angiogram of the neck.  2.  Partially visualized stigmata of recent cardiac surgery, as described.  3.  Heterogeneous thyroid gland with hypodense nodule in the left thyroid lobe measuring 1.5 cm in axial plane. Follow-up thyroid ultrasound would typically be recommended for further evaluation.   XR Chest Port 1 View    Impression    IMPRESSION: Endotracheal tube tip is about 5.7 cm above the mook. NG  tube coursing into the stomach, tip outside the field of view. Right  IJ [place] catheter tip in the right pulmonary artery, unchanged.  Mediastinal drains and bilateral basilar chest tubes in place.  Increased small left pleural effusion. Stable linear atelectasis in  the left  midlung and bibasilar retrocardiac atelectasis. No  pneumothorax. Normal heart size. Epicardial pacing wires.    RANDELL ROSE MD         SYSTEM ID:  [id number]   Echocardiogram Limited   Result Value Ref Range    LVEF  50-55%    Echo Limited   Result Value Ref Range    LVEF  40-45%

## 2022-04-01 NOTE — PROGRESS NOTES
[facility] ICU RESPIRATORY NOTE           Date of Admission: 3/27/2022     Date of Intubation (most recent): 3/30/22     Reason for Mechanical Ventilation: Post op CABG     Number of Days on Mechanical Ventilation: 3     Reason for No Spontaneous Breathing Trial: Per MD     Significant Events Today:NONE  Recent Labs   Lab 04/01/22  0416 03/31/22  2341 03/31/22  2115 03/31/22  1605 03/31/22  1244 03/31/22  0603 03/30/22  1714 03/30/22  1708   PH  --   --  7.45  --  7.53* 7.49*  --  7.44   PCO2  --   --  31*  --  27* 27*  --  31*   PO2  --   --  117*  --  92 143*  --  87   HCO3  --   --  21  --  22 21  --  21   O2PER 40 40 40 40 30 30   < > 80    < > = values in this interval not displayed.   Vent Mode: CMV/AC  (Continuous Mandatory Ventilation/ Assist Control)  FiO2 (%): 40 %  Resp Rate (Set): 14 breaths/min  Tidal Volume (Set, mL): 450 mL  PEEP (cm H2O): 5 cmH2O  Resp: 14

## 2022-04-01 NOTE — PROGRESS NOTES
[facility] ICU RESPIRATORY NOTE        Date of Admission: 3/27/2022    Date of Intubation (most recent): 3/30/2022    Reason for Mechanical Ventilation: Post op CABG    Number of Days on Mechanical Ventilation: 3    Met Criteria for Spontaneous Breathing Trial: No    Reason for No Spontaneous Breathing Trial: Per MD    Significant Events Today: None    ABG Results:   Recent Labs   Lab 04/01/22  0416 03/31/22  2341 03/31/22  2115 03/31/22  1605 03/31/22  1244 03/31/22  0603 03/30/22  1714 03/30/22  1708   PH  --   --  7.45  --  7.53* 7.49*  --  7.44   PCO2  --   --  31*  --  27* 27*  --  31*   PO2  --   --  117*  --  92 143*  --  87   HCO3  --   --  21  --  22 21  --  21   O2PER 40 40 40 40 30 30   < > 80    < > = values in this interval not displayed.       Current Vent Settings: Vent Mode: CMV/AC  (Continuous Mandatory Ventilation/ Assist Control)  FiO2 (%): 40 %  Resp Rate (Set): 14 breaths/min  Tidal Volume (Set, mL): 450 mL  PEEP (cm H2O): 5 cmH2O  Resp: 16    Continue full ventilatory support      Jaxon Dawson, RT

## 2022-04-01 NOTE — CONSULTS
[facility]    Hematology/Oncology Consult Note      Date of Admission:  3/27/2022  Date of Consult:  04/01/22  Reason for Consult: Concern for hypercoagulable state      ASSESSMENT/PLAN:  Angelica Robledo is a 51 year old female with past medical history outlined as per HPI more recently had a radial artery occlusion post catheterization, concern for a for hypercoagulable state    She has no family history of hypercoagulability will order hypercoagulable work-up including lupus anticoagulant testing with cardiolipid antibody and beta2 glycoprotein antibody.  We will also order Antithrombin III levels factor II and factor V Leiden mutation.  I am not sure if radial artery thrombosis could be a complication of the procedure itself?  Would not suggest platelet function studies at this time as they would be inaccurate in this setting   We will continue to follow along . Continue heparin and antiplatelet therapy     1.  Hypercoagulable state: Recommendations as per above, will await hypercoagulable wor kup.  We will continue to follow along.  Continue heparin   2 NSTEMI: s/p cabg mgmt per  CT surgery.   3 encephalopathy s/p cardiac arrest: neurology evaluating    Dr Vidal will follow up on the labs and round on the weekend. . Thank you for the consult        HISTORY OF PRESENT ILLNESS: Angelica Robledo is a 51 year with a past medical history significant for prior MI at age 25 1996 status post angioplasty, hypertension, anemia, non-ST elevation MI.  She presented on 3/27 with new onset chest pain work-up was consistent with non-ST elevation MI.  Cardiac catheterization was demonstrated which showed severe multivessel coronary artery disease.  She was seen by CT surgery and CABG was scheduled for 3/30/2022.  She underwent CABG with left radial, LIMA, left saphenous vein x2 (FAUSTINO taken down but elected to not be used due to size).  She does not have any family history of clotting disorders.  No family  history of DVT. She is a non smoker     On postoperative day 0 post CABG she had a PEA.  Emergent coronary cath demonstrated radial artery occlusion leading to V fib s/p  Drug-eluting stent was completed into the RCA and balloon angioplasty was performed with excellent completion of angiogram flow patient is on aspirin and brinlinta. .  Patient is also currently on heparin.  She has been off sedation and  Not responsive with upward gaze.  Family by bedside including her  and her mother.  Neurology has seen the patient in consultation pending.  MRI of the brain pending . I talked with the cardiothoracic PA and there is not DOCUMENTATION OF Radial     REVIEW OF SYSTEMS:   14 point ROS was reviewed and is negative other than as noted above in HPI.       MEDICATIONS:  Current Facility-Administered Medications   Medication     acetaminophen (TYLENOL) tablet 650 mg    Or     acetaminophen (TYLENOL) Suppository 650 mg     acetaminophen (TYLENOL) tablet 975 mg     [START ON 4/8/2022] amiodarone (PACERONE) tablet 200 mg     amiodarone (PACERONE) tablet 400 mg     amLODIPine (NORVASC) tablet 2.5 mg     aspirin (ASA) chewable tablet 81 mg     atorvastatin (LIPITOR) tablet 80 mg     bisacodyl (DULCOLAX) Suppository 10 mg     calcium gluconate 1 g in 50 mL sodium chloride intermittent infusion (premix)     calcium gluconate 2 g in 100 mL sodium chloride intermittent infusion (premix)     calcium gluconate 3 g in sodium chloride 0.9 % 100 mL intermittent infusion     chlorhexidine (PERIDEX) 0.12 % solution 15 mL     dexmedetomidine (PRECEDEX) 400 mcg in 0.9% sodium chloride 100 mL     dextrose 5% and 0.45% NaCl + KCl 20 mEq/L infusion     glucose gel 15-30 g    Or     dextrose 50 % injection 25-50 mL    Or     glucagon injection 1 mg     EPINEPHrine (ADRENALIN) 5 mg in  mL infusion     fentaNYL (PF) (SUBLIMAZE) injection 25-50 mcg     furosemide (LASIX) injection 40 mg     gabapentin (NEURONTIN) capsule 100 mg      heparin ANTICOAGULANT injection 5,000 Units     hydrALAZINE (APRESOLINE) injection 10-20 mg     HYDROmorphone (DILAUDID) injection 0.2 mg    Or     HYDROmorphone (DILAUDID) injection 0.4 mg     insulin 1 unit/mL in saline (NovoLIN, HumuLIN Regular) drip - ADULT IV Infusion     ipratropium - albuterol 0.5 mg/2.5 mg/3 mL (DUONEB) neb solution 3 mL     lactated ringers BOLUS 250 mL     lactated ringers BOLUS 250 mL     lidocaine (LMX4) cream     lidocaine 1 % 0.1-1 mL     magnesium hydroxide (MILK OF MAGNESIA) suspension 30 mL     Medication Considerations - To maintain platelet inhibition after discontinuation of cangrelor (KENGREAL) [see admin instructions]     propofol (DIPRIVAN) infusion    And     propofol (DIPRIVAN) bolus from infusion pump 10-20 mg    And     Medication Instruction     melatonin tablet 1 mg     methocarbamol (ROBAXIN) tablet 750 mg     metoprolol tartrate (LOPRESSOR) half-tab 12.5 mg     multivitamins w/minerals liquid 15 mL     mupirocin (BACTROBAN) 2 % ointment 0.5 g     naloxone (NARCAN) injection 0.2 mg    Or     naloxone (NARCAN) injection 0.4 mg    Or     naloxone (NARCAN) injection 0.2 mg    Or     naloxone (NARCAN) injection 0.4 mg     niCARdipine 40 mg in 200 mL NS (CARDENE) infusion     nitroGLYcerin 50 mg in D5W 250 mL (adult std) infusion CENTRAL     norepinephrine (LEVOPHED) 16 mg in  mL infusion MAX CONC CENTRAL LINE     ondansetron (ZOFRAN-ODT) ODT tab 4 mg    Or     ondansetron (ZOFRAN) injection 4 mg     oxyCODONE (ROXICODONE) tablet 5 mg    Or     oxyCODONE (ROXICODONE) tablet 10 mg     pantoprazole (PROTONIX) 2 mg/mL suspension 40 mg    Or     pantoprazole (PROTONIX) EC tablet 40 mg     Percutaneous Coronary Intervention orders placed (this is information for BPA alerting)     perflutren diluted 1mL to 2mL with saline (OPTISON) diluted injection 9 mL     phenylephrine (CYNDEE-SYNEPHRINE) 50 mg in NaCl 0.9 % 250 mL infusion     polyethylene glycol (MIRALAX) Packet 17 g      Reason beta blocker order not selected     senna-docusate (SENOKOT-S/PERICOLACE) 8.6-50 MG per tablet 1 tablet     sodium chloride (PF) 0.9% PF flush 10 mL     sodium chloride (PF) 0.9% PF flush 3 mL     sodium chloride (PF) 0.9% PF flush 3 mL     sodium chloride 0.9% infusion     ticagrelor (BRILINTA) tablet 90 mg         ALLERGIES:  Allergies   Allergen Reactions     Amoxicillin Rash         PAST MEDICAL HISTORY:  Past Medical History:   Diagnosis Date     Bilateral external ear infections     frequent infections as child     H/O lithotripsy      Kidney stone      Myocardial infarction (H)          PAST SURGICAL HISTORY:  Past Surgical History:   Procedure Laterality Date     ANGIOPLASTY       BYPASS GRAFT ARTERY CORONARY N/A 3/30/2022    Procedure: CORONARY ARTERY BYPASS GRAFT X4, Endoscopic left radial artery harvest , endoscopic left leg vein harvest.TARGETS: LAD, LPL, RAMUS,PDA  ON CBP, WITH YAMEL READ BY ANESTHESIOLOGIST.;  Surgeon: Mark Butterfield MD;  Location:  OR      SECTION      2003     CV CORONARY ANGIOGRAM N/A 3/28/2022    Procedure: Coronary Angiogram;  Surgeon: Sylvia Cruz MD;  Location:  HEART CARDIAC CATH LAB     CV CORONARY ANGIOGRAM N/A 3/31/2022    Procedure: Coronary Angiogram;  Surgeon: Mayank Skinner MD;  Location:  HEART CARDIAC CATH LAB     CV INTRAVASULAR ULTRASOUND N/A 3/31/2022    Procedure: Intravascular Ultrasound;  Surgeon: Mayank Skinner MD;  Location:  HEART CARDIAC CATH LAB     CV LEFT HEART CATH N/A 3/28/2022    Procedure: Left Heart Catheterization;  Surgeon: Sylvia Cruz MD;  Location:  HEART CARDIAC CATH LAB     CV PCI N/A 3/31/2022    Procedure: Percutaneous Coronary Intervention;  Surgeon: Mayank Skinner MD;  Location:  HEART CARDIAC CATH LAB     MYRINGOTOMY, INSERT TUBE BILATERAL, COMBINED       ORTHOPEDIC SURGERY Left 1995    left ankle         SOCIAL HISTORY:  Social History     Socioeconomic History      "Marital status:      Spouse name: Not on file     Number of children: Not on file     Years of education: Not on file     Highest education level: Not on file   Occupational History     Not on file   Tobacco Use     Smoking status: Never Smoker     Smokeless tobacco: Never Used   Substance and Sexual Activity     Alcohol use: Yes     Comment: socially     Drug use: Never     Sexual activity: Yes     Partners: Male     Birth control/protection: None   Other Topics Concern     Not on file   Social History Narrative     Not on file     Social Determinants of Health     Financial Resource Strain: Not on file   Food Insecurity: Not on file   Transportation Needs: Not on file   Physical Activity: Not on file   Stress: Not on file   Social Connections: Not on file   Intimate Partner Violence: Not on file   Housing Stability: Not on file     Gynecologic history .  No history of recurrent miscarriages FAMILY HISTORY:  History reviewed. No pertinent family history.  No family history of recurrent miscarriages, early strokes or hypercoagulable states.      PHYSICAL EXAM:  Vital signs:  Temp: 99.1  F (37.3  C) Temp src: Bladder  Pulse: 65   Resp: 16 SpO2: 95 % O2 Device: Mechanical Ventilator Oxygen Delivery: 2 LPM Height: 165.1 cm (5' 5\") Weight: 96.9 kg (213 lb 10 oz)  Estimated body mass index is 35.55 kg/m  as calculated from the following:    Height as of this encounter: 1.651 m (5' 5\").    Weight as of this encounter: 96.9 kg (213 lb 10 oz).    ECOG: Unable to assess patient is intubated GENERAL/CONSTITUTIONAL: No acute distress.  EYES: Upward gaze  ENT/MOUTH: Neck supple. Oropharynx clear, no mucositis.  LYMPH: No anterior cervical, posterior cervical, supraclavicular, axillary or inguinal adenopathy.   RESPIRATORY: Clear to auscultation bilaterally. No crackles or wheezing.   CARDIOVASCULAR: Regular rate and rhythm without murmurs, gallops, or rubs.  GASTROINTESTINAL: No hepatosplenomegaly, masses, or " tenderness. The patient has normal bowel sounds. No guarding.  No distention.  MUSCULOSKELETAL: Warm and well-perfused, no cyanosis, clubbing, or edema.  NEUROLOGIC: Unable to assess as patient is not responsive  INTEGUMENTARY: No rashes or jaundice.         LABS:  CBC RESULTS:   Recent Labs   Lab Test 04/01/22 0416   WBC 20.3*   RBC 3.21*   HGB 8.0*   HCT 26.2*   MCV 82   MCH 24.9*   MCHC 30.5*   RDW 18.1*          Recent Labs   Lab Test 04/01/22  1213 04/01/22  1110 04/01/22  0419 04/01/22  0416 03/31/22  2158 03/31/22  2108 03/31/22  1420 03/31/22  1243   NA  --   --   --  146*  --   --   --  144   POTASSIUM  --   --   --  3.7  --  3.8  --  3.4   CHLORIDE  --   --   --  118*  --   --   --  115*   CO2  --   --   --  23  --   --   --  22   ANIONGAP  --   --   --  5  --   --   --  7   * 105*   < > 151*   < >  --    < > 178*   BUN  --   --   --  11  --   --   --  9   CR  --   --   --  0.79  --   --   --  0.73   TOMAS  --   --   --  8.5  --   --   --  8.8    < > = values in this interval not displayed.         PATHOLOGY:  N/A    IMAGING:    CT angiogram of the head reviewed shows clinically non relevant findings.   HEAD CTA:   1.  No high-grade stenosis or large vessel occlusion involving the major intracranial arteries.  2.  Mild atherosclerosis involving the left carotid siphon.     NECK CTA:  1.  Unremarkable CT angiogram of the neck.  2.  Partially visualized stigmata of recent cardiac surgery, as described.  3.  Heterogeneous thyroid gland with hypodense nodule in the left thyroid lobe measuring 1.5 cm in axial plane. Follow-up thyroid ultrasound would typically be recommended for further evaluation.  Thank you for the opportunity to participate in this patient's care.  Please call with any questions.    Tin Salinas MD  Hematology/Oncology  [facility]

## 2022-04-01 NOTE — PROGRESS NOTES
Pt S/P CAB x4, post op VF arrest, cardiac cath with stent placement    NEURO: minimal responsiveness with propofol off x 15 hours, opens eyes gaze upward, attempted to correct gaze to husbands voice but does not track. PERRLA 5-6mm brisk, cough, no gag, does not withdraw to pain  CT/CTA per neuro critical care updated with above information Cerebell EEG initiated     CV: remains AV paced via epicardial wires at rate of 80 dropped from 100. Underlying SR with 1 AVB rate 60s, BP unchanged with intrinsic rhythm. On nicardipine for BP control, PA cath calculations remain stable, SvO2 40s CVS team and Cardiology teams aware. Edematous, afebrile    RESP titrating FiO2 and PEEP, suctioned of moderate tan creamy bronchial secretions, ICU team updated, small amount of crepitus upper chest, CT x 3 no airleak -20mmHg thinning to serosang drainage, ABGs stable    GI tolerating meds via OG with minimal residuals, rare BS, abd soft      nathan catheter with adequate UOP    INTEG: rhino rockets in bilateral nares to remain in place until 4/1, right nare device was displaced without any obvious bleeding See flowsheet for incision details    SOCIAL family updated at bedside and on phone by writer, emotional support and resources provided

## 2022-04-01 NOTE — PLAN OF CARE
Pt remains vented, no sedation.  Opens eyes spontaneously and to voice when eyes closed but upward gaze continues.  Withdrawas BLEs slightly to pain; no withdrawal of UEs.  No spontaneous movement noted. Continuous EEG in place; MRI ordered.  PO antihypertensives started; nicardipine weaned off.  Amio gtt changed to PO.  SR w/ HR in 60s; pacer wires pulled.  Vasal/vagal episode x 1 w/coughing/biting ETT.  HR to mid 30s but self-limiting and returned to 60s. Hematology consulted; multiple labs sent.   & mother updated at bedside by RN & MDs.      Goal Outcome Evaluation:    Plan of Care Reviewed With: spouse, mother     Overall Patient Progress: no change

## 2022-04-01 NOTE — PROGRESS NOTES
EEG CLINICAL NEUROPHYSIOLOGY PRELIMINARY REPORT    Limited channel ambulatory EEG completed earlier this morning reviewed.  Findings are Severely abnormal.      1) generalized periodic discharges waxed and waned throughout the study.  At other times a discontinuous EEG pattern is seen.  These findings are consistent with a severe diffuse encephalopathy.  Findings are more extensive than would be expected given doses of sedative drips employed.  2) generalized periodic discharges waxed and wane.  These do not meet excepted criteria for nonconvulsive status epilepticus.  They likely represent a manifestation of the patient's severe diffuse encephalopathy.     Results are tentative because only ten channels recording from fronto temporal regions were utilized so no comments can be made regarding electrocerebral activity emerging from other areas of the brain. Furthermore video or technician observations were not available so distinction between artefact and cerebral activity was not always secure. It should further be noted that this technology is not meant for longer-term recording.. Longer recording with video and standard complement of electrodes could be pursued if clinically indicated.    Full report in chart.    Trey Sutherland MD  Contact information for physicians covering Epilepsy and EEG is available on [facility].  Click search, enter neurology adult/[facility] in group name box, click on neurology adult/[facility], then click Staff Epilepsy and EEG.

## 2022-04-02 ENCOUNTER — HOSPITAL ENCOUNTER (INPATIENT)
Dept: NEUROLOGY | Facility: CLINIC | Age: 52
Discharge: HOME | End: 2022-04-02
Attending: NURSE PRACTITIONER
Payer: COMMERCIAL

## 2022-04-02 LAB
ALBUMIN SERPL-MCNC: 2.2 G/DL (ref 3.4–5)
ALP SERPL-CCNC: 72 U/L (ref 40–150)
ALT SERPL W P-5'-P-CCNC: 45 U/L (ref 0–50)
ANION GAP SERPL CALCULATED.3IONS-SCNC: 5 MMOL/L (ref 3–14)
AST SERPL W P-5'-P-CCNC: 27 U/L (ref 0–45)
AT III ACT/NOR PPP CHRO: 68 % (ref 85–135)
BILIRUB SERPL-MCNC: 0.2 MG/DL (ref 0.2–1.3)
BUN SERPL-MCNC: 18 MG/DL (ref 7–30)
CA-I BLD-MCNC: 4.9 MG/DL (ref 4.4–5.2)
CALCIUM SERPL-MCNC: 8.8 MG/DL (ref 8.5–10.1)
CHLORIDE BLD-SCNC: 115 MMOL/L (ref 94–109)
CO2 SERPL-SCNC: 25 MMOL/L (ref 20–32)
CREAT SERPL-MCNC: 0.76 MG/DL (ref 0.52–1.04)
ERYTHROCYTE [DISTWIDTH] IN BLOOD BY AUTOMATED COUNT: 18.4 % (ref 10–15)
GFR SERPL CREATININE-BSD FRML MDRD: >90 ML/MIN/1.73M2
GLUCOSE BLD-MCNC: 135 MG/DL (ref 70–99)
GLUCOSE BLDC GLUCOMTR-MCNC: 106 MG/DL (ref 70–99)
GLUCOSE BLDC GLUCOMTR-MCNC: 120 MG/DL (ref 70–99)
GLUCOSE BLDC GLUCOMTR-MCNC: 121 MG/DL (ref 70–99)
GLUCOSE BLDC GLUCOMTR-MCNC: 122 MG/DL (ref 70–99)
GLUCOSE BLDC GLUCOMTR-MCNC: 125 MG/DL (ref 70–99)
GLUCOSE BLDC GLUCOMTR-MCNC: 135 MG/DL (ref 70–99)
GLUCOSE BLDC GLUCOMTR-MCNC: 138 MG/DL (ref 70–99)
GLUCOSE BLDC GLUCOMTR-MCNC: 143 MG/DL (ref 70–99)
HCT VFR BLD AUTO: 24.7 % (ref 35–47)
HGB BLD-MCNC: 7.7 G/DL (ref 11.7–15.7)
MAGNESIUM SERPL-MCNC: 2.2 MG/DL (ref 1.6–2.3)
MCH RBC QN AUTO: 25.8 PG (ref 26.5–33)
MCHC RBC AUTO-ENTMCNC: 31.2 G/DL (ref 31.5–36.5)
MCV RBC AUTO: 83 FL (ref 78–100)
PHOSPHATE SERPL-MCNC: 3.6 MG/DL (ref 2.5–4.5)
PLATELET # BLD AUTO: 207 10E3/UL (ref 150–450)
POTASSIUM BLD-SCNC: 3.6 MMOL/L (ref 3.4–5.3)
PROT SERPL-MCNC: 5.7 G/DL (ref 6.8–8.8)
RBC # BLD AUTO: 2.99 10E6/UL (ref 3.8–5.2)
SODIUM SERPL-SCNC: 145 MMOL/L (ref 133–144)
WBC # BLD AUTO: 16.4 10E3/UL (ref 4–11)

## 2022-04-02 PROCEDURE — 250N000011 HC RX IP 250 OP 636: Performed by: NURSE PRACTITIONER

## 2022-04-02 PROCEDURE — 250N000013 HC RX MED GY IP 250 OP 250 PS 637: Performed by: SURGERY

## 2022-04-02 PROCEDURE — 5A1955Z RESPIRATORY VENTILATION, GREATER THAN 96 CONSECUTIVE HOURS: ICD-10-PCS | Performed by: PHYSICIAN ASSISTANT

## 2022-04-02 PROCEDURE — 84100 ASSAY OF PHOSPHORUS: CPT | Performed by: SURGERY

## 2022-04-02 PROCEDURE — 82330 ASSAY OF CALCIUM: CPT | Performed by: SURGERY

## 2022-04-02 PROCEDURE — 83735 ASSAY OF MAGNESIUM: CPT | Performed by: SURGERY

## 2022-04-02 PROCEDURE — 94003 VENT MGMT INPAT SUBQ DAY: CPT

## 2022-04-02 PROCEDURE — 999N000009 HC STATISTIC AIRWAY CARE

## 2022-04-02 PROCEDURE — 250N000013 HC RX MED GY IP 250 OP 250 PS 637: Performed by: PHYSICIAN ASSISTANT

## 2022-04-02 PROCEDURE — 250N000012 HC RX MED GY IP 250 OP 636 PS 637: Performed by: NURSE PRACTITIONER

## 2022-04-02 PROCEDURE — 250N000009 HC RX 250: Performed by: NURSE PRACTITIONER

## 2022-04-02 PROCEDURE — 250N000013 HC RX MED GY IP 250 OP 250 PS 637: Performed by: NURSE PRACTITIONER

## 2022-04-02 PROCEDURE — 999N000157 HC STATISTIC RCP TIME EA 10 MIN

## 2022-04-02 PROCEDURE — 250N000011 HC RX IP 250 OP 636: Performed by: SURGERY

## 2022-04-02 PROCEDURE — 99223 1ST HOSP IP/OBS HIGH 75: CPT | Mod: 24 | Performed by: INTERNAL MEDICINE

## 2022-04-02 PROCEDURE — 80053 COMPREHEN METABOLIC PANEL: CPT | Performed by: NURSE PRACTITIONER

## 2022-04-02 PROCEDURE — 95720 EEG PHY/QHP EA INCR W/VEEG: CPT | Performed by: PSYCHIATRY & NEUROLOGY

## 2022-04-02 PROCEDURE — 85027 COMPLETE CBC AUTOMATED: CPT | Performed by: NURSE PRACTITIONER

## 2022-04-02 PROCEDURE — 250N000013 HC RX MED GY IP 250 OP 250 PS 637: Performed by: INTERNAL MEDICINE

## 2022-04-02 PROCEDURE — 200N000001 HC R&B ICU

## 2022-04-02 PROCEDURE — 258N000003 HC RX IP 258 OP 636: Performed by: SURGERY

## 2022-04-02 PROCEDURE — 95714 VEEG EA 12-26 HR UNMNTR: CPT

## 2022-04-02 RX ORDER — NICOTINE POLACRILEX 4 MG
15-30 LOZENGE BUCCAL
Status: DISCONTINUED | OUTPATIENT
Start: 2022-04-02 | End: 2022-04-02

## 2022-04-02 RX ORDER — CARVEDILOL 6.25 MG/1
6.25 TABLET ORAL 2 TIMES DAILY WITH MEALS
Status: DISCONTINUED | OUTPATIENT
Start: 2022-04-02 | End: 2022-04-04

## 2022-04-02 RX ORDER — DEXTROSE MONOHYDRATE 25 G/50ML
25-50 INJECTION, SOLUTION INTRAVENOUS
Status: DISCONTINUED | OUTPATIENT
Start: 2022-04-02 | End: 2022-04-02

## 2022-04-02 RX ORDER — METOPROLOL TARTRATE 25 MG/1
25 TABLET, FILM COATED ORAL EVERY 8 HOURS
Status: DISCONTINUED | OUTPATIENT
Start: 2022-04-02 | End: 2022-04-02

## 2022-04-02 RX ORDER — POTASSIUM CHLORIDE 1.5 G/1.58G
20 POWDER, FOR SOLUTION ORAL ONCE
Status: COMPLETED | OUTPATIENT
Start: 2022-04-02 | End: 2022-04-02

## 2022-04-02 RX ORDER — FUROSEMIDE 10 MG/ML
40 INJECTION INTRAMUSCULAR; INTRAVENOUS EVERY 8 HOURS
Status: DISCONTINUED | OUTPATIENT
Start: 2022-04-02 | End: 2022-04-04

## 2022-04-02 RX ADMIN — NICARDIPINE HYDROCHLORIDE 5 MG/HR: 0.2 INJECTION INTRAVENOUS at 15:37

## 2022-04-02 RX ADMIN — MUPIROCIN 0.5 G: 20 OINTMENT TOPICAL at 08:59

## 2022-04-02 RX ADMIN — FUROSEMIDE 40 MG: 10 INJECTION, SOLUTION INTRAVENOUS at 15:32

## 2022-04-02 RX ADMIN — HYDROMORPHONE HYDROCHLORIDE 0.2 MG: 0.2 INJECTION, SOLUTION INTRAMUSCULAR; INTRAVENOUS; SUBCUTANEOUS at 20:33

## 2022-04-02 RX ADMIN — HYDRALAZINE HYDROCHLORIDE 10 MG: 20 INJECTION INTRAMUSCULAR; INTRAVENOUS at 20:31

## 2022-04-02 RX ADMIN — POLYETHYLENE GLYCOL 3350 17 G: 17 POWDER, FOR SOLUTION ORAL at 08:42

## 2022-04-02 RX ADMIN — HEPARIN SODIUM 5000 UNITS: 5000 INJECTION, SOLUTION INTRAVENOUS; SUBCUTANEOUS at 05:26

## 2022-04-02 RX ADMIN — FUROSEMIDE 40 MG: 10 INJECTION, SOLUTION INTRAVENOUS at 08:40

## 2022-04-02 RX ADMIN — Medication 40 MG: at 08:42

## 2022-04-02 RX ADMIN — HEPARIN SODIUM 5000 UNITS: 5000 INJECTION, SOLUTION INTRAVENOUS; SUBCUTANEOUS at 19:27

## 2022-04-02 RX ADMIN — TICAGRELOR 90 MG: 90 TABLET ORAL at 17:43

## 2022-04-02 RX ADMIN — METHOCARBAMOL 750 MG: 750 TABLET ORAL at 14:15

## 2022-04-02 RX ADMIN — METOPROLOL TARTRATE 12.5 MG: 25 TABLET, FILM COATED ORAL at 07:39

## 2022-04-02 RX ADMIN — SENNOSIDES AND DOCUSATE SODIUM 1 TABLET: 50; 8.6 TABLET ORAL at 08:42

## 2022-04-02 RX ADMIN — TICAGRELOR 90 MG: 90 TABLET ORAL at 05:26

## 2022-04-02 RX ADMIN — AMLODIPINE BESYLATE 2.5 MG: 2.5 TABLET ORAL at 07:39

## 2022-04-02 RX ADMIN — CHLORHEXIDINE GLUCONATE 15 ML: 1.2 RINSE ORAL at 19:27

## 2022-04-02 RX ADMIN — CARVEDILOL 6.25 MG: 6.25 TABLET, FILM COATED ORAL at 11:04

## 2022-04-02 RX ADMIN — FUROSEMIDE 40 MG: 10 INJECTION, SOLUTION INTRAVENOUS at 23:23

## 2022-04-02 RX ADMIN — ASPIRIN 81 MG CHEWABLE TABLET 81 MG: 81 TABLET CHEWABLE at 08:42

## 2022-04-02 RX ADMIN — POTASSIUM CHLORIDE 20 MEQ: 1.5 POWDER, FOR SOLUTION ORAL at 06:08

## 2022-04-02 RX ADMIN — GABAPENTIN 100 MG: 100 CAPSULE ORAL at 08:42

## 2022-04-02 RX ADMIN — METOPROLOL TARTRATE 12.5 MG: 25 TABLET, FILM COATED ORAL at 01:07

## 2022-04-02 RX ADMIN — AMIODARONE HYDROCHLORIDE 400 MG: 200 TABLET ORAL at 08:42

## 2022-04-02 RX ADMIN — CARVEDILOL 6.25 MG: 6.25 TABLET, FILM COATED ORAL at 17:43

## 2022-04-02 RX ADMIN — AMIODARONE HYDROCHLORIDE 400 MG: 200 TABLET ORAL at 20:33

## 2022-04-02 RX ADMIN — NICARDIPINE HYDROCHLORIDE 10 MG/HR: 0.2 INJECTION INTRAVENOUS at 10:08

## 2022-04-02 RX ADMIN — MUPIROCIN 0.5 G: 20 OINTMENT TOPICAL at 21:22

## 2022-04-02 RX ADMIN — CHLORHEXIDINE GLUCONATE 15 ML: 1.2 RINSE ORAL at 07:40

## 2022-04-02 RX ADMIN — HEPARIN SODIUM 5000 UNITS: 5000 INJECTION, SOLUTION INTRAVENOUS; SUBCUTANEOUS at 12:23

## 2022-04-02 RX ADMIN — ACETAMINOPHEN 975 MG: 325 TABLET ORAL at 07:39

## 2022-04-02 RX ADMIN — Medication 15 ML: at 08:42

## 2022-04-02 RX ADMIN — ACETAMINOPHEN 650 MG: 325 TABLET ORAL at 20:33

## 2022-04-02 RX ADMIN — SENNOSIDES AND DOCUSATE SODIUM 1 TABLET: 50; 8.6 TABLET ORAL at 20:34

## 2022-04-02 RX ADMIN — INSULIN ASPART 1 UNITS: 100 INJECTION, SOLUTION INTRAVENOUS; SUBCUTANEOUS at 08:57

## 2022-04-02 RX ADMIN — ATORVASTATIN CALCIUM 80 MG: 40 TABLET, FILM COATED ORAL at 08:42

## 2022-04-02 RX ADMIN — POTASSIUM CHLORIDE, DEXTROSE MONOHYDRATE AND SODIUM CHLORIDE: 150; 5; 450 INJECTION, SOLUTION INTRAVENOUS at 15:50

## 2022-04-02 ASSESSMENT — ACTIVITIES OF DAILY LIVING (ADL)
ADLS_ACUITY_SCORE: 11
ADLS_ACUITY_SCORE: 9
ADLS_ACUITY_SCORE: 11
ADLS_ACUITY_SCORE: 11
ADLS_ACUITY_SCORE: 9
ADLS_ACUITY_SCORE: 9
ADLS_ACUITY_SCORE: 11
ADLS_ACUITY_SCORE: 9
ADLS_ACUITY_SCORE: 11
ADLS_ACUITY_SCORE: 11
ADLS_ACUITY_SCORE: 9
ADLS_ACUITY_SCORE: 9
ADLS_ACUITY_SCORE: 11
ADLS_ACUITY_SCORE: 9
ADLS_ACUITY_SCORE: 11
ADLS_ACUITY_SCORE: 11
ADLS_ACUITY_SCORE: 9
ADLS_ACUITY_SCORE: 11
ADLS_ACUITY_SCORE: 9
ADLS_ACUITY_SCORE: 11

## 2022-04-02 NOTE — PROGRESS NOTES
EEG CLINICAL NEUROPHYSIOLOGY PRELIMINARY REPORT    EEG through 9 AM today reviewed.  During some portions of the recording, rhythmic 80  V delta alternating with periods of significant attenuation.  During other portions of the recording continuous polymorphic moderate amplitude delta.  Stimulus induced rhythmic periodic discharges seen earlier in the study but are not seen after around 12 noon on 4/1.  No electrographic seizures.    Abnormal.  Study consistent with moderate to severe diffuse encephalopathy.  There is probably some minor improvement since yesterday study because background is more continuous.  However normal rhythms are not seen and there is no normal reactivity.  No seizures.    Full report to follow.    Trey Sutherland MD  Contact information for physicians covering Epilepsy and EEG is available on [facility].  Click search, enter neurology adult/[facility] in group name box, click on neurology adult/[facility], then click Staff Epilepsy and EEG.

## 2022-04-02 NOTE — PROGRESS NOTES
[facility] ICU RESPIRATORY NOTE        Date of Admission: 3/27/2022    Date of Intubation (most recent): 3/30/22    Reason for Mechanical Ventilation: Airway protection    Number of Days on Mechanical Ventilation: 3    Met Criteria for Spontaneous Breathing Trial:no    Reason for No Spontaneous Breathing Trial: per MD    Significant Events Today: None    ABG Results:   Recent Labs   Lab 04/01/22  0416 03/31/22  2341 03/31/22  2115 03/31/22  1605 03/31/22  1244 03/31/22  0603 03/30/22  1714 03/30/22  1708   PH  --   --  7.45  --  7.53* 7.49*  --  7.44   PCO2  --   --  31*  --  27* 27*  --  31*   PO2  --   --  117*  --  92 143*  --  87   HCO3  --   --  21  --  22 21  --  21   O2PER 40 40 40 40 30 30   < > 80    < > = values in this interval not displayed.       Current Vent Settings: Vent Mode: CMV/AC  (Continuous Mandatory Ventilation/ Assist Control)  FiO2 (%): 40 %  Resp Rate (Set): 14 breaths/min  Tidal Volume (Set, mL): 450 mL  PEEP (cm H2O): 5 cmH2O  Resp: 18      Plan: Will continue to monitor and assess for weaning readiness.     Margie Estes, RT

## 2022-04-02 NOTE — PROGRESS NOTES
[facility] ICU RESPIRATORY NOTE    Date of Admission: 3/27/2022     Date of Intubation (most recent): 3/30/22     Reason for Mechanical Ventilation: AW Protection     Number of Days on Mechanical Ventilation: 4     Met Criteria for Spontaneous Breathing Trial: No     Reason for No Spontaneous Breathing Trial: Per MD    ABG Results:   Recent Labs   Lab 04/01/22  0416 03/31/22  2341 03/31/22  2115 03/31/22  1605 03/31/22  1244 03/31/22  0603 03/30/22  1714 03/30/22  1708   PH  --   --  7.45  --  7.53* 7.49*  --  7.44   PCO2  --   --  31*  --  27* 27*  --  31*   PO2  --   --  117*  --  92 143*  --  87   HCO3  --   --  21  --  22 21  --  21   O2PER 40 40 40 40 30 30   < > 80    < > = values in this interval not displayed.       Current Vent Settings: Vent Mode: CMV/AC  (Continuous Mandatory Ventilation/ Assist Control)  FiO2 (%): 40 %  Resp Rate (Set): 14 breaths/min  Tidal Volume (Set, mL): 450 mL  PEEP (cm H2O): 5 cmH2O  Resp: 13      Plan: Continue full ventilator support.    Chris Gutierrez, RT  4/2/2022

## 2022-04-02 NOTE — PROGRESS NOTES
[facility] ICU RESPIRATORY NOTE        Date of Admission: 3/27/2022    Date of Intubation (most recent): 3/30/22    Reason for Mechanical Ventilation: AW Protection    Number of Days on Mechanical Ventilation: 4    Met Criteria for Spontaneous Breathing Trial: No    Reason for No Spontaneous Breathing Trial: Per MD    Significant Events Today: None    ABG Results:   Recent Labs   Lab 04/01/22  0416 03/31/22  2341 03/31/22  2115 03/31/22  1605 03/31/22  1244 03/31/22  0603 03/30/22  1714 03/30/22  1708   PH  --   --  7.45  --  7.53* 7.49*  --  7.44   PCO2  --   --  31*  --  27* 27*  --  31*   PO2  --   --  117*  --  92 143*  --  87   HCO3  --   --  21  --  22 21  --  21   O2PER 40 40 40 40 30 30   < > 80    < > = values in this interval not displayed.       Current Vent Settings: Vent Mode: CMV/AC  (Continuous Mandatory Ventilation/ Assist Control)  FiO2 (%): 40 %  Resp Rate (Set): 14 breaths/min  Tidal Volume (Set, mL): 450 mL  PEEP (cm H2O): 5 cmH2O  Resp: 14      Skin Assessment: Intact    Plan: Continue to monitor and assess respiratory status while in ICU.  Continue to maintain ETT patency.  Continue to wean from mechanical ventilation and oxygen as tolerated per protocol.  Assess skin integrity at least once per shift.     Juan Breen, RT

## 2022-04-02 NOTE — PROGRESS NOTES
"      [facility]    Neurocritical Care Progress Note    Interval EventsSlightly more spontaneous eye opening again today, and some slight spontaneous eye movements. Continues to be intubated and not sedated. EEG still ongoing- read this morning indicates no seizures, continued mod-severe diffuse encephalopathy, probably some minor improvement In background since yesterday  No specific events overnight.  MRI was completed last night shows a few punctate areas of restricted diffusion    HPI Summary  51 year old female admitted on 3/27/2022 for chest pain with workup demonstrating severe multivessel CAD.  Patient underwent CABG x4.  POD#0 patient had a PEA arrest with ROSC obtained at 10 minutes.  Emergent coronary cath demonstrated radial artery occlusion.  A DANA was placed into the RCA and balloon angioplasty of the RPLB was performed with excellent completion angiography flow.    Impression   1. Encephalopathy s/p cardiac arrest. No explanation for her encephalopathy seen on brain MRI  2. A few incidental areas of restricted diffusion are seen on brain MRI which are common after catheter-based coronary angiography  3. Mild leukocytosis without fever    Plan  - Continue continuous EEG, keep off antiseizure medications   - No specific additional workup for the MRI findings  - Maintain normothermia, normoglycemia, normonatremia  - Continue supportive care  - Continue neuro checks q4 hours      We will continue to follow.     Ni George PA-C  Neurology  04/02/2022 10:12 AM  To page me or covering stroke neurology team member, click here: AMCOM   Choose \"On Call\" tab at top, then search dropdown box for \"Neurology Adult\", select location, press Enter, then look for stroke/neuro ICU/telestroke.    ______________________________________________________    Clinically Significant Risk Factors Present on Admission                  Medications   Scheduled Meds    acetaminophen  975 mg Oral Q8H "     [START ON 4/8/2022] amiodarone  200 mg Oral Daily     amiodarone  400 mg Oral BID     amLODIPine  2.5 mg Oral Daily     aspirin  81 mg Oral or NG Tube Daily     atorvastatin  80 mg Oral Daily     carvedilol  6.25 mg Oral BID w/meals     chlorhexidine  15 mL Mouth/Throat Q12H     furosemide  40 mg Intravenous Q8H     gabapentin  100 mg Oral TID     heparin ANTICOAGULANT  5,000 Units Subcutaneous Q8H     insulin aspart  1-6 Units Subcutaneous Q4H     lactated ringers  250 mL Intravenous Once     lactated ringers  250 mL Intravenous Once     multivitamins w/minerals  15 mL Per Feeding Tube Daily     mupirocin  0.5 g Both Nostrils BID     pantoprazole  40 mg Oral or NG Tube Daily    Or     pantoprazole  40 mg Oral Daily     perflutren diluted 1mL to 2mL with saline  9 mL Intravenous Once     polyethylene glycol  17 g Oral Daily     senna-docusate  1 tablet Oral BID     sodium chloride (PF)  10 mL Intracatheter Q8H     sodium chloride (PF)  10 mL Intravenous Once     sodium chloride (PF)  3 mL Intracatheter Q8H     ticagrelor  90 mg Oral BID       Infusion Meds    dexmedetomidine Stopped (03/30/22 1611)     dextrose 5% and 0.45% NaCl + KCl 20 mEq/L 30 mL/hr at 04/01/22 2000     EPINEPHrine Stopped (03/31/22 1030)     - MEDICATION INSTRUCTIONS -       propofol (DIPRIVAN) infusion Stopped (03/31/22 1000)    And     - MEDICATION INSTRUCTIONS -       niCARdipine 10 mg/hr (04/02/22 1008)     nitroGLYcerin       norepinephrine Stopped (03/30/22 1612)     Percutaneous Coronary Intervention orders placed (this is information for BPA alerting)       phenylephrine       BETA BLOCKER NOT PRESCRIBED       sodium chloride Stopped (04/01/22 1200)       PRN Meds  acetaminophen **OR** acetaminophen, bisacodyl, calcium gluconate, calcium gluconate, calcium gluconate, glucose **OR** dextrose **OR** glucagon, hydrALAZINE, HYDROmorphone **OR** HYDROmorphone, ipratropium - albuterol 0.5 mg/2.5 mg/3 mL, lidocaine 4%, lidocaine (buffered  or not buffered), magnesium hydroxide, - MEDICATION INSTRUCTIONS -, propofol (DIPRIVAN) infusion **AND** propofol **AND** CK total **AND** Triglycerides **AND** - MEDICATION INSTRUCTIONS - **AND** Notify Physician, melatonin, methocarbamol, naloxone **OR** naloxone **OR** naloxone **OR** naloxone, nitroGLYcerin, ondansetron **OR** ondansetron, oxyCODONE **OR** oxyCODONE, Percutaneous Coronary Intervention orders placed (this is information for BPA alerting), phenylephrine, BETA BLOCKER NOT PRESCRIBED, sodium chloride (PF)       PHYSICAL EXAMINATION  Temp:  [97.9  F (36.6  C)-99.5  F (37.5  C)] 99.1  F (37.3  C)  Pulse:  [61-85] 72  Resp:  [13-25] 16  BP: (119-155)/(60-88) 155/88  MAP:  [68 mmHg-126 mmHg] 92 mmHg  Arterial Line BP: ()/(50-86) 133/69  FiO2 (%):  [40 %] 40 %  SpO2:  [95 %-100 %] 96 %      Neurologic  Mental Status:  does not follow commands. eyes open with light touch stimulation.   Cranial Nerves:  PERRL, corneal reflexes brisk bilaterally, does not protrude tongue, does not blink to threat even with eyes widely open, does not respond to shout, oculocephalic maneuver not tested today  Motor:  no withdraw noted to noxious stimulus in any extremity  Reflexes:  toes mute  Sensory:  as above to noxious, see motor section  Coordination:  deferred  Station/Gait:  unable to test due to intubation      Imaging  I personally reviewed all imaging; relevant findings per HPI.     Lab Results Data   CBC  Recent Labs   Lab 04/02/22  0355 04/01/22  0416 03/31/22  2341 03/31/22  1243   WBC 16.4* 20.3*  --  17.8*   RBC 2.99* 3.21*  --  3.26*   HGB 7.7* 8.0* 8.2* 8.2*   HCT 24.7* 26.2*  --  25.9*    198  --  190     Basic Metabolic Panel    Recent Labs   Lab 04/02/22  0700 04/02/22  0606 04/02/22  0357 04/02/22  0355 04/01/22  1811 04/01/22  1648 04/01/22  0419 04/01/22  0416 03/31/22  1420 03/31/22  1243   NA  --   --   --  145*  --   --   --  146*  --  144   POTASSIUM  --   --   --  3.6  --  3.8  --  3.7    < > 3.4   CHLORIDE  --   --   --  115*  --   --   --  118*  --  115*   CO2  --   --   --  25  --   --   --  23  --  22   BUN  --   --   --  18  --   --   --  11  --  9   CR  --   --   --  0.76  --   --   --  0.79  --  0.73   * 122* 138* 135*   < >  --    < > 151*   < > 178*   TOMAS  --   --   --  8.8  --   --   --  8.5  --  8.8    < > = values in this interval not displayed.     Liver Panel  Recent Labs   Lab 04/02/22  0355 04/01/22  0416 03/31/22  1243   PROTTOTAL 5.7* 5.5* 5.6*   ALBUMIN 2.2* 2.3* 2.4*   BILITOTAL 0.2 0.4 0.4   ALKPHOS 72 65 62   AST 27 47* 89*   ALT 45 50 59*     INR    Recent Labs   Lab Test 03/31/22  0022 03/30/22  1622 03/30/22  1445   INR 1.37* 1.24* 1.44*      Lipid Profile    Recent Labs   Lab Test 03/27/22  1028   CHOL 172   HDL 57   *   TRIG 47     A1C    Recent Labs   Lab Test 03/28/22  0600   A1C 5.7*     Troponin I  No results for input(s): TROPONIN, GHTROP in the last 168 hours.

## 2022-04-02 NOTE — PLAN OF CARE
Pt remains vented, no sedation.  EEG con't.  Eyes open spontaneously and seems to look at RN on occasion.  Withdraws BLEs usually to pain; nothing in UEs.  Right groin marlyn removed.  Nicardipine to keep SBP <140 as needed.  Son &  at bedside; updated.      Goal Outcome Evaluation: No Change.      Plan of Care Reviewed With: spouse, son

## 2022-04-02 NOTE — PROGRESS NOTES
[facility]  Cardiovascular and Thoracic Surgery Daily Note          Assessment and Plan:   sepideh Robledo is a 51 year old female with history of MI at age 25 s/p angioplasty who presented to the hospital with hypertensive emergency and NSTEMI. Coronary angio demonstrated severe multivessel CAD. Echocardiogram preoperatively with preserved biventricular function.    PMH includes: NSTEMI, CAD with previous coronary stents, HTN    Course has been complicated by PEA arrest with ventricular fibrillation on POD 0 with ROSC after defibrillation x3, 2 mg epinephrine and one round of CPR. Was taken to cath lab where radial artery graft was found to be occluded. PCI with DANA placement to mid RCA and angioplasty of the rPLB was performed. Echocardiogram at the conclusion of PCI demonstrated LVEF 40-45% with mild to moderate global hypokinesis of the left ventricle with severe hypokinesis of the mid anterior and anteroseptal walls, the entire inferior wall and all apical segments of the left ventricle; no pericardial effusion. Additionally, due to antiplatelet therapy post PCI, patient developed epistaxis during attempted NG placement requiring placement of b/l rhino rockets. Now, diffuse severe encephalopathy with minimal neurologic response.     POD #3 s/p:  1.  Coronary artery bypass grafting x 4 (radial artery graft to the right posterior descending coronary artery, reversed saphenous vein graft to the obtuse marginal branch of the left circumflex coronary artery, reversed saphenous vein graft to the first diagonal branch of the left anterior descending coronary artery, and pedicled left internal mammary artery to left anterior descending coronary artery).  2.  Endoscopic vein harvest of the greater saphenous vein from the left lower extremity.  3.  Endoscopic left radial artery harvest on 3/30/22 with Dr. Butterfield.    CVS:   NSTEMI and severe multivessel CAD s/p CABG x4 with radial grafts as  above  HTN  PEA and Vfib arrest s/p ROSC and DANA to mid RCA and angioplasty of the rPLB on 3/31  Currently on nicardipine gtt to maintain ordered hemodynamic parameters; NSR rates 70s  - Goal MAP >65, SBP <140  - Continue nicardipine gtt, titrate off as able  - Amlodipine 2.5 mg daily for radial graft (at least 3-6 months)  - Change metoprolol tartrate to carvedilol 12.5 mg BID  - PRN hydralazine available  - Brilinta load and daily per Cardiology  - Transitioned amiodarone gtt to PO taper per EP/Cards recs  - ASA 81 daily  - Atorvastatin 80 mg daily  - CTs in to suction, leave in, cap TPWs  - Would favor removing femoral arterial line to enable sitting patient up and getting to chair    Resp:   Postoperative ventilator management  - Current vent settings:  Vent Mode: CMV/AC  (Continuous Mandatory Ventilation/ Assist Control)  FiO2 (%): 40 %  Resp Rate (Set): 14 breaths/min  Tidal Volume (Set, mL): 450 mL  PEEP (cm H2O): 5 cmH2O  Resp: 16  - Currently needs intubation while neuro status as below, oxygenating well on minimal settings  - Once extubated: pulm hygiene efforts: IS/flutter valve/mobility  - Titrate O2 to maintain sats >92%  - CXR stable    Neuro:    Acute postoperative pain  Hx migraines  Encephalopathy   Did receive CPR on 3/31, will likely have pain from this also  Propofol turned off on 3/31 at 9 am, starting to open eyes more, unable to move extremities but withdraws BLE to pain, nothing in BUEs yet. CT head 3/31 negative for hemorrhage; CTA negative for strokes, vessels patent; per neurology, MRI stable w/o acute concern to explain clinical status.  - PRN dilaudid available  - PRN oxycodone and methocarbamol available  - Scheduled tylenol   - Neuro critical care consulted and following  - Ceribell EEG placed 3/31, transitioned to vEEG with no e/o seizure activity    Renal/Electrolyte:   Lactic acidosis, resolved  Volume overload  Creat stable, up  lbs from preop, +edema; negative 800 mL past 24h  Na  145, K+3.6  - Strict I/O, daily weights  - Avoid/limit nephrotoxins as able  - Replete lytes per protocol  - Increase Lasix to 40 mg IV q8h goal negative ~1L/24h    GI/Nutrition:    Epistaxis, resolved  Elevated LFTs, likely shock liver 2/2 arrest, resolved  Orders Placed This Encounter      NPO for Medical/Clinical Reasons Except for: Other; Specify: NPO until Extubated      Advance Diet as Tolerated: Clear Liquid Diet  - OG in place, no e/o blood in output  - Nutrition following, cautiously increasing enteral feeds  - Rhino rockets in place, no further bleeding noted, remove today per protocol  - Continue bowel regimen, no BM yet  - PPI  - Trend CMP    :    - Cooper in place for strict I/O  - Currently having menses    Endo:  Stress induced hyperglycemia  Thyroid nodule   Preop A1c   Lab Results   Component Value Date    A1C 5.7 03/28/2022   - Continue insulin infusion  - Goal BG <180 for optimal healing  - Thyroid nodule noted on CT on 3/31, will have this worked up on an outpatient basis    ID:  Stress induced leukocytosis  E.Coli UTI preoperatively  WBC 20.3-->16.4, Tmax 99.5; no s/sx infection  - Completed perioperative ABX  - Continue to follow fever curve, WBC and inflammatory markers as appropriate  - Repeat UA 4/1 without overt e/o infection    Heme:  Acute blood loss anemia  Acute blood loss thrombocytopenia  Hgb 7.7, Plts 207; no s/sx active bleeding  - CBC in AM  - Goal Hgb >7  - Consulted hematology given severe premature CAD and acute graft failure- appreciate recommendations. Multiple labs sent.    -Proph: PPI, subcutaneous heparin, SCDs  -Dispo: Continue in ICU          Interval History:   No acute events overnight. Opening eyes more readily, withdraws BLE to pain, no movement with BUEs.  On nicardipine this AM.         Medications:       acetaminophen  975 mg Oral Q8H     [START ON 4/8/2022] amiodarone  200 mg Oral Daily     amiodarone  400 mg Oral BID     amLODIPine  2.5 mg Oral Daily     aspirin  " 81 mg Oral or NG Tube Daily     atorvastatin  80 mg Oral Daily     chlorhexidine  15 mL Mouth/Throat Q12H     furosemide  40 mg Intravenous Q8H     gabapentin  100 mg Oral TID     heparin ANTICOAGULANT  5,000 Units Subcutaneous Q8H     insulin aspart  1-6 Units Subcutaneous Q4H     lactated ringers  250 mL Intravenous Once     lactated ringers  250 mL Intravenous Once     metoprolol tartrate  12.5 mg Oral Q8H     multivitamins w/minerals  15 mL Per Feeding Tube Daily     mupirocin  0.5 g Both Nostrils BID     pantoprazole  40 mg Oral or NG Tube Daily    Or     pantoprazole  40 mg Oral Daily     perflutren diluted 1mL to 2mL with saline  9 mL Intravenous Once     polyethylene glycol  17 g Oral Daily     senna-docusate  1 tablet Oral BID     sodium chloride (PF)  10 mL Intracatheter Q8H     sodium chloride (PF)  10 mL Intravenous Once     sodium chloride (PF)  3 mL Intracatheter Q8H     ticagrelor  90 mg Oral BID             Physical Exam:   Vitals were reviewed  Blood pressure (Abnormal) 155/88, pulse 72, temperature 99.1  F (37.3  C), resp. rate 16, height 1.651 m (5' 5\"), weight 101.7 kg (224 lb 3.3 oz), last menstrual period 02/27/2022, SpO2 96 %.  Rhythm: NSR    Lungs: CTA anterior, synchronous with ventilator, minimal vent support    Cardiovascular: S1, S2, RRR, distant, no m/r/g    Abdomen: S/ND, OG in place    Extremeties: + edema, + pitting, warm and well perfused    Incision: CDI    CT: to suction, no air leak, no crepitus    Weight:   Vitals:    03/28/22 0500 03/29/22 0500 03/30/22 0600 04/01/22 0430   Weight: 95.7 kg (211 lb) 95.8 kg (211 lb 3.2 oz) 95.7 kg (211 lb) 96.9 kg (213 lb 10 oz)    04/02/22 0500   Weight: 101.7 kg (224 lb 3.3 oz)            Data:   Labs:   Lab Results   Component Value Date    WBC 15.8 03/31/2022     Lab Results   Component Value Date    RBC 3.15 03/31/2022     Lab Results   Component Value Date    HGB 8.0 03/31/2022     Lab Results   Component Value Date    HCT 25.5 03/31/2022 "     No components found for: MCT  Lab Results   Component Value Date    MCV 81 03/31/2022     Lab Results   Component Value Date    MCH 25.4 03/31/2022     Lab Results   Component Value Date    MCHC 31.4 03/31/2022     Lab Results   Component Value Date    RDW 17.7 03/31/2022     Lab Results   Component Value Date     03/31/2022       Last Basic Metabolic Panel:  Lab Results   Component Value Date     03/31/2022      Lab Results   Component Value Date    POTASSIUM 3.2 03/31/2022     Lab Results   Component Value Date    CHLORIDE 116 03/31/2022     Lab Results   Component Value Date    TOMAS 8.7 03/31/2022     Lab Results   Component Value Date    CO2 20 03/31/2022     Lab Results   Component Value Date    BUN 8 03/31/2022     Lab Results   Component Value Date    CR 0.81 03/31/2022     Lab Results   Component Value Date     03/31/2022     03/31/2022       Imaging:  Recent Results (from the past 24 hour(s))   MR Brain w/o & w Contrast    Narrative    EXAM: MR BRAIN W/O and W CONTRAST  LOCATION: [facility]  DATE/TIME: 4/1/2022 8:02 PM    INDICATION: Seizure, abnormal neuro exam  COMPARISON: CTA head and neck 03/31/2022.  CONTRAST: 10mL Gadavist  TECHNIQUE: Routine multiplanar multisequence head MRI without and with intravenous contrast.    FINDINGS:  INTRACRANIAL CONTENTS: There are a few punctate areas of cortical diffusion restriction within the left frontal lobe. Additional foci of diffusion restriction within the right occipital lobe and left inferolateral temporal lobe. No hemorrhage.    No mass or extra-axial collection. Scattered nonspecific T2/FLAIR hyperintensities within the cerebral white matter most consistent with mild chronic microvascular ischemic change. Mild generalized cerebral atrophy. No hydrocephalus. Normal position of   the cerebellar tonsils. No pathologic contrast enhancement.    SELLA: No abnormality accounting for technique.    OSSEOUS  STRUCTURES/SOFT TISSUES: Normal marrow signal. The major intracranial vascular flow voids are maintained.     ORBITS: No abnormality accounting for technique.     SINUSES/MASTOIDS: Moderate mucosal thickening scattered about the paranasal sinuses. No middle ear or mastoid effusion.       Impression    IMPRESSION:  1.  There are a few scattered punctate acute infarctions within the left frontal lobe, left temporal lobe, right occipital lobe, presumably embolic.  2.  Mild generalized volume loss and chronic microvascular ischemic change.        Rounded and discussed with YRIS Vazquez, ACN-AG, CCRN  Nurse Practitioner  Cardiothoracic Surgery  Pager: [phone]  [facility]

## 2022-04-02 NOTE — PROGRESS NOTES
Critical Care Progress Note      04/02/2022    Name: Angelica Robledo MRN#: [id number]   Age: 51 year old YOB: 1970     Hsptl Day# 6  ICU DAY #3    MV DAY #3             Problem List:   Active Problems:    NSTEMI (non-ST elevated myocardial infarction) (H)    Hypertensive emergency    Paroxysmal ventricular tachycardia (H)           Summary/Hospital Course:   Angelica Robledo is a 51 year old female with pertinent PMHx of prior MI at age 24-25 (in 1996) s/p angioplasty, HTN, anemia, and NSTEMI.  She presented on 3/27 with new onset chest pain similar to her previous MI.  Workup was consistent with NSTEMI.  Cardiac cath demonstrated severe multivessel CAD.  She was seen by CT surgery and CABG was scheduled for 3/30.  She underwent CABG x4 left radial, LIMA, left saph vein x2 (FAUSTINO taken down, but elected to not be used due to size).    No family history of clotting disorders.  No known history of DVT/PE/stroke. Siblings without cardiovascular disease. Father passed away of cardiac causes at an advanced age.  Mother had cardiac disease as well as an aortic aneurysm, but not at a young age.     4V CABG as follows: left radial artery graft to the right posterior descending coronary artery, reversed saphenous vein graft to the obtuse marginal branch of the left circumflex coronary artery, reversed saphenous vein graft to the first diagonal branch of the left anterior descending coronary artery, and pedicled left internal mammary artery to left anterior descending coronary.    POD 0 - initial hypoxia resolved with vent changes.  At 2355 patient suffered a PEA and Vfib arrest and underwent 3 shocks, 2 rounds of epi, and 1 round of CPR. ROSC obtained at 0005.  300 mg amiodarone bolus administered.  STAT cardiac cath lab performed demonstrating LIMA-LAD, VG-LADD1, and VG-LPLB were patent. The left radial artery to RDPA graft had an occlusion.  The mid RCA was treated with a DANA stent and a balloon angioplasty  performed at the RPLB.  Excellent flow was noted on completion coronary angiogram.  Echo demonstrated a drop in LVEF to 40-45% with aspects of hypokinesis. RV demonstrated mild-moderate RV function reduction.    POD1 - weaned from sedation with minimal arousal limited to briefly opening eyes to pain and occasionally sound, minimal extremity pain response.  CT head/CTA head and neck unremarkable for acute insult.  Ceribell placed overnight.    POD - 2/3 no major issues, awaiting improvement in mental status for extubation   Art line removed      Assessment and plan :     Angelica Robledo IS a 51 year old female admitted on 3/27/2022 for chest pain with workup demonstrating severe multivessel CAD.  Patient underwent CABG x4.  POD#0 patient had a PEA arrest with ROSC obtained at 10 minutes.  Emergent coronary cath demonstrated radial artery occlusion.  A DANA was placed into the RCA and balloon angioplasty of the RPLB was performed with excellent completion angiography flow.     I have personally reviewed the daily labs, imaging studies, cultures and discussed the case with referring physician and consulting physicians.     My assessment and plan by system for this patient is as follows:    Neurology/Psychiatry:   1. Analgesia -- tylenol, prn narcotics - minimize narcotics during phase of trying to allow her to be more alert  2. Sedation -- hold all sedation  3. Encephalopathy - head CT negative -- continuous EEG     Cardiovascular:   1.  S/p CABG x4 - left radial, LIMA, left saph vein x2 (FAUSTINO taken down, but elected to not be used due to size) -- ASA, statin  2. POD#0 PEA and Vfib arrest -- s/p cardiac cath demonstrated radial artery graft to RPDA occlusion -- intervention with DANA to RCA and balloon angioplasty RPLA -- on Kangrelor 3/31, now transitioned to aspirin and Brilinta with discussion per CV surgery and interventional cardiology  -- okay from ICU standpoint to discontinue amiodarone gtt as etiology seems  resolved - pending discussion with cards  2. Continue nicardipine until CV surgery clears for discontinuation  3. Hx of MI at age 24-25  4. HTN - likely untreated prior to hospitalization - presented with hypertension emergency - nicardipine on for radial artery graft bypass and for treatment goal SBP<140, MAP >65.     Pulmonary/Ventilator Management:   1. Airway -- intubated -- gas exchange uncomplicated  2. No underlying pulmonary problems on medical history or cxr.   3. Remains intubated due to mental status/airway protection     GI and Nutrition :   1.  enteral feeds per OGT  2. Acid suppression for PUD prophy     Renal/Fluids/Electrolytes:   1. Monitor UOP/creatinine post-op -- edema -- diuresis today  2. Replete electrolytes PRN per protocol  3. IVF and albumin administration for volume prn per protocol  4. Cooper indicated for accurate I/Os     Infectious Disease:   1. UTI with E coli on admission -- recheck UA today, if positive, will treat with course of abx     Endocrine:   1. Monitor for stress induced hyperglycemia. ICU insulin protocol, goal sugar <180   2. Incidental thyroid nodule -- follow-up outpatient per discharge referral     Hematology/Oncology:   1. Chronic anemia -- Hgb/plts stable post op.  pRBC for goal hgb 8.0 or as indicated by CV surgery     IV/Access:   1. Venous access - CVC  2. Arterial access - femoral  - central access required and necessary      ICU Prophylaxis:   1. DVT: Hep Subq/mechanical  2. VAP: HOB 30 degrees, chlorhexidine rinse  3. Stress Ulcer: H2 blocker  4. Restraints: Nonviolent soft two point restraints required and necessary for patient safety and continued cares and good effect as patient continues to pull at necessary lines, tubes despite education and distraction. Will readdress daily.   5. Wound care - per unit routine   6. Feeding - enteral feeds  7. Family Update: not today  8. Disposition - ICU        Key goals for next 24 hours:   1. EEG shows diffuse  encephalopathy, improving  2. Continue serial neuro checks           Interim History:            Key Medications:       [START ON 4/8/2022] amiodarone  200 mg Oral Daily     amiodarone  400 mg Oral BID     amLODIPine  2.5 mg Oral Daily     aspirin  81 mg Oral or NG Tube Daily     atorvastatin  80 mg Oral Daily     carvedilol  6.25 mg Oral BID w/meals     chlorhexidine  15 mL Mouth/Throat Q12H     furosemide  40 mg Intravenous Q8H     heparin ANTICOAGULANT  5,000 Units Subcutaneous Q8H     insulin aspart  1-6 Units Subcutaneous Q4H     lactated ringers  250 mL Intravenous Once     lactated ringers  250 mL Intravenous Once     multivitamins w/minerals  15 mL Per Feeding Tube Daily     mupirocin  0.5 g Both Nostrils BID     pantoprazole  40 mg Oral or NG Tube Daily    Or     pantoprazole  40 mg Oral Daily     perflutren diluted 1mL to 2mL with saline  9 mL Intravenous Once     polyethylene glycol  17 g Oral Daily     senna-docusate  1 tablet Oral BID     sodium chloride (PF)  10 mL Intracatheter Q8H     sodium chloride (PF)  10 mL Intravenous Once     sodium chloride (PF)  3 mL Intracatheter Q8H     ticagrelor  90 mg Oral BID       dexmedetomidine Stopped (03/30/22 1611)     dextrose 5% and 0.45% NaCl + KCl 20 mEq/L 30 mL/hr at 04/02/22 1550     EPINEPHrine Stopped (03/31/22 1030)     - MEDICATION INSTRUCTIONS -       propofol (DIPRIVAN) infusion Stopped (03/31/22 1000)    And     - MEDICATION INSTRUCTIONS -       niCARdipine 5 mg/hr (04/02/22 1537)     nitroGLYcerin       norepinephrine Stopped (03/30/22 1612)     Percutaneous Coronary Intervention orders placed (this is information for BPA alerting)       phenylephrine       BETA BLOCKER NOT PRESCRIBED       sodium chloride Stopped (04/01/22 1200)               Physical Examination:   Temp:  [97.9  F (36.6  C)-99.9  F (37.7  C)] 99.3  F (37.4  C)  Pulse:  [66-85] 71  Resp:  [13-25] 13  BP: (104-155)/(60-88) 132/85  MAP:  [74 mmHg-126 mmHg] 88 mmHg  Arterial Line BP:  ()/(54-86) 130/64  FiO2 (%):  [40 %] 40 %  SpO2:  [93 %-100 %] 99 %    Intake/Output Summary (Last 24 hours) at 3/31/2022 0921  Last data filed at 3/31/2022 0700  Gross per 24 hour   Intake 3676.54 ml   Output 2825 ml   Net 851.54 ml     Wt Readings from Last 4 Encounters:   04/02/22 101.7 kg (224 lb 3.3 oz)     Arterial Line BP: ()/(54-86) 130/64  MAP:  [74 mmHg-126 mmHg] 88 mmHg  BP - Mean:  [] 100  Vent Mode: CMV/AC  (Continuous Mandatory Ventilation/ Assist Control)  FiO2 (%): 40 %  Resp Rate (Set): 14 breaths/min  Tidal Volume (Set, mL): 450 mL  PEEP (cm H2O): 5 cmH2O  Resp: 13    Recent Labs   Lab 04/01/22  0416 03/31/22  2341 03/31/22  2115 03/31/22  1605 03/31/22  1244 03/31/22  0603 03/30/22  1714 03/30/22  1708   PH  --   --  7.45  --  7.53* 7.49*  --  7.44   PCO2  --   --  31*  --  27* 27*  --  31*   PO2  --   --  117*  --  92 143*  --  87   HCO3  --   --  21  --  22 21  --  21   O2PER 40 40 40 40 30 30   < > 80    < > = values in this interval not displayed.       GEN: no acute distress   HEENT: head ncat, sclera anicteric, ETT present   PULM: unlabored, synchronous with vent, clear anteriorly    CV/Chest: RRR, S1S2 no gallop,  No rub, no murmur appreciated. Midline dressing CDI. CTs: low serosang output, no air leak.   ABD: soft, nontender  EXT:  Edema,  warm  NEURO: Off sedation. Opens eyes to stimulus, occasionally sound. Pupillary light reflex brisk. No focal signs appreciated. Subtle BLE response to pain stimulus.  : nathan present, clear urine output  SKIN: no obvious rash  LINES: clean, dry intact         Data:   All data and imaging reviewed     ROUTINE ICU LABS (Last four results)  CMP  Recent Labs   Lab 04/02/22  1541 04/02/22  1226 04/02/22  0700 04/02/22  0606 04/02/22  0357 04/02/22  0355 04/01/22  1811 04/01/22  1648 04/01/22  0419 04/01/22  0416 03/31/22  2158 03/31/22  2108 03/31/22  1420 03/31/22  1243 03/31/22  0820 03/31/22  0604   NA  --   --   --   --   --  145*   --   --   --  146*  --   --   --  144  --  145*   POTASSIUM  --   --   --   --   --  3.6  --  3.8  --  3.7  --  3.8  --  3.4  --  3.2*   CHLORIDE  --   --   --   --   --  115*  --   --   --  118*  --   --   --  115*  --  116*   CO2  --   --   --   --   --  25  --   --   --  23  --   --   --  22  --  20   ANIONGAP  --   --   --   --   --  5  --   --   --  5  --   --   --  7  --  9   * 120* 143* 122*   < > 135*   < >  --    < > 151*   < >  --    < > 178*   < > 210*   BUN  --   --   --   --   --  18  --   --   --  11  --   --   --  9  --  8   CR  --   --   --   --   --  0.76  --   --   --  0.79  --   --   --  0.73  --  0.81   GFRESTIMATED  --   --   --   --   --  >90  --   --   --  >90  --   --   --  >90  --  88   TOMAS  --   --   --   --   --  8.8  --   --   --  8.5  --   --   --  8.8  --  8.7   MAG  --   --   --   --   --  2.2  --  2.3  --  2.0  --   --   --   --   --  2.6*   PHOS  --   --   --   --   --  3.6  --   --   --  3.5  --  3.6  --  3.2  --  2.0*   PROTTOTAL  --   --   --   --   --  5.7*  --   --   --  5.5*  --   --   --  5.6*  --  5.2*   ALBUMIN  --   --   --   --   --  2.2*  --   --   --  2.3*  --   --   --  2.4*  --  2.4*   BILITOTAL  --   --   --   --   --  0.2  --   --   --  0.4  --   --   --  0.4  --  0.4   ALKPHOS  --   --   --   --   --  72  --   --   --  65  --   --   --  62  --  47   AST  --   --   --   --   --  27  --   --   --  47*  --   --   --  89*  --  107*   ALT  --   --   --   --   --  45  --   --   --  50  --   --   --  59*  --  59*    < > = values in this interval not displayed.     CBC  Recent Labs   Lab 04/02/22  0355 04/01/22  0416 03/31/22  2341 03/31/22  1243 03/31/22  0604   WBC 16.4* 20.3*  --  17.8* 15.8*   RBC 2.99* 3.21*  --  3.26* 3.15*   HGB 7.7* 8.0* 8.2* 8.2* 8.0*   HCT 24.7* 26.2*  --  25.9* 25.5*   MCV 83 82  --  79 81   MCH 25.8* 24.9*  --  25.2* 25.4*   MCHC 31.2* 30.5*  --  31.7 31.4*   RDW 18.4* 18.1*  --  18.1* 17.7*    198  --  190 183     INR  Recent Labs    Lab 03/31/22  0022 03/30/22  1622 03/30/22  1445   INR 1.37* 1.24* 1.44*     Arterial Blood Gas  Recent Labs   Lab 04/01/22  0416 03/31/22  2341 03/31/22  2115 03/31/22  1605 03/31/22  1244 03/31/22  0603 03/30/22  1714 03/30/22  1708   PH  --   --  7.45  --  7.53* 7.49*  --  7.44   PCO2  --   --  31*  --  27* 27*  --  31*   PO2  --   --  117*  --  92 143*  --  87   HCO3  --   --  21  --  22 21  --  21   O2PER 40 40 40 40 30 30   < > 80    < > = values in this interval not displayed.       All cultures:  No results for input(s): CULT in the last 168 hours.  Recent Results (from the past 24 hour(s))   MR Brain w/o & w Contrast    Narrative    EXAM: MR BRAIN W/O and W CONTRAST  LOCATION: [facility]  DATE/TIME: 4/1/2022 8:02 PM    INDICATION: Seizure, abnormal neuro exam  COMPARISON: CTA head and neck 03/31/2022.  CONTRAST: 10mL Gadavist  TECHNIQUE: Routine multiplanar multisequence head MRI without and with intravenous contrast.    FINDINGS:  INTRACRANIAL CONTENTS: There are a few punctate areas of cortical diffusion restriction within the left frontal lobe. Additional foci of diffusion restriction within the right occipital lobe and left inferolateral temporal lobe. No hemorrhage.    No mass or extra-axial collection. Scattered nonspecific T2/FLAIR hyperintensities within the cerebral white matter most consistent with mild chronic microvascular ischemic change. Mild generalized cerebral atrophy. No hydrocephalus. Normal position of   the cerebellar tonsils. No pathologic contrast enhancement.    SELLA: No abnormality accounting for technique.    OSSEOUS STRUCTURES/SOFT TISSUES: Normal marrow signal. The major intracranial vascular flow voids are maintained.     ORBITS: No abnormality accounting for technique.     SINUSES/MASTOIDS: Moderate mucosal thickening scattered about the paranasal sinuses. No middle ear or mastoid effusion.       Impression    IMPRESSION:  1.  There are a few  scattered punctate acute infarctions within the left frontal lobe, left temporal lobe, right occipital lobe, presumably embolic.  2.  Mild generalized volume loss and chronic microvascular ischemic change.

## 2022-04-02 NOTE — PLAN OF CARE
Neuro: Patient opens eyes gaze upward, does not track does not fallow commands.. PERRL, cough, no gag, withdraw to pain BLE slightly.Continuous EEG. MRI head done.  CV:  SR, HR 70s, SBP <140, MAP > 65  Pulmonary: titrating FiO2 and PEEP,moderate tan creamy bronchial secretions, small amount of crepitus upper chest, CT x 3 no airleak -20mmHg thinning to serosang drainage.  GI/: tolerating meds via OG with minimal residuals, rare BS, abd soft no BM this shift, Cooper catheter with adequate UOP  INTEG: R and L  nares without any obvious bleeding, [place] remain in place.  Continue with plan of care.

## 2022-04-03 ENCOUNTER — APPOINTMENT (OUTPATIENT)
Dept: GENERAL RADIOLOGY | Facility: CLINIC | Age: 52
End: 2022-04-03
Attending: NURSE PRACTITIONER
Payer: COMMERCIAL

## 2022-04-03 ENCOUNTER — APPOINTMENT (OUTPATIENT)
Dept: MRI IMAGING | Facility: CLINIC | Age: 52
End: 2022-04-03
Attending: NURSE PRACTITIONER
Payer: COMMERCIAL

## 2022-04-03 LAB
ALBUMIN SERPL-MCNC: 2.3 G/DL (ref 3.4–5)
ALP SERPL-CCNC: 108 U/L (ref 40–150)
ALT SERPL W P-5'-P-CCNC: 52 U/L (ref 0–50)
ANION GAP SERPL CALCULATED.3IONS-SCNC: 3 MMOL/L (ref 3–14)
AST SERPL W P-5'-P-CCNC: 26 U/L (ref 0–45)
BILIRUB SERPL-MCNC: 0.2 MG/DL (ref 0.2–1.3)
BUN SERPL-MCNC: 22 MG/DL (ref 7–30)
CALCIUM SERPL-MCNC: 8.6 MG/DL (ref 8.5–10.1)
CHLORIDE BLD-SCNC: 111 MMOL/L (ref 94–109)
CO2 SERPL-SCNC: 30 MMOL/L (ref 20–32)
CREAT SERPL-MCNC: 0.96 MG/DL (ref 0.52–1.04)
ERYTHROCYTE [DISTWIDTH] IN BLOOD BY AUTOMATED COUNT: 18.3 % (ref 10–15)
GFR SERPL CREATININE-BSD FRML MDRD: 72 ML/MIN/1.73M2
GLUCOSE BLD-MCNC: 145 MG/DL (ref 70–99)
GLUCOSE BLDC GLUCOMTR-MCNC: 114 MG/DL (ref 70–99)
GLUCOSE BLDC GLUCOMTR-MCNC: 120 MG/DL (ref 70–99)
GLUCOSE BLDC GLUCOMTR-MCNC: 121 MG/DL (ref 70–99)
GLUCOSE BLDC GLUCOMTR-MCNC: 128 MG/DL (ref 70–99)
GLUCOSE BLDC GLUCOMTR-MCNC: 129 MG/DL (ref 70–99)
HCT VFR BLD AUTO: 26 % (ref 35–47)
HGB BLD-MCNC: 7.9 G/DL (ref 11.7–15.7)
MAGNESIUM SERPL-MCNC: 2.2 MG/DL (ref 1.6–2.3)
MCH RBC QN AUTO: 25.2 PG (ref 26.5–33)
MCHC RBC AUTO-ENTMCNC: 30.4 G/DL (ref 31.5–36.5)
MCV RBC AUTO: 83 FL (ref 78–100)
PHOSPHATE SERPL-MCNC: 3.7 MG/DL (ref 2.5–4.5)
PLATELET # BLD AUTO: 245 10E3/UL (ref 150–450)
POTASSIUM BLD-SCNC: 3.5 MMOL/L (ref 3.4–5.3)
POTASSIUM BLD-SCNC: 3.5 MMOL/L (ref 3.4–5.3)
POTASSIUM BLD-SCNC: 3.7 MMOL/L (ref 3.4–5.3)
PROT SERPL-MCNC: 6.2 G/DL (ref 6.8–8.8)
RBC # BLD AUTO: 3.13 10E6/UL (ref 3.8–5.2)
SODIUM SERPL-SCNC: 144 MMOL/L (ref 133–144)
WBC # BLD AUTO: 12.5 10E3/UL (ref 4–11)

## 2022-04-03 PROCEDURE — 84100 ASSAY OF PHOSPHORUS: CPT | Performed by: NURSE PRACTITIONER

## 2022-04-03 PROCEDURE — 250N000013 HC RX MED GY IP 250 OP 250 PS 637: Performed by: INTERNAL MEDICINE

## 2022-04-03 PROCEDURE — 85027 COMPLETE CBC AUTOMATED: CPT | Performed by: NURSE PRACTITIONER

## 2022-04-03 PROCEDURE — 99291 CRITICAL CARE FIRST HOUR: CPT | Mod: 24 | Performed by: STUDENT IN AN ORGANIZED HEALTH CARE EDUCATION/TRAINING PROGRAM

## 2022-04-03 PROCEDURE — 84132 ASSAY OF SERUM POTASSIUM: CPT | Performed by: INTERNAL MEDICINE

## 2022-04-03 PROCEDURE — A9585 GADOBUTROL INJECTION: HCPCS | Performed by: STUDENT IN AN ORGANIZED HEALTH CARE EDUCATION/TRAINING PROGRAM

## 2022-04-03 PROCEDURE — 250N000013 HC RX MED GY IP 250 OP 250 PS 637: Performed by: NURSE PRACTITIONER

## 2022-04-03 PROCEDURE — 250N000013 HC RX MED GY IP 250 OP 250 PS 637: Performed by: SURGERY

## 2022-04-03 PROCEDURE — 250N000011 HC RX IP 250 OP 636: Performed by: SURGERY

## 2022-04-03 PROCEDURE — 999N000157 HC STATISTIC RCP TIME EA 10 MIN

## 2022-04-03 PROCEDURE — 94003 VENT MGMT INPAT SUBQ DAY: CPT

## 2022-04-03 PROCEDURE — 72156 MRI NECK SPINE W/O & W/DYE: CPT

## 2022-04-03 PROCEDURE — 80053 COMPREHEN METABOLIC PANEL: CPT | Performed by: NURSE PRACTITIONER

## 2022-04-03 PROCEDURE — 83735 ASSAY OF MAGNESIUM: CPT | Performed by: INTERNAL MEDICINE

## 2022-04-03 PROCEDURE — 250N000011 HC RX IP 250 OP 636: Performed by: NURSE PRACTITIONER

## 2022-04-03 PROCEDURE — 255N000002 HC RX 255 OP 636: Performed by: STUDENT IN AN ORGANIZED HEALTH CARE EDUCATION/TRAINING PROGRAM

## 2022-04-03 PROCEDURE — 999N000009 HC STATISTIC AIRWAY CARE

## 2022-04-03 PROCEDURE — 250N000013 HC RX MED GY IP 250 OP 250 PS 637: Performed by: PHYSICIAN ASSISTANT

## 2022-04-03 PROCEDURE — 70553 MRI BRAIN STEM W/O & W/DYE: CPT

## 2022-04-03 PROCEDURE — 999N000185 HC STATISTIC TRANSPORT TIME EA 15 MIN

## 2022-04-03 PROCEDURE — 999N000065 XR CHEST PORT 1 VIEW

## 2022-04-03 PROCEDURE — 200N000001 HC R&B ICU

## 2022-04-03 PROCEDURE — 84132 ASSAY OF SERUM POTASSIUM: CPT | Performed by: SURGERY

## 2022-04-03 RX ORDER — POTASSIUM CHLORIDE 20MEQ/15ML
20 LIQUID (ML) ORAL ONCE
Status: COMPLETED | OUTPATIENT
Start: 2022-04-03 | End: 2022-04-03

## 2022-04-03 RX ORDER — POTASSIUM CHLORIDE 1.5 G/1.58G
20 POWDER, FOR SOLUTION ORAL ONCE
Status: COMPLETED | OUTPATIENT
Start: 2022-04-03 | End: 2022-04-03

## 2022-04-03 RX ORDER — GADOBUTROL 604.72 MG/ML
9 INJECTION INTRAVENOUS ONCE
Status: COMPLETED | OUTPATIENT
Start: 2022-04-03 | End: 2022-04-03

## 2022-04-03 RX ADMIN — Medication 15 ML: at 08:12

## 2022-04-03 RX ADMIN — GADOBUTROL 9 ML: 604.72 INJECTION INTRAVENOUS at 22:25

## 2022-04-03 RX ADMIN — MUPIROCIN 0.5 G: 20 OINTMENT TOPICAL at 10:11

## 2022-04-03 RX ADMIN — FUROSEMIDE 40 MG: 10 INJECTION, SOLUTION INTRAVENOUS at 06:13

## 2022-04-03 RX ADMIN — TICAGRELOR 90 MG: 90 TABLET ORAL at 18:01

## 2022-04-03 RX ADMIN — AMIODARONE HYDROCHLORIDE 400 MG: 200 TABLET ORAL at 08:13

## 2022-04-03 RX ADMIN — ASPIRIN 81 MG CHEWABLE TABLET 81 MG: 81 TABLET CHEWABLE at 08:13

## 2022-04-03 RX ADMIN — CHLORHEXIDINE GLUCONATE 15 ML: 1.2 RINSE ORAL at 19:44

## 2022-04-03 RX ADMIN — CARVEDILOL 6.25 MG: 6.25 TABLET, FILM COATED ORAL at 08:13

## 2022-04-03 RX ADMIN — METHOCARBAMOL 750 MG: 750 TABLET ORAL at 14:03

## 2022-04-03 RX ADMIN — MUPIROCIN 0.5 G: 20 OINTMENT TOPICAL at 22:52

## 2022-04-03 RX ADMIN — AMIODARONE HYDROCHLORIDE 400 MG: 200 TABLET ORAL at 22:51

## 2022-04-03 RX ADMIN — HYDROMORPHONE HYDROCHLORIDE 0.4 MG: 0.2 INJECTION, SOLUTION INTRAMUSCULAR; INTRAVENOUS; SUBCUTANEOUS at 20:58

## 2022-04-03 RX ADMIN — FUROSEMIDE 40 MG: 10 INJECTION, SOLUTION INTRAVENOUS at 14:47

## 2022-04-03 RX ADMIN — HEPARIN SODIUM 5000 UNITS: 5000 INJECTION, SOLUTION INTRAVENOUS; SUBCUTANEOUS at 11:49

## 2022-04-03 RX ADMIN — Medication 40 MG: at 08:12

## 2022-04-03 RX ADMIN — ACETAMINOPHEN 650 MG: 325 TABLET ORAL at 19:44

## 2022-04-03 RX ADMIN — AMLODIPINE BESYLATE 2.5 MG: 2.5 TABLET ORAL at 08:13

## 2022-04-03 RX ADMIN — HYDRALAZINE HYDROCHLORIDE 10 MG: 20 INJECTION INTRAMUSCULAR; INTRAVENOUS at 23:20

## 2022-04-03 RX ADMIN — HYDRALAZINE HYDROCHLORIDE 20 MG: 20 INJECTION INTRAMUSCULAR; INTRAVENOUS at 14:03

## 2022-04-03 RX ADMIN — TICAGRELOR 90 MG: 90 TABLET ORAL at 04:15

## 2022-04-03 RX ADMIN — FUROSEMIDE 40 MG: 10 INJECTION, SOLUTION INTRAVENOUS at 22:51

## 2022-04-03 RX ADMIN — CHLORHEXIDINE GLUCONATE 15 ML: 1.2 RINSE ORAL at 08:04

## 2022-04-03 RX ADMIN — HYDRALAZINE HYDROCHLORIDE 20 MG: 20 INJECTION INTRAMUSCULAR; INTRAVENOUS at 05:09

## 2022-04-03 RX ADMIN — ATORVASTATIN CALCIUM 80 MG: 40 TABLET, FILM COATED ORAL at 08:12

## 2022-04-03 RX ADMIN — HYDROMORPHONE HYDROCHLORIDE 0.2 MG: 0.2 INJECTION, SOLUTION INTRAMUSCULAR; INTRAVENOUS; SUBCUTANEOUS at 05:09

## 2022-04-03 RX ADMIN — POTASSIUM CHLORIDE 20 MEQ: 1.5 POWDER, FOR SOLUTION ORAL at 06:13

## 2022-04-03 RX ADMIN — HEPARIN SODIUM 5000 UNITS: 5000 INJECTION, SOLUTION INTRAVENOUS; SUBCUTANEOUS at 19:44

## 2022-04-03 RX ADMIN — CARVEDILOL 6.25 MG: 6.25 TABLET, FILM COATED ORAL at 18:02

## 2022-04-03 RX ADMIN — POTASSIUM CHLORIDE 20 MEQ: 20 SOLUTION ORAL at 15:44

## 2022-04-03 RX ADMIN — HEPARIN SODIUM 5000 UNITS: 5000 INJECTION, SOLUTION INTRAVENOUS; SUBCUTANEOUS at 04:15

## 2022-04-03 RX ADMIN — HYDRALAZINE HYDROCHLORIDE 10 MG: 20 INJECTION INTRAMUSCULAR; INTRAVENOUS at 20:58

## 2022-04-03 ASSESSMENT — ACTIVITIES OF DAILY LIVING (ADL)
ADLS_ACUITY_SCORE: 11
ADLS_ACUITY_SCORE: 9
ADLS_ACUITY_SCORE: 9
ADLS_ACUITY_SCORE: 11
ADLS_ACUITY_SCORE: 9
ADLS_ACUITY_SCORE: 9
ADLS_ACUITY_SCORE: 11
ADLS_ACUITY_SCORE: 11
ADLS_ACUITY_SCORE: 9
ADLS_ACUITY_SCORE: 11
ADLS_ACUITY_SCORE: 9
ADLS_ACUITY_SCORE: 9
ADLS_ACUITY_SCORE: 11
ADLS_ACUITY_SCORE: 11
ADLS_ACUITY_SCORE: 9
ADLS_ACUITY_SCORE: 11
ADLS_ACUITY_SCORE: 9

## 2022-04-03 NOTE — PLAN OF CARE
Pt remains vented, no sedation.  Eyes open spontaneously, upward gaze mostly w/roving movements at times.  No response to pain in BUEs; withdraws to pain in BLEs.  Upward toe on LLE.  EEG continues; MRI of head/spine later tonight/overnight. Chest tubes removed; f/up CXR done. Increasing TF toward goal; next increase @ 1800 tomorrow to 55ml.  Diuresing BID w/lasix w/good response. Lg stool x 2 this shift.  K+ repl.  updated by RN at bedside.      Goal Outcome Evaluation:  Ongoing; not progressing.     Plan of Care Reviewed With: spouse

## 2022-04-03 NOTE — PROGRESS NOTES
[facility] ICU RESPIRATORY NOTE        Date of Admission: 3/27/2022    Date of Intubation (most recent): 3/30/22    Reason for Mechanical Ventilation: Airway protection     Number of Days on Mechanical Ventilation: 4    Met Criteria for Spontaneous Breathing Trial: No    Reason for No Spontaneous Breathing Trial: Per MD    Significant Events Today: None     ABG Results:   Recent Labs   Lab 04/01/22  0416 03/31/22  2341 03/31/22  2115 03/31/22  1605 03/31/22  1244 03/31/22  0603 03/30/22  1714 03/30/22  1708   PH  --   --  7.45  --  7.53* 7.49*  --  7.44   PCO2  --   --  31*  --  27* 27*  --  31*   PO2  --   --  117*  --  92 143*  --  87   HCO3  --   --  21  --  22 21  --  21   O2PER 40 40 40 40 30 30   < > 80    < > = values in this interval not displayed.       Current Vent Settings: Vent Mode: CMV/AC  (Continuous Mandatory Ventilation/ Assist Control)  FiO2 (%): 40 %  Resp Rate (Set): 14 breaths/min  Tidal Volume (Set, mL): 450 mL  PEEP (cm H2O): 5 cmH2O  Resp: 14      Skin Assessment: Skin intact     Plan: Continue full vent support and wean as tolerated.     Natividad Camp, RT

## 2022-04-03 NOTE — PLAN OF CARE
Neuro: Patient opens eyes gaze upward, does not track does not fallow commands.. PERRL, cough, no gag, withdraw to pain BLE slightly.Continuous EEG.   CV:  SR, HR 70s, SBP <140, MAP > 65  Pulmonary: LSC, moderate tan creamy bronchial secretions, small amount of crepitus upper chest, CT x 3 no airleak -20mmHg thinning to serosang drainage.  GI/: tolerating meds via OG with minimal residuals, rare BS, abd soft have BM x 1  this shift, Cooper catheter with adequate UOP  INTEG: R and L  nares without any obvious bleeding, [place] remain in place.  Continue with plan of care.

## 2022-04-03 NOTE — PROGRESS NOTES
[facility] ICU RESPIRATORY NOTE        Date of Admission: 3/27/2022    Date of Intubation (most recent): 03/30/22     Reason for Mechanical Ventilation: Airway protection    Number of Days on Mechanical Ventilation: 5    Met Criteria for Spontaneous Breathing Trial: No    Reason for No Spontaneous Breathing Trial: Per MD    Significant Events Today: None    ABG Results:   Recent Labs   Lab 04/01/22  0416 03/31/22  2341 03/31/22  2115 03/31/22  1605 03/31/22  1244 03/31/22  0603 03/30/22  1714 03/30/22  1708   PH  --   --  7.45  --  7.53* 7.49*  --  7.44   PCO2  --   --  31*  --  27* 27*  --  31*   PO2  --   --  117*  --  92 143*  --  87   HCO3  --   --  21  --  22 21  --  21   O2PER 40 40 40 40 30 30   < > 80    < > = values in this interval not displayed.       Current Vent Settings: Vent Mode: CMV/AC  (Continuous Mandatory Ventilation/ Assist Control)  FiO2 (%): 40 %  Resp Rate (Set): 14 breaths/min  Tidal Volume (Set, mL): 450 mL  PEEP (cm H2O): 5 cmH2O  Resp: 13      Skin Assessment: Clean, dry, intact    Plan: Continue full vent support and wean as tolerated    Stephanie Griffiths

## 2022-04-03 NOTE — PROGRESS NOTES
"      [facility]    Neurocritical Care Progress Note    Interval EventsNo major change in neuro exam. Continues to be intubated and not sedated. EEG still ongoing- read this morning indicates no seizures, continued mod-severe diffuse encephalopathy, probably again some minor improvement In background since Saturday  No specific events overnight.    HPI Summary  51 year old female admitted on 3/27/2022 for chest pain with workup demonstrating severe multivessel CAD.  Patient underwent CABG x4.  POD#0 patient had a PEA arrest with ROSC obtained at 10 minutes.  Emergent coronary cath demonstrated radial artery occlusion.  A DANA was placed into the RCA and balloon angioplasty of the RPLB was performed with excellent completion angiography flow.    Impression   1. Encephalopathy s/p cardiac arrest. No explanation for her encephalopathy seen on brain MRI  2. A few incidental areas of restricted diffusion are seen on brain MRI which are common after catheter-based coronary angiography  3. Mild leukocytosis without fever    Plan  - Continue continuous EEG, keep off antiseizure medications   - No specific additional workup for the MRI brain findings. However due to her continued severe encephalopathy, will recheck brain MRI with coronal diffusion cuts through brain stem as well as MRI c-spine  - Maintain normothermia, normoglycemia, normonatremia  - Continue supportive care  - Continue neuro checks q4 hours      We will continue to follow.     Ni George PA-C  Neurology  04/03/2022 4:42 PM  To page me or covering stroke neurology team member, click here: AMCOM   Choose \"On Call\" tab at top, then search dropdown box for \"Neurology Adult\", select location, press Enter, then look for stroke/neuro ICU/telestroke.    ______________________________________________________    Clinically Significant Risk Factors Present on Admission                  Medications   Scheduled Meds    [START ON 4/8/2022] " amiodarone  200 mg Oral Daily     amiodarone  400 mg Oral BID     amLODIPine  2.5 mg Oral Daily     aspirin  81 mg Oral or NG Tube Daily     atorvastatin  80 mg Oral Daily     carvedilol  6.25 mg Oral BID w/meals     chlorhexidine  15 mL Mouth/Throat Q12H     furosemide  40 mg Intravenous Q8H     heparin ANTICOAGULANT  5,000 Units Subcutaneous Q8H     insulin aspart  1-6 Units Subcutaneous Q4H     lactated ringers  250 mL Intravenous Once     lactated ringers  250 mL Intravenous Once     multivitamins w/minerals  15 mL Per Feeding Tube Daily     mupirocin  0.5 g Both Nostrils BID     pantoprazole  40 mg Oral or NG Tube Daily    Or     pantoprazole  40 mg Oral Daily     perflutren diluted 1mL to 2mL with saline  9 mL Intravenous Once     polyethylene glycol  17 g Oral Daily     senna-docusate  1 tablet Oral BID     sodium chloride (PF)  10 mL Intracatheter Q8H     sodium chloride (PF)  10 mL Intravenous Once     sodium chloride (PF)  3 mL Intracatheter Q8H     ticagrelor  90 mg Oral BID       Infusion Meds    - MEDICATION INSTRUCTIONS -       Percutaneous Coronary Intervention orders placed (this is information for BPA alerting)       sodium chloride Stopped (04/01/22 1200)       PRN Meds  acetaminophen **OR** acetaminophen, bisacodyl, glucose **OR** dextrose **OR** glucagon, hydrALAZINE, HYDROmorphone **OR** HYDROmorphone, ipratropium - albuterol 0.5 mg/2.5 mg/3 mL, lidocaine 4%, lidocaine (buffered or not buffered), magnesium hydroxide, - MEDICATION INSTRUCTIONS -, melatonin, methocarbamol, naloxone **OR** naloxone **OR** naloxone **OR** naloxone, ondansetron **OR** ondansetron, oxyCODONE **OR** oxyCODONE, Percutaneous Coronary Intervention orders placed (this is information for BPA alerting), sodium chloride (PF)       PHYSICAL EXAMINATION  Temp:  [98.2  F (36.8  C)-99.7  F (37.6  C)] 99.1  F (37.3  C)  Pulse:  [68-80] 78  Resp:  [11-22] 14  BP: ()/(52-97) 98/53  FiO2 (%):  [40 %] 40 %  SpO2:  [98 %-100 %]  100 %      Neurologic (Per Dr. Chawla's report)  Mental Status:  does not follow commands. eyes open with light touch stimulation. , slight head turning to side of noxious  Cranial Nerves:  PERRL, corneal reflexes brisk bilaterally, does not protrude tongue, does not blink to threat even with eyes widely open, does not respond to shout, oculocephalic maneuver not tested today  Motor:  no withdraw noted to noxious stimulus in any extremity, but she does have triple flexion in the legs to noxious  Reflexes:  toes mute  Sensory:  as above to noxious, see motor section  Coordination:  deferred  Station/Gait:  unable to test due to intubation      Imaging  I personally reviewed all imaging; relevant findings per HPI.     Lab Results Data   CBC  Recent Labs   Lab 04/03/22 0408 04/02/22 0355 04/01/22 0416   WBC 12.5* 16.4* 20.3*   RBC 3.13* 2.99* 3.21*   HGB 7.9* 7.7* 8.0*   HCT 26.0* 24.7* 26.2*    207 198     Basic Metabolic Panel    Recent Labs   Lab 04/03/22  1548 04/03/22  1156 04/03/22  1155 04/03/22  0808 04/03/22  0408 04/02/22  0357 04/02/22 0355 04/01/22 0419 04/01/22 0416   NA  --   --   --   --  144  --  145*  --  146*   POTASSIUM  --  3.7  --   --  3.5  --  3.6   < > 3.7   CHLORIDE  --   --   --   --  111*  --  115*  --  118*   CO2  --   --   --   --  30  --  25  --  23   BUN  --   --   --   --  22  --  18  --  11   CR  --   --   --   --  0.96  --  0.76  --  0.79   *  --  128* 129* 145*   < > 135*   < > 151*   TOMAS  --   --   --   --  8.6  --  8.8  --  8.5    < > = values in this interval not displayed.     Liver Panel  Recent Labs   Lab 04/03/22  0408 04/02/22 0355 04/01/22 0416   PROTTOTAL 6.2* 5.7* 5.5*   ALBUMIN 2.3* 2.2* 2.3*   BILITOTAL 0.2 0.2 0.4   ALKPHOS 108 72 65   AST 26 27 47*   ALT 52* 45 50     INR    Recent Labs   Lab Test 03/31/22  0022 03/30/22  1622 03/30/22  1445   INR 1.37* 1.24* 1.44*      Lipid Profile    Recent Labs   Lab Test 03/27/22  1028   CHOL 172   HDL 57    *   TRIG 47     A1C    Recent Labs   Lab Test 03/28/22  0600   A1C 5.7*     Troponin I  No results for input(s): TROPONIN, GHTROP in the last 168 hours.

## 2022-04-03 NOTE — PROGRESS NOTES
Critical Care Progress Note      04/03/2022    Name: Angelica Robledo MRN#: [id number]   Age: 51 year old YOB: 1970     Hsptl Day# 7  ICU DAY #4    MV DAY #4           Problem List:   Active Problems:    NSTEMI (non-ST elevated myocardial infarction) (H)    Hypertensive emergency    Paroxysmal ventricular tachycardia (H)         Summary/Hospital Course:   Angelica Robledo is a 51 year old female with pertinent PMHx of prior MI at age 24-25 (in 1996) s/p angioplasty, HTN, anemia, and NSTEMI.  She presented on 3/27 with new onset chest pain similar to her previous MI.  Workup was consistent with NSTEMI.  Cardiac cath demonstrated severe multivessel CAD.  She was seen by CT surgery and CABG was scheduled for 3/30.  She underwent CABG x4 left radial, LIMA, left saph vein x2 (FAUSTINO taken down, but elected to not be used due to size).    No family history of clotting disorders.  No known history of DVT/PE/stroke. Siblings without cardiovascular disease. Father passed away of cardiac causes at an advanced age.  Mother had cardiac disease as well as an aortic aneurysm, but not at a young age.     4V CABG as follows: left radial artery graft to the right posterior descending coronary artery, reversed saphenous vein graft to the obtuse marginal branch of the left circumflex coronary artery, reversed saphenous vein graft to the first diagonal branch of the left anterior descending coronary artery, and pedicled left internal mammary artery to left anterior descending coronary.    POD 0 - initial hypoxia resolved with vent changes.  At 2355 patient suffered a PEA and Vfib arrest and underwent 3 shocks, 2 rounds of epi, and 1 round of CPR. ROSC obtained at 0005.  300 mg amiodarone bolus administered.  STAT cardiac cath lab performed demonstrating LIMA-LAD, VG-LADD1, and VG-LPLB were patent. The left radial artery to RDPA graft had an occlusion.  The mid RCA was treated with a DANA stent and a balloon angioplasty performed  at the RPLB.  Excellent flow was noted on completion coronary angiogram.  Echo demonstrated a drop in LVEF to 40-45% with aspects of hypokinesis. RV demonstrated mild-moderate RV function reduction.    POD1 - weaned from sedation with minimal arousal limited to briefly opening eyes to pain and occasionally sound, minimal extremity pain response.  CT head/CTA head and neck unremarkable for acute insult.  Ceribell placed overnight.    POD - 2-4 no major issues, awaiting improvement in mental status for extubation   Art line removed      Assessment and plan :     Angelica Robledo IS a 51 year old female admitted on 3/27/2022 for chest pain with workup demonstrating severe multivessel CAD.  Patient underwent CABG x4.  POD#0 patient had a PEA arrest with ROSC obtained at 10 minutes.  Emergent coronary cath demonstrated radial artery occlusion.  A DANA was placed into the RCA and balloon angioplasty of the RPLB was performed with excellent completion angiography flow.     I have personally reviewed the daily labs, imaging studies, cultures and discussed the case with referring physician and consulting physicians.     My assessment and plan by system for this patient is as follows:    Neurology/Psychiatry:   1. Analgesia -- tylenol, prn narcotics - minimize narcotics during phase of trying to allow her to be more alert  2. Sedation -- hold all sedation  3. Encephalopathy - head CT negative -- continuous EEG   - MRI today... checking spinal cord and brainstem for ischemia   - appreciate NCC recommendations and assistance     Cardiovascular:   1.  S/p CABG x4 - left radial, LIMA, left saph vein x2 (FAUSTINO taken down, but elected to not be used due to size) -- ASA, statin  2. POD#0 PEA and Vfib arrest -- s/p cardiac cath demonstrated radial artery graft to RPDA occlusion -- intervention with DANA to RCA and balloon angioplasty RPLA -- on Kangrelor 3/31, now transitioned to aspirin and Brilinta with discussion per CV surgery and  interventional cardiology  -- okay from ICU standpoint to discontinue amiodarone gtt as etiology seems resolved - pending discussion with cards  2. Continue nicardipine until CV surgery clears for discontinuation  3. Hx of MI at age 24-25  4. HTN - likely untreated prior to hospitalization - presented with hypertension emergency - nicardipine on for radial artery graft bypass and for treatment goal SBP<140, MAP >65.     Pulmonary/Ventilator Management:   1. Airway -- intubated -- gas exchange uncomplicated  2. No underlying pulmonary problems on medical history or cxr.   3. Remains intubated due to mental status/airway protection     GI and Nutrition :   1.  enteral feeds per OGT  2. Acid suppression for PUD prophy     Renal/Fluids/Electrolytes:   1. Monitor UOP/creatinine post-op -- edema -- diuresis today  2. Replete electrolytes PRN per protocol  3. IVF and albumin administration for volume prn per protocol  4. Cooper indicated for accurate I/Os     Infectious Disease:   1. UTI with E coli on admission -- not fully treated at the time. Consider rechecking if Febrile or WBC increasing.      Endocrine:   1. Monitor for stress induced hyperglycemia. ICU insulin protocol, goal sugar <180   2. Incidental thyroid nodule -- follow-up outpatient per discharge referral     Hematology/Oncology:   1. Chronic anemia -- Hgb/plts stable post op.  pRBC for goal hgb 8.0 or as indicated by CV surgery   - heme/onc consulted for hypercoag workup (given early graft failure)     IV/Access:   1. Venous access - CVC  2. Arterial access - femoral  - central access required and necessary      ICU Prophylaxis:   1. DVT: Hep Subq/mechanical  2. VAP: HOB 30 degrees, chlorhexidine rinse  3. Stress Ulcer: H2 blocker  4. Restraints: Nonviolent soft two point restraints required and necessary for patient safety and continued cares and good effect as patient continues to pull at necessary lines, tubes despite education and distraction. Will  readdress daily.   5. Wound care - per unit routine   6. Feeding - enteral feeds  7. Family Update: bedside  8. Disposition - ICU      I have personally reviewed the daily labs, imaging studies, cultures and discussed the case with referring physician and consulting physicians.     This patient is critically ill and I have provided 35 minutes of critical care time (excluding procedures) on 04/03/2022    Silverio Hi MD  Pulmonary & Critical Care Instructor  Department of Medicine  Division of Pulmonary, Allergy, Critical Care and Sleep Medicine   [facility]  [phone] (Text Page)       Key goals for next 24 hours:   1. EEG shows diffuse encephalopathy, improving  2. Continue serial neuro checks  3. MRI spine and brainstem         Interim History:   Patient doing similar to yesterday  No changes overnight         Key Medications:       [START ON 4/8/2022] amiodarone  200 mg Oral Daily     amiodarone  400 mg Oral BID     amLODIPine  2.5 mg Oral Daily     aspirin  81 mg Oral or NG Tube Daily     atorvastatin  80 mg Oral Daily     carvedilol  6.25 mg Oral BID w/meals     chlorhexidine  15 mL Mouth/Throat Q12H     furosemide  40 mg Intravenous Q8H     heparin ANTICOAGULANT  5,000 Units Subcutaneous Q8H     insulin aspart  1-6 Units Subcutaneous Q4H     lactated ringers  250 mL Intravenous Once     lactated ringers  250 mL Intravenous Once     multivitamins w/minerals  15 mL Per Feeding Tube Daily     mupirocin  0.5 g Both Nostrils BID     pantoprazole  40 mg Oral or NG Tube Daily    Or     pantoprazole  40 mg Oral Daily     perflutren diluted 1mL to 2mL with saline  9 mL Intravenous Once     polyethylene glycol  17 g Oral Daily     senna-docusate  1 tablet Oral BID     sodium chloride (PF)  10 mL Intracatheter Q8H     sodium chloride (PF)  10 mL Intravenous Once     sodium chloride (PF)  3 mL Intracatheter Q8H     ticagrelor  90 mg Oral BID       - MEDICATION INSTRUCTIONS -        Percutaneous Coronary Intervention orders placed (this is information for BPA alerting)       sodium chloride Stopped (04/01/22 1200)               Physical Examination:   Temp:  [98.2  F (36.8  C)-99.9  F (37.7  C)] 98.6  F (37  C)  Pulse:  [68-75] 75  Resp:  [11-16] 16  BP: ()/(52-97) 140/78  MAP:  [82 mmHg-94 mmHg] 88 mmHg  Arterial Line BP: (122-138)/(60-69) 130/64  FiO2 (%):  [40 %] 40 %  SpO2:  [93 %-100 %] 100 %    Intake/Output Summary (Last 24 hours) at 3/31/2022 0921  Last data filed at 3/31/2022 0700  Gross per 24 hour   Intake 3676.54 ml   Output 2825 ml   Net 851.54 ml     Wt Readings from Last 4 Encounters:   04/03/22 99 kg (218 lb 4.1 oz)     Arterial Line BP: (122-138)/(60-69) 130/64  MAP:  [82 mmHg-94 mmHg] 88 mmHg  BP - Mean:  [] 99  Vent Mode: CMV/AC  (Continuous Mandatory Ventilation/ Assist Control)  FiO2 (%): 40 %  Resp Rate (Set): 14 breaths/min  Tidal Volume (Set, mL): 450 mL  PEEP (cm H2O): 5 cmH2O  Resp: 16    Recent Labs   Lab 04/01/22  0416 03/31/22  2341 03/31/22  2115 03/31/22  1605 03/31/22  1244 03/31/22  0603 03/30/22  1714 03/30/22  1708   PH  --   --  7.45  --  7.53* 7.49*  --  7.44   PCO2  --   --  31*  --  27* 27*  --  31*   PO2  --   --  117*  --  92 143*  --  87   HCO3  --   --  21  --  22 21  --  21   O2PER 40 40 40 40 30 30   < > 80    < > = values in this interval not displayed.       GEN: no acute distress   HEENT: head ncat, sclera anicteric, ETT present   PULM: unlabored, synchronous with vent, clear anteriorly    CV/Chest: RRR, S1S2 no gallop,  No rub, no murmur appreciated. Midline dressing CDI. CTs: low serosang output, no air leak.   ABD: soft, nontender  EXT:  Edema,  warm  NEURO: Off sedation. Opens eyes to stimulus, occasionally sound. Pupillary light reflex brisk. No focal signs appreciated. Subtle BLE response to pain stimulus.  : nathan present, clear urine output  SKIN: no obvious rash  LINES: clean, dry intact         Data:   All data and imaging  reviewed     ROUTINE ICU LABS (Last four results)  CMP  Recent Labs   Lab 04/03/22  0808 04/03/22  0408 04/02/22  2329 04/02/22  1929 04/02/22  0357 04/02/22  0355 04/01/22  1811 04/01/22  1648 04/01/22  0419 04/01/22  0416 03/31/22  2158 03/31/22  2108 03/31/22  1420 03/31/22  1243   NA  --  144  --   --   --  145*  --   --   --  146*  --   --   --  144   POTASSIUM  --  3.5  --   --   --  3.6  --  3.8  --  3.7  --  3.8  --  3.4   CHLORIDE  --  111*  --   --   --  115*  --   --   --  118*  --   --   --  115*   CO2  --  30  --   --   --  25  --   --   --  23  --   --   --  22   ANIONGAP  --  3  --   --   --  5  --   --   --  5  --   --   --  7   * 145* 106* 135*   < > 135*   < >  --    < > 151*   < >  --    < > 178*   BUN  --  22  --   --   --  18  --   --   --  11  --   --   --  9   CR  --  0.96  --   --   --  0.76  --   --   --  0.79  --   --   --  0.73   GFRESTIMATED  --  72  --   --   --  >90  --   --   --  >90  --   --   --  >90   TOMAS  --  8.6  --   --   --  8.8  --   --   --  8.5  --   --   --  8.8   MAG  --  2.2  --   --   --  2.2  --  2.3  --  2.0  --   --   --   --    PHOS  --  3.7  --   --   --  3.6  --   --   --  3.5  --  3.6  --  3.2   PROTTOTAL  --  6.2*  --   --   --  5.7*  --   --   --  5.5*  --   --   --  5.6*   ALBUMIN  --  2.3*  --   --   --  2.2*  --   --   --  2.3*  --   --   --  2.4*   BILITOTAL  --  0.2  --   --   --  0.2  --   --   --  0.4  --   --   --  0.4   ALKPHOS  --  108  --   --   --  72  --   --   --  65  --   --   --  62   AST  --  26  --   --   --  27  --   --   --  47*  --   --   --  89*   ALT  --  52*  --   --   --  45  --   --   --  50  --   --   --  59*    < > = values in this interval not displayed.     CBC  Recent Labs   Lab 04/03/22  0408 04/02/22  0355 04/01/22  0416 03/31/22  2341 03/31/22  1243   WBC 12.5* 16.4* 20.3*  --  17.8*   RBC 3.13* 2.99* 3.21*  --  3.26*   HGB 7.9* 7.7* 8.0* 8.2* 8.2*   HCT 26.0* 24.7* 26.2*  --  25.9*   MCV 83 83 82  --  79   MCH 25.2* 25.8*  24.9*  --  25.2*   MCHC 30.4* 31.2* 30.5*  --  31.7   RDW 18.3* 18.4* 18.1*  --  18.1*    207 198  --  190     INR  Recent Labs   Lab 03/31/22  0022 03/30/22  1622 03/30/22  1445   INR 1.37* 1.24* 1.44*     Arterial Blood Gas  Recent Labs   Lab 04/01/22  0416 03/31/22  2341 03/31/22  2115 03/31/22  1605 03/31/22  1244 03/31/22  0603 03/30/22  1714 03/30/22  1708   PH  --   --  7.45  --  7.53* 7.49*  --  7.44   PCO2  --   --  31*  --  27* 27*  --  31*   PO2  --   --  117*  --  92 143*  --  87   HCO3  --   --  21  --  22 21  --  21   O2PER 40 40 40 40 30 30   < > 80    < > = values in this interval not displayed.       All cultures:  No results for input(s): CULT in the last 168 hours.  No results found for this or any previous visit (from the past 24 hour(s)).

## 2022-04-03 NOTE — PROGRESS NOTES
EEG CLINICAL NEUROPHYSIOLOGY PRELIMINARY REPORT    Video EEG through approximately 9 AM reviewed.  At times rhythmic moderate amplitude delta interrupted by periods of severe attenuation.  At other times, irregular polymorphic theta and delta.  EEG is reactive.  Epileptiform discharges and seizures not seen.  No periodic activity.  Stimulus induced rhythmic periodic discharges not seen.    Study indicates a varying degrees of encephalopathy, sometimes moderate-to-severe, sometimes severe.  EEG is reactive.  There is some mild improvement since yesterday's study.  No indications of seizures or seizure tendency.    Full report to follow.    Trey Sutherland MD  Contact information for physicians covering Epilepsy and EEG is available on [facility].  Click search, enter neurology adult/[facility] in group name box, click on neurology adult/[facility], then click Staff Epilepsy and EEG.

## 2022-04-03 NOTE — PROGRESS NOTES
[facility] ICU RESPIRATORY NOTE  Date of Admission: 3/27/2022     Date of Intubation (most recent): 03/30/22      Reason for Mechanical Ventilation: Airway protection     Number of Days on Mechanical Ventilation: 5     Met Criteria for Spontaneous Breathing Trial: No     Reason for No Spontaneous Breathing Trial: Per MD       ABG Results:   Recent Labs   Lab 04/01/22  0416 03/31/22  2341 03/31/22  2115 03/31/22  1605 03/31/22  1244 03/31/22  0603 03/30/22  1714 03/30/22  1708   PH  --   --  7.45  --  7.53* 7.49*  --  7.44   PCO2  --   --  31*  --  27* 27*  --  31*   PO2  --   --  117*  --  92 143*  --  87   HCO3  --   --  21  --  22 21  --  21   O2PER 40 40 40 40 30 30   < > 80    < > = values in this interval not displayed.       Current Vent Settings: Vent Mode: CMV/AC  (Continuous Mandatory Ventilation/ Assist Control)  FiO2 (%): 40 %  Resp Rate (Set): 14 breaths/min  Tidal Volume (Set, mL): 450 mL  PEEP (cm H2O): 5 cmH2O  Resp: 14      Plan: Continue full ventilator support and assess weaning readiness in the morning.    Chris Gutierrez, RT  4/3/2022

## 2022-04-03 NOTE — PROGRESS NOTES
[facility]  Cardiovascular and Thoracic Surgery Daily Note          Assessment and Plan:   sepideh Robledo is a 51 year old female with history of MI at age 25 s/p angioplasty who presented to the hospital with hypertensive emergency and NSTEMI. Coronary angio demonstrated severe multivessel CAD. Echocardiogram preoperatively with preserved biventricular function.    PMH includes: NSTEMI, CAD with previous coronary stents, HTN    Course has been complicated by PEA arrest with ventricular fibrillation on POD 0 with ROSC after defibrillation x3, 2 mg epinephrine and one round of CPR. Was taken to cath lab where radial artery graft was found to be occluded. PCI with DANA placement to mid RCA and angioplasty of the rPLB was performed. Echocardiogram at the conclusion of PCI demonstrated LVEF 40-45% with mild to moderate global hypokinesis of the left ventricle with severe hypokinesis of the mid anterior and anteroseptal walls, the entire inferior wall and all apical segments of the left ventricle; no pericardial effusion. Additionally, due to antiplatelet therapy post PCI, patient developed epistaxis during attempted NG placement requiring placement of b/l rhino rockets. Now, diffuse severe encephalopathy with minimal neurologic response.     POD #4 s/p:  1.  Coronary artery bypass grafting x 4 (radial artery graft to the right posterior descending coronary artery, reversed saphenous vein graft to the obtuse marginal branch of the left circumflex coronary artery, reversed saphenous vein graft to the first diagonal branch of the left anterior descending coronary artery, and pedicled left internal mammary artery to left anterior descending coronary artery).  2.  Endoscopic vein harvest of the greater saphenous vein from the left lower extremity.  3.  Endoscopic left radial artery harvest on 3/30/22 with Dr. Butterfield.    CVS:   NSTEMI and severe multivessel CAD s/p CABG x4 with radial grafts as  above  HTN  PEA and Vfib arrest s/p ROSC and DANA to mid RCA and angioplasty of the rPLB on 3/31  Currently on nicardipine gtt to maintain ordered hemodynamic parameters; NSR rates 70s  - Goal MAP >65, SBP <140  - Amlodipine 2.5 mg daily for radial graft (at least 3-6 months)  - Continue carvedilol 12.5 mg BID  - PRN hydralazine available  - Brilinta load and daily per Cardiology  - Transitioned amiodarone gtt to PO taper per EP/Cards recs  - ASA 81 daily  - Atorvastatin 80 mg daily  - CTs in to suction, will remove today, 4/3. TPWs removed 4/1    Resp:   Postoperative ventilator management  - Current vent settings:  Vent Mode: CMV/AC  (Continuous Mandatory Ventilation/ Assist Control)  FiO2 (%): 40 %  Resp Rate (Set): 14 breaths/min  Tidal Volume (Set, mL): 450 mL  PEEP (cm H2O): 5 cmH2O  Resp: 13  - Currently needs intubation while neuro status as below, oxygenating well on minimal settings  - Titrate O2/peep to maintain sats >92%  - CXR stable    Neuro:    Acute postoperative pain  Hx migraines  Encephalopathy   Did receive CPR on 3/31, will likely have pain from this also  Propofol turned off on 3/31 at 9 am, starting to open eyes more, unable to move extremities but withdraws BLE to pain, nothing in BUEs yet. CT head 3/31 negative for hemorrhage; CTA negative for strokes, vessels patent; per neurology, MRI stable w/o acute concern to explain clinical status.  EEG without e/o seizure activity, continues to have severe diffuse encephalopathy   - PRN dilaudid available  - PRN oxycodone and methocarbamol available  - Scheduled tylenol   - Neuro critical care consulted and following, greatly appreciate recommendations  - Plan for MRI/c-spine/spine today 4/3 given no upper extremity tone/movement    Renal/Electrolyte:   Lactic acidosis, resolved  Volume overload  Creat stable, up  lbs from preop, +edema; negative 2.5 L past 24h  Na 144, K+3.5  - Strict I/O, daily weights  - Avoid/limit nephrotoxins as able  - Replete  lytes per protocol  - Continue Lasix to 40 mg IV q8h goal negative ~1-2L/24h    GI/Nutrition:    Epistaxis, resolved  Elevated LFTs, likely shock liver 2/2 arrest, resolved  Orders Placed This Encounter      NPO for Medical/Clinical Reasons Except for: Other; Specify: NPO until Extubated      Advance Diet as Tolerated: Clear Liquid Diet  - OG in place, no e/o blood in output  - Will need to secure NJ placement, plan for radiology on 4/4  - Nutrition following, cautiously increasing enteral feeds  - Rhino rockets removed, continues to have some oozing from b/l nares  - Continue bowel regimen, no BM yet  - PPI    :    - Cooper in place for strict I/O  - Currently having menses    Endo:  Stress induced hyperglycemia  Thyroid nodule   Preop A1c   Lab Results   Component Value Date    A1C 5.7 03/28/2022   - Transitioned to sliding scale insulin   - Goal BG <180 for optimal healing  - Thyroid nodule noted on CT on 3/31, will have this worked up on an outpatient basis    ID:  Stress induced leukocytosis  E.Coli UTI preoperatively  WBC 12.5, Tmax 99.9; no s/sx infection  - Completed perioperative ABX  - Continue to follow fever curve, WBC and inflammatory markers as appropriate  - Repeat UA 4/1 without overt e/o infection    Heme:  Acute blood loss anemia  Acute blood loss thrombocytopenia  Hgb 7.9, Plts 245; no s/sx active bleeding  - CBC in AM  - Goal Hgb >7  - Consulted hematology given severe premature CAD and acute graft failure- appreciate recommendations. Multiple labs sent.    -Proph: PPI, subcutaneous heparin, SCDs  -Dispo: Continue in ICU          Interval History:   No acute events overnight. No neuro changes noted today. Up in chair.         Medications:       [START ON 4/8/2022] amiodarone  200 mg Oral Daily     amiodarone  400 mg Oral BID     amLODIPine  2.5 mg Oral Daily     aspirin  81 mg Oral or NG Tube Daily     atorvastatin  80 mg Oral Daily     carvedilol  6.25 mg Oral BID w/meals     chlorhexidine  15  "mL Mouth/Throat Q12H     furosemide  40 mg Intravenous Q8H     heparin ANTICOAGULANT  5,000 Units Subcutaneous Q8H     insulin aspart  1-6 Units Subcutaneous Q4H     lactated ringers  250 mL Intravenous Once     lactated ringers  250 mL Intravenous Once     multivitamins w/minerals  15 mL Per Feeding Tube Daily     mupirocin  0.5 g Both Nostrils BID     pantoprazole  40 mg Oral or NG Tube Daily    Or     pantoprazole  40 mg Oral Daily     perflutren diluted 1mL to 2mL with saline  9 mL Intravenous Once     polyethylene glycol  17 g Oral Daily     senna-docusate  1 tablet Oral BID     sodium chloride (PF)  10 mL Intracatheter Q8H     sodium chloride (PF)  10 mL Intravenous Once     sodium chloride (PF)  3 mL Intracatheter Q8H     ticagrelor  90 mg Oral BID             Physical Exam:   Vitals were reviewed  Blood pressure 135/81, pulse 75, temperature 98.8  F (37.1  C), resp. rate 13, height 1.651 m (5' 5\"), weight 99 kg (218 lb 4.1 oz), last menstrual period 02/27/2022, SpO2 99 %.  Rhythm: NSR    Lungs: CTA anterior, synchronous with ventilator, minimal vent support    Cardiovascular: S1, S2, RRR, distant, no m/r/g    Abdomen: S/ND, OG in place    Extremeties: + edema, + pitting, warm and well perfused    Incision: CDI    CT: to suction, no air leak, no crepitus    Weight:   Vitals:    03/29/22 0500 03/30/22 0600 04/01/22 0430 04/02/22 0500   Weight: 95.8 kg (211 lb 3.2 oz) 95.7 kg (211 lb) 96.9 kg (213 lb 10 oz) 101.7 kg (224 lb 3.3 oz)    04/03/22 0429   Weight: 99 kg (218 lb 4.1 oz)            Data:   Labs:   Lab Results   Component Value Date    WBC 15.8 03/31/2022     Lab Results   Component Value Date    RBC 3.15 03/31/2022     Lab Results   Component Value Date    HGB 8.0 03/31/2022     Lab Results   Component Value Date    HCT 25.5 03/31/2022     No components found for: MCT  Lab Results   Component Value Date    MCV 81 03/31/2022     Lab Results   Component Value Date    MCH 25.4 03/31/2022     Lab Results "   Component Value Date    MCHC 31.4 03/31/2022     Lab Results   Component Value Date    RDW 17.7 03/31/2022     Lab Results   Component Value Date     03/31/2022       Last Basic Metabolic Panel:  Lab Results   Component Value Date     03/31/2022      Lab Results   Component Value Date    POTASSIUM 3.2 03/31/2022     Lab Results   Component Value Date    CHLORIDE 116 03/31/2022     Lab Results   Component Value Date    TOMAS 8.7 03/31/2022     Lab Results   Component Value Date    CO2 20 03/31/2022     Lab Results   Component Value Date    BUN 8 03/31/2022     Lab Results   Component Value Date    CR 0.81 03/31/2022     Lab Results   Component Value Date     03/31/2022     03/31/2022       Imaging:  No results found for this or any previous visit (from the past 24 hour(s)).     Rounded and discussed with Dr. Babita Tenorio, APRN, ACNPC-AG, CCRN  Nurse Practitioner  Cardiothoracic Surgery  Pager: [phone]  [facility]

## 2022-04-04 ENCOUNTER — APPOINTMENT (OUTPATIENT)
Dept: GENERAL RADIOLOGY | Facility: CLINIC | Age: 52
End: 2022-04-04
Attending: NURSE PRACTITIONER
Payer: COMMERCIAL

## 2022-04-04 ENCOUNTER — HOSPITAL ENCOUNTER (INPATIENT)
Dept: NEUROLOGY | Facility: CLINIC | Age: 52
Discharge: HOME | End: 2022-04-04
Attending: NURSE PRACTITIONER
Payer: COMMERCIAL

## 2022-04-04 LAB
AMMONIA PLAS-SCNC: 20 UMOL/L (ref 10–50)
ANION GAP SERPL CALCULATED.3IONS-SCNC: 4 MMOL/L (ref 3–14)
B2 GLYCOPROT1 IGG SERPL IA-ACNC: <0.8 U/ML
B2 GLYCOPROT1 IGM SERPL IA-ACNC: <2.4 U/ML
BUN SERPL-MCNC: 25 MG/DL (ref 7–30)
CALCIUM SERPL-MCNC: 9.2 MG/DL (ref 8.5–10.1)
CARDIOLIPIN IGG SER IA-ACNC: <2 GPL-U/ML
CARDIOLIPIN IGG SER IA-ACNC: NEGATIVE
CARDIOLIPIN IGM SER IA-ACNC: <2 MPL-U/ML
CARDIOLIPIN IGM SER IA-ACNC: NEGATIVE
CHLORIDE BLD-SCNC: 111 MMOL/L (ref 94–109)
CO2 SERPL-SCNC: 29 MMOL/L (ref 20–32)
CREAT SERPL-MCNC: 1 MG/DL (ref 0.52–1.04)
DRVVT CONFIRM NORMALIZED RATIO: 1.02
DRVVT SCREEN MIX RATIO: 1.12
DRVVT SCREEN RATIO: 1.27
ERYTHROCYTE [DISTWIDTH] IN BLOOD BY AUTOMATED COUNT: 18.2 % (ref 10–15)
GFR SERPL CREATININE-BSD FRML MDRD: 68 ML/MIN/1.73M2
GLUCOSE BLD-MCNC: 140 MG/DL (ref 70–99)
GLUCOSE BLDC GLUCOMTR-MCNC: 127 MG/DL (ref 70–99)
GLUCOSE BLDC GLUCOMTR-MCNC: 128 MG/DL (ref 70–99)
GLUCOSE BLDC GLUCOMTR-MCNC: 128 MG/DL (ref 70–99)
GLUCOSE BLDC GLUCOMTR-MCNC: 139 MG/DL (ref 70–99)
GLUCOSE BLDC GLUCOMTR-MCNC: 142 MG/DL (ref 70–99)
GLUCOSE BLDC GLUCOMTR-MCNC: 148 MG/DL (ref 70–99)
HCT VFR BLD AUTO: 27.7 % (ref 35–47)
HGB BLD-MCNC: 8.3 G/DL (ref 11.7–15.7)
INR PPP: 1.31 (ref 0.85–1.15)
LA PPP-IMP: NEGATIVE
LUPUS INTERPRETATION: ABNORMAL
MAGNESIUM SERPL-MCNC: 2.5 MG/DL (ref 1.6–2.3)
MCH RBC QN AUTO: 24.1 PG (ref 26.5–33)
MCHC RBC AUTO-ENTMCNC: 30 G/DL (ref 31.5–36.5)
MCV RBC AUTO: 81 FL (ref 78–100)
NSE SERPL IA-MCNC: 23.7 NG/ML
PHOSPHATE SERPL-MCNC: 4 MG/DL (ref 2.5–4.5)
PLATELET # BLD AUTO: 299 10E3/UL (ref 150–450)
PLATELET NEUTRALIZATION: -3 SECONDS
POTASSIUM BLD-SCNC: 3.6 MMOL/L (ref 3.4–5.3)
POTASSIUM BLD-SCNC: 4.3 MMOL/L (ref 3.4–5.3)
PROT C ACT/NOR PPP CHRO: 70 % (ref 70–170)
PROT S FREE AG ACT/NOR PPP IA: 63 % (ref 55–125)
PTT 1:2 MIX: 43 SECONDS (ref 31–45)
PTT RATIO: 1.21
RBC # BLD AUTO: 3.44 10E6/UL (ref 3.8–5.2)
SARS-COV-2 RNA RESP QL NAA+PROBE: NEGATIVE
SODIUM SERPL-SCNC: 144 MMOL/L (ref 133–144)
THROMBIN TIME: 15.1 SECONDS (ref 13–19)
WBC # BLD AUTO: 10.3 10E3/UL (ref 4–11)

## 2022-04-04 PROCEDURE — 250N000013 HC RX MED GY IP 250 OP 250 PS 637: Performed by: SURGERY

## 2022-04-04 PROCEDURE — 84132 ASSAY OF SERUM POTASSIUM: CPT | Performed by: INTERNAL MEDICINE

## 2022-04-04 PROCEDURE — 250N000013 HC RX MED GY IP 250 OP 250 PS 637: Performed by: PHYSICIAN ASSISTANT

## 2022-04-04 PROCEDURE — 94003 VENT MGMT INPAT SUBQ DAY: CPT

## 2022-04-04 PROCEDURE — 999N000157 HC STATISTIC RCP TIME EA 10 MIN

## 2022-04-04 PROCEDURE — U0003 INFECTIOUS AGENT DETECTION BY NUCLEIC ACID (DNA OR RNA); SEVERE ACUTE RESPIRATORY SYNDROME CORONAVIRUS 2 (SARS-COV-2) (CORONAVIRUS DISEASE [COVID-19]), AMPLIFIED PROBE TECHNIQUE, MAKING USE OF HIGH THROUGHPUT TECHNOLOGIES AS DESCRIBED BY CMS-2020-01-R: HCPCS | Performed by: STUDENT IN AN ORGANIZED HEALTH CARE EDUCATION/TRAINING PROGRAM

## 2022-04-04 PROCEDURE — 250N000011 HC RX IP 250 OP 636: Performed by: SURGERY

## 2022-04-04 PROCEDURE — 250N000011 HC RX IP 250 OP 636: Performed by: NURSE PRACTITIONER

## 2022-04-04 PROCEDURE — 99207 PR SC NO CHARGE VISIT: CPT | Performed by: PSYCHIATRY & NEUROLOGY

## 2022-04-04 PROCEDURE — 36415 COLL VENOUS BLD VENIPUNCTURE: CPT | Performed by: INTERNAL MEDICINE

## 2022-04-04 PROCEDURE — 250N000013 HC RX MED GY IP 250 OP 250 PS 637: Performed by: NURSE PRACTITIONER

## 2022-04-04 PROCEDURE — 95714 VEEG EA 12-26 HR UNMNTR: CPT

## 2022-04-04 PROCEDURE — 80048 BASIC METABOLIC PNL TOTAL CA: CPT | Performed by: NURSE PRACTITIONER

## 2022-04-04 PROCEDURE — 999N000253 HC STATISTIC WEANING TRIALS

## 2022-04-04 PROCEDURE — 82140 ASSAY OF AMMONIA: CPT | Performed by: INTERNAL MEDICINE

## 2022-04-04 PROCEDURE — 99291 CRITICAL CARE FIRST HOUR: CPT | Mod: 24 | Performed by: ANESTHESIOLOGY

## 2022-04-04 PROCEDURE — 999N000040 HC STATISTIC CONSULT NO CHARGE VASC ACCESS

## 2022-04-04 PROCEDURE — 99291 CRITICAL CARE FIRST HOUR: CPT | Mod: 25 | Performed by: PSYCHIATRY & NEUROLOGY

## 2022-04-04 PROCEDURE — 250N000009 HC RX 250: Performed by: STUDENT IN AN ORGANIZED HEALTH CARE EDUCATION/TRAINING PROGRAM

## 2022-04-04 PROCEDURE — 95720 EEG PHY/QHP EA INCR W/VEEG: CPT | Performed by: PSYCHIATRY & NEUROLOGY

## 2022-04-04 PROCEDURE — 250N000013 HC RX MED GY IP 250 OP 250 PS 637: Performed by: INTERNAL MEDICINE

## 2022-04-04 PROCEDURE — 95700 EEG CONT REC W/VID EEG TECH: CPT | Performed by: PSYCHIATRY & NEUROLOGY

## 2022-04-04 PROCEDURE — 84100 ASSAY OF PHOSPHORUS: CPT | Performed by: NURSE PRACTITIONER

## 2022-04-04 PROCEDURE — 200N000001 HC R&B ICU

## 2022-04-04 PROCEDURE — 250N000009 HC RX 250: Performed by: NURSE PRACTITIONER

## 2022-04-04 PROCEDURE — 83735 ASSAY OF MAGNESIUM: CPT | Performed by: NURSE PRACTITIONER

## 2022-04-04 PROCEDURE — 999N000009 HC STATISTIC AIRWAY CARE

## 2022-04-04 PROCEDURE — 74340 X-RAY GUIDE FOR GI TUBE: CPT

## 2022-04-04 PROCEDURE — 85027 COMPLETE CBC AUTOMATED: CPT | Performed by: NURSE PRACTITIONER

## 2022-04-04 PROCEDURE — 999N000185 HC STATISTIC TRANSPORT TIME EA 15 MIN

## 2022-04-04 RX ORDER — LIDOCAINE 40 MG/G
CREAM TOPICAL
Status: DISCONTINUED | OUTPATIENT
Start: 2022-04-04 | End: 2022-04-09

## 2022-04-04 RX ORDER — CARBOXYMETHYLCELLULOSE SODIUM 5 MG/ML
1 SOLUTION/ DROPS OPHTHALMIC
Status: DISCONTINUED | OUTPATIENT
Start: 2022-04-04 | End: 2022-04-18 | Stop reason: HOSPADM

## 2022-04-04 RX ORDER — POLYETHYLENE GLYCOL 3350 17 G/17G
17 POWDER, FOR SOLUTION ORAL DAILY PRN
Status: DISCONTINUED | OUTPATIENT
Start: 2022-04-04 | End: 2022-04-18 | Stop reason: HOSPADM

## 2022-04-04 RX ORDER — FUROSEMIDE 10 MG/ML
20 INJECTION INTRAMUSCULAR; INTRAVENOUS EVERY 12 HOURS
Status: DISCONTINUED | OUTPATIENT
Start: 2022-04-04 | End: 2022-04-05

## 2022-04-04 RX ORDER — CARVEDILOL 6.25 MG/1
12.5 TABLET ORAL 2 TIMES DAILY WITH MEALS
Status: DISCONTINUED | OUTPATIENT
Start: 2022-04-04 | End: 2022-04-05

## 2022-04-04 RX ORDER — AMOXICILLIN 250 MG
1 CAPSULE ORAL
Status: DISCONTINUED | OUTPATIENT
Start: 2022-04-04 | End: 2022-04-18 | Stop reason: HOSPADM

## 2022-04-04 RX ORDER — POTASSIUM CHLORIDE 20MEQ/15ML
20 LIQUID (ML) ORAL ONCE
Status: COMPLETED | OUTPATIENT
Start: 2022-04-04 | End: 2022-04-04

## 2022-04-04 RX ORDER — LIDOCAINE HYDROCHLORIDE 20 MG/ML
JELLY TOPICAL ONCE
Status: COMPLETED | OUTPATIENT
Start: 2022-04-04 | End: 2022-04-04

## 2022-04-04 RX ORDER — POTASSIUM CHLORIDE 1.5 G/1.58G
20 POWDER, FOR SOLUTION ORAL 2 TIMES DAILY
Status: DISCONTINUED | OUTPATIENT
Start: 2022-04-04 | End: 2022-04-05

## 2022-04-04 RX ADMIN — TICAGRELOR 90 MG: 90 TABLET ORAL at 04:47

## 2022-04-04 RX ADMIN — INSULIN ASPART 1 UNITS: 100 INJECTION, SOLUTION INTRAVENOUS; SUBCUTANEOUS at 19:36

## 2022-04-04 RX ADMIN — HYDRALAZINE HYDROCHLORIDE 10 MG: 20 INJECTION INTRAMUSCULAR; INTRAVENOUS at 19:46

## 2022-04-04 RX ADMIN — LIDOCAINE HYDROCHLORIDE 4 ML: 20 JELLY TOPICAL at 15:16

## 2022-04-04 RX ADMIN — HYDROMORPHONE HYDROCHLORIDE 0.2 MG: 0.2 INJECTION, SOLUTION INTRAMUSCULAR; INTRAVENOUS; SUBCUTANEOUS at 19:46

## 2022-04-04 RX ADMIN — AMLODIPINE BESYLATE 2.5 MG: 2.5 TABLET ORAL at 09:00

## 2022-04-04 RX ADMIN — MUPIROCIN 0.5 G: 20 OINTMENT TOPICAL at 08:38

## 2022-04-04 RX ADMIN — MINERAL OIL AND PETROLATUM 3.5 G: 150; 830 OINTMENT OPHTHALMIC at 19:46

## 2022-04-04 RX ADMIN — HEPARIN SODIUM 5000 UNITS: 5000 INJECTION, SOLUTION INTRAVENOUS; SUBCUTANEOUS at 19:24

## 2022-04-04 RX ADMIN — CARVEDILOL 12.5 MG: 6.25 TABLET, FILM COATED ORAL at 17:00

## 2022-04-04 RX ADMIN — HEPARIN SODIUM 5000 UNITS: 5000 INJECTION, SOLUTION INTRAVENOUS; SUBCUTANEOUS at 03:23

## 2022-04-04 RX ADMIN — POTASSIUM CHLORIDE 20 MEQ: 1.5 POWDER, FOR SOLUTION ORAL at 08:59

## 2022-04-04 RX ADMIN — CHLORHEXIDINE GLUCONATE 15 ML: 1.2 RINSE ORAL at 08:36

## 2022-04-04 RX ADMIN — FUROSEMIDE 20 MG: 10 INJECTION, SOLUTION INTRAVENOUS at 17:56

## 2022-04-04 RX ADMIN — MINERAL OIL AND PETROLATUM 3.5 G: 150; 830 OINTMENT OPHTHALMIC at 23:46

## 2022-04-04 RX ADMIN — ASPIRIN 81 MG CHEWABLE TABLET 81 MG: 81 TABLET CHEWABLE at 08:35

## 2022-04-04 RX ADMIN — Medication 40 MG: at 08:36

## 2022-04-04 RX ADMIN — POTASSIUM CHLORIDE 20 MEQ: 20 SOLUTION ORAL at 06:21

## 2022-04-04 RX ADMIN — HYDRALAZINE HYDROCHLORIDE 20 MG: 20 INJECTION INTRAMUSCULAR; INTRAVENOUS at 15:58

## 2022-04-04 RX ADMIN — POTASSIUM CHLORIDE 20 MEQ: 1.5 POWDER, FOR SOLUTION ORAL at 20:57

## 2022-04-04 RX ADMIN — AMIODARONE HYDROCHLORIDE 400 MG: 200 TABLET ORAL at 20:57

## 2022-04-04 RX ADMIN — CARVEDILOL 6.25 MG: 6.25 TABLET, FILM COATED ORAL at 08:36

## 2022-04-04 RX ADMIN — OXYCODONE HYDROCHLORIDE 10 MG: 5 TABLET ORAL at 23:53

## 2022-04-04 RX ADMIN — METHOCARBAMOL 750 MG: 750 TABLET ORAL at 23:54

## 2022-04-04 RX ADMIN — ACETAMINOPHEN 650 MG: 325 TABLET ORAL at 23:53

## 2022-04-04 RX ADMIN — HEPARIN SODIUM 5000 UNITS: 5000 INJECTION, SOLUTION INTRAVENOUS; SUBCUTANEOUS at 11:52

## 2022-04-04 RX ADMIN — HYDRALAZINE HYDROCHLORIDE 10 MG: 20 INJECTION INTRAMUSCULAR; INTRAVENOUS at 03:23

## 2022-04-04 RX ADMIN — HYDROMORPHONE HYDROCHLORIDE 0.2 MG: 0.2 INJECTION, SOLUTION INTRAMUSCULAR; INTRAVENOUS; SUBCUTANEOUS at 03:22

## 2022-04-04 RX ADMIN — POTASSIUM CHLORIDE 20 MEQ: 20 SOLUTION ORAL at 01:04

## 2022-04-04 RX ADMIN — ACETAMINOPHEN 650 MG: 325 TABLET ORAL at 15:59

## 2022-04-04 RX ADMIN — HYDRALAZINE HYDROCHLORIDE 10 MG: 20 INJECTION INTRAMUSCULAR; INTRAVENOUS at 06:57

## 2022-04-04 RX ADMIN — Medication 15 ML: at 08:36

## 2022-04-04 RX ADMIN — CHLORHEXIDINE GLUCONATE 15 ML: 1.2 RINSE ORAL at 19:24

## 2022-04-04 RX ADMIN — FUROSEMIDE 40 MG: 10 INJECTION, SOLUTION INTRAVENOUS at 06:20

## 2022-04-04 RX ADMIN — TICAGRELOR 90 MG: 90 TABLET ORAL at 16:00

## 2022-04-04 RX ADMIN — HYDROMORPHONE HYDROCHLORIDE 0.4 MG: 0.2 INJECTION, SOLUTION INTRAMUSCULAR; INTRAVENOUS; SUBCUTANEOUS at 14:44

## 2022-04-04 RX ADMIN — INSULIN ASPART 1 UNITS: 100 INJECTION, SOLUTION INTRAVENOUS; SUBCUTANEOUS at 23:46

## 2022-04-04 RX ADMIN — ATORVASTATIN CALCIUM 80 MG: 40 TABLET, FILM COATED ORAL at 08:35

## 2022-04-04 RX ADMIN — AMIODARONE HYDROCHLORIDE 400 MG: 200 TABLET ORAL at 08:36

## 2022-04-04 RX ADMIN — HYDROMORPHONE HYDROCHLORIDE 0.2 MG: 0.2 INJECTION, SOLUTION INTRAMUSCULAR; INTRAVENOUS; SUBCUTANEOUS at 12:23

## 2022-04-04 ASSESSMENT — ACTIVITIES OF DAILY LIVING (ADL)
ADLS_ACUITY_SCORE: 13
ADLS_ACUITY_SCORE: 11
ADLS_ACUITY_SCORE: 13
ADLS_ACUITY_SCORE: 11
ADLS_ACUITY_SCORE: 11
ADLS_ACUITY_SCORE: 13
ADLS_ACUITY_SCORE: 11
ADLS_ACUITY_SCORE: 13
ADLS_ACUITY_SCORE: 11

## 2022-04-04 NOTE — PROGRESS NOTES
[facility]  Cardiovascular and Thoracic Surgery Daily Note          Assessment and Plan:   sepideh Robledo is a 51 year old female with history of MI at age 25 s/p angioplasty who presented to the hospital with hypertensive emergency and NSTEMI. Coronary angio demonstrated severe multivessel CAD. Echocardiogram preoperatively with preserved biventricular function.    PMH includes: NSTEMI, CAD with previous coronary stents, HTN    Course has been complicated by PEA arrest with ventricular fibrillation on POD 0 with ROSC after defibrillation x3, 2 mg epinephrine and one round of CPR. Was taken to cath lab where radial artery graft was found to be occluded. PCI with DANA placement to mid RCA and angioplasty of the rPLB was performed. Echocardiogram at the conclusion of PCI demonstrated LVEF 40-45% with mild to moderate global hypokinesis of the left ventricle with severe hypokinesis of the mid anterior and anteroseptal walls, the entire inferior wall and all apical segments of the left ventricle; no pericardial effusion. Additionally, due to antiplatelet therapy post PCI, patient developed epistaxis during attempted NG placement requiring placement of b/l rhino rockets. Now, diffuse severe encephalopathy with minimal neurologic response.     POD #5 s/p:  1.  Coronary artery bypass grafting x 4 (radial artery graft to the right posterior descending coronary artery, reversed saphenous vein graft to the obtuse marginal branch of the left circumflex coronary artery, reversed saphenous vein graft to the first diagonal branch of the left anterior descending coronary artery, and pedicled left internal mammary artery to left anterior descending coronary artery).  2.  Endoscopic vein harvest of the greater saphenous vein from the left lower extremity.  3.  Endoscopic left radial artery harvest on 3/30/22 with Dr. Butterfield.    CVS:   NSTEMI and severe multivessel CAD s/p CABG x4 with radial grafts as  above  HTN  PEA and Vfib arrest s/p ROSC and DANA to mid RCA and angioplasty of the rPLB on 3/31  Currently on nicardipine gtt to maintain ordered hemodynamic parameters; NSR rates 70s  - Goal MAP >65, SBP <140  - Amlodipine 2.5 mg daily for radial graft (at least 3-6 months)  - Continue carvedilol 12.5 mg BID  - PRN hydralazine available  - Brilinta load and daily per Cardiology  - Transitioned amiodarone gtt to PO taper per EP/Cards recs  - ASA 81 daily  - Atorvastatin 80 mg daily  - CTs removed 4/3. TPWs removed 4/1    Resp:   Postoperative ventilator management  - Current vent settings:  Vent Mode: (Significant) CPAP/PS  (Continuous positive airway pressure with Pressure Support)  FiO2 (%): 30 %  Resp Rate (Set): 14 breaths/min  Tidal Volume (Set, mL): 450 mL  PEEP (cm H2O): 5 cmH2O  Pressure Support (cm H2O): 10 cmH2O  Resp: 14  - Intubated for airway protection due to encephalopathy  - Titrate O2/peep to maintain sats >92%  - BID PSTs  - Dr. Butterfield ok with trach POD 10-14    Neuro:    Acute postoperative pain  Hx migraines  Encephalopathy   Did receive CPR on 3/31, will likely have pain from this also  Propofol turned off on 3/31 at 9 am, starting to open eyes more, unable to move extremities but withdraws BLE to pain, nothing in BUEs yet. CT head 3/31 negative for hemorrhage; CTA negative for strokes, vessels patent; per neurology, MRI stable w/o acute concern to explain clinical status.  EEG without e/o seizure activity, continues to have severe diffuse encephalopathy   MRI head and c-spine on 4/3 negative  - PRN dilaudid available  - PRN oxycodone and methocarbamol available  - Scheduled tylenol   - Neuro critical care consulted and following, greatly appreciate recommendations    Renal/Electrolyte:   Lactic acidosis, resolved  Volume overload  Creat stable, up  lbs from preop, +edema; negative 2.5 L past 24h  Na 144, K+3.6  - Strict I/O, daily weights  - Avoid/limit nephrotoxins as able  - Replete lytes  per protocol  - Decrease Lasix to 20 mg IV q12h goal even to slightly negative  - Scheduled BID potassium while on lasix    GI/Nutrition:    Epistaxis, resolved  Elevated LFTs, likely shock liver 2/2 arrest, resolved  Orders Placed This Encounter      NPO for Medical/Clinical Reasons Except for: Other; Specify: NPO until Extubated      Advance Diet as Tolerated: Clear Liquid Diet  - OG in place, no e/o blood in output  - Needs secure NJ placement, plan for radiology today 4/4  - Nutrition following, cautiously increasing enteral feeds, absorbing well  - Rhino rockets removed, continues to have some oozing from b/l nares  - Continue bowel regimen, + BMs  - PPI    :    - Cooper in place for strict I/O, leave in with diuresis    Endo:  Stress induced hyperglycemia  Thyroid nodule   Preop A1c 5.7%  - Transitioned to sliding scale insulin   - Goal BG <180 for optimal healing  - Thyroid nodule noted on CT on 3/31, will have this worked up on an outpatient basis     ID:  Stress induced leukocytosis  E.Coli UTI preoperatively  WBC 10.3, Tmax 99.7; no s/sx infection  - Completed perioperative ABX  - Continue to follow fever curve, WBC and inflammatory markers as appropriate  - Repeat UA 4/1 without overt e/o infection     Heme:  Acute blood loss anemia  Acute blood loss thrombocytopenia  Hgb 8.3, Plts 299; no s/sx active bleeding  - CBC in AM  - Goal Hgb >7  - Consulted hematology given severe premature CAD and acute graft failure- appreciate recommendations. Multiple labs sent.     -Proph: PPI, subcutaneous heparin, SCDs  -Dispo: Continue in ICU    Lab Results   Component Value Date    A1C 5.7 03/28/2022   - Transitioned to sliding scale insulin   - Goal BG <180 for optimal healing  - Thyroid nodule noted on CT on 3/31, will have this worked up on an outpatient basis    ID:  Stress induced leukocytosis  E.Coli UTI preoperatively  WBC 12.5, Tmax 99.9; no s/sx infection  - Completed perioperative ABX  - Continue to follow  fever curve, WBC and inflammatory markers as appropriate  - Repeat UA 4/1 without overt e/o infection    Heme:  Acute blood loss anemia  Acute blood loss thrombocytopenia  Hgb 7.9, Plts 245; no s/sx active bleeding  - CBC in AM  - Goal Hgb >7  - Consulted hematology given severe premature CAD and acute graft failure- appreciate recommendations. Multiple labs sent.    -Proph: PPI, subcutaneous heparin, SCDs  - Lines: Right internal jugular cordis, PIV, nathan. Will have midline placed and remove right internal jugular today 4/4  -Dispo: Continue in ICU          Interval History:   No acute events overnight. Roving eye movements today, otherwise no changes.          Medications:       [START ON 4/8/2022] amiodarone  200 mg Oral Daily     amiodarone  400 mg Oral BID     amLODIPine  2.5 mg Oral Daily     aspirin  81 mg Oral or NG Tube Daily     atorvastatin  80 mg Oral Daily     carvedilol  6.25 mg Oral BID w/meals     chlorhexidine  15 mL Mouth/Throat Q12H     furosemide  20 mg Intravenous Q12H     heparin ANTICOAGULANT  5,000 Units Subcutaneous Q8H     insulin aspart  1-6 Units Subcutaneous Q4H     lactated ringers  250 mL Intravenous Once     lactated ringers  250 mL Intravenous Once     multivitamins w/minerals  15 mL Per Feeding Tube Daily     mupirocin  0.5 g Both Nostrils BID     pantoprazole  40 mg Oral or NG Tube Daily    Or     pantoprazole  40 mg Oral Daily     perflutren diluted 1mL to 2mL with saline  9 mL Intravenous Once     polyethylene glycol  17 g Oral Daily     potassium chloride  20 mEq Oral BID     senna-docusate  1 tablet Oral BID     sodium chloride (PF)  10 mL Intracatheter Q8H     sodium chloride (PF)  10 mL Intracatheter Q8H     sodium chloride (PF)  10 mL Intravenous Once     sodium chloride (PF)  3 mL Intracatheter Q8H     ticagrelor  90 mg Oral BID             Physical Exam:   Vitals were reviewed  Blood pressure 133/77, pulse 84, temperature 99.1  F (37.3  C), resp. rate 14, height 1.651 m  "(5' 5\"), weight 94.9 kg (209 lb 3.5 oz), last menstrual period 02/27/2022, SpO2 99 %.  Rhythm: NSR    Lungs: CTA anterior, synchronous with ventilator, minimal vent support, tolerating CPAP    Cardiovascular: S1, S2, RRR, distant, no m/r/g    Abdomen: S/ND, OG in place, +BS    Extremeties: + edema, improved, less pitting, warm and well perfused    Incision: CDI    CT: N/A    Weight:   Vitals:    03/30/22 0600 04/01/22 0430 04/02/22 0500 04/03/22 0429   Weight: 95.7 kg (211 lb) 96.9 kg (213 lb 10 oz) 101.7 kg (224 lb 3.3 oz) 99 kg (218 lb 4.1 oz)    04/04/22 0415   Weight: 94.9 kg (209 lb 3.5 oz)            Data:   Labs:   Lab Results   Component Value Date    WBC 15.8 03/31/2022     Lab Results   Component Value Date    RBC 3.15 03/31/2022     Lab Results   Component Value Date    HGB 8.0 03/31/2022     Lab Results   Component Value Date    HCT 25.5 03/31/2022     No components found for: MCT  Lab Results   Component Value Date    MCV 81 03/31/2022     Lab Results   Component Value Date    MCH 25.4 03/31/2022     Lab Results   Component Value Date    MCHC 31.4 03/31/2022     Lab Results   Component Value Date    RDW 17.7 03/31/2022     Lab Results   Component Value Date     03/31/2022       Last Basic Metabolic Panel:  Lab Results   Component Value Date     03/31/2022      Lab Results   Component Value Date    POTASSIUM 3.2 03/31/2022     Lab Results   Component Value Date    CHLORIDE 116 03/31/2022     Lab Results   Component Value Date    TOMAS 8.7 03/31/2022     Lab Results   Component Value Date    CO2 20 03/31/2022     Lab Results   Component Value Date    BUN 8 03/31/2022     Lab Results   Component Value Date    CR 0.81 03/31/2022     Lab Results   Component Value Date     03/31/2022     03/31/2022       Imaging:  Recent Results (from the past 24 hour(s))   XR Chest Port 1 View    Narrative    EXAM: XR CHEST PORT 1 VIEW  LOCATION: [facility]  DATE/TIME: " 4/3/2022 12:38 PM    INDICATION: s p CT removal  COMPARISON: 03/31/2022      Impression    IMPRESSION: Right IJ sheath in the SVC. Endotracheal tube above the mook. NG tube in the stomach. Previously seen chest tubes have been removed. No pneumothorax. There is atelectasis in the left lower lobe. The lungs are otherwise clear. No pulmonary   edema.   MR Brain w/o & w Contrast    Narrative    EXAM: MR BRAIN W/O and W CONTRAST, MR CERVICAL SPINE W/O and W CONTRAST  LOCATION: [facility]  DATE/TIME: 4/3/2022 9:28 PM    INDICATION: Stroke, follow up, encephalopathy. Evaluate for involvement of the cervical cord.  COMPARISON: MRI brain 4/1/2022  CONTRAST: 9mL gadavist  TECHNIQUE:   1) Routine multiplanar multisequence head MRI without and with intravenous contrast.  2) Routine Cervical Spine MRI without and with IV contrast.    FINDINGS:  HEAD MRI:  INTRACRANIAL CONTENTS: Stable punctate foci of restricted diffusion in the left frontal lobe, left temporal lobe, right occipital lobe and right frontal lobe. No definite new foci of ischemia. No mass, acute hemorrhage, or extra-axial fluid collections.   Patchy nonspecific T2/FLAIR hyperintensities within the cerebral white matter most consistent with mild to moderate chronic microvascular ischemic change. Normal ventricles and sulci. Normal position of the cerebellar tonsils. No pathologic enhancement.    SELLA: No abnormality accounting for technique.    OSSEOUS STRUCTURES/SOFT TISSUES: Normal marrow signal. The major intracranial vascular flow voids are maintained.     ORBITS: No abnormality accounting for technique.     SINUSES/MASTOIDS: Extensive membrane thickening of the paranasal sinuses. Scattered fluid/membrane thickening in the mastoid air cells bilaterally.     CERVICAL SPINE:   Normal vertebral body height and alignment. Probable atypical hemangioma at the anterior/inferior corner of C6. No abnormal cord signal. There is a recent  fracture of the posterior left first rib. This appears acute on 3/31/2022.    Craniovertebral junction and C1-C2: Normal.    C2-C3 through C7-T1: Normal disc height. No herniation. Normal facets. No spinal canal or neural foraminal stenosis.       Impression    IMPRESSION:  HEAD MRI:   1.  No new acute findings or significant interval change when compared to 4/1/2022.  2.  Scattered punctate foci of early subacute ischemic change in the cerebral hemispheres as detailed above.  3.  Mild to moderate small vessel ischemic disease, advanced for age.    CERVICAL SPINE MRI:  1.  Unremarkable MRI of the cervical spine.  2.  Recent posterior left first rib fracture. This appeared acute on 3/31/2022.   MR Cervical Spine w/o & w Contrast    Narrative    EXAM: MR BRAIN W/O and W CONTRAST, MR CERVICAL SPINE W/O and W CONTRAST  LOCATION: [facility]  DATE/TIME: 4/3/2022 9:28 PM    INDICATION: Stroke, follow up, encephalopathy. Evaluate for involvement of the cervical cord.  COMPARISON: MRI brain 4/1/2022  CONTRAST: 9mL gadavist  TECHNIQUE:   1) Routine multiplanar multisequence head MRI without and with intravenous contrast.  2) Routine Cervical Spine MRI without and with IV contrast.    FINDINGS:  HEAD MRI:  INTRACRANIAL CONTENTS: Stable punctate foci of restricted diffusion in the left frontal lobe, left temporal lobe, right occipital lobe and right frontal lobe. No definite new foci of ischemia. No mass, acute hemorrhage, or extra-axial fluid collections.   Patchy nonspecific T2/FLAIR hyperintensities within the cerebral white matter most consistent with mild to moderate chronic microvascular ischemic change. Normal ventricles and sulci. Normal position of the cerebellar tonsils. No pathologic enhancement.    SELLA: No abnormality accounting for technique.    OSSEOUS STRUCTURES/SOFT TISSUES: Normal marrow signal. The major intracranial vascular flow voids are maintained.     ORBITS: No abnormality  accounting for technique.     SINUSES/MASTOIDS: Extensive membrane thickening of the paranasal sinuses. Scattered fluid/membrane thickening in the mastoid air cells bilaterally.     CERVICAL SPINE:   Normal vertebral body height and alignment. Probable atypical hemangioma at the anterior/inferior corner of C6. No abnormal cord signal. There is a recent fracture of the posterior left first rib. This appears acute on 3/31/2022.    Craniovertebral junction and C1-C2: Normal.    C2-C3 through C7-T1: Normal disc height. No herniation. Normal facets. No spinal canal or neural foraminal stenosis.       Impression    IMPRESSION:  HEAD MRI:   1.  No new acute findings or significant interval change when compared to 4/1/2022.  2.  Scattered punctate foci of early subacute ischemic change in the cerebral hemispheres as detailed above.  3.  Mild to moderate small vessel ischemic disease, advanced for age.    CERVICAL SPINE MRI:  1.  Unremarkable MRI of the cervical spine.  2.  Recent posterior left first rib fracture. This appeared acute on 3/31/2022.        Rounded and discussed with Dr. Jc and d/w Dr. Greer Tenorio, APRN, ACNPC-AG, CCRN  Nurse Practitioner  Cardiothoracic Surgery  Pager: [phone]  [facility]

## 2022-04-04 NOTE — PROGRESS NOTES
[facility] ICU RESPIRATORY NOTE        Date of Admission: 3/27/2022    Date of Intubation (most recent): 3/30/22    Reason for Mechanical Ventilation: AW Protection    Number of Days on Mechanical Ventilation: 6    Met Criteria for Spontaneous Breathing Trial: No    Reason for No Spontaneous Breathing Trial: Per MD    Significant Events Today: None    ABG Results:   Recent Labs   Lab 04/01/22  0416 03/31/22  2341 03/31/22  2115 03/31/22  1605 03/31/22  1244 03/31/22  0603 03/30/22  1714 03/30/22  1708   PH  --   --  7.45  --  7.53* 7.49*  --  7.44   PCO2  --   --  31*  --  27* 27*  --  31*   PO2  --   --  117*  --  92 143*  --  87   HCO3  --   --  21  --  22 21  --  21   O2PER 40 40 40 40 30 30   < > 80    < > = values in this interval not displayed.       Current Vent Settings: Vent Mode: CMV/AC  (Continuous Mandatory Ventilation/ Assist Control)  FiO2 (%): 30 %  Resp Rate (Set): 14 breaths/min  Tidal Volume (Set, mL): 450 mL  PEEP (cm H2O): 5 cmH2O  Resp: 14      Skin Assessment: Intact    Plan: Continue to monitor and assess respiratory status while in ICU.  Continue to maintain ETT position.  Continue to wean from mechanical ventilation and oxygen as tolerated per protocol.  Assess skin integrity at least once per shift.     Juan Breen, RT

## 2022-04-04 NOTE — PROGRESS NOTES
Critical Care Progress Note      04/04/2022    Name: Angelica Robledo MRN#: [id number]   Age: 51 year old YOB: 1970     Hsptl Day# 8  ICU DAY #2    MV DAY #2             Problem List:   Active Problems:    NSTEMI (non-ST elevated myocardial infarction) (H)    Hypertensive emergency    Paroxysmal ventricular tachycardia (H)           Summary/Hospital Course:   Angelica Robledo is a 51 year old female with pertinent PMHx of prior MI at age 24-25 (in 1996) s/p angioplasty, HTN, anemia, and NSTEMI.  She presented on 3/27 with new onset chest pain similar to her previous MI.  Workup was consistent with NSTEMI.  Cardiac cath demonstrated severe multivessel CAD.  She was seen by CT surgery and CABG was scheduled for 3/30.  She underwent CABG x4 left radial, LIMA, left saph vein x2 (FAUSTINO taken down, but elected to not be used due to size).    No family history of clotting disorders.  No known history of DVT/PE/stroke. Siblings without cardiovascular disease. Father passed away of cardiac causes at an advanced age.  Mother had cardiac disease as well as an aortic aneurysm, but not at a young age.     4V CABG as follows: left radial artery graft to the right posterior descending coronary artery, reversed saphenous vein graft to the obtuse marginal branch of the left circumflex coronary artery, reversed saphenous vein graft to the first diagonal branch of the left anterior descending coronary artery, and pedicled left internal mammary artery to left anterior descending coronary.    POD 0 - initial hypoxia resolved with vent changes.  At 2355 patient suffered a PEA and Vfib arrest and underwent 3 shocks, 2 rounds of epi, and 1 round of CPR. ROSC obtained at 0005.  300 mg amiodarone bolus administered.  STAT cardiac cath lab performed demonstrating LIMA-LAD, VG-LADD1, and VG-LPLB were patent. The left radial artery to RDPA graft had an occlusion.  The mid RCA was treated with a DANA stent and a balloon angioplasty  performed at the RPLB.  Excellent flow was noted on completion coronary angiogram.  Echo demonstrated a drop in LVEF to 40-45% with aspects of hypokinesis. RV demonstrated mild-moderate RV function reduction.    POD1 - weaned from sedation with minimal arousal limited to briefly opening eyes to pain and occasionally sound, minimal extremity pain response.  CT head/CTA head and neck unremarkable for acute insult.  Ceribell placed overnight.    PD2-5: MRI x2 obtained demonstrating punctate foci of early subacute ischemic change. EEG waxes and wanes from mod-severe to severe diffuse slowing. Neither of these findings portent a poor prognosis.          Assessment and plan :     Angelica Robledo IS a 51 year old female admitted on 3/27/2022 for chest pain with workup demonstrating severe multivessel CAD.  Patient underwent CABG x4.  POD#0 patient had a PEA arrest with ROSC obtained at 10 minutes.  Emergent coronary cath demonstrated radial artery occlusion.  A DANA was placed into the RCA and balloon angioplasty of the RPLB was performed with excellent completion angiography flow.     I have personally reviewed the daily labs, imaging studies, cultures and discussed the case with referring physician and consulting physicians.     My assessment and plan by system for this patient is as follows:    Neurology/Psychiatry:   1. Analgesia -- tylenol, prn narcotics - minimize narcotics during phase of trying to allow her to be more alert  2. Sedation -- hold all sedation  3. Encephalopathy s/p cardiac arrest -- head CT negative -- MRI w/ punctate foci of subacute ischemic change -- MRI C spine negative -- continuing EEG - diffuse slowing with gradual improvement, no indices of seizure activity.  -- Neurology following, appreciate management/recs.  -- Very gradual improvement in clinical exam     Cardiovascular:   1.  S/p CABG x4 - left radial, LIMA, left saph vein x2 (FAUSTINO taken down, but elected to not be used due to size)   2.  POD#0 PEA and Vfib arrest -- s/p cardiac cath demonstrated radial artery graft to RPDA occlusion -- intervention with DANA to RCA and balloon angioplasty RPLA -- on Kangrelor 3/31, now transitioned to aspirin and Brilinta with discussion per CV surgery and interventional cardiology  -- cards recommends Amiodarone taper -- PO regimen scheduled  3. Hx of MI at age 24-25  4. HTN - likely untreated prior to hospitalization - presented with hypertension emergency - PO regimen including amlodipine (radial artery graft) and carvedilol.     Pulmonary/Ventilator Management:   1. Airway -- intubated -- low vent settings required -- tolerates pressure support  2. No underlying pulmonary problems on medical history or cxr.   -- Long term: if her encephalopathy is not able to recover this week, a tracheostomy would be indicated. CV surgery would like to wait until 10-14 days post op.     GI and Nutrition :   1. Epistaxis - s/p rhino rocket removal - intermittent scant oozing - resolved this AM  2. Nutrition - OGT present -- NJ desired by CV surgery - nares have ceased bleeding. Okay to try fluoro placement as desired by CV surgery. Continue enteral feeds at goal otherwise.     Renal/Fluids/Electrolytes:   1. Monitor UOP/creatinine -- stable labs -- euvolemic status -- diuresis per CV surgery with goal of net even to slightly net negative -- Lasix decreased to 20 mg IV BID today with scheduled potassium replacement  2. Replete electrolytes PRN per protocol  3. IVF and albumin administration for volume prn per protocol  4. Cooper indicated for accurate I/Os     Infectious Disease:   1. UTI with E coli on admission -- negative on recheck on 4/1     Endocrine:   1. Monitor for stress induced hyperglycemia. ICU insulin protocol, goal sugar <180   2. Incidental thyroid nodule -- follow-up outpatient per discharge referral     Hematology/Oncology:   1. Acute blood loss on chronic anemia -- monitor  2. Acute blood loss thrombocytopenia --  monitor  3. S/P DANA placement s/p cardiac arrest -- aspirin and brilinta  4. Rule out hypercoagulable state -- hypercoagulopathy panel pending per hematology guidance     IV/Access:   1. Venous access - CVC -- remove today -- assess for accessibility of peripheral lab draw -- if difficult, place PICC line and remove CVC introducer at right internal jugular   2. Arterial access - NA      ICU Prophylaxis:   1. DVT: Hep Subq/mechanical  2. VAP: HOB 30 degrees, chlorhexidine rinse  3. Stress Ulcer: PPI  4. Restraints: As needed nonviolent soft two point restraints required and necessary for patient safety and continued cares and good effect as patient continues to pull at necessary lines, tubes despite education and distraction. Will readdress daily.   5. Wound care - per unit routine   6. Feeding - enteral feeds  7. Family Update:  and family will be at bedside later for update  8. Disposition - ICU        Key goals for next 24 hours:   1. Await neuro recs - EEG monitoring  2. Continue serial neuro checks           Interim History:   No acute events  MRI brain and c spine performed -- no change from previous MRI, c spine negative  EEG - diffuse slowing with slight improvement           Key Medications:       [START ON 4/8/2022] amiodarone  200 mg Oral Daily     amiodarone  400 mg Oral BID     amLODIPine  2.5 mg Oral Daily     aspirin  81 mg Oral or NG Tube Daily     atorvastatin  80 mg Oral Daily     carvedilol  6.25 mg Oral BID w/meals     chlorhexidine  15 mL Mouth/Throat Q12H     furosemide  20 mg Intravenous Q12H     heparin ANTICOAGULANT  5,000 Units Subcutaneous Q8H     insulin aspart  1-6 Units Subcutaneous Q4H     lactated ringers  250 mL Intravenous Once     lactated ringers  250 mL Intravenous Once     multivitamins w/minerals  15 mL Per Feeding Tube Daily     mupirocin  0.5 g Both Nostrils BID     pantoprazole  40 mg Oral or NG Tube Daily    Or     pantoprazole  40 mg Oral Daily     perflutren diluted  1mL to 2mL with saline  9 mL Intravenous Once     potassium chloride  20 mEq Oral BID     sodium chloride (PF)  10 mL Intracatheter Q8H     sodium chloride (PF)  10 mL Intracatheter Q8H     sodium chloride (PF)  10 mL Intracatheter Q8H     sodium chloride (PF)  10 mL Intravenous Once     sodium chloride (PF)  3 mL Intracatheter Q8H     ticagrelor  90 mg Oral BID       - MEDICATION INSTRUCTIONS -       Percutaneous Coronary Intervention orders placed (this is information for BPA alerting)       sodium chloride Stopped (04/01/22 1200)               Physical Examination:   Temp:  [97  F (36.1  C)-99.7  F (37.6  C)] 99.1  F (37.3  C)  Pulse:  [71-84] 84  Resp:  [12-22] 14  BP: ()/(53-93) 133/77  FiO2 (%):  [30 %-40 %] 30 %  SpO2:  [98 %-100 %] 99 %    Intake/Output Summary (Last 24 hours) at 3/31/2022 0921  Last data filed at 3/31/2022 0700  Gross per 24 hour   Intake 3676.54 ml   Output 2825 ml   Net 851.54 ml     Wt Readings from Last 4 Encounters:   04/04/22 94.9 kg (209 lb 3.5 oz)     BP - Mean:  [] 99  Vent Mode: (S) CPAP/PS  (Continuous positive airway pressure with Pressure Support)  FiO2 (%): 30 %  Resp Rate (Set): 14 breaths/min  Tidal Volume (Set, mL): 450 mL  PEEP (cm H2O): 5 cmH2O  Pressure Support (cm H2O): 10 cmH2O  Resp: 14    Recent Labs   Lab 04/01/22  0416 03/31/22  2341 03/31/22  2115 03/31/22  1605 03/31/22  1244 03/31/22  0603 03/30/22  1714 03/30/22  1708   PH  --   --  7.45  --  7.53* 7.49*  --  7.44   PCO2  --   --  31*  --  27* 27*  --  31*   PO2  --   --  117*  --  92 143*  --  87   HCO3  --   --  21  --  22 21  --  21   O2PER 40 40 40 40 30 30   < > 80    < > = values in this interval not displayed.       GEN: no acute distress   HEENT: head ncat, sclera anicteric, ETT present   PULM: unlabored, synchronous with vent, clear anteriorly    CV/Chest: RRR, S1S2 no gallop,  No rub, no murmur appreciated. Midline incision CDI.   ABD: soft, nontender  EXT:  Edema,  warm  NEURO: Off  sedation. Opens eyes to stimulus and verbal stimulus. Pupillary light reflex brisk. No focal signs appreciated. BLE withdrawal vs triple reflex to pain stimulus. No BUE pain reflex withdrawal. Slight BUE reflex to central pain stimulus. Does not follow commands. Does not blink to threat.  : nathan present, clear urine output  SKIN: no obvious rash  LINES: clean, dry intact         Data:   All data and imaging reviewed     ROUTINE ICU LABS (Last four results)  CMP  Recent Labs   Lab 04/04/22  0830 04/04/22  0453 04/04/22  0451 04/03/22  2325 04/03/22  2246 04/03/22  1548 04/03/22  1156 04/03/22  0808 04/03/22  0408 04/02/22  0357 04/02/22  0355 04/01/22  1811 04/01/22  1648 04/01/22  0419 04/01/22  0416 03/31/22  1420 03/31/22  1243   NA  --   --  144  --   --   --   --   --  144  --  145*  --   --   --  146*  --  144   POTASSIUM  --   --  3.6  --  3.5  --  3.7  --  3.5  --  3.6  --  3.8  --  3.7   < > 3.4   CHLORIDE  --   --  111*  --   --   --   --   --  111*  --  115*  --   --   --  118*  --  115*   CO2  --   --  29  --   --   --   --   --  30  --  25  --   --   --  23  --  22   ANIONGAP  --   --  4  --   --   --   --   --  3  --  5  --   --   --  5  --  7   * 127* 140* 121*  --    < >  --    < > 145*   < > 135*   < >  --    < > 151*   < > 178*   BUN  --   --  25  --   --   --   --   --  22  --  18  --   --   --  11  --  9   CR  --   --  1.00  --   --   --   --   --  0.96  --  0.76  --   --   --  0.79  --  0.73   GFRESTIMATED  --   --  68  --   --   --   --   --  72  --  >90  --   --   --  >90  --  >90   TOMAS  --   --  9.2  --   --   --   --   --  8.6  --  8.8  --   --   --  8.5  --  8.8   MAG  --   --  2.5*  --   --   --   --   --  2.2  --  2.2  --  2.3  --  2.0  --   --    PHOS  --   --  4.0  --   --   --   --   --  3.7  --  3.6  --   --   --  3.5   < > 3.2   PROTTOTAL  --   --   --   --   --   --   --   --  6.2*  --  5.7*  --   --   --  5.5*  --  5.6*   ALBUMIN  --   --   --   --   --   --   --   --   2.3*  --  2.2*  --   --   --  2.3*  --  2.4*   BILITOTAL  --   --   --   --   --   --   --   --  0.2  --  0.2  --   --   --  0.4  --  0.4   ALKPHOS  --   --   --   --   --   --   --   --  108  --  72  --   --   --  65  --  62   AST  --   --   --   --   --   --   --   --  26  --  27  --   --   --  47*  --  89*   ALT  --   --   --   --   --   --   --   --  52*  --  45  --   --   --  50  --  59*    < > = values in this interval not displayed.     CBC  Recent Labs   Lab 04/04/22  0451 04/03/22  0408 04/02/22  0355 04/01/22  0416   WBC 10.3 12.5* 16.4* 20.3*   RBC 3.44* 3.13* 2.99* 3.21*   HGB 8.3* 7.9* 7.7* 8.0*   HCT 27.7* 26.0* 24.7* 26.2*   MCV 81 83 83 82   MCH 24.1* 25.2* 25.8* 24.9*   MCHC 30.0* 30.4* 31.2* 30.5*   RDW 18.2* 18.3* 18.4* 18.1*    245 207 198     INR  Recent Labs   Lab 03/31/22  0022 03/30/22  1622 03/30/22  1445   INR 1.37* 1.24* 1.44*     Arterial Blood Gas  Recent Labs   Lab 04/01/22  0416 03/31/22  2341 03/31/22  2115 03/31/22  1605 03/31/22  1244 03/31/22  0603 03/30/22  1714 03/30/22  1708   PH  --   --  7.45  --  7.53* 7.49*  --  7.44   PCO2  --   --  31*  --  27* 27*  --  31*   PO2  --   --  117*  --  92 143*  --  87   HCO3  --   --  21  --  22 21  --  21   O2PER 40 40 40 40 30 30   < > 80    < > = values in this interval not displayed.       All cultures:  No results for input(s): CULT in the last 168 hours.  Recent Results (from the past 24 hour(s))   XR Chest Port 1 View    Narrative    EXAM: XR CHEST PORT 1 VIEW  LOCATION: [facility]  DATE/TIME: 4/3/2022 12:38 PM    INDICATION: s p CT removal  COMPARISON: 03/31/2022      Impression    IMPRESSION: Right IJ sheath in the SVC. Endotracheal tube above the mook. NG tube in the stomach. Previously seen chest tubes have been removed. No pneumothorax. There is atelectasis in the left lower lobe. The lungs are otherwise clear. No pulmonary   edema.   MR Brain w/o & w Contrast    Narrative    EXAM: MR BRAIN W/O  and W CONTRAST, MR CERVICAL SPINE W/O and W CONTRAST  LOCATION: [facility]  DATE/TIME: 4/3/2022 9:28 PM    INDICATION: Stroke, follow up, encephalopathy. Evaluate for involvement of the cervical cord.  COMPARISON: MRI brain 4/1/2022  CONTRAST: 9mL gadavist  TECHNIQUE:   1) Routine multiplanar multisequence head MRI without and with intravenous contrast.  2) Routine Cervical Spine MRI without and with IV contrast.    FINDINGS:  HEAD MRI:  INTRACRANIAL CONTENTS: Stable punctate foci of restricted diffusion in the left frontal lobe, left temporal lobe, right occipital lobe and right frontal lobe. No definite new foci of ischemia. No mass, acute hemorrhage, or extra-axial fluid collections.   Patchy nonspecific T2/FLAIR hyperintensities within the cerebral white matter most consistent with mild to moderate chronic microvascular ischemic change. Normal ventricles and sulci. Normal position of the cerebellar tonsils. No pathologic enhancement.    SELLA: No abnormality accounting for technique.    OSSEOUS STRUCTURES/SOFT TISSUES: Normal marrow signal. The major intracranial vascular flow voids are maintained.     ORBITS: No abnormality accounting for technique.     SINUSES/MASTOIDS: Extensive membrane thickening of the paranasal sinuses. Scattered fluid/membrane thickening in the mastoid air cells bilaterally.     CERVICAL SPINE:   Normal vertebral body height and alignment. Probable atypical hemangioma at the anterior/inferior corner of C6. No abnormal cord signal. There is a recent fracture of the posterior left first rib. This appears acute on 3/31/2022.    Craniovertebral junction and C1-C2: Normal.    C2-C3 through C7-T1: Normal disc height. No herniation. Normal facets. No spinal canal or neural foraminal stenosis.       Impression    IMPRESSION:  HEAD MRI:   1.  No new acute findings or significant interval change when compared to 4/1/2022.  2.  Scattered punctate foci of early subacute  ischemic change in the cerebral hemispheres as detailed above.  3.  Mild to moderate small vessel ischemic disease, advanced for age.    CERVICAL SPINE MRI:  1.  Unremarkable MRI of the cervical spine.  2.  Recent posterior left first rib fracture. This appeared acute on 3/31/2022.   MR Cervical Spine w/o & w Contrast    Narrative    EXAM: MR BRAIN W/O and W CONTRAST, MR CERVICAL SPINE W/O and W CONTRAST  LOCATION: [facility]  DATE/TIME: 4/3/2022 9:28 PM    INDICATION: Stroke, follow up, encephalopathy. Evaluate for involvement of the cervical cord.  COMPARISON: MRI brain 4/1/2022  CONTRAST: 9mL gadavist  TECHNIQUE:   1) Routine multiplanar multisequence head MRI without and with intravenous contrast.  2) Routine Cervical Spine MRI without and with IV contrast.    FINDINGS:  HEAD MRI:  INTRACRANIAL CONTENTS: Stable punctate foci of restricted diffusion in the left frontal lobe, left temporal lobe, right occipital lobe and right frontal lobe. No definite new foci of ischemia. No mass, acute hemorrhage, or extra-axial fluid collections.   Patchy nonspecific T2/FLAIR hyperintensities within the cerebral white matter most consistent with mild to moderate chronic microvascular ischemic change. Normal ventricles and sulci. Normal position of the cerebellar tonsils. No pathologic enhancement.    SELLA: No abnormality accounting for technique.    OSSEOUS STRUCTURES/SOFT TISSUES: Normal marrow signal. The major intracranial vascular flow voids are maintained.     ORBITS: No abnormality accounting for technique.     SINUSES/MASTOIDS: Extensive membrane thickening of the paranasal sinuses. Scattered fluid/membrane thickening in the mastoid air cells bilaterally.     CERVICAL SPINE:   Normal vertebral body height and alignment. Probable atypical hemangioma at the anterior/inferior corner of C6. No abnormal cord signal. There is a recent fracture of the posterior left first rib. This appears acute on  3/31/2022.    Craniovertebral junction and C1-C2: Normal.    C2-C3 through C7-T1: Normal disc height. No herniation. Normal facets. No spinal canal or neural foraminal stenosis.       Impression    IMPRESSION:  HEAD MRI:   1.  No new acute findings or significant interval change when compared to 4/1/2022.  2.  Scattered punctate foci of early subacute ischemic change in the cerebral hemispheres as detailed above.  3.  Mild to moderate small vessel ischemic disease, advanced for age.    CERVICAL SPINE MRI:  1.  Unremarkable MRI of the cervical spine.  2.  Recent posterior left first rib fracture. This appeared acute on 3/31/2022.           Adi Bazan MD  Surgical Critical Care Fellow

## 2022-04-04 NOTE — PLAN OF CARE
Neuro: Patient opens eyes gaze upward, does not track does not fallow commands.. PERRL, cough, no gag, withdraw to pain BLE slightly.Continuous EEG. MRI head and  cervical spine done.  CV:  SR, HR 70s, SBP <140, MAP > 65  Pulmonary: LSC, moderate tan creamy secretions.  GI/: tolerating meds via OG with minimal residuals, rare BS, abd soft no BM this shift, Cooper catheter with adequate UOP  INTEG: R and L  nares without any obvious bleeding, Laina remain in place.  Continue with plan of care.

## 2022-04-04 NOTE — PLAN OF CARE
2050 patient left the unit for MRI, EEG discontinued at the same time. VSS, patient tolerate MRI well,

## 2022-04-04 NOTE — PROGRESS NOTES
INTERIM REPORT of Video-EEG Monitoring              DATE OF RECORDIN2022         I reviewed the ongoing video-EEG monitoring of Angelica Robledo.          The EEG during stupor was abnormal due to generalized delta-theta slowing, with intermixture of faster alpha-beta activities bilaterally and with occasional, brief generalized electrodecrements, at times with unsustained reactivity consisting of acceleration of delta-theta activities.  No interictal epileptiform abnormalities and no electrographic seizures were observed.         These abnormalities indicate moderate-severe electrographic encephalopathy.  This recording excludes non-convulsive status epilepticus as a possible cause of this encephalopathy.    Mike Sow M.D.

## 2022-04-04 NOTE — PROGRESS NOTES
Spoke to beside RN about Midline order. Pt has two PIVs in place. Explained to RN that Midline is not the best for lab draw, if that is the reasoning for the Midline order. Lab was able to obtain blood sample for labs. Midline is not needed at this time. PICC/IV team will sign off for now. Please feel free to consult again if needed.

## 2022-04-04 NOTE — PROGRESS NOTES
[facility] ICU RESPIRATORY NOTE        Date of Admission: 3/27/2022    Date of Intubation (most recent):3/30/22    Reason for Mechanical Ventilation:Airway protection    Number of Days on Mechanical Ventilation:6    Met Criteria for Spontaneous Breathing Trial:Yes. Pt was on PS 10/5 for 2 hrs this morning and 2 hrs this afternoon per NP order. Pt tolerated the PS trials well.     Significant Events Today:Pt was transported to [facility] on the transport vent for feeding tube placement. Pt tolerated the transport well.    ABG Results:   Recent Labs   Lab 04/01/22  0416 03/31/22  2341 03/31/22  2115 03/31/22  1605 03/31/22  1244 03/31/22  0603 03/30/22  1714 03/30/22  1708   PH  --   --  7.45  --  7.53* 7.49*  --  7.44   PCO2  --   --  31*  --  27* 27*  --  31*   PO2  --   --  117*  --  92 143*  --  87   HCO3  --   --  21  --  22 21  --  21   O2PER 40 40 40 40 30 30   < > 80    < > = values in this interval not displayed.       Current Vent Settings: Vent Mode: CMV/AC  (Continuous Mandatory Ventilation/ Assist Control)  FiO2 (%): 30 %  Resp Rate (Set): 14 breaths/min  Tidal Volume (Set, mL): 450 mL  PEEP (cm H2O): 5 cmH2O  Pressure Support (cm H2O): 10 cmH2O  Resp: 14      Skin Assessment:Intact    Plan:Will cont full vent support overnight and will assess for another PS trial in the morning.    Marilynn Ross, RT

## 2022-04-04 NOTE — PROGRESS NOTES
[facility]    Neurocritical Care Progress Note    Interval EventsPatient continues to be intubated and not sedated. EEG still ongoing- read this morning indicates generalized slowing indicates moderate-severe encephalopathy.  No interictal epileptiform abnormalities and no electrographic seizures were observed. Patient opening and closing eyes. Not following commands. Withdrawing to noxious stimuli in both upper and lower extremities bilaterally.     HPI Summary  51 year old female admitted on 3/27/2022 for chest pain with workup demonstrating severe multivessel CAD.  Patient underwent CABG x4.  POD#0 patient had a PEA arrest with ROSC obtained at 10 minutes.  Emergent coronary cath demonstrated radial artery occlusion.  A DANA was placed into the RCA and balloon angioplasty of the RPLB was performed with excellent completion angiography flow.       Evaluation Summarized    MRI/Head CT MRI brain #1: There are a few scattered punctate acute infarctions within the left frontal lobe, left temporal lobe, right occipital lobe, presumably embolic    MRI brain #2: No new acute findings or significant interval change when compared to 4/1/2022. Scattered punctate foci of early subacute ischemic change in the cerebral hemispheres    Intracranial Vasculature CTA head: No high-grade stenosis or large vessel occlusion involving the major intracranial arteries. Mild atherosclerosis involving the left carotid siphon   Cervical Vasculature CTA neck: Unremarkable CT angiogram of the neck     Other Testing MRI cervical spine: Unremarkable MRI of the cervical spine     LDL  3/27/2022: 106 mg/dL   A1C  3/28/2022: 5.7 %   Troponin No lab value available in past 48 hrs       Impression   1. Encephalopathy s/p cardiac arrest. No explanation for her encephalopathy seen on brain MRI  2. A few incidental areas of restricted diffusion are seen on brain MRI which are common after catheter-based coronary  "angiography      Plan  - Continue continuous EEG, keep off antiseizure medications   - No specific additional workup for the MRI brain findings  - Maintain normothermia, normoglycemia, normonatremia  - Continue supportive care  - Continue neuro checks q4 hours  - Hypercoagulable work up per hematology pending     Patient Follow-up    - final recommendation pending work-up    We will continue to follow.     YRIS Prakash, CNP  Vascular Neurology  To page me or covering stroke neurology team member, click here: AMCOM   Choose \"On Call\" tab at top, then search dropdown box for \"Neurology Adult\", select location, press Enter, then look for stroke/neuro ICU/telestroke.    ______________________________________________________    Clinically Significant Risk Factors Present on Admission                  Medications   Scheduled Meds    [START ON 4/8/2022] amiodarone  200 mg Oral Daily     amiodarone  400 mg Oral BID     amLODIPine  2.5 mg Oral Daily     aspirin  81 mg Oral or NG Tube Daily     atorvastatin  80 mg Oral Daily     carvedilol  6.25 mg Oral BID w/meals     chlorhexidine  15 mL Mouth/Throat Q12H     furosemide  20 mg Intravenous Q12H     heparin ANTICOAGULANT  5,000 Units Subcutaneous Q8H     insulin aspart  1-6 Units Subcutaneous Q4H     iohexol  50 mL Oral or GJ tube Once     lactated ringers  250 mL Intravenous Once     lactated ringers  250 mL Intravenous Once     lidocaine   Topical Once     multivitamins w/minerals  15 mL Per Feeding Tube Daily     mupirocin  0.5 g Both Nostrils BID     pantoprazole  40 mg Oral or NG Tube Daily    Or     pantoprazole  40 mg Oral Daily     perflutren diluted 1mL to 2mL with saline  9 mL Intravenous Once     potassium chloride  20 mEq Oral BID     sodium chloride (PF)  10 mL Intracatheter Q8H     sodium chloride (PF)  10 mL Intracatheter Q8H     sodium chloride (PF)  10 mL Intracatheter Q8H     sodium chloride (PF)  10 mL Intravenous Once     sodium chloride (PF)  3 " mL Intracatheter Q8H     ticagrelor  90 mg Oral BID       Infusion Meds    - MEDICATION INSTRUCTIONS -       Percutaneous Coronary Intervention orders placed (this is information for BPA alerting)       sodium chloride Stopped (04/01/22 1200)       PRN Meds  acetaminophen **OR** acetaminophen, bisacodyl, glucose **OR** dextrose **OR** glucagon, hydrALAZINE, HYDROmorphone **OR** HYDROmorphone, ipratropium - albuterol 0.5 mg/2.5 mg/3 mL, lidocaine 4%, lidocaine 4%, lidocaine (buffered or not buffered), lidocaine (buffered or not buffered), lidocaine (buffered or not buffered), magnesium hydroxide, - MEDICATION INSTRUCTIONS -, melatonin, methocarbamol, naloxone **OR** naloxone **OR** naloxone **OR** naloxone, ondansetron **OR** ondansetron, oxyCODONE **OR** oxyCODONE, Percutaneous Coronary Intervention orders placed (this is information for BPA alerting), polyethylene glycol, senna-docusate, sodium chloride (PF), sodium chloride (PF), sodium chloride (PF)       PHYSICAL EXAMINATION  Temp:  [97  F (36.1  C)-99.9  F (37.7  C)] 99.7  F (37.6  C)  Pulse:  [71-85] 84  Resp:  [12-18] 14  BP: (104-165)/(60-93) 148/82  FiO2 (%):  [30 %] 30 %  SpO2:  [98 %-100 %] 99 %      General Exam  General:  patient lying in bed without any acute distress  HEENT:  intubated  Pulmonary:  on mechanical ventilation      Neuro Exam  Mental Status:  does not follow commands. eyes open and close spontaneously   Cranial Nerves:  PERRL, corneal reflexes brisk bilaterally, does not protrude tongue, does not blink to threat even with eyes widely open, does not respond to shout, oculocephalic maneuver not tested today  Motor:  withdrawing to noxious stimuli in both upper and lower extremities   Reflexes:  toes mute  Sensory:  as above to noxious, see motor section  Coordination:  deferred  Station/Gait:  unable to test due to intubation         Imaging  I personally reviewed all imaging; relevant findings per HPI.     Lab Results Data   CBC  Recent  Labs   Lab 04/04/22 0451 04/03/22 0408 04/02/22  0355   WBC 10.3 12.5* 16.4*   RBC 3.44* 3.13* 2.99*   HGB 8.3* 7.9* 7.7*   HCT 27.7* 26.0* 24.7*    245 207     Basic Metabolic Panel    Recent Labs   Lab 04/04/22  1155 04/04/22  0938 04/04/22  0830 04/04/22  0453 04/04/22 0451 04/03/22  2325 04/03/22  2246 04/03/22  0808 04/03/22  0408 04/02/22  0357 04/02/22  0355   NA  --   --   --   --  144  --   --   --  144  --  145*   POTASSIUM  --  4.3  --   --  3.6  --  3.5   < > 3.5  --  3.6   CHLORIDE  --   --   --   --  111*  --   --   --  111*  --  115*   CO2  --   --   --   --  29  --   --   --  30  --  25   BUN  --   --   --   --  25  --   --   --  22  --  18   CR  --   --   --   --  1.00  --   --   --  0.96  --  0.76   *  --  139* 127* 140*   < >  --    < > 145*   < > 135*   TOMAS  --   --   --   --  9.2  --   --   --  8.6  --  8.8    < > = values in this interval not displayed.     Liver Panel  Recent Labs   Lab 04/03/22 0408 04/02/22  0355 04/01/22  0416   PROTTOTAL 6.2* 5.7* 5.5*   ALBUMIN 2.3* 2.2* 2.3*   BILITOTAL 0.2 0.2 0.4   ALKPHOS 108 72 65   AST 26 27 47*   ALT 52* 45 50     INR    Recent Labs   Lab Test 03/31/22  0022 03/30/22  1622 03/30/22  1445   INR 1.37* 1.24* 1.44*      Lipid Profile    Recent Labs   Lab Test 03/27/22  1028   CHOL 172   HDL 57   *   TRIG 47     A1C    Recent Labs   Lab Test 03/28/22  0600   A1C 5.7*     Troponin I    Recent Labs   Lab 03/31/22  0022   TROPONINIS 34,[phone]*

## 2022-04-05 ENCOUNTER — HOSPITAL ENCOUNTER (INPATIENT)
Dept: NEUROLOGY | Facility: CLINIC | Age: 52
Discharge: HOME | End: 2022-04-05
Attending: NURSE PRACTITIONER
Payer: COMMERCIAL

## 2022-04-05 LAB
ANION GAP SERPL CALCULATED.3IONS-SCNC: 6 MMOL/L (ref 3–14)
BUN SERPL-MCNC: 31 MG/DL (ref 7–30)
CALCIUM SERPL-MCNC: 9.1 MG/DL (ref 8.5–10.1)
CHLORIDE BLD-SCNC: 112 MMOL/L (ref 94–109)
CO2 SERPL-SCNC: 28 MMOL/L (ref 20–32)
CREAT SERPL-MCNC: 1.04 MG/DL (ref 0.52–1.04)
ERYTHROCYTE [DISTWIDTH] IN BLOOD BY AUTOMATED COUNT: 18.2 % (ref 10–15)
GFR SERPL CREATININE-BSD FRML MDRD: 65 ML/MIN/1.73M2
GLUCOSE BLD-MCNC: 159 MG/DL (ref 70–99)
GLUCOSE BLDC GLUCOMTR-MCNC: 141 MG/DL (ref 70–99)
GLUCOSE BLDC GLUCOMTR-MCNC: 146 MG/DL (ref 70–99)
GLUCOSE BLDC GLUCOMTR-MCNC: 150 MG/DL (ref 70–99)
GLUCOSE BLDC GLUCOMTR-MCNC: 155 MG/DL (ref 70–99)
GLUCOSE BLDC GLUCOMTR-MCNC: 156 MG/DL (ref 70–99)
HCT VFR BLD AUTO: 29.3 % (ref 35–47)
HGB BLD-MCNC: 8.6 G/DL (ref 11.7–15.7)
MAGNESIUM SERPL-MCNC: 2.6 MG/DL (ref 1.6–2.3)
MCH RBC QN AUTO: 24.9 PG (ref 26.5–33)
MCHC RBC AUTO-ENTMCNC: 29.4 G/DL (ref 31.5–36.5)
MCV RBC AUTO: 85 FL (ref 78–100)
PHOSPHATE SERPL-MCNC: 4.1 MG/DL (ref 2.5–4.5)
PLATELET # BLD AUTO: 349 10E3/UL (ref 150–450)
POTASSIUM BLD-SCNC: 4.1 MMOL/L (ref 3.4–5.3)
RBC # BLD AUTO: 3.46 10E6/UL (ref 3.8–5.2)
SODIUM SERPL-SCNC: 146 MMOL/L (ref 133–144)
WBC # BLD AUTO: 9.8 10E3/UL (ref 4–11)

## 2022-04-05 PROCEDURE — 94003 VENT MGMT INPAT SUBQ DAY: CPT

## 2022-04-05 PROCEDURE — 250N000013 HC RX MED GY IP 250 OP 250 PS 637: Performed by: NURSE PRACTITIONER

## 2022-04-05 PROCEDURE — 250N000013 HC RX MED GY IP 250 OP 250 PS 637: Performed by: STUDENT IN AN ORGANIZED HEALTH CARE EDUCATION/TRAINING PROGRAM

## 2022-04-05 PROCEDURE — 250N000013 HC RX MED GY IP 250 OP 250 PS 637: Performed by: SURGERY

## 2022-04-05 PROCEDURE — 95700 EEG CONT REC W/VID EEG TECH: CPT | Performed by: PSYCHIATRY & NEUROLOGY

## 2022-04-05 PROCEDURE — 99291 CRITICAL CARE FIRST HOUR: CPT | Mod: 25 | Performed by: PSYCHIATRY & NEUROLOGY

## 2022-04-05 PROCEDURE — 84100 ASSAY OF PHOSPHORUS: CPT | Performed by: NURSE PRACTITIONER

## 2022-04-05 PROCEDURE — 99291 CRITICAL CARE FIRST HOUR: CPT | Mod: 24 | Performed by: ANESTHESIOLOGY

## 2022-04-05 PROCEDURE — 95720 EEG PHY/QHP EA INCR W/VEEG: CPT | Performed by: PSYCHIATRY & NEUROLOGY

## 2022-04-05 PROCEDURE — 999N000157 HC STATISTIC RCP TIME EA 10 MIN

## 2022-04-05 PROCEDURE — 250N000011 HC RX IP 250 OP 636: Performed by: PSYCHIATRY & NEUROLOGY

## 2022-04-05 PROCEDURE — 250N000013 HC RX MED GY IP 250 OP 250 PS 637: Performed by: PHYSICIAN ASSISTANT

## 2022-04-05 PROCEDURE — 250N000011 HC RX IP 250 OP 636: Performed by: NURSE PRACTITIONER

## 2022-04-05 PROCEDURE — 36415 COLL VENOUS BLD VENIPUNCTURE: CPT | Performed by: NURSE PRACTITIONER

## 2022-04-05 PROCEDURE — 99231 SBSQ HOSP IP/OBS SF/LOW 25: CPT | Performed by: INTERNAL MEDICINE

## 2022-04-05 PROCEDURE — 250N000011 HC RX IP 250 OP 636: Performed by: SURGERY

## 2022-04-05 PROCEDURE — 85027 COMPLETE CBC AUTOMATED: CPT | Performed by: NURSE PRACTITIONER

## 2022-04-05 PROCEDURE — 83735 ASSAY OF MAGNESIUM: CPT | Performed by: NURSE PRACTITIONER

## 2022-04-05 PROCEDURE — 200N000001 HC R&B ICU

## 2022-04-05 PROCEDURE — 95714 VEEG EA 12-26 HR UNMNTR: CPT

## 2022-04-05 PROCEDURE — 80048 BASIC METABOLIC PNL TOTAL CA: CPT | Performed by: NURSE PRACTITIONER

## 2022-04-05 RX ORDER — CARVEDILOL 25 MG/1
25 TABLET ORAL 2 TIMES DAILY WITH MEALS
Status: DISCONTINUED | OUTPATIENT
Start: 2022-04-05 | End: 2022-04-05

## 2022-04-05 RX ORDER — AMIODARONE HYDROCHLORIDE 200 MG/1
200 TABLET ORAL DAILY
Status: DISCONTINUED | OUTPATIENT
Start: 2022-04-08 | End: 2022-04-18 | Stop reason: HOSPADM

## 2022-04-05 RX ORDER — AMLODIPINE BESYLATE 5 MG/1
5 TABLET ORAL DAILY
Status: DISCONTINUED | OUTPATIENT
Start: 2022-04-05 | End: 2022-04-05

## 2022-04-05 RX ORDER — AMLODIPINE BESYLATE 5 MG/1
5 TABLET ORAL DAILY
Status: DISCONTINUED | OUTPATIENT
Start: 2022-04-06 | End: 2022-04-17

## 2022-04-05 RX ORDER — AMIODARONE HYDROCHLORIDE 200 MG/1
400 TABLET ORAL 2 TIMES DAILY
Status: COMPLETED | OUTPATIENT
Start: 2022-04-05 | End: 2022-04-07

## 2022-04-05 RX ORDER — ATORVASTATIN CALCIUM 40 MG/1
80 TABLET, FILM COATED ORAL DAILY
Status: DISCONTINUED | OUTPATIENT
Start: 2022-04-06 | End: 2022-04-18 | Stop reason: HOSPADM

## 2022-04-05 RX ORDER — CARVEDILOL 25 MG/1
25 TABLET ORAL 2 TIMES DAILY WITH MEALS
Status: DISCONTINUED | OUTPATIENT
Start: 2022-04-05 | End: 2022-04-18 | Stop reason: HOSPADM

## 2022-04-05 RX ORDER — ACETAMINOPHEN 325 MG/1
975 TABLET ORAL EVERY 6 HOURS
Status: DISCONTINUED | OUTPATIENT
Start: 2022-04-05 | End: 2022-04-18 | Stop reason: HOSPADM

## 2022-04-05 RX ORDER — LEVETIRACETAM 10 MG/ML
1000 INJECTION INTRAVASCULAR EVERY 12 HOURS
Status: DISCONTINUED | OUTPATIENT
Start: 2022-04-05 | End: 2022-04-17

## 2022-04-05 RX ADMIN — CARVEDILOL 25 MG: 25 TABLET, FILM COATED ORAL at 18:01

## 2022-04-05 RX ADMIN — HYDRALAZINE HYDROCHLORIDE 10 MG: 20 INJECTION INTRAMUSCULAR; INTRAVENOUS at 06:12

## 2022-04-05 RX ADMIN — MINERAL OIL AND PETROLATUM 3.5 G: 150; 830 OINTMENT OPHTHALMIC at 19:48

## 2022-04-05 RX ADMIN — HEPARIN SODIUM 5000 UNITS: 5000 INJECTION, SOLUTION INTRAVENOUS; SUBCUTANEOUS at 11:11

## 2022-04-05 RX ADMIN — Medication 40 MG: at 08:01

## 2022-04-05 RX ADMIN — Medication 15 ML: at 08:01

## 2022-04-05 RX ADMIN — CHLORHEXIDINE GLUCONATE 15 ML: 1.2 RINSE ORAL at 19:48

## 2022-04-05 RX ADMIN — HEPARIN SODIUM 5000 UNITS: 5000 INJECTION, SOLUTION INTRAVENOUS; SUBCUTANEOUS at 19:48

## 2022-04-05 RX ADMIN — INSULIN ASPART 1 UNITS: 100 INJECTION, SOLUTION INTRAVENOUS; SUBCUTANEOUS at 15:58

## 2022-04-05 RX ADMIN — MINERAL OIL AND PETROLATUM 3.5 G: 150; 830 OINTMENT OPHTHALMIC at 11:23

## 2022-04-05 RX ADMIN — ASPIRIN 81 MG CHEWABLE TABLET 81 MG: 81 TABLET CHEWABLE at 08:02

## 2022-04-05 RX ADMIN — AMIODARONE HYDROCHLORIDE 400 MG: 200 TABLET ORAL at 20:55

## 2022-04-05 RX ADMIN — HYDROMORPHONE HYDROCHLORIDE 0.2 MG: 0.2 INJECTION, SOLUTION INTRAMUSCULAR; INTRAVENOUS; SUBCUTANEOUS at 04:07

## 2022-04-05 RX ADMIN — AMIODARONE HYDROCHLORIDE 400 MG: 200 TABLET ORAL at 08:02

## 2022-04-05 RX ADMIN — FUROSEMIDE 20 MG: 10 INJECTION, SOLUTION INTRAVENOUS at 06:12

## 2022-04-05 RX ADMIN — AMLODIPINE BESYLATE 5 MG: 5 TABLET ORAL at 08:02

## 2022-04-05 RX ADMIN — ACETAMINOPHEN 975 MG: 325 TABLET, FILM COATED ORAL at 21:49

## 2022-04-05 RX ADMIN — INSULIN ASPART 1 UNITS: 100 INJECTION, SOLUTION INTRAVENOUS; SUBCUTANEOUS at 19:51

## 2022-04-05 RX ADMIN — MINERAL OIL AND PETROLATUM 3.5 G: 150; 830 OINTMENT OPHTHALMIC at 08:45

## 2022-04-05 RX ADMIN — INSULIN ASPART 1 UNITS: 100 INJECTION, SOLUTION INTRAVENOUS; SUBCUTANEOUS at 04:13

## 2022-04-05 RX ADMIN — LEVETIRACETAM 1000 MG: 10 INJECTION INTRAVENOUS at 13:20

## 2022-04-05 RX ADMIN — HYDRALAZINE HYDROCHLORIDE 10 MG: 20 INJECTION INTRAMUSCULAR; INTRAVENOUS at 20:55

## 2022-04-05 RX ADMIN — MINERAL OIL AND PETROLATUM 3.5 G: 150; 830 OINTMENT OPHTHALMIC at 04:13

## 2022-04-05 RX ADMIN — POTASSIUM CHLORIDE 20 MEQ: 1.5 POWDER, FOR SOLUTION ORAL at 08:01

## 2022-04-05 RX ADMIN — TICAGRELOR 90 MG: 90 TABLET ORAL at 16:06

## 2022-04-05 RX ADMIN — HEPARIN SODIUM 5000 UNITS: 5000 INJECTION, SOLUTION INTRAVENOUS; SUBCUTANEOUS at 04:09

## 2022-04-05 RX ADMIN — OXYCODONE HYDROCHLORIDE 10 MG: 5 TABLET ORAL at 20:55

## 2022-04-05 RX ADMIN — INSULIN ASPART 1 UNITS: 100 INJECTION, SOLUTION INTRAVENOUS; SUBCUTANEOUS at 08:22

## 2022-04-05 RX ADMIN — ACETAMINOPHEN 975 MG: 325 TABLET, FILM COATED ORAL at 11:11

## 2022-04-05 RX ADMIN — CHLORHEXIDINE GLUCONATE 15 ML: 1.2 RINSE ORAL at 08:01

## 2022-04-05 RX ADMIN — OXYCODONE HYDROCHLORIDE 10 MG: 5 TABLET ORAL at 08:02

## 2022-04-05 RX ADMIN — CARVEDILOL 25 MG: 25 TABLET, FILM COATED ORAL at 08:02

## 2022-04-05 RX ADMIN — ACETAMINOPHEN 975 MG: 325 TABLET, FILM COATED ORAL at 16:06

## 2022-04-05 RX ADMIN — INSULIN ASPART 1 UNITS: 100 INJECTION, SOLUTION INTRAVENOUS; SUBCUTANEOUS at 11:21

## 2022-04-05 RX ADMIN — ATORVASTATIN CALCIUM 80 MG: 40 TABLET, FILM COATED ORAL at 08:01

## 2022-04-05 RX ADMIN — TICAGRELOR 90 MG: 90 TABLET ORAL at 05:22

## 2022-04-05 RX ADMIN — MINERAL OIL AND PETROLATUM 3.5 G: 150; 830 OINTMENT OPHTHALMIC at 16:03

## 2022-04-05 ASSESSMENT — ACTIVITIES OF DAILY LIVING (ADL)
ADLS_ACUITY_SCORE: 13
ADLS_ACUITY_SCORE: 11
ADLS_ACUITY_SCORE: 13
ADLS_ACUITY_SCORE: 13
ADLS_ACUITY_SCORE: 11
ADLS_ACUITY_SCORE: 13
ADLS_ACUITY_SCORE: 13
ADLS_ACUITY_SCORE: 11
ADLS_ACUITY_SCORE: 13
ADLS_ACUITY_SCORE: 11
ADLS_ACUITY_SCORE: 13
ADLS_ACUITY_SCORE: 11
ADLS_ACUITY_SCORE: 13
ADLS_ACUITY_SCORE: 13

## 2022-04-05 NOTE — PROGRESS NOTES
[facility]    Neurocritical Care Progress Note    Interval EventsNeuro exam unchanged. Patient continues to be intubated and not sedated. EEG still ongoing, started Keppra 1 gram BID, due to abnormalities which indicate moderate-severe electrographic encephalopathy, and an elevated risk of epileptic seizures, per Dr. Sow's EEG report.   On exam, patient opening and closing eyes. Not following commands. Withdrawing to noxious stimuli in both upper and lower extremities bilaterally. RUE and RLE withdrawing more briskly compared the LUE and LLE.        HPI Summary  51 year old female admitted on 3/27/2022 for chest pain with workup demonstrating severe multivessel CAD.  Patient underwent CABG x4.  POD#0 patient had a PEA arrest with ROSC obtained at 10 minutes.  Emergent coronary cath demonstrated radial artery occlusion.  A DANA was placed into the RCA and balloon angioplasty of the RPLB was performed with excellent completion angiography flow.        Evaluation Summarized     MRI/Head CT MRI brain #1: There are a few scattered punctate acute infarctions within the left frontal lobe, left temporal lobe, right occipital lobe, presumably embolic     MRI brain #2: No new acute findings or significant interval change when compared to 4/1/2022. Scattered punctate foci of early subacute ischemic change in the cerebral hemispheres    Intracranial Vasculature CTA head: No high-grade stenosis or large vessel occlusion involving the major intracranial arteries. Mild atherosclerosis involving the left carotid siphon   Cervical Vasculature CTA neck: Unremarkable CT angiogram of the neck      Other Testing MRI cervical spine: Unremarkable MRI of the cervical spine      LDL  3/27/2022: 106 mg/dL   A1C  3/28/2022: 5.7 %   Troponin No lab value available in past 48 hrs         Impression   1. Encephalopathy s/p cardiac arrest. No explanation for her encephalopathy seen on brain MRI  2. A few  "incidental areas of restricted diffusion are seen on brain MRI which are common after catheter-based coronary angiography        Plan  - Continue continuous vEEG  - Start Keppra 1 gram BID  - Hold all sedation   - Maintain normothermia, normoglycemia, normonatremia  - Continue supportive care  - Continue neuro checks q4 hours  - Hypercoagulable work up per hematology     Patient Follow-up    - final recommendation pending work-up     We will continue to follow.       YRIS Prakash, CNP  Vascular Neurology  To page me or covering stroke neurology team member, click here: AMCOM   Choose \"On Call\" tab at top, then search dropdown box for \"Neurology Adult\", select location, press Enter, then look for stroke/neuro ICU/telestroke.    ______________________________________________________    Clinically Significant Risk Factors Present on Admission                  Medications   Scheduled Meds    acetaminophen  975 mg Oral or Feeding Tube Q6H     [START ON 4/8/2022] amiodarone  200 mg Oral or Feeding Tube Daily     amiodarone  400 mg Oral or Feeding Tube BID     [START ON 4/6/2022] amLODIPine  5 mg Oral or Feeding Tube Daily     artificial tears   Both Eyes Q4H     aspirin  81 mg Oral or NG Tube Daily     [START ON 4/6/2022] atorvastatin  80 mg Oral or Feeding Tube Daily     carvedilol  25 mg Oral or Feeding Tube BID w/meals     chlorhexidine  15 mL Mouth/Throat Q12H     heparin ANTICOAGULANT  5,000 Units Subcutaneous Q8H     insulin aspart  1-6 Units Subcutaneous Q4H     lactated ringers  250 mL Intravenous Once     lactated ringers  250 mL Intravenous Once     levETIRAcetam  1,000 mg Intravenous Q12H     multivitamins w/minerals  15 mL Per Feeding Tube Daily     pantoprazole  40 mg Oral or NG Tube Daily    Or     pantoprazole  40 mg Oral Daily     perflutren diluted 1mL to 2mL with saline  9 mL Intravenous Once     sodium chloride (PF)  3 mL Intracatheter Q8H     ticagrelor  90 mg Oral or Feeding Tube BID "       Infusion Meds    - MEDICATION INSTRUCTIONS -       Percutaneous Coronary Intervention orders placed (this is information for BPA alerting)       sodium chloride Stopped (04/01/22 1200)       PRN Meds  acetaminophen **OR** acetaminophen, bisacodyl, artificial tears ophthalmic solution, glucose **OR** dextrose **OR** glucagon, hydrALAZINE, HYDROmorphone **OR** HYDROmorphone, ipratropium - albuterol 0.5 mg/2.5 mg/3 mL, lidocaine 4%, lidocaine 4%, lidocaine (buffered or not buffered), lidocaine (buffered or not buffered), lidocaine (buffered or not buffered), magnesium hydroxide, - MEDICATION INSTRUCTIONS -, melatonin, methocarbamol, naloxone **OR** naloxone **OR** naloxone **OR** naloxone, ondansetron **OR** ondansetron, oxyCODONE **OR** oxyCODONE, Percutaneous Coronary Intervention orders placed (this is information for BPA alerting), polyethylene glycol, senna-docusate, sodium chloride (PF), sodium chloride (PF), sodium chloride (PF)       PHYSICAL EXAMINATION  Temp:  [98.8  F (37.1  C)-100.2  F (37.9  C)] 99.5  F (37.5  C)  Pulse:  [67-89] 77  Resp:  [12-19] 14  BP: ()/() 158/93  FiO2 (%):  [30 %] 30 %  SpO2:  [97 %-100 %] 98 %      General Exam  General:  patient lying in bed without any acute distress  HEENT:  intubated  Pulmonary:  on mechanical ventilation        Neuro Exam  Mental Status:  does not follow commands. eyes open and close spontaneously   Cranial Nerves:  PERRL, corneal reflexes brisk bilaterally, does not protrude tongue, does not blink to threat even with eyes widely open, does not respond to shout, oculocephalic maneuver not tested today  Motor:  withdrawing to noxious stimuli in both upper and lower extremities. RUE and RLE withdrawing more briskly compared to LUE and LLE.   Reflexes:  toes mute  Sensory:  as above to noxious, see motor section  Coordination:  deferred  Station/Gait:  unable to test due to intubation      Imaging  I personally reviewed all imaging; relevant  findings per HPI.     Lab Results Data   CBC  Recent Labs   Lab 04/05/22  0525 04/04/22  0451 04/03/22  0408   WBC 9.8 10.3 12.5*   RBC 3.46* 3.44* 3.13*   HGB 8.6* 8.3* 7.9*   HCT 29.3* 27.7* 26.0*    299 245     Basic Metabolic Panel    Recent Labs   Lab 04/05/22  1557 04/05/22  1120 04/05/22  0821 04/05/22  0525 04/04/22  1155 04/04/22  0938 04/04/22  0453 04/04/22  0451 04/03/22  0808 04/03/22  0408   NA  --   --   --  146*  --   --   --  144  --  144   POTASSIUM  --   --   --  4.1  --  4.3  --  3.6   < > 3.5   CHLORIDE  --   --   --  112*  --   --   --  111*  --  111*   CO2  --   --   --  28  --   --   --  29  --  30   BUN  --   --   --  31*  --   --   --  25  --  22   CR  --   --   --  1.04  --   --   --  1.00  --  0.96   * 141* 150* 159*   < >  --    < > 140*   < > 145*   TOMAS  --   --   --  9.1  --   --   --  9.2  --  8.6    < > = values in this interval not displayed.     Liver Panel  Recent Labs   Lab 04/03/22  0408 04/02/22  0355 04/01/22  0416   PROTTOTAL 6.2* 5.7* 5.5*   ALBUMIN 2.3* 2.2* 2.3*   BILITOTAL 0.2 0.2 0.4   ALKPHOS 108 72 65   AST 26 27 47*   ALT 52* 45 50     INR    Recent Labs   Lab Test 04/01/22  1647 03/31/22  0022 03/30/22  1622   INR 1.31* 1.37* 1.24*      Lipid Profile    Recent Labs   Lab Test 03/27/22  1028   CHOL 172   HDL 57   *   TRIG 47     A1C    Recent Labs   Lab Test 03/28/22  0600   A1C 5.7*     Troponin I    Recent Labs   Lab 03/31/22  0022   TROPONINIS 34,414*

## 2022-04-05 NOTE — PROGRESS NOTES
CLINICAL NUTRITION SERVICES - REASSESSMENT NOTE      Malnutrition: (4/1)  % Weight Loss:  Unable to determine without recent weight history  % Intake:  Unable to determine without nutrition history  Subcutaneous Fat Loss:  None observed  Muscle Loss:  None observed  Fluid Retention:  Mild      Malnutrition Diagnosis: Unable to determine due to lack of nutrition history and recent weight history       EVALUATION OF PROGRESS TOWARD GOALS   Diet:  NPO on vent     Nutrition Support:  Patient started on TF 4/1, advanced to goal on 4/4, and continues as follows ~    Nutrition Support Enteral:  Type of Feeding Tube: Nasoduodenal   Enteral Frequency:  Continuous  Enteral Regimen: Promote at 65 mL/hr  Total Enteral Provisions: 1560 kcal (17 kcal/kg), 97 g protein (1.7 g/kg), 1309 mL H2O, 203 g CHO, no fiber   Free Water Flush: 60 mL every 4 hours       Intake/Tolerance:    Na 146 (H) - D5 was discontinued on 4/3, continues with standard flushes   K and Phos normal  Mg 2.6 (H)   -159 with Medium sliding scale insulin  I/O 1760/4085, wt 93 kg (down 3.9 kg from DW) - Lasix d/c'd today   BM x 2 today and x 1 yest         ASSESSED NUTRITION NEEDS:    Dosing Weight: 93 kg (4/5 wt for energy), 56.8 kg (IBW for protein)   Estimated Energy Needs: 3919-4903 kcals (14-17 Kcal/Kg)  Justification: obese and vented  Estimated Protein Needs:  grams protein (1.5-1.8 g pro/Kg)  Justification: post-op, hypercatabolism with critical illness, obesity guidelines  and preservation of lean body mass  Estimated Fluid Needs:  Per provider pending fluid status      NEW FINDINGS:   4/3:  HEAD MRI:   1.  No new acute findings or significant interval change when compared to 4/1/2022.   2.  Scattered punctate foci of early subacute ischemic change in the cerebral hemispheres as detailed above.   3.  Mild to moderate small vessel ischemic disease, advanced for age.   4/4:  FT= The feeding tube is advanced with the tip in the distal duodenum      CERVICAL SPINE MRI:   1.  Unremarkable MRI of the cervical spine.   2.  Recent posterior left first rib fracture. This appeared acute on 3/31/2022.     D5 IVF was discontinued on 4/3 as noted above     Previous Goals (4/1):   TF will meet % estimated needs in the next 72 hrs  Evaluation: Met    Previous Nutrition Diagnosis (4/1):   Inadequate protein-energy intake related to intubation as evidenced by NPO status, D5 containing IVF meeting 9% energy and 0% protein needs, and TF planned  Evaluation: Resolved       CURRENT NUTRITION DIAGNOSIS  No nutrition diagnosis identified at this time    INTERVENTIONS  Recommendations / Nutrition Prescription  Continue goal TF as above     Implementation  Collaboration and Referral of Nutrition care:  Patient discussed today during interdisciplinary bedside rounds     Goals  TF regimen will continue to meet % needs     MONITORING AND EVALUATION:  Progress towards goals will be monitored and evaluated per protocol and Practice Guidelines    Sarah Esposito RD, LD, CNSC   Clinical Dietitian - [facility]

## 2022-04-05 NOTE — PROGRESS NOTES
[facility]  Cardiovascular and Thoracic Surgery Daily Note          Assessment and Plan:   sepideh Robledo is a 51 year old female with history of MI at age 25 s/p angioplasty who presented to the hospital with hypertensive emergency and NSTEMI. Coronary angio demonstrated severe multivessel CAD. Echocardiogram preoperatively with preserved biventricular function.    PMH includes: NSTEMI, CAD with previous coronary stents, HTN    Course has been complicated by PEA arrest with ventricular fibrillation on POD 0 with ROSC after defibrillation x3, 2 mg epinephrine and one round of CPR. Was taken to cath lab where radial artery graft was found to be occluded. PCI with DANA placement to mid RCA and angioplasty of the rPLB was performed. Echocardiogram at the conclusion of PCI demonstrated LVEF 40-45% with mild to moderate global hypokinesis of the left ventricle with severe hypokinesis of the mid anterior and anteroseptal walls, the entire inferior wall and all apical segments of the left ventricle; no pericardial effusion. Additionally, due to antiplatelet therapy post PCI, patient developed epistaxis during attempted NG placement requiring placement of b/l rhino rockets. Now, diffuse severe encephalopathy with minimal neurologic response.     POD #6 s/p:  1.  Coronary artery bypass grafting x 4 (radial artery graft to the right posterior descending coronary artery, reversed saphenous vein graft to the obtuse marginal branch of the left circumflex coronary artery, reversed saphenous vein graft to the first diagonal branch of the left anterior descending coronary artery, and pedicled left internal mammary artery to left anterior descending coronary artery).  2.  Endoscopic vein harvest of the greater saphenous vein from the left lower extremity.  3.  Endoscopic left radial artery harvest on 3/30/22 with Dr. Butterfield.    CVS:   NSTEMI and severe multivessel CAD s/p CABG x4 with radial grafts as  above  HTN  PEA and Vfib arrest s/p ROSC and DANA to mid RCA and angioplasty of the rPLB on 3/31  HD stable, lends to HTN; NSR rates 70-80s  - Goal MAP >65, SBP <140  - Amlodipine 5 mg daily for radial graft (at least 3-6 months)  - Increase carvedilol to 25 mg BID  - PRN hydralazine available  - Brilinta load and daily per Cardiology  - Transitioned amiodarone gtt to PO taper per EP/Cards recs  - ASA 81 daily  - Atorvastatin 80 mg daily  - CTs removed 4/3. TPWs removed 4/1    Resp:   Postoperative ventilator management  - Current vent settings:  Vent Mode: CMV/AC  (Continuous Mandatory Ventilation/ Assist Control)  FiO2 (%): 30 %  Resp Rate (Set): 14 breaths/min  Tidal Volume (Set, mL): 450 mL  PEEP (cm H2O): 5 cmH2O  Pressure Support (cm H2O): 10 cmH2O  Resp: 14  - Intubated for airway protection due to encephalopathy  - Titrate O2/peep to maintain sats >92%  - PST as tolerated  - Dr. Butterfield ok with trach POD 10-14    Neuro:    Acute postoperative pain  Hx migraines  Encephalopathy   Did receive CPR on 3/31, will likely have pain from this also  Propofol turned off on 3/31 at 9 am, starting to open eyes more, unable to move extremities but withdraws BLE to pain, nothing in BUEs yet. CT head 3/31 negative for hemorrhage; CTA negative for strokes, vessels patent; per neurology, MRI stable w/o acute concern to explain clinical status.  EEG without e/o seizure activity, continues to have moderate to severe diffuse encephalopathy   MRI head and c-spine on 4/3 negative  - PRN dilaudid available  - PRN oxycodone and methocarbamol available  - Scheduled tylenol   - Neuro critical care consulted and following, greatly appreciate recommendations    Renal/Electrolyte:   Lactic acidosis, resolved  Volume overload  Hypernatremia  Creat stable, below preop weight, much less edema; negative 3.4 L past 24h  Na 146, K+4.1; hypernatremia likely due to diuresis  - Strict I/O, daily weights  - Avoid/limit nephrotoxins as able  -  Replete lytes per protocol  - No further diuresis  - Continue FWF 60 mL q6h    GI/Nutrition:    Epistaxis, resolved  Elevated LFTs, likely shock liver 2/2 arrest, resolved  Orders Placed This Encounter      NPO for Medical/Clinical Reasons Except for: Other; Specify: NPO until Extubated      Advance Diet as Tolerated: Clear Liquid Diet  - S/P NJ placement on 4/4 in radiology  - Nutrition following, increasing enteral feeds to goal, absorbing well  - Rhino rockets removed, continues to have some oozing from b/l nares  - Continue bowel regimen, + BMs  - PPI    :    - Cooper in place for strict I/O    Endo:  Stress induced hyperglycemia  Thyroid nodule   Preop A1c 5.7%  - Transitioned to sliding scale insulin   - Goal BG <180 for optimal healing  - Thyroid nodule noted on CT on 3/31, will have this worked up on an outpatient basis     ID:  Stress induced leukocytosis  E.Coli UTI preoperatively  WBC 9.8, Tmax 100.2; no s/sx infection  - Completed perioperative ABX  - Continue to follow fever curve, WBC and inflammatory markers as appropriate  - Repeat UA 4/1 without overt e/o infection     Heme:  Acute blood loss anemia  Acute blood loss thrombocytopenia  Hgb 8.6, Plts 349; no s/sx active bleeding  - CBC in AM  - Goal Hgb >7  - Consulted hematology given severe premature CAD and acute graft failure- appreciate recommendations. Multiple labs sent. Hypercoagulable workup appears to be negative     - Proph: PPI, subcutaneous heparin, SCDs  - Dispo: Continue in ICU  - Lines: Cooper, PIV, ETT          Interval History:   NAEON. Eyes more midline today, continues to have some roving.          Medications:       [START ON 4/8/2022] amiodarone  200 mg Oral Daily     amiodarone  400 mg Oral BID     amLODIPine  5 mg Oral Daily     artificial tears   Both Eyes Q4H     aspirin  81 mg Oral or NG Tube Daily     atorvastatin  80 mg Oral Daily     carvedilol  25 mg Oral BID w/meals     chlorhexidine  15 mL Mouth/Throat Q12H     heparin  "ANTICOAGULANT  5,000 Units Subcutaneous Q8H     insulin aspart  1-6 Units Subcutaneous Q4H     lactated ringers  250 mL Intravenous Once     lactated ringers  250 mL Intravenous Once     multivitamins w/minerals  15 mL Per Feeding Tube Daily     pantoprazole  40 mg Oral or NG Tube Daily    Or     pantoprazole  40 mg Oral Daily     perflutren diluted 1mL to 2mL with saline  9 mL Intravenous Once     potassium chloride  20 mEq Oral BID     sodium chloride (PF)  3 mL Intracatheter Q8H     ticagrelor  90 mg Oral BID             Physical Exam:   Vitals were reviewed  Blood pressure 132/81, pulse 81, temperature 98.8  F (37.1  C), resp. rate 14, height 1.651 m (5' 5\"), weight 93 kg (205 lb 0.4 oz), last menstrual period 02/27/2022, SpO2 98 %.  Rhythm: NSR    Lungs: CTA anteriorly, synchronous with ventilator    Cardiovascular: S1, S2, RRR, no m/r/g    Abdomen: S/ND, NJ in place, +BS    Extremeties: much less edema, less pitting, warm and well perfused    Incision: CDI    CT: N/A    Weight:   Vitals:    04/01/22 0430 04/02/22 0500 04/03/22 0429 04/04/22 0415   Weight: 96.9 kg (213 lb 10 oz) 101.7 kg (224 lb 3.3 oz) 99 kg (218 lb 4.1 oz) 94.9 kg (209 lb 3.5 oz)    04/05/22 0414   Weight: 93 kg (205 lb 0.4 oz)            Data:   Labs:   Lab Results   Component Value Date    WBC 15.8 03/31/2022     Lab Results   Component Value Date    RBC 3.15 03/31/2022     Lab Results   Component Value Date    HGB 8.0 03/31/2022     Lab Results   Component Value Date    HCT 25.5 03/31/2022     No components found for: MCT  Lab Results   Component Value Date    MCV 81 03/31/2022     Lab Results   Component Value Date    MCH 25.4 03/31/2022     Lab Results   Component Value Date    MCHC 31.4 03/31/2022     Lab Results   Component Value Date    RDW 17.7 03/31/2022     Lab Results   Component Value Date     03/31/2022       Last Basic Metabolic Panel:  Lab Results   Component Value Date     03/31/2022      Lab Results   Component " Value Date    POTASSIUM 3.2 03/31/2022     Lab Results   Component Value Date    CHLORIDE 116 03/31/2022     Lab Results   Component Value Date    TOMAS 8.7 03/31/2022     Lab Results   Component Value Date    CO2 20 03/31/2022     Lab Results   Component Value Date    BUN 8 03/31/2022     Lab Results   Component Value Date    CR 0.81 03/31/2022     Lab Results   Component Value Date     03/31/2022     03/31/2022       Imaging:  Recent Results (from the past 24 hour(s))   XR Feeding Tube Placement    Narrative    FLUOROSCOPY GUIDED FEEDING TUBE PLACEMENT  4/4/2022 3:31 PM     HISTORY: Status post CABG, encephalopathic, would like NJ placement.  Feeding tube needed for nutrition.    COMPARISON: None.    TECHNIQUE: After injection of Xylocaine gel into the nose, a feeding  tube was advanced under fluoroscopic guidance.    FLUOROSCOPY TIME: 9.5 minutes.    SPOT FILMS: 2    FINDINGS: The feeding tube is advanced with the tip in the distal  duodenum. A small amount of barium was injected to define anatomy.      Impression    IMPRESSION: Uncomplicated feeding tube placement with tip in the  distal duodenum.    CORINE BORRERO MD         SYSTEM ID:  [id number]        Rounded and discussed with Dr. Babita Tenorio, APRN, ACNPC-AG, CCRN  Nurse Practitioner  Cardiothoracic Surgery  Pager: [phone]  [facility]

## 2022-04-05 NOTE — PROGRESS NOTES
Critical Care Progress Note      04/05/2022    Name: Angelica Robledo MRN#: [id number]   Age: 51 year old YOB: 1970     Hsptl Day# 9  ICU DAY #2    MV DAY #2             Problem List:   Active Problems:    NSTEMI (non-ST elevated myocardial infarction) (H)    Hypertensive emergency    Paroxysmal ventricular tachycardia (H)           Summary/Hospital Course:   Angelica Robledo is a 51 year old female with pertinent PMHx of prior MI at age 24-26 (in 1996) s/p angioplasty, HTN, anemia, and NSTEMI.  She presented on 3/27 with new onset chest pain similar to her previous MI.  Workup was consistent with NSTEMI.  Cardiac cath demonstrated severe multivessel CAD.  She was seen by CT surgery and CABG was scheduled for 3/30.  She underwent CABG x4 left radial, LIMA, left saph vein x2 (FAUSTINO taken down, but elected to not be used due to size).    No family history of clotting disorders.  No known history of DVT/PE/stroke. Siblings without cardiovascular disease. Father passed away of cardiac causes at an advanced age.  Mother had cardiac disease as well as an aortic aneurysm, but not at a young age.     4V CABG as follows: left radial artery graft to the right posterior descending coronary artery, reversed saphenous vein graft to the obtuse marginal branch of the left circumflex coronary artery, reversed saphenous vein graft to the first diagonal branch of the left anterior descending coronary artery, and pedicled left internal mammary artery to left anterior descending coronary.    POD 0 - initial hypoxia resolved with vent changes.  At 2355 patient suffered a PEA and Vfib arrest and underwent 3 shocks, 2 rounds of epi, and 1 round of CPR. ROSC obtained at 0005.  300 mg amiodarone bolus administered.  STAT cardiac cath lab performed demonstrating LIMA-LAD, VG-LADD1, and VG-LPLB were patent. The left radial artery to RDPA graft had an occlusion.  The mid RCA was treated with a DANA stent and a balloon angioplasty  performed at the RPLB.  Excellent flow was noted on completion coronary angiogram.  Echo demonstrated a drop in LVEF to 40-45% with aspects of hypokinesis. RV demonstrated mild-moderate RV function reduction.    POD1 - weaned from sedation with minimal arousal limited to briefly opening eyes to pain and occasionally sound, minimal extremity pain response.  CT head/CTA head and neck unremarkable for acute insult.  Ceribell placed overnight.    PD2-5: MRI x2 obtained demonstrating punctate foci of early subacute ischemic change. EEG waxes and wanes from mod-severe to severe diffuse slowing. With mixed workup results, neither of these findings portent a poor prognosis specifically; however, prognosis remains unclear.          Assessment and plan :     Angelica Robledo IS a 51 year old female admitted on 3/27/2022 for chest pain with workup demonstrating severe multivessel CAD.  Patient underwent CABG x4.  POD#0 patient had a PEA arrest with ROSC obtained at 10 minutes.  Emergent coronary cath demonstrated radial artery occlusion.  A DANA was placed into the RCA and balloon angioplasty of the RPLB was performed with excellent completion angiography flow.     I have personally reviewed the daily labs, imaging studies, cultures and discussed the case with referring physician and consulting physicians.     My assessment and plan by system for this patient is as follows:    Neurology/Psychiatry:   1. Analgesia -- tylenol, prn narcotics - minimize narcotics during phase of trying to allow her to be more alert  2. Sedation -- hold all sedation  3. Encephalopathy s/p cardiac arrest -- head CT negative -- MRI w/ punctate foci of subacute ischemic change -- MRI C spine negative -- continuing EEG: diffuse slowing with gradual improvement, moderate-severe encephalpathy, no indices of seizure activity.  -- Neurology following, appreciate management/recs.  -- Very gradual improvement in clinical exam  -- Anticipate long recovery course  requiring trach and PEG     Cardiovascular:   1.  S/p CABG x4 - left radial, LIMA, left saph vein x2 (FAUSTINO taken down, but elected to not be used due to size)   2. POD#0 PEA and Vfib arrest -- s/p cardiac cath demonstrated radial artery graft to RPDA occlusion -- intervention with DANA to RCA and balloon angioplasty RPLA -- on Kangrelor 3/31, now transitioned to aspirin and Brilinta with discussion per CV surgery and interventional cardiology  -- cards recommends Amiodarone taper -- PO regimen scheduled  3. Hx of MI at age 24-25  4. HTN - likely untreated prior to hospitalization - presented with hypertension emergency - PO regimen including amlodipine (radial artery graft) and carvedilol per CV surgery management     Pulmonary/Ventilator Management:   1. Airway -- intubated -- low vent settings required -- tolerates pressure support  2. No underlying pulmonary problems on medical history or cxr.   -- Long term: if her encephalopathy is not able to recover this week, a tracheostomy would be indicated. CV surgery would like to wait until 10-14 days post op -- tentative plan of trach and PEG early next week     GI and Nutrition :   1. Epistaxis - s/p rhino rocket removal - intermittent scant oozing  2. Nutrition - NJ present -- Continue enteral feeds at goal.     Renal/Fluids/Electrolytes:   1. Monitor UOP/creatinine -- stable labs -- euvolemic status -- diuresis per CV surgery with goal of net even to slightly net negative -- Lasix decreased to 20 mg IV BID today with scheduled potassium replacement  2. Replete electrolytes PRN per protocol  3. IVF and albumin administration for volume prn per protocol  4. Cooper indicated for accurate I/Os, large volume diuresis     Infectious Disease:   1. UTI with E coli on admission -- negative on recheck on 4/1  2. Reactive leukocytosis - normalized     Endocrine:   1. Monitor for stress induced hyperglycemia. ICU insulin protocol, goal sugar <180   2. Incidental thyroid nodule --  follow-up outpatient per discharge referral  3. PPI ppx     Hematology/Oncology:   1. Acute blood loss on chronic anemia -- monitor  2. Acute blood loss thrombocytopenia -- monitor  3. S/P DANA placement s/p cardiac arrest -- aspirin and brilinta  4. Rule out hypercoagulable state -- hypercoagulopathy panel pending per hematology guidance     IV/Access:   1. Venous access - peripheral  2. Arterial access - NA      ICU Prophylaxis:   1. DVT: Hep Subq/mechanical  2. VAP: HOB 30 degrees, chlorhexidine rinse  3. Stress Ulcer: PPI  4. Restraints: No current indication. Will readdress daily.   5. Wound care - per unit routine   6. Feeding - enteral feeds  7. Family Update:  at bedside  8. Disposition - ICU - discussed/introduced the anticipated need for trach and PEG      Key goals for next 24 hours:   1. Await neuro recs - EEG monitoring continues.   2. Continue serial neuro checks           Interim History:   No acute events  EEG - diffuse slowing. Moderate-severe encephalopathy. Recording excludes non-convulsive status epilepticus as a possible cause of this encephalopathy           Key Medications:       [START ON 4/8/2022] amiodarone  200 mg Oral Daily     amiodarone  400 mg Oral BID     amLODIPine  5 mg Oral Daily     artificial tears   Both Eyes Q4H     aspirin  81 mg Oral or NG Tube Daily     atorvastatin  80 mg Oral Daily     carvedilol  25 mg Oral BID w/meals     chlorhexidine  15 mL Mouth/Throat Q12H     heparin ANTICOAGULANT  5,000 Units Subcutaneous Q8H     insulin aspart  1-6 Units Subcutaneous Q4H     lactated ringers  250 mL Intravenous Once     lactated ringers  250 mL Intravenous Once     multivitamins w/minerals  15 mL Per Feeding Tube Daily     pantoprazole  40 mg Oral or NG Tube Daily    Or     pantoprazole  40 mg Oral Daily     perflutren diluted 1mL to 2mL with saline  9 mL Intravenous Once     potassium chloride  20 mEq Oral BID     sodium chloride (PF)  3 mL Intracatheter Q8H     ticagrelor   90 mg Oral BID       - MEDICATION INSTRUCTIONS -       Percutaneous Coronary Intervention orders placed (this is information for BPA alerting)       sodium chloride Stopped (04/01/22 1200)               Physical Examination:   Temp:  [97.7  F (36.5  C)-100.2  F (37.9  C)] 98.8  F (37.1  C)  Pulse:  [67-89] 81  Resp:  [12-19] 14  BP: ()/() 132/81  FiO2 (%):  [30 %] 30 %  SpO2:  [97 %-100 %] 98 %    Intake/Output Summary (Last 24 hours) at 3/31/2022 0921  Last data filed at 3/31/2022 0700  Gross per 24 hour   Intake 3676.54 ml   Output 2825 ml   Net 851.54 ml     Wt Readings from Last 4 Encounters:   04/05/22 93 kg (205 lb 0.4 oz)     BP - Mean:  [] 98  Vent Mode: CMV/AC  (Continuous Mandatory Ventilation/ Assist Control)  FiO2 (%): 30 %  Resp Rate (Set): 14 breaths/min  Tidal Volume (Set, mL): 450 mL  PEEP (cm H2O): 5 cmH2O  Pressure Support (cm H2O): 10 cmH2O  Resp: 14    Recent Labs   Lab 04/01/22  0416 03/31/22  2341 03/31/22  2115 03/31/22  1605 03/31/22  1244 03/31/22  0603 03/30/22  1714 03/30/22  1708   PH  --   --  7.45  --  7.53* 7.49*  --  7.44   PCO2  --   --  31*  --  27* 27*  --  31*   PO2  --   --  117*  --  92 143*  --  87   HCO3  --   --  21  --  22 21  --  21   O2PER 40 40 40 40 30 30   < > 80    < > = values in this interval not displayed.       GEN: no acute distress   HEENT: head ncat, sclera anicteric, ETT present   PULM: unlabored, synchronous with vent, clear anteriorly    CV/Chest: RRR, S1S2 no gallop,  No rub, no murmur appreciated. Midline incision CDI.   ABD: soft, nontender  EXT:  Edema,  warm  NEURO: Off sedation. Opens eyes to stimulus and verbal stimulus. Pupillary light reflex brisk. Does not blink to threat. Cough reflex present. Corneal reflex weakly present (previously absent). No gag reflex present; however, demonstrates turning head away with introduction of swab.  BLE withdrawal vs triple reflex to pain stimulus. Slight BUE pain reflex withdrawal. Withdrawal  reflex to central pain stimulus. Does not follow commands.   : nathan present, clear urine output  SKIN: no obvious rash  LINES: clean, dry intact         Data:   All data and imaging reviewed     ROUTINE ICU LABS (Last four results)  CMP  Recent Labs   Lab 04/05/22  0525 04/05/22  0413 04/04/22  2343 04/04/22  1935 04/04/22  1155 04/04/22  0938 04/04/22  0453 04/04/22  0451 04/03/22  2325 04/03/22  2246 04/03/22  0808 04/03/22  0408 04/02/22  0357 04/02/22  0355 04/01/22  0419 04/01/22  0416 03/31/22  1420 03/31/22  1243   *  --   --   --   --   --   --  144  --   --   --  144  --  145*  --  146*  --  144   POTASSIUM 4.1  --   --   --   --  4.3  --  3.6  --  3.5   < > 3.5  --  3.6   < > 3.7   < > 3.4   CHLORIDE 112*  --   --   --   --   --   --  111*  --   --   --  111*  --  115*  --  118*  --  115*   CO2 28  --   --   --   --   --   --  29  --   --   --  30  --  25  --  23  --  22   ANIONGAP 6  --   --   --   --   --   --  4  --   --   --  3  --  5  --  5  --  7   * 146* 142* 148*   < >  --    < > 140*   < >  --    < > 145*   < > 135*   < > 151*   < > 178*   BUN 31*  --   --   --   --   --   --  25  --   --   --  22  --  18  --  11  --  9   CR 1.04  --   --   --   --   --   --  1.00  --   --   --  0.96  --  0.76  --  0.79  --  0.73   GFRESTIMATED 65  --   --   --   --   --   --  68  --   --   --  72  --  >90  --  >90  --  >90   TOMAS 9.1  --   --   --   --   --   --  9.2  --   --   --  8.6  --  8.8  --  8.5  --  8.8   MAG 2.6*  --   --   --   --   --   --  2.5*  --   --   --  2.2  --  2.2   < > 2.0  --   --    PHOS 4.1  --   --   --   --   --   --  4.0  --   --   --  3.7  --  3.6  --  3.5   < > 3.2   PROTTOTAL  --   --   --   --   --   --   --   --   --   --   --  6.2*  --  5.7*  --  5.5*  --  5.6*   ALBUMIN  --   --   --   --   --   --   --   --   --   --   --  2.3*  --  2.2*  --  2.3*  --  2.4*   BILITOTAL  --   --   --   --   --   --   --   --   --   --   --  0.2  --  0.2  --  0.4  --  0.4   ALKPHOS   --   --   --   --   --   --   --   --   --   --   --  108  --  72  --  65  --  62   AST  --   --   --   --   --   --   --   --   --   --   --  26  --  27  --  47*  --  89*   ALT  --   --   --   --   --   --   --   --   --   --   --  52*  --  45  --  50  --  59*    < > = values in this interval not displayed.     CBC  Recent Labs   Lab 04/05/22  0525 04/04/22  0451 04/03/22  0408 04/02/22  0355   WBC 9.8 10.3 12.5* 16.4*   RBC 3.46* 3.44* 3.13* 2.99*   HGB 8.6* 8.3* 7.9* 7.7*   HCT 29.3* 27.7* 26.0* 24.7*   MCV 85 81 83 83   MCH 24.9* 24.1* 25.2* 25.8*   MCHC 29.4* 30.0* 30.4* 31.2*   RDW 18.2* 18.2* 18.3* 18.4*    299 245 207     INR  Recent Labs   Lab 04/01/22  1647 03/31/22  0022 03/30/22  1622 03/30/22  1445   INR 1.31* 1.37* 1.24* 1.44*     Arterial Blood Gas  Recent Labs   Lab 04/01/22  0416 03/31/22  2341 03/31/22  2115 03/31/22  1605 03/31/22  1244 03/31/22  0603 03/30/22  1714 03/30/22  1708   PH  --   --  7.45  --  7.53* 7.49*  --  7.44   PCO2  --   --  31*  --  27* 27*  --  31*   PO2  --   --  117*  --  92 143*  --  87   HCO3  --   --  21  --  22 21  --  21   O2PER 40 40 40 40 30 30   < > 80    < > = values in this interval not displayed.       All cultures:  No results for input(s): CULT in the last 168 hours.  Recent Results (from the past 24 hour(s))   XR Feeding Tube Placement    Narrative    FLUOROSCOPY GUIDED FEEDING TUBE PLACEMENT  4/4/2022 3:31 PM     HISTORY: Status post CABG, encephalopathic, would like NJ placement.  Feeding tube needed for nutrition.    COMPARISON: None.    TECHNIQUE: After injection of Xylocaine gel into the nose, a feeding  tube was advanced under fluoroscopic guidance.    FLUOROSCOPY TIME: 9.5 minutes.    SPOT FILMS: 2    FINDINGS: The feeding tube is advanced with the tip in the distal  duodenum. A small amount of barium was injected to define anatomy.      Impression    IMPRESSION: Uncomplicated feeding tube placement with tip in the  distal duodenum.    CORINE  MD ADAIR         SYSTEM ID:  [id number]           Adi Bazan MD  Surgical Critical Care Fellow

## 2022-04-05 NOTE — PROGRESS NOTES
[facility] ICU RESPIRATORY NOTE        Date of Admission: 3/27/2022    Date of Intubation (most recent): 03/30/2022  Reason for Mechanical Ventilation: Airway protection     Number of Days on Mechanical Ventilation: 6    Met Criteria for Spontaneous Breathing Trial: No    Reason for No Spontaneous Breathing Trial: Per MD     Significant Events Today: None  ABG Results:   Recent Labs   Lab 04/01/22  0416 03/31/22  2341 03/31/22  2115 03/31/22  1605 03/31/22  1244 03/31/22  0603 03/30/22  1714 03/30/22  1708   PH  --   --  7.45  --  7.53* 7.49*  --  7.44   PCO2  --   --  31*  --  27* 27*  --  31*   PO2  --   --  117*  --  92 143*  --  87   HCO3  --   --  21  --  22 21  --  21   O2PER 40 40 40 40 30 30   < > 80    < > = values in this interval not displayed.       Current Vent Settings: Vent Mode: CMV/AC  (Continuous Mandatory Ventilation/ Assist Control)  FiO2 (%): 30 %  Resp Rate (Set): 14 breaths/min  Tidal Volume (Set, mL): 450 mL  PEEP (cm H2O): 5 cmH2O  Pressure Support (cm H2O): 10 cmH2O  Resp: 14      Skin Assessment: securement device changed. Used adhesive remover and skin barrier . Skin intact. pt has tongue injury      Plan: Will cont full vent support . will assess for another PS trial in the morning.   Eleazar Suggs, RT

## 2022-04-05 NOTE — PROGRESS NOTES
INTERIM REPORT of Video-EEG Monitoring              DATE OF RECORDIN2022         I reviewed the ongoing video-EEG monitoring of Angelica Robledo.          The EEG during stupor was abnormal due to generalized delta-theta slowing, with intermixture of faster alpha-beta activities bilaterally, with occasional, brief generalized electrodecrements, and with occasional, non-repetitive, generalized sharp-and-slow-wave complexes, at times with unsustained reactivity consisting of acceleration of delta-theta activities.  No electrographic seizures were observed.         These abnormalities indicate moderate-severe electrographic encephalopathy, and an elevated risk of epileptic seizures.    Mike Sow M.D.

## 2022-04-05 NOTE — PLAN OF CARE
OT/CR: pt admitted due to NSTEMI, POD #6 s/p CABG x 4 and PEA arrest with V-fib. Spoke with nursing, pt not following commands and has a long course. Recommends to complete OT/CR orders at this time. CR/OT order completed. Please reorder OT/CR when pt appropriate to participate OT/CR.

## 2022-04-05 NOTE — PROGRESS NOTES
[facility] ICU RESPIRATORY NOTE        Date of Admission: 3/27/2022    Date of Intubation (most recent): 03/30/2021    Reason for Mechanical Ventilation: Post op CABG    Number of Days on Mechanical Ventilation: 7    Met Criteria for Spontaneous Breathing Trial: PS trial was done two times and failed both. RR was as below as 7.    Reason for No Spontaneous Breathing Trial:     Significant Events Today: No.    ABG Results: Recent Labs   Lab 04/01/22  0416 03/31/22  2341 03/31/22  2115 03/31/22  1605 03/31/22  1244 03/31/22  0603 03/30/22  1714 03/30/22  1708   PH  --   --  7.45  --  7.53* 7.49*  --  7.44   PCO2  --   --  31*  --  27* 27*  --  31*   PO2  --   --  117*  --  92 143*  --  87   HCO3  --   --  21  --  22 21  --  21   O2PER 40 40 40 40 30 30   < > 80    < > = values in this interval not displayed.       Current Vent Settings: Vent Mode: CMV/AC  (Continuous Mandatory Ventilation/ Assist Control)  FiO2 (%): 30 %  Resp Rate (Set): 14 breaths/min  Tidal Volume (Set, mL): 450 mL  PEEP (cm H2O): 5 cmH2O  Pressure Support (cm H2O): 10 cmH2O  Resp: 13      Skin Assessment: Clean.    Plan: Continue giving Full support.    Tal Álvarez

## 2022-04-05 NOTE — PLAN OF CARE
Neuro: Patient opens eyes spontaneus, does not track does not fallow commands.. PERRL, cough, no gag, withdraw to pain BLE slightly.Continuous EEG.   CV:  SR, HR 70s, SBP <140, MAP > 65  Pulmonary: LSC, moderate tan creamy secretions.  GI/: NJ in place TF increase to goal 65 mol/h, pt tolerate well, abd soft no BM this shift, Cooper catheter with adequate UOP  INTEG: R and L  nares without any obvious bleeding, Laina remain in place. Thoung bitten of few places, vaselin applied,   Continue with plan of care.

## 2022-04-05 NOTE — PLAN OF CARE
Shift note [id number]: Mild fever of 37.5, VSS with PO antihypertensives to maintain SBP<140. SR. PS trials failed x2- low tidals.Up in chair for 2 hours. Loose BM x1. UOP adequate. Neuro status mostly unchanged with minor improvements such as more frequently scanning the room/less upward gaze. Moves RUE at times without stimulation. Family supportive at bedside most of day.

## 2022-04-05 NOTE — PROGRESS NOTES
SPIRITUAL HEALTH SERVICES Progress Note  I met with pt's spouse and sister this afternoon. They said they are doing okay and supporting each other emotionally during this time. They did not have any pressing emotional or spiritual needs at this time. Chaplains will continue to follow up as needed while pt is in the hospital.       Vincent Scherer  Associate

## 2022-04-05 NOTE — PROGRESS NOTES
Lab testing noted, cardiolipid antibody negative as is beta 2 glycoprotein.  ATIII low but in this setting would not be a fully reliable test for ATIII deficiency and would recommend repeat levels once acute issues resolve.  She is on heparin subcutaneous tid for prophylaxis. She had some low grade DIC at the time of testing. Would recommend continue same anticoagulation measures with antiplatelet therapy. In the setting of thrombosis we generally recommend full dose anticoagulation with therapeutic PTTs so unless contraindicated would recommend  Therapeutic heparin    Will continue to follow along, hypercoag workup thus far is ultimately negative but in setting of a  thrombus anticoagulation recommendations are full dose    Please call if questions    Tin Salinas MD

## 2022-04-06 ENCOUNTER — APPOINTMENT (OUTPATIENT)
Dept: NEUROLOGY | Facility: CLINIC | Age: 52
End: 2022-04-06
Attending: PSYCHIATRY & NEUROLOGY
Payer: COMMERCIAL

## 2022-04-06 LAB
ANION GAP SERPL CALCULATED.3IONS-SCNC: 3 MMOL/L (ref 3–14)
BASE EXCESS BLDA CALC-SCNC: -0.2 MMOL/L (ref -9.6–2)
BASE EXCESS BLDA CALC-SCNC: -0.7 MMOL/L (ref -9.6–2)
BASE EXCESS BLDA CALC-SCNC: -1.6 MMOL/L (ref -9.6–2)
BASE EXCESS BLDA CALC-SCNC: -1.7 MMOL/L (ref -9.6–2)
BASE EXCESS BLDA CALC-SCNC: -1.7 MMOL/L (ref -9.6–2)
BASE EXCESS BLDA CALC-SCNC: -2.2 MMOL/L (ref -9.6–2)
BASE EXCESS BLDA CALC-SCNC: 0.2 MMOL/L (ref -9.6–2)
BASE EXCESS BLDA CALC-SCNC: 0.5 MMOL/L (ref -9.6–2)
BASE EXCESS BLDV CALC-SCNC: 2.1 MMOL/L (ref -8.1–1.9)
BUN SERPL-MCNC: 28 MG/DL (ref 7–30)
CA-I BLD-MCNC: 4.2 MG/DL (ref 4.4–5.2)
CA-I BLD-MCNC: 4.4 MG/DL (ref 4.4–5.2)
CA-I BLD-MCNC: 4.5 MG/DL (ref 4.4–5.2)
CA-I BLD-MCNC: 4.7 MG/DL (ref 4.4–5.2)
CA-I BLD-MCNC: 4.8 MG/DL (ref 4.4–5.2)
CA-I BLD-MCNC: 5.2 MG/DL (ref 4.4–5.2)
CALCIUM SERPL-MCNC: 9.5 MG/DL (ref 8.5–10.1)
CHLORIDE BLD-SCNC: 112 MMOL/L (ref 94–109)
CO2 SERPL-SCNC: 29 MMOL/L (ref 20–32)
CREAT SERPL-MCNC: 0.95 MG/DL (ref 0.52–1.04)
ERYTHROCYTE [DISTWIDTH] IN BLOOD BY AUTOMATED COUNT: 17.8 % (ref 10–15)
ERYTHROCYTE [DISTWIDTH] IN BLOOD BY AUTOMATED COUNT: 18 % (ref 10–15)
GFR SERPL CREATININE-BSD FRML MDRD: 73 ML/MIN/1.73M2
GLUCOSE BLD-MCNC: 102 MG/DL (ref 70–99)
GLUCOSE BLD-MCNC: 114 MG/DL (ref 70–99)
GLUCOSE BLD-MCNC: 124 MG/DL (ref 70–99)
GLUCOSE BLD-MCNC: 126 MG/DL (ref 70–99)
GLUCOSE BLD-MCNC: 150 MG/DL (ref 70–99)
GLUCOSE BLD-MCNC: 151 MG/DL (ref 70–99)
GLUCOSE BLD-MCNC: 168 MG/DL (ref 70–99)
GLUCOSE BLD-MCNC: 169 MG/DL (ref 70–99)
GLUCOSE BLD-MCNC: 169 MG/DL (ref 70–99)
GLUCOSE BLD-MCNC: 171 MG/DL (ref 70–99)
GLUCOSE BLDC GLUCOMTR-MCNC: 124 MG/DL (ref 70–99)
GLUCOSE BLDC GLUCOMTR-MCNC: 133 MG/DL (ref 70–99)
GLUCOSE BLDC GLUCOMTR-MCNC: 136 MG/DL (ref 70–99)
GLUCOSE BLDC GLUCOMTR-MCNC: 140 MG/DL (ref 70–99)
GLUCOSE BLDC GLUCOMTR-MCNC: 142 MG/DL (ref 70–99)
GLUCOSE BLDC GLUCOMTR-MCNC: 153 MG/DL (ref 70–99)
GLUCOSE BLDC GLUCOMTR-MCNC: 155 MG/DL (ref 70–99)
HCO3 BLDA-SCNC: 22 MMOL/L (ref 21–28)
HCO3 BLDA-SCNC: 23 MMOL/L (ref 21–28)
HCO3 BLDA-SCNC: 24 MMOL/L (ref 21–28)
HCO3 BLDA-SCNC: 24 MMOL/L (ref 21–28)
HCO3 BLDA-SCNC: 25 MMOL/L (ref 21–28)
HCO3 BLDA-SCNC: 25 MMOL/L (ref 21–28)
HCO3 BLDV-SCNC: 27 MMOL/L (ref 21–28)
HCT VFR BLD AUTO: 28.1 % (ref 35–47)
HCT VFR BLD AUTO: 29.6 % (ref 35–47)
HGB BLD-MCNC: 7 G/DL (ref 11.7–15.7)
HGB BLD-MCNC: 7.3 G/DL (ref 11.7–15.7)
HGB BLD-MCNC: 7.4 G/DL (ref 11.7–15.7)
HGB BLD-MCNC: 7.4 G/DL (ref 11.7–15.7)
HGB BLD-MCNC: 7.6 G/DL (ref 11.7–15.7)
HGB BLD-MCNC: 7.9 G/DL (ref 11.7–15.7)
HGB BLD-MCNC: 8.2 G/DL (ref 11.7–15.7)
HGB BLD-MCNC: 8.2 G/DL (ref 11.7–15.7)
HGB BLD-MCNC: 8.4 G/DL (ref 11.7–15.7)
HGB BLD-MCNC: 9.3 G/DL (ref 11.7–15.7)
HGB BLD-MCNC: 9.5 G/DL (ref 11.7–15.7)
LACTATE BLD-SCNC: 0.6 MMOL/L
LACTATE BLD-SCNC: 0.6 MMOL/L
LACTATE BLD-SCNC: 0.7 MMOL/L
LACTATE BLD-SCNC: 0.8 MMOL/L
LACTATE BLD-SCNC: 0.8 MMOL/L
LACTATE BLD-SCNC: 1.1 MMOL/L
MAGNESIUM SERPL-MCNC: 2.7 MG/DL (ref 1.6–2.3)
MCH RBC QN AUTO: 24.3 PG (ref 26.5–33)
MCH RBC QN AUTO: 24.4 PG (ref 26.5–33)
MCHC RBC AUTO-ENTMCNC: 28.4 G/DL (ref 31.5–36.5)
MCHC RBC AUTO-ENTMCNC: 29.2 G/DL (ref 31.5–36.5)
MCV RBC AUTO: 83 FL (ref 78–100)
MCV RBC AUTO: 86 FL (ref 78–100)
O2/TOTAL GAS SETTING VFR VENT: 100 %
O2/TOTAL GAS SETTING VFR VENT: 100 %
O2/TOTAL GAS SETTING VFR VENT: 50 %
O2/TOTAL GAS SETTING VFR VENT: 70 %
O2/TOTAL GAS SETTING VFR VENT: 80 %
O2/TOTAL GAS SETTING VFR VENT: 80 %
PCO2 BLDA: 35 MM HG (ref 35–45)
PCO2 BLDA: 36 MM HG (ref 35–45)
PCO2 BLDA: 38 MM HG (ref 35–45)
PCO2 BLDA: 39 MM HG (ref 35–45)
PCO2 BLDA: 40 MM HG (ref 35–45)
PCO2 BLDV: 43 MM HG (ref 40–50)
PH BLDA: 7.38 [PH] (ref 7.35–7.45)
PH BLDA: 7.38 [PH] (ref 7.35–7.45)
PH BLDA: 7.39 [PH] (ref 7.35–7.45)
PH BLDA: 7.4 [PH] (ref 7.35–7.45)
PH BLDA: 7.41 [PH] (ref 7.35–7.45)
PH BLDA: 7.44 [PH] (ref 7.35–7.45)
PH BLDV: 7.41 [PH] (ref 7.32–7.43)
PHOSPHATE SERPL-MCNC: 3.9 MG/DL (ref 2.5–4.5)
PLATELET # BLD AUTO: 334 10E3/UL (ref 150–450)
PLATELET # BLD AUTO: 344 10E3/UL (ref 150–450)
PO2 BLDA: 100 MM HG (ref 80–105)
PO2 BLDA: 124 MM HG (ref 80–105)
PO2 BLDA: 350 MM HG (ref 80–105)
PO2 BLDA: 358 MM HG (ref 80–105)
PO2 BLDA: 399 MM HG (ref 80–105)
PO2 BLDA: 407 MM HG (ref 80–105)
PO2 BLDA: 426 MM HG (ref 80–105)
PO2 BLDA: 470 MM HG (ref 80–105)
PO2 BLDV: 45 MM HG (ref 25–47)
POTASSIUM BLD-SCNC: 3.2 MMOL/L (ref 3.5–5)
POTASSIUM BLD-SCNC: 3.4 MMOL/L (ref 3.5–5)
POTASSIUM BLD-SCNC: 3.4 MMOL/L (ref 3.5–5)
POTASSIUM BLD-SCNC: 3.7 MMOL/L (ref 3.5–5)
POTASSIUM BLD-SCNC: 3.9 MMOL/L (ref 3.5–5)
POTASSIUM BLD-SCNC: 4 MMOL/L (ref 3.5–5)
POTASSIUM BLD-SCNC: 4.1 MMOL/L (ref 3.4–5.3)
POTASSIUM BLD-SCNC: 4.1 MMOL/L (ref 3.5–5)
POTASSIUM BLD-SCNC: 4.2 MMOL/L (ref 3.5–5)
POTASSIUM BLD-SCNC: 4.2 MMOL/L (ref 3.5–5)
RBC # BLD AUTO: 3.37 10E6/UL (ref 3.8–5.2)
RBC # BLD AUTO: 3.44 10E6/UL (ref 3.8–5.2)
SODIUM BLD-SCNC: 141 MMOL/L (ref 133–144)
SODIUM BLD-SCNC: 142 MMOL/L (ref 133–144)
SODIUM BLD-SCNC: 143 MMOL/L (ref 133–144)
SODIUM SERPL-SCNC: 144 MMOL/L (ref 133–144)
WBC # BLD AUTO: 10.4 10E3/UL (ref 4–11)
WBC # BLD AUTO: 10.6 10E3/UL (ref 4–11)

## 2022-04-06 PROCEDURE — 250N000011 HC RX IP 250 OP 636: Performed by: NURSE PRACTITIONER

## 2022-04-06 PROCEDURE — 83735 ASSAY OF MAGNESIUM: CPT | Performed by: NURSE PRACTITIONER

## 2022-04-06 PROCEDURE — 95718 EEG PHYS/QHP 2-12 HR W/VEEG: CPT | Performed by: PSYCHIATRY & NEUROLOGY

## 2022-04-06 PROCEDURE — 200N000001 HC R&B ICU

## 2022-04-06 PROCEDURE — 250N000013 HC RX MED GY IP 250 OP 250 PS 637: Performed by: STUDENT IN AN ORGANIZED HEALTH CARE EDUCATION/TRAINING PROGRAM

## 2022-04-06 PROCEDURE — 99291 CRITICAL CARE FIRST HOUR: CPT | Mod: 25 | Performed by: PSYCHIATRY & NEUROLOGY

## 2022-04-06 PROCEDURE — 85027 COMPLETE CBC AUTOMATED: CPT | Performed by: NURSE PRACTITIONER

## 2022-04-06 PROCEDURE — 80048 BASIC METABOLIC PNL TOTAL CA: CPT | Performed by: NURSE PRACTITIONER

## 2022-04-06 PROCEDURE — 99291 CRITICAL CARE FIRST HOUR: CPT | Mod: 24 | Performed by: ANESTHESIOLOGY

## 2022-04-06 PROCEDURE — 94003 VENT MGMT INPAT SUBQ DAY: CPT

## 2022-04-06 PROCEDURE — 250N000011 HC RX IP 250 OP 636: Performed by: SURGERY

## 2022-04-06 PROCEDURE — 250N000011 HC RX IP 250 OP 636: Performed by: PSYCHIATRY & NEUROLOGY

## 2022-04-06 PROCEDURE — 36415 COLL VENOUS BLD VENIPUNCTURE: CPT | Performed by: NURSE PRACTITIONER

## 2022-04-06 PROCEDURE — 250N000013 HC RX MED GY IP 250 OP 250 PS 637: Performed by: SURGERY

## 2022-04-06 PROCEDURE — 250N000013 HC RX MED GY IP 250 OP 250 PS 637: Performed by: NURSE PRACTITIONER

## 2022-04-06 PROCEDURE — 999N000157 HC STATISTIC RCP TIME EA 10 MIN

## 2022-04-06 PROCEDURE — 84100 ASSAY OF PHOSPHORUS: CPT | Performed by: NURSE PRACTITIONER

## 2022-04-06 PROCEDURE — 95711 VEEG 2-12 HR UNMONITORED: CPT

## 2022-04-06 RX ORDER — HEPARIN SODIUM 10000 [USP'U]/100ML
500 INJECTION, SOLUTION INTRAVENOUS CONTINUOUS
Status: DISCONTINUED | OUTPATIENT
Start: 2022-04-06 | End: 2022-04-07

## 2022-04-06 RX ADMIN — AMIODARONE HYDROCHLORIDE 400 MG: 200 TABLET ORAL at 07:35

## 2022-04-06 RX ADMIN — INSULIN ASPART 1 UNITS: 100 INJECTION, SOLUTION INTRAVENOUS; SUBCUTANEOUS at 20:29

## 2022-04-06 RX ADMIN — TICAGRELOR 90 MG: 90 TABLET ORAL at 04:10

## 2022-04-06 RX ADMIN — INSULIN ASPART 1 UNITS: 100 INJECTION, SOLUTION INTRAVENOUS; SUBCUTANEOUS at 15:50

## 2022-04-06 RX ADMIN — CARVEDILOL 25 MG: 25 TABLET, FILM COATED ORAL at 07:35

## 2022-04-06 RX ADMIN — ACETAMINOPHEN 975 MG: 325 TABLET, FILM COATED ORAL at 04:09

## 2022-04-06 RX ADMIN — TICAGRELOR 90 MG: 90 TABLET ORAL at 17:08

## 2022-04-06 RX ADMIN — AMIODARONE HYDROCHLORIDE 400 MG: 200 TABLET ORAL at 20:27

## 2022-04-06 RX ADMIN — INSULIN ASPART 1 UNITS: 100 INJECTION, SOLUTION INTRAVENOUS; SUBCUTANEOUS at 00:06

## 2022-04-06 RX ADMIN — ACETAMINOPHEN 975 MG: 325 TABLET, FILM COATED ORAL at 11:57

## 2022-04-06 RX ADMIN — LEVETIRACETAM 1000 MG: 10 INJECTION INTRAVENOUS at 00:09

## 2022-04-06 RX ADMIN — CHLORHEXIDINE GLUCONATE 15 ML: 1.2 RINSE ORAL at 20:28

## 2022-04-06 RX ADMIN — CHLORHEXIDINE GLUCONATE 15 ML: 1.2 RINSE ORAL at 07:35

## 2022-04-06 RX ADMIN — MINERAL OIL AND PETROLATUM 3.5 G: 150; 830 OINTMENT OPHTHALMIC at 04:10

## 2022-04-06 RX ADMIN — LEVETIRACETAM 1000 MG: 10 INJECTION INTRAVENOUS at 13:36

## 2022-04-06 RX ADMIN — MINERAL OIL AND PETROLATUM 3.5 G: 150; 830 OINTMENT OPHTHALMIC at 15:50

## 2022-04-06 RX ADMIN — HEPARIN SODIUM AND DEXTROSE 500 UNITS/HR: 10000; 5 INJECTION INTRAVENOUS at 10:45

## 2022-04-06 RX ADMIN — MINERAL OIL AND PETROLATUM 3.5 G: 150; 830 OINTMENT OPHTHALMIC at 12:09

## 2022-04-06 RX ADMIN — Medication 15 ML: at 07:35

## 2022-04-06 RX ADMIN — ACETAMINOPHEN 975 MG: 325 TABLET, FILM COATED ORAL at 20:25

## 2022-04-06 RX ADMIN — Medication 40 MG: at 07:35

## 2022-04-06 RX ADMIN — AMLODIPINE BESYLATE 5 MG: 5 TABLET ORAL at 07:35

## 2022-04-06 RX ADMIN — OXYCODONE HYDROCHLORIDE 10 MG: 5 TABLET ORAL at 20:27

## 2022-04-06 RX ADMIN — ATORVASTATIN CALCIUM 80 MG: 40 TABLET, FILM COATED ORAL at 07:35

## 2022-04-06 RX ADMIN — MINERAL OIL AND PETROLATUM 3.5 G: 150; 830 OINTMENT OPHTHALMIC at 20:27

## 2022-04-06 RX ADMIN — INSULIN ASPART 1 UNITS: 100 INJECTION, SOLUTION INTRAVENOUS; SUBCUTANEOUS at 04:20

## 2022-04-06 RX ADMIN — ASPIRIN 81 MG CHEWABLE TABLET 81 MG: 81 TABLET CHEWABLE at 07:35

## 2022-04-06 RX ADMIN — MINERAL OIL AND PETROLATUM 3.5 G: 150; 830 OINTMENT OPHTHALMIC at 07:35

## 2022-04-06 RX ADMIN — CARVEDILOL 25 MG: 25 TABLET, FILM COATED ORAL at 17:08

## 2022-04-06 RX ADMIN — MINERAL OIL AND PETROLATUM 3.5 G: 150; 830 OINTMENT OPHTHALMIC at 00:04

## 2022-04-06 RX ADMIN — HEPARIN SODIUM 5000 UNITS: 5000 INJECTION, SOLUTION INTRAVENOUS; SUBCUTANEOUS at 04:10

## 2022-04-06 ASSESSMENT — ACTIVITIES OF DAILY LIVING (ADL)
ADLS_ACUITY_SCORE: 13

## 2022-04-06 NOTE — PROGRESS NOTES
[facility] ICU RESPIRATORY NOTE        Date of Admission: 3/27/2022    Date of Intubation (most recent): 03/30/2022    Reason for Mechanical Ventilation: airway protection     Number of Days on Mechanical Ventilation: 7    Met Criteria for Spontaneous Breathing Trial: No    Reason for No Spontaneous Breathing Trial: Per MD     Significant Events Today: None     ABG Results:   Recent Labs   Lab 04/01/22  0416 03/31/22  2341 03/31/22  2115 03/31/22  1605 03/31/22  1244 03/31/22  0603 03/30/22  1714 03/30/22  1708   PH  --   --  7.45  --  7.53* 7.49*  --  7.44   PCO2  --   --  31*  --  27* 27*  --  31*   PO2  --   --  117*  --  92 143*  --  87   HCO3  --   --  21  --  22 21  --  21   O2PER 40 40 40 40 30 30   < > 80    < > = values in this interval not displayed.       Current Vent Settings: Vent Mode: CMV/AC  (Continuous Mandatory Ventilation/ Assist Control)  FiO2 (%): 30 %  Resp Rate (Set): 14 breaths/min  Tidal Volume (Set, mL): 450 mL  PEEP (cm H2O): 5 cmH2O  Resp: 13      Skin Assessment: skin intact. tongue injury     Plan: Will continue to monitor and daily PS trail     Eleazar Suggs, RT

## 2022-04-06 NOTE — PROGRESS NOTES
Critical Care Progress Note      04/06/2022    Name: Angelica Robledo MRN#: [id number]   Age: 51 year old YOB: 1970     Hsptl Day# 10  ICU DAY #2    MV DAY #2             Problem List:   Active Problems:    NSTEMI (non-ST elevated myocardial infarction) (H)    Hypertensive emergency    Paroxysmal ventricular tachycardia (H)           Summary/Hospital Course:   Angelica Robledo is a 51 year old female with pertinent PMHx of prior MI at age 24-25 (in 1996) s/p angioplasty, HTN, anemia, and NSTEMI.  She presented on 3/27 with new onset chest pain similar to her previous MI.  Workup was consistent with NSTEMI.  Cardiac cath demonstrated severe multivessel CAD.  She was seen by CT surgery and CABG was scheduled for 3/30.  She underwent CABG x4 left radial, LIMA, left saph vein x2 (FAUSTINO taken down, but elected to not be used due to size).    No family history of clotting disorders.  No known history of DVT/PE/stroke. Siblings without cardiovascular disease. Father passed away of cardiac causes at an advanced age.  Mother had cardiac disease as well as an aortic aneurysm, but not at a young age.     4V CABG as follows: left radial artery graft to the right posterior descending coronary artery, reversed saphenous vein graft to the obtuse marginal branch of the left circumflex coronary artery, reversed saphenous vein graft to the first diagonal branch of the left anterior descending coronary artery, and pedicled left internal mammary artery to left anterior descending coronary.    POD 0 - initial hypoxia resolved with vent changes.  At 2355 patient suffered a PEA and Vfib arrest and underwent 3 shocks, 2 rounds of epi, and 1 round of CPR. ROSC obtained at 0005.  300 mg amiodarone bolus administered.  STAT cardiac cath lab performed demonstrating LIMA-LAD, VG-LADD1, and VG-LPLB were patent. The left radial artery to RDPA graft had an occlusion.  The mid RCA was treated with a DANA stent and a balloon angioplasty  performed at the RPLB.  Excellent flow was noted on completion coronary angiogram.  Echo demonstrated a drop in LVEF to 40-45% with aspects of hypokinesis. RV demonstrated mild-moderate RV function reduction.    POD1 - weaned from sedation with minimal arousal limited to briefly opening eyes to pain and occasionally sound, minimal extremity pain response.  CT head/CTA head and neck unremarkable for acute insult.  Ceribell placed overnight.    PD2-5: MRI x2 obtained demonstrating punctate foci of early subacute ischemic change. EEG waxes and wanes from mod-severe to severe diffuse slowing. With mixed workup results, neither of these findings portent a poor prognosis specifically; however, prognosis remains unclear.    POD6-current: Gradual clinical exam improvement. Possibility of trach and PEG introduced. Possible repeat MRI later this week.          Assessment and plan :     Angelica Robledo IS a 51 year old female admitted on 3/27/2022 for chest pain with workup demonstrating severe multivessel CAD.  Patient underwent CABG x4.  POD#0 patient had a PEA arrest with ROSC obtained at 10 minutes.  Emergent coronary cath demonstrated radial artery occlusion.  A DANA was placed into the RCA and balloon angioplasty of the RPLB was performed with excellent completion angiography flow.     I have personally reviewed the daily labs, imaging studies, cultures and discussed the case with referring physician and consulting physicians.     My assessment and plan by system for this patient is as follows:    Neurology/Psychiatry:   1. Analgesia -- tylenol, prn narcotics - minimize narcotics during phase of trying to allow her to be more alert  2. Sedation -- hold all sedation  3. Encephalopathy s/p cardiac arrest -- head CT negative -- MRI w/ punctate foci of subacute ischemic change -- MRI C spine negative -- continuing EEG: diffuse slowing with gradual improvement, moderate-severe encephalpathy, no indices of seizure activity  however is at risk.  -- NCC following, appreciate management/recs.  -- Keppra for seizure ppx (no seizures per EEG)  -- Gradual improvement in clinical exam  -- Anticipate prolonged recovery course requiring trach and PEG - will wait until next week for trach to allow for neurologic recovery      Cardiovascular:   1.  S/p CABG x4 - left radial, LIMA, left saph vein x2 (FAUSTINO taken down, but elected to not be used due to size)   2. POD#0 PEA and Vfib arrest -- s/p cardiac cath demonstrated radial artery graft to RPDA occlusion -- intervention with DANA to RCA and balloon angioplasty RPLA -- on Kangrelor 3/31, now transitioned to aspirin and Brilinta with discussion per CV surgery and interventional cardiology  -- cards recommends Amiodarone taper -- PO regimen scheduled  3. Hx of MI at age 24-25  4. HTN - likely untreated prior to hospitalization - presented with hypertension emergency - PO regimen including amlodipine (radial artery graft) and carvedilol per CV surgery management     Pulmonary/Ventilator Management:   1. Airway -- intubated -- low vent settings required -- tolerates pressure support  2. No underlying pulmonary problems on medical history or cxr.   -- Long term: if her encephalopathy is not able to recover this week, a tracheostomy would be indicated. CV surgery would like to wait until 10-14 days post op -- tentative plan of trach and PEG early next week     GI and Nutrition :   1. Epistaxis - s/p rhino rocket removal - oozing has resolved  2. Nutrition - NJ present -- Continue enteral feeds at goal.  3. PPI ppx    Renal/Fluids/Electrolytes:   1. Monitor UOP/creatinine -- stable labs -- euvolemic status -- daily goal of net even to slightly net negative  2. Replete electrolytes PRN per protocol  3. Remove nathan today.     Infectious Disease:   1. UTI with E coli on admission -- negative on recheck on 4/1  2. Reactive leukocytosis - normalized     Endocrine:   1. Monitor for stress induced  hyperglycemia. ICU insulin protocol, goal sugar <180   2. Incidental thyroid nodule -- follow-up outpatient per discharge referral     Hematology/Oncology:   1. Acute blood loss on chronic anemia -- stable, trend  2. Acute blood loss thrombocytopenia -- resolved, trend.  3. S/P DANA placement s/p cardiac arrest -- aspirin and brilinta  4. Rule out hypercoagulable state -- hypercoagulopathy panel pending per hematology guidance -- ultimately been negative; however, recommends full dose anticoagulation in setting of thrombus -- discussed with CV: plan for 400 U/hr for 1 day, then low dose for 1 day, then high dose.     IV/Access:   1. Venous access - peripheral  2. Arterial access - NA      ICU Prophylaxis:   1. DVT: Hep Subq/mechanical  2. VAP: HOB 30 degrees, chlorhexidine rinse  3. Stress Ulcer: PPI  4. Restraints: No current indication. Will readdress daily.   5. Wound care - per unit routine   6. Feeding - enteral feeds  7. Family Update:  at bedside  8. Disposition - ICU       Key goals for next 24 hours:   1. Continue EEG  2. Continue cares            Interim History:   No acute events  EEG - diffuse slowing. Moderate-severe encephalopathy. Recording excludes non-convulsive status epilepticus as a possible cause of this encephalopathy           Key Medications:       acetaminophen  975 mg Oral or Feeding Tube Q6H     [START ON 4/8/2022] amiodarone  200 mg Oral or Feeding Tube Daily     amiodarone  400 mg Oral or Feeding Tube BID     amLODIPine  5 mg Oral or Feeding Tube Daily     artificial tears   Both Eyes Q4H     aspirin  81 mg Oral or NG Tube Daily     atorvastatin  80 mg Oral or Feeding Tube Daily     carvedilol  25 mg Oral or Feeding Tube BID w/meals     chlorhexidine  15 mL Mouth/Throat Q12H     heparin ANTICOAGULANT  5,000 Units Subcutaneous Q8H     insulin aspart  1-6 Units Subcutaneous Q4H     lactated ringers  250 mL Intravenous Once     lactated ringers  250 mL Intravenous Once      levETIRAcetam  1,000 mg Intravenous Q12H     multivitamins w/minerals  15 mL Per Feeding Tube Daily     pantoprazole  40 mg Oral or NG Tube Daily    Or     pantoprazole  40 mg Oral Daily     perflutren diluted 1mL to 2mL with saline  9 mL Intravenous Once     sodium chloride (PF)  3 mL Intracatheter Q8H     ticagrelor  90 mg Oral or Feeding Tube BID       - MEDICATION INSTRUCTIONS -       Percutaneous Coronary Intervention orders placed (this is information for BPA alerting)       sodium chloride Stopped (04/01/22 1200)               Physical Examination:   Temp:  [98.8  F (37.1  C)-99.7  F (37.6  C)] 99  F (37.2  C)  Pulse:  [67-79] 79  Resp:  [12-17] 12  BP: (106-164)/() 164/106  FiO2 (%):  [30 %] 30 %  SpO2:  [95 %-100 %] 98 %    Intake/Output Summary (Last 24 hours) at 3/31/2022 0921  Last data filed at 3/31/2022 0700  Gross per 24 hour   Intake 3676.54 ml   Output 2825 ml   Net 851.54 ml     Wt Readings from Last 4 Encounters:   04/06/22 93.3 kg (205 lb 11 oz)     BP - Mean:  [] 131  Vent Mode: (S) PS  (Pressure Support)  FiO2 (%): 30 %  Resp Rate (Set): 14 breaths/min  Tidal Volume (Set, mL): 450 mL  PEEP (cm H2O): 5 cmH2O  Pressure Support (cm H2O): 12 cmH2O  Resp: 12    Recent Labs   Lab 04/01/22  0416 03/31/22  2341 03/31/22  2115 03/31/22  1605 03/31/22  1244 03/31/22  0603 03/30/22  1714 03/30/22  1708   PH  --   --  7.45  --  7.53* 7.49*  --  7.44   PCO2  --   --  31*  --  27* 27*  --  31*   PO2  --   --  117*  --  92 143*  --  87   HCO3  --   --  21  --  22 21  --  21   O2PER 40 40 40 40 30 30   < > 80    < > = values in this interval not displayed.       GEN: no acute distress   HEENT: head ncat, sclera anicteric, ETT present   PULM: unlabored, synchronous with vent, clear anteriorly    CV/Chest: RRR, S1S2 no gallop,  No rub, no murmur appreciated. Midline incision CDI.   ABD: soft, nontender  EXT:  Trace edema,  warm  NEURO: Off sedation. Opens eyes to stimulus and verbal stimulus.  Pupillary light reflex brisk. Does not blink to threat. Cough reflex present. Corneal reflex strongly present (previously absent, then weak). No gag reflex present; however, demonstrates turning head away with introduction of mouth swab.  BLE withdrawal more brisk, sustained, and pronounced today. BUE pain reflex withdrawal also more pronounced, sustained, and brisk than previous. Withdrawal reflex to central pain stimulus, appearing to possibly be localizing. Does not follow commands.   : nathan present, clear urine output  SKIN: no obvious rash  LINES: clean, dry intact         Data:   All data and imaging reviewed     ROUTINE ICU LABS (Last four results)  CMP  Recent Labs   Lab 04/06/22  0745 04/06/22  0440 04/06/22  0420 04/06/22  0006 04/05/22  0821 04/05/22  0525 04/04/22  1155 04/04/22  0938 04/04/22  0453 04/04/22  0451 04/03/22  0808 04/03/22  0408 04/02/22  0357 04/02/22  0355 04/01/22  0419 04/01/22  0416 03/31/22  1420 03/31/22  1243   NA  --  144  --   --   --  146*  --   --   --  144  --  144  --  145*  --  146*  --  144   POTASSIUM  --  4.1  --   --   --  4.1  --  4.3  --  3.6   < > 3.5  --  3.6   < > 3.7   < > 3.4   CHLORIDE  --  112*  --   --   --  112*  --   --   --  111*  --  111*  --  115*  --  118*  --  115*   CO2  --  29  --   --   --  28  --   --   --  29  --  30  --  25  --  23  --  22   ANIONGAP  --  3  --   --   --  6  --   --   --  4  --  3  --  5  --  5  --  7   * 151* 142* 155*   < > 159*   < >  --    < > 140*   < > 145*   < > 135*   < > 151*   < > 178*   BUN  --  28  --   --   --  31*  --   --   --  25  --  22  --  18  --  11  --  9   CR  --  0.95  --   --   --  1.04  --   --   --  1.00  --  0.96  --  0.76  --  0.79  --  0.73   GFRESTIMATED  --  73  --   --   --  65  --   --   --  68  --  72  --  >90  --  >90  --  >90   TOMAS  --  9.5  --   --   --  9.1  --   --   --  9.2  --  8.6  --  8.8  --  8.5  --  8.8   MAG  --  2.7*  --   --   --  2.6*  --   --   --  2.5*  --  2.2  --  2.2    < > 2.0  --   --    PHOS  --  3.9  --   --   --  4.1  --   --   --  4.0  --  3.7  --  3.6  --  3.5   < > 3.2   PROTTOTAL  --   --   --   --   --   --   --   --   --   --   --  6.2*  --  5.7*  --  5.5*  --  5.6*   ALBUMIN  --   --   --   --   --   --   --   --   --   --   --  2.3*  --  2.2*  --  2.3*  --  2.4*   BILITOTAL  --   --   --   --   --   --   --   --   --   --   --  0.2  --  0.2  --  0.4  --  0.4   ALKPHOS  --   --   --   --   --   --   --   --   --   --   --  108  --  72  --  65  --  62   AST  --   --   --   --   --   --   --   --   --   --   --  26  --  27  --  47*  --  89*   ALT  --   --   --   --   --   --   --   --   --   --   --  52*  --  45  --  50  --  59*    < > = values in this interval not displayed.     CBC  Recent Labs   Lab 04/06/22  0440 04/05/22  0525 04/04/22  0451 04/03/22  0408   WBC 10.4 9.8 10.3 12.5*   RBC 3.37* 3.46* 3.44* 3.13*   HGB 8.2* 8.6* 8.3* 7.9*   HCT 28.1* 29.3* 27.7* 26.0*   MCV 83 85 81 83   MCH 24.3* 24.9* 24.1* 25.2*   MCHC 29.2* 29.4* 30.0* 30.4*   RDW 18.0* 18.2* 18.2* 18.3*    349 299 245     INR  Recent Labs   Lab 04/01/22  1647 03/31/22  0022 03/30/22  1622 03/30/22  1445   INR 1.31* 1.37* 1.24* 1.44*     Arterial Blood Gas  Recent Labs   Lab 04/01/22  0416 03/31/22  2341 03/31/22  2115 03/31/22  1605 03/31/22  1244 03/31/22  0603 03/30/22  1714 03/30/22  1708   PH  --   --  7.45  --  7.53* 7.49*  --  7.44   PCO2  --   --  31*  --  27* 27*  --  31*   PO2  --   --  117*  --  92 143*  --  87   HCO3  --   --  21  --  22 21  --  21   O2PER 40 40 40 40 30 30   < > 80    < > = values in this interval not displayed.       All cultures:  No results for input(s): CULT in the last 168 hours.  No results found for this or any previous visit (from the past 24 hour(s)).        Adi Bazan MD  Surgical Critical Care Fellow

## 2022-04-06 NOTE — PROGRESS NOTES
[facility] ICU RESPIRATORY NOTE      Date of Admission: 3/27/2022    Date of Intubation (most recent): 3/30/22    Reason for Mechanical Ventilation: airway protection     Number of Days on Mechanical Ventilation: 7    Met Criteria for Spontaneous Breathing Trial: Yes    Significant Events Today: PSV today for 8hrs, back to full support overnight.     ABG Results:   Recent Labs   Lab 04/01/22  0416 03/31/22  2341 03/31/22  2115 03/31/22  1605 03/31/22  1244 03/31/22  0603 03/30/22  1714 03/30/22  1708   PH  --   --  7.45  --  7.53* 7.49*  --  7.44   PCO2  --   --  31*  --  27* 27*  --  31*   PO2  --   --  117*  --  92 143*  --  87   HCO3  --   --  21  --  22 21  --  21   O2PER 40 40 40 40 30 30   < > 80    < > = values in this interval not displayed.       Current Vent Settings: Vent Mode: (S) CMV/AC  (Continuous Mandatory Ventilation/ Assist Control)  FiO2 (%): 30 %  Resp Rate (Set): 14 breaths/min  Tidal Volume (Set, mL): 450 mL  PEEP (cm H2O): 5 cmH2O  Pressure Support (cm H2O): (S) 8 cmH2O  Resp: 13    Skin Assessment: clean and intact     Plan: Continue to wean from mechanical ventilation and oxygen as tolerated per protocol.     Amal Ali, RRT

## 2022-04-06 NOTE — PLAN OF CARE
Neuro: Patient opens eyes spontaneus, does not track does not fallow commands.. PERRL, cough, no gag, withdraw to pain BLE slightly.Continuous EEG, on Keppra  CV:  SR, HR 70s, SBP <140  Pulmonary: LSC, moderate tan creamy secretions.  GI/: NJ in place TF increase to goal 65 mol/h, pt tolerate well, abd soft no BM this shift, Cooper catheter with adequate UOP  Integ: Thoung bitten on few places, vaselin applied,   Continue with plan of care.

## 2022-04-06 NOTE — PROGRESS NOTES
INTERIM REPORT of Video-EEG Monitoring              DATE OF RECORDIN2022         I reviewed the continuing video-EEG monitoring of Angelica Robledo.          The EEG during stupor was abnormal due to generalized delta-theta slowing, with intermixture of faster alpha-beta activities bilaterally, with occasional, brief generalized electrodecrements, and with occasional, non-repetitive, generalized sharp-and-slow-wave complexes, at times with unsustained reactivity and with spontaneous variability consisting of acceleration of delta-theta activities.  No electrographic seizures were observed.         These abnormalities indicate moderate-severe electrographic encephalopathy, and an elevated risk of epileptic seizures.    Mike Sow M.D.

## 2022-04-06 NOTE — PROGRESS NOTES
[facility]  Cardiovascular and Thoracic Surgery Daily Note          Assessment and Plan:   sepideh Robledo is a 51 year old female with history of MI at age 25 s/p angioplasty who presented to the hospital with hypertensive emergency and NSTEMI. Coronary angio demonstrated severe multivessel CAD. Echocardiogram preoperatively with preserved biventricular function.    PMH includes: NSTEMI, CAD with previous coronary stents, HTN    Course has been complicated by PEA arrest with ventricular fibrillation on POD 0 with ROSC after defibrillation x3, 2 mg epinephrine and one round of CPR. Was taken to cath lab where radial artery graft was found to be occluded. PCI with DANA placement to mid RCA and angioplasty of the rPLB was performed. Echocardiogram at the conclusion of PCI demonstrated LVEF 40-45% with mild to moderate global hypokinesis of the left ventricle with severe hypokinesis of the mid anterior and anteroseptal walls, the entire inferior wall and all apical segments of the left ventricle; no pericardial effusion. Additionally, due to antiplatelet therapy post PCI, patient developed epistaxis during attempted NG placement requiring placement of b/l rhino rockets. Now, diffuse severe encephalopathy with minimal neurologic response.     POD #7 s/p:  1.  Coronary artery bypass grafting x 4 (radial artery graft to the right posterior descending coronary artery, reversed saphenous vein graft to the obtuse marginal branch of the left circumflex coronary artery, reversed saphenous vein graft to the first diagonal branch of the left anterior descending coronary artery, and pedicled left internal mammary artery to left anterior descending coronary artery).  2.  Endoscopic vein harvest of the greater saphenous vein from the left lower extremity.  3.  Endoscopic left radial artery harvest on 3/30/22 with Dr. Butterfield.    CVS:   NSTEMI and severe multivessel CAD s/p CABG x4 with radial grafts as  above  HTN  PEA and Vfib arrest s/p ROSC and DANA to mid RCA and angioplasty of the rPLB on 3/31  HD stable, lends to HTN; NSR rates 70-80s  - Goal MAP >65, SBP <140  - Amlodipine 5 mg daily for radial graft (at least 3-6 months)  - Carvedilol 25 mg BID  - PRN hydralazine available  - Brilinta load and daily per Cardiology  - Transitioned amiodarone gtt to PO taper per EP/Cards recs  - ASA 81 daily  - Atorvastatin 80 mg daily  - CTs removed 4/3. TPWs removed 4/1    Resp:   Postoperative ventilator management  - Current vent settings:  Vent Mode: CMV/AC  (Continuous Mandatory Ventilation/ Assist Control)  FiO2 (%): 30 %  Resp Rate (Set): 14 breaths/min  Tidal Volume (Set, mL): 450 mL  PEEP (cm H2O): 5 cmH2O  Resp: 13  - Intubated for airway protection due to encephalopathy  - Titrate O2/peep to maintain sats >92%  - PST as tolerated  - Dr. Butterfield ok with trach POD 10-14    Neuro:    Acute postoperative pain  Hx migraines  Encephalopathy   Did receive CPR on 3/31, will likely have pain from this also  Propofol turned off on 3/31 at 9 am, starting to open eyes more, unable to move extremities but withdraws BLE to pain, nothing in BUEs yet. CT head 3/31 negative for hemorrhage; CTA negative for strokes, vessels patent; per neurology, MRI stable w/o acute concern to explain clinical status.  EEG without e/o seizure activity, continues to have moderate to severe diffuse encephalopathy   4/5 EEG improved but c/f new spikes not c/w seizures but tendency to seize  MRI head and c-spine on 4/3 negative  - PRN dilaudid available  - PRN oxycodone and methocarbamol available  - Scheduled tylenol   - Neuro critical care consulted and following, greatly appreciate recommendations  - Keppra added 4/5    Renal/Electrolyte:   Lactic acidosis, resolved  Volume overload  Hypernatremia, resolved  Creat stable, below preop weight, much less edema; negative 525 mL past 24h w/o diuresis  Na 144, K+4.1  - Strict I/O, daily weights  -  Avoid/limit nephrotoxins as able  - Replete lytes per protocol  - No further diuresis  - Continue FWF 60 mL q6h    GI/Nutrition:    Epistaxis, resolved  Elevated LFTs, likely shock liver 2/2 arrest, resolved  Orders Placed This Encounter      NPO for Medical/Clinical Reasons Except for: Other; Specify: NPO until Extubated      Advance Diet as Tolerated: Clear Liquid Diet  - S/P NJ placement on 4/4 in radiology  - Nutrition following, enteral feeds at goal, absorbing well  - Rhino rockets removed, continues to have some oozing from b/l nares  - Continue bowel regimen, + BMs  - PPI    :    - Cooper in place for strict I/O    Endo:  Stress induced hyperglycemia  Thyroid nodule   Preop A1c 5.7%  - Transitioned to sliding scale insulin   - Goal BG <180 for optimal healing  - Thyroid nodule noted on CT on 3/31, will have this worked up on an outpatient basis     ID:  Stress induced leukocytosis, resolved  E.Coli UTI preoperatively  WBC 10.4, Tmax 99.7; no s/sx infection  - Completed perioperative ABX  - Continue to follow fever curve, WBC and inflammatory markers as appropriate  - Repeat UA 4/1 without overt e/o infection     Heme:  Acute blood loss anemia  Acute blood loss thrombocytopenia  Hgb 8.2, Plts 334; no s/sx active bleeding  - CBC in AM  - Goal Hgb >7  - Consulted hematology given severe premature CAD and acute graft failure- appreciate recommendations. Multiple labs sent. Hypercoagulable workup appears to be negative but recommending repeat levels once acute issues resolve. Currently recommending antiplatelet and full anticoagulation (therapeutic heparin). Will d/w Dr. Butterfield. Plan will be straight rate heparin at 500 units/hr for 24 hours, if tolerates, LIH gtt x48 hours, if continues to tolerate then transition to high intensity.     - Proph: PPI, subcutaneous heparin, SCDs  - Dispo: Continue in ICU; Family would like patient moved to [place] if/when trach established to be closer to daughter. REGGIE  "consulted.  - Lines: Cooper, PIV, ETT, NJ          Interval History:   NAEON. Eyes more midline today and less roving eye movement. Still not following commands. Tolerating PST this AM.         Medications:       acetaminophen  975 mg Oral or Feeding Tube Q6H     [START ON 4/8/2022] amiodarone  200 mg Oral or Feeding Tube Daily     amiodarone  400 mg Oral or Feeding Tube BID     amLODIPine  5 mg Oral or Feeding Tube Daily     artificial tears   Both Eyes Q4H     aspirin  81 mg Oral or NG Tube Daily     atorvastatin  80 mg Oral or Feeding Tube Daily     carvedilol  25 mg Oral or Feeding Tube BID w/meals     chlorhexidine  15 mL Mouth/Throat Q12H     heparin ANTICOAGULANT  5,000 Units Subcutaneous Q8H     insulin aspart  1-6 Units Subcutaneous Q4H     lactated ringers  250 mL Intravenous Once     lactated ringers  250 mL Intravenous Once     levETIRAcetam  1,000 mg Intravenous Q12H     multivitamins w/minerals  15 mL Per Feeding Tube Daily     pantoprazole  40 mg Oral or NG Tube Daily    Or     pantoprazole  40 mg Oral Daily     perflutren diluted 1mL to 2mL with saline  9 mL Intravenous Once     sodium chloride (PF)  3 mL Intracatheter Q8H     ticagrelor  90 mg Oral or Feeding Tube BID             Physical Exam:   Vitals were reviewed  Blood pressure 128/85, pulse 71, temperature 99  F (37.2  C), resp. rate 13, height 1.651 m (5' 5\"), weight 93.3 kg (205 lb 11 oz), last menstrual period 02/27/2022, SpO2 99 %.  Rhythm: NSR    Lungs: CTA anteriorly, synchronous with ventilator    Cardiovascular: S1, S2, RRR, no m/r/g    Abdomen: S/ND, NJ in place, +BS    Extremeties: mild to moderate edema, less pitting, warm and well perfused    Incision: CDI    CT: N/A    Weight:   Vitals:    04/02/22 0500 04/03/22 0429 04/04/22 0415 04/05/22 0414   Weight: 101.7 kg (224 lb 3.3 oz) 99 kg (218 lb 4.1 oz) 94.9 kg (209 lb 3.5 oz) 93 kg (205 lb 0.4 oz)    04/06/22 0454   Weight: 93.3 kg (205 lb 11 oz)            Data:   Labs:   Lab " Results   Component Value Date    WBC 15.8 03/31/2022     Lab Results   Component Value Date    RBC 3.15 03/31/2022     Lab Results   Component Value Date    HGB 8.0 03/31/2022     Lab Results   Component Value Date    HCT 25.5 03/31/2022     No components found for: MCT  Lab Results   Component Value Date    MCV 81 03/31/2022     Lab Results   Component Value Date    MCH 25.4 03/31/2022     Lab Results   Component Value Date    MCHC 31.4 03/31/2022     Lab Results   Component Value Date    RDW 17.7 03/31/2022     Lab Results   Component Value Date     03/31/2022       Last Basic Metabolic Panel:  Lab Results   Component Value Date     03/31/2022      Lab Results   Component Value Date    POTASSIUM 3.2 03/31/2022     Lab Results   Component Value Date    CHLORIDE 116 03/31/2022     Lab Results   Component Value Date    TOMAS 8.7 03/31/2022     Lab Results   Component Value Date    CO2 20 03/31/2022     Lab Results   Component Value Date    BUN 8 03/31/2022     Lab Results   Component Value Date    CR 0.81 03/31/2022     Lab Results   Component Value Date     03/31/2022     03/31/2022       Imaging:  No results found for this or any previous visit (from the past 24 hour(s)).     Rounded and discussed with Dr. Babita Tenorio, APRN, ACNPC-AG, CCRN  Nurse Practitioner  Cardiothoracic Surgery  Pager: [phone]  [facility]

## 2022-04-06 NOTE — PLAN OF CARE
Shift note [id number]:  VSS with PO antihypertensives to maintain SBP<140. SR. PS for 6 hours 8/5, 30%.Up in chair for 2 hours. Loose BM x1. UOP adequate, nathan catheter removed, changed to pure wick. Neuro status slightly improved with more eye contact, moving RUE and LLE withdrawing away from painful stimulus/reaching towards stimulus and ETT. Family supportive at bedside most of day.

## 2022-04-06 NOTE — PROGRESS NOTES
[facility]    Neurocritical Care Progress Note    Interval EventsNeuro exam with slight improvement. Patient is opening and closing eyes spontaneously. Able to turn head and withdraw to noxious stimuli. Continues to be intubated and not sedated. EEG still ongoing, continue Keppra 1 gram BID.     HPI Summary  51 year old female admitted on 3/27/2022 for chest pain with workup demonstrating severe multivessel CAD.  Patient underwent CABG x4.  POD#0 patient had a PEA arrest with ROSC obtained at 10 minutes.  Emergent coronary cath demonstrated radial artery occlusion.  A DANA was placed into the RCA and balloon angioplasty of the RPLB was performed with excellent completion angiography flow.        Evaluation Summarized     MRI/Head CT MRI brain #1: There are a few scattered punctate acute infarctions within the left frontal lobe, left temporal lobe, right occipital lobe, presumably embolic     MRI brain #2: No new acute findings or significant interval change when compared to 4/1/2022. Scattered punctate foci of early subacute ischemic change in the cerebral hemispheres    Intracranial Vasculature CTA head: No high-grade stenosis or large vessel occlusion involving the major intracranial arteries. Mild atherosclerosis involving the left carotid siphon   Cervical Vasculature CTA neck: Unremarkable CT angiogram of the neck      Other Testing MRI cervical spine: Unremarkable MRI of the cervical spine      LDL  3/27/2022: 106 mg/dL   A1C  3/28/2022: 5.7 %   Troponin No lab value available in past 48 hrs         Impression   1. Encephalopathy s/p cardiac arrest. No explanation for her encephalopathy seen on brain MRI  2. A few incidental areas of restricted diffusion are seen on brain MRI which are common after catheter-based coronary angiography        Plan  - Continue continuous vEEG  - Continue Keppra 1 gram BID  - Hold all sedation   - Maintain normothermia, normoglycemia,  "normonatremia  - Continue supportive care  - Continue neuro checks q4 hours  - Hypercoagulable work up per hematology     Patient Follow-up    - final recommendation pending work-up     We will continue to follow.         YRIS Prakash, CNP  Vascular Neurology  To page me or covering stroke neurology team member, click here: AMCOM   Choose \"On Call\" tab at top, then search dropdown box for \"Neurology Adult\", select location, press Enter, then look for stroke/neuro ICU/telestroke.    ______________________________________________________    Clinically Significant Risk Factors Present on Admission                  Medications   Scheduled Meds    acetaminophen  975 mg Oral or Feeding Tube Q6H     [START ON 4/8/2022] amiodarone  200 mg Oral or Feeding Tube Daily     amiodarone  400 mg Oral or Feeding Tube BID     amLODIPine  5 mg Oral or Feeding Tube Daily     artificial tears   Both Eyes Q4H     aspirin  81 mg Oral or NG Tube Daily     atorvastatin  80 mg Oral or Feeding Tube Daily     carvedilol  25 mg Oral or Feeding Tube BID w/meals     chlorhexidine  15 mL Mouth/Throat Q12H     insulin aspart  1-6 Units Subcutaneous Q4H     lactated ringers  250 mL Intravenous Once     lactated ringers  250 mL Intravenous Once     levETIRAcetam  1,000 mg Intravenous Q12H     multivitamins w/minerals  15 mL Per Feeding Tube Daily     pantoprazole  40 mg Oral or NG Tube Daily    Or     pantoprazole  40 mg Oral Daily     perflutren diluted 1mL to 2mL with saline  9 mL Intravenous Once     sodium chloride (PF)  3 mL Intracatheter Q8H     ticagrelor  90 mg Oral or Feeding Tube BID       Infusion Meds    heparin 500 Units/hr (04/06/22 1045)     - MEDICATION INSTRUCTIONS -       Percutaneous Coronary Intervention orders placed (this is information for BPA alerting)       sodium chloride Stopped (04/01/22 1200)       PRN Meds  acetaminophen **OR** acetaminophen, bisacodyl, artificial tears ophthalmic solution, glucose **OR** " dextrose **OR** glucagon, hydrALAZINE, HYDROmorphone **OR** HYDROmorphone, ipratropium - albuterol 0.5 mg/2.5 mg/3 mL, lidocaine 4%, lidocaine 4%, lidocaine (buffered or not buffered), lidocaine (buffered or not buffered), lidocaine (buffered or not buffered), magnesium hydroxide, - MEDICATION INSTRUCTIONS -, melatonin, methocarbamol, naloxone **OR** naloxone **OR** naloxone **OR** naloxone, ondansetron **OR** ondansetron, oxyCODONE **OR** oxyCODONE, Percutaneous Coronary Intervention orders placed (this is information for BPA alerting), polyethylene glycol, senna-docusate, sodium chloride (PF), sodium chloride (PF), sodium chloride (PF)       PHYSICAL EXAMINATION  Temp:  [98.8  F (37.1  C)-99.7  F (37.6  C)] 99  F (37.2  C)  Pulse:  [67-79] 67  Resp:  [12-17] 16  BP: (106-164)/() 119/88  FiO2 (%):  [30 %] 30 %  SpO2:  [95 %-100 %] 99 %     General Exam  General:  patient lying in bed without any acute distress  HEENT:  intubated  Pulmonary:  on mechanical ventilation        Neuro Exam  Mental Status:  does not follow commands, eyes open and close spontaneously   Cranial Nerves:  PERRL, corneal reflexes brisk bilaterally, does not protrude tongue, does not blink to threat, does not respond to shout,   Motor:  withdrawing to noxious stimuli in both upper and lower extremities.   Reflexes:  toes mute  Sensory:  as above to noxious, see motor section  Coordination:  deferred  Station/Gait:  unable to test due to intubation       Imaging  I personally reviewed all imaging; relevant findings per HPI.     Lab Results Data   CBC  Recent Labs   Lab 04/06/22  0945 04/06/22  0440 04/05/22  0525   WBC 10.6 10.4 9.8   RBC 3.44* 3.37* 3.46*   HGB 8.4* 8.2* 8.6*   HCT 29.6* 28.1* 29.3*    334 349     Basic Metabolic Panel    Recent Labs   Lab 04/06/22  0745 04/06/22  0440 04/06/22  0420 04/05/22  0821 04/05/22  0525 04/04/22  1155 04/04/22  0938 04/04/22  0453 04/04/22  0451   NA  --  144  --   --  146*  --   --   --   144   POTASSIUM  --  4.1  --   --  4.1  --  4.3  --  3.6   CHLORIDE  --  112*  --   --  112*  --   --   --  111*   CO2  --  29  --   --  28  --   --   --  29   BUN  --  28  --   --  31*  --   --   --  25   CR  --  0.95  --   --  1.04  --   --   --  1.00   * 151* 142*   < > 159*   < >  --    < > 140*   TOMAS  --  9.5  --   --  9.1  --   --   --  9.2    < > = values in this interval not displayed.     Liver Panel  Recent Labs   Lab 04/03/22  0408 04/02/22  0355 04/01/22  0416   PROTTOTAL 6.2* 5.7* 5.5*   ALBUMIN 2.3* 2.2* 2.3*   BILITOTAL 0.2 0.2 0.4   ALKPHOS 108 72 65   AST 26 27 47*   ALT 52* 45 50     INR    Recent Labs   Lab Test 04/01/22  1647 03/31/22  0022 03/30/22  1622   INR 1.31* 1.37* 1.24*      Lipid Profile    Recent Labs   Lab Test 03/27/22  1028   CHOL 172   HDL 57   *   TRIG 47     A1C    Recent Labs   Lab Test 03/28/22  0600   A1C 5.7*     Troponin I    Recent Labs   Lab 03/31/22  0022   TROPONINIS 34,414*

## 2022-04-07 ENCOUNTER — APPOINTMENT (OUTPATIENT)
Dept: MRI IMAGING | Facility: CLINIC | Age: 52
End: 2022-04-07
Attending: NURSE PRACTITIONER
Payer: COMMERCIAL

## 2022-04-07 LAB
ALBUMIN UR-MCNC: 20 MG/DL
AMORPH CRY #/AREA URNS HPF: ABNORMAL /HPF
APPEARANCE UR: ABNORMAL
ATRIAL RATE - MUSE: 76 BPM
ATRIAL RATE - MUSE: 79 BPM
BACTERIA #/AREA URNS HPF: ABNORMAL /HPF
BILIRUB UR QL STRIP: NEGATIVE
COLOR UR AUTO: YELLOW
DIASTOLIC BLOOD PRESSURE - MUSE: NORMAL MMHG
DIASTOLIC BLOOD PRESSURE - MUSE: NORMAL MMHG
ERYTHROCYTE [DISTWIDTH] IN BLOOD BY AUTOMATED COUNT: 17.7 % (ref 10–15)
ERYTHROCYTE [DISTWIDTH] IN BLOOD BY AUTOMATED COUNT: 17.7 % (ref 10–15)
GLUCOSE BLDC GLUCOMTR-MCNC: 125 MG/DL (ref 70–99)
GLUCOSE BLDC GLUCOMTR-MCNC: 125 MG/DL (ref 70–99)
GLUCOSE BLDC GLUCOMTR-MCNC: 131 MG/DL (ref 70–99)
GLUCOSE BLDC GLUCOMTR-MCNC: 132 MG/DL (ref 70–99)
GLUCOSE BLDC GLUCOMTR-MCNC: 132 MG/DL (ref 70–99)
GLUCOSE BLDC GLUCOMTR-MCNC: 138 MG/DL (ref 70–99)
GLUCOSE UR STRIP-MCNC: NEGATIVE MG/DL
HCT VFR BLD AUTO: 27.7 % (ref 35–47)
HCT VFR BLD AUTO: 28.6 % (ref 35–47)
HGB BLD-MCNC: 8.1 G/DL (ref 11.7–15.7)
HGB BLD-MCNC: 8.3 G/DL (ref 11.7–15.7)
HGB UR QL STRIP: NEGATIVE
INTERPRETATION ECG - MUSE: NORMAL
INTERPRETATION ECG - MUSE: NORMAL
KETONES UR STRIP-MCNC: NEGATIVE MG/DL
LEUKOCYTE ESTERASE UR QL STRIP: ABNORMAL
MAGNESIUM SERPL-MCNC: 2.7 MG/DL (ref 1.6–2.3)
MCH RBC QN AUTO: 24.3 PG (ref 26.5–33)
MCH RBC QN AUTO: 24.9 PG (ref 26.5–33)
MCHC RBC AUTO-ENTMCNC: 29 G/DL (ref 31.5–36.5)
MCHC RBC AUTO-ENTMCNC: 29.2 G/DL (ref 31.5–36.5)
MCV RBC AUTO: 83 FL (ref 78–100)
MCV RBC AUTO: 86 FL (ref 78–100)
NITRATE UR QL: POSITIVE
P AXIS - MUSE: 29 DEGREES
P AXIS - MUSE: 47 DEGREES
PH UR STRIP: 7.5 [PH] (ref 5–7)
PHOSPHATE SERPL-MCNC: 4.6 MG/DL (ref 2.5–4.5)
PLATELET # BLD AUTO: 338 10E3/UL (ref 150–450)
PLATELET # BLD AUTO: 360 10E3/UL (ref 150–450)
PR INTERVAL - MUSE: 158 MS
PR INTERVAL - MUSE: 232 MS
QRS DURATION - MUSE: 126 MS
QRS DURATION - MUSE: 90 MS
QT - MUSE: 416 MS
QT - MUSE: 462 MS
QTC - MUSE: 477 MS
QTC - MUSE: 519 MS
R AXIS - MUSE: -28 DEGREES
R AXIS - MUSE: 46 DEGREES
RBC # BLD AUTO: 3.33 10E6/UL (ref 3.8–5.2)
RBC # BLD AUTO: 3.34 10E6/UL (ref 3.8–5.2)
RBC URINE: 0 /HPF
SP GR UR STRIP: 1.02 (ref 1–1.03)
SQUAMOUS EPITHELIAL: 2 /HPF
SYSTOLIC BLOOD PRESSURE - MUSE: NORMAL MMHG
SYSTOLIC BLOOD PRESSURE - MUSE: NORMAL MMHG
T AXIS - MUSE: -49 DEGREES
T AXIS - MUSE: 260 DEGREES
UFH PPP CHRO-ACNC: 0.32 IU/ML
UFH PPP CHRO-ACNC: 0.38 IU/ML
UFH PPP CHRO-ACNC: <0.1 IU/ML
UROBILINOGEN UR STRIP-MCNC: NORMAL MG/DL
VENTRICULAR RATE- MUSE: 76 BPM
VENTRICULAR RATE- MUSE: 79 BPM
WBC # BLD AUTO: 14.7 10E3/UL (ref 4–11)
WBC # BLD AUTO: 15.1 10E3/UL (ref 4–11)
WBC CLUMPS #/AREA URNS HPF: PRESENT /HPF
WBC URINE: >182 /HPF

## 2022-04-07 PROCEDURE — 250N000013 HC RX MED GY IP 250 OP 250 PS 637: Performed by: STUDENT IN AN ORGANIZED HEALTH CARE EDUCATION/TRAINING PROGRAM

## 2022-04-07 PROCEDURE — A9585 GADOBUTROL INJECTION: HCPCS | Performed by: NURSE PRACTITIONER

## 2022-04-07 PROCEDURE — 84100 ASSAY OF PHOSPHORUS: CPT | Performed by: NURSE PRACTITIONER

## 2022-04-07 PROCEDURE — 85520 HEPARIN ASSAY: CPT | Performed by: PHYSICIAN ASSISTANT

## 2022-04-07 PROCEDURE — 999N000253 HC STATISTIC WEANING TRIALS

## 2022-04-07 PROCEDURE — 87088 URINE BACTERIA CULTURE: CPT | Performed by: ANESTHESIOLOGY

## 2022-04-07 PROCEDURE — 200N000001 HC R&B ICU

## 2022-04-07 PROCEDURE — 94003 VENT MGMT INPAT SUBQ DAY: CPT

## 2022-04-07 PROCEDURE — 87040 BLOOD CULTURE FOR BACTERIA: CPT | Performed by: ANESTHESIOLOGY

## 2022-04-07 PROCEDURE — 250N000009 HC RX 250: Performed by: NURSE PRACTITIONER

## 2022-04-07 PROCEDURE — 99291 CRITICAL CARE FIRST HOUR: CPT | Performed by: PSYCHIATRY & NEUROLOGY

## 2022-04-07 PROCEDURE — 36415 COLL VENOUS BLD VENIPUNCTURE: CPT | Performed by: ANESTHESIOLOGY

## 2022-04-07 PROCEDURE — 36415 COLL VENOUS BLD VENIPUNCTURE: CPT | Performed by: PHYSICIAN ASSISTANT

## 2022-04-07 PROCEDURE — 250N000013 HC RX MED GY IP 250 OP 250 PS 637: Performed by: SURGERY

## 2022-04-07 PROCEDURE — 85027 COMPLETE CBC AUTOMATED: CPT | Performed by: PHYSICIAN ASSISTANT

## 2022-04-07 PROCEDURE — 250N000011 HC RX IP 250 OP 636: Performed by: PSYCHIATRY & NEUROLOGY

## 2022-04-07 PROCEDURE — 99291 CRITICAL CARE FIRST HOUR: CPT | Mod: 24 | Performed by: ANESTHESIOLOGY

## 2022-04-07 PROCEDURE — 250N000013 HC RX MED GY IP 250 OP 250 PS 637: Performed by: NURSE PRACTITIONER

## 2022-04-07 PROCEDURE — 258N000003 HC RX IP 258 OP 636: Performed by: SURGERY

## 2022-04-07 PROCEDURE — 85520 HEPARIN ASSAY: CPT | Performed by: NURSE PRACTITIONER

## 2022-04-07 PROCEDURE — 36415 COLL VENOUS BLD VENIPUNCTURE: CPT | Performed by: NURSE PRACTITIONER

## 2022-04-07 PROCEDURE — 255N000002 HC RX 255 OP 636: Performed by: NURSE PRACTITIONER

## 2022-04-07 PROCEDURE — 250N000011 HC RX IP 250 OP 636: Performed by: PHYSICIAN ASSISTANT

## 2022-04-07 PROCEDURE — 85520 HEPARIN ASSAY: CPT | Performed by: ANESTHESIOLOGY

## 2022-04-07 PROCEDURE — 81003 URINALYSIS AUTO W/O SCOPE: CPT | Performed by: ANESTHESIOLOGY

## 2022-04-07 PROCEDURE — 70553 MRI BRAIN STEM W/O & W/DYE: CPT

## 2022-04-07 PROCEDURE — 83735 ASSAY OF MAGNESIUM: CPT | Performed by: NURSE PRACTITIONER

## 2022-04-07 PROCEDURE — 87205 SMEAR GRAM STAIN: CPT | Performed by: NURSE PRACTITIONER

## 2022-04-07 PROCEDURE — 999N000157 HC STATISTIC RCP TIME EA 10 MIN

## 2022-04-07 PROCEDURE — 85041 AUTOMATED RBC COUNT: CPT | Performed by: NURSE PRACTITIONER

## 2022-04-07 RX ORDER — HEPARIN SODIUM 10000 [USP'U]/100ML
0-5000 INJECTION, SOLUTION INTRAVENOUS CONTINUOUS
Status: DISCONTINUED | OUTPATIENT
Start: 2022-04-07 | End: 2022-04-12

## 2022-04-07 RX ORDER — GADOBUTROL 604.72 MG/ML
9 INJECTION INTRAVENOUS ONCE
Status: COMPLETED | OUTPATIENT
Start: 2022-04-07 | End: 2022-04-07

## 2022-04-07 RX ADMIN — HEPARIN SODIUM AND DEXTROSE 1150 UNITS/HR: 10000; 5 INJECTION INTRAVENOUS at 19:26

## 2022-04-07 RX ADMIN — AMIODARONE HYDROCHLORIDE 400 MG: 200 TABLET ORAL at 08:12

## 2022-04-07 RX ADMIN — TICAGRELOR 90 MG: 90 TABLET ORAL at 05:03

## 2022-04-07 RX ADMIN — ASPIRIN 81 MG CHEWABLE TABLET 81 MG: 81 TABLET CHEWABLE at 08:12

## 2022-04-07 RX ADMIN — TICAGRELOR 90 MG: 90 TABLET ORAL at 19:28

## 2022-04-07 RX ADMIN — ACETAMINOPHEN 975 MG: 325 TABLET, FILM COATED ORAL at 19:28

## 2022-04-07 RX ADMIN — MINERAL OIL AND PETROLATUM 3.5 G: 150; 830 OINTMENT OPHTHALMIC at 08:12

## 2022-04-07 RX ADMIN — GADOBUTROL 9 ML: 604.72 INJECTION INTRAVENOUS at 18:20

## 2022-04-07 RX ADMIN — AMIODARONE HYDROCHLORIDE 400 MG: 200 TABLET ORAL at 20:04

## 2022-04-07 RX ADMIN — MINERAL OIL AND PETROLATUM 3.5 G: 150; 830 OINTMENT OPHTHALMIC at 00:22

## 2022-04-07 RX ADMIN — MINERAL OIL AND PETROLATUM 3.5 G: 150; 830 OINTMENT OPHTHALMIC at 20:04

## 2022-04-07 RX ADMIN — Medication 15 ML: at 08:12

## 2022-04-07 RX ADMIN — LEVETIRACETAM 1000 MG: 10 INJECTION INTRAVENOUS at 00:21

## 2022-04-07 RX ADMIN — ACETAMINOPHEN 975 MG: 325 TABLET, FILM COATED ORAL at 00:21

## 2022-04-07 RX ADMIN — Medication 40 MG: at 08:12

## 2022-04-07 RX ADMIN — MINERAL OIL AND PETROLATUM 3.5 G: 150; 830 OINTMENT OPHTHALMIC at 12:13

## 2022-04-07 RX ADMIN — ACETAMINOPHEN 975 MG: 325 TABLET, FILM COATED ORAL at 05:22

## 2022-04-07 RX ADMIN — SODIUM CHLORIDE: 9 INJECTION, SOLUTION INTRAVENOUS at 20:05

## 2022-04-07 RX ADMIN — ATORVASTATIN CALCIUM 80 MG: 40 TABLET, FILM COATED ORAL at 08:12

## 2022-04-07 RX ADMIN — ACETAMINOPHEN 975 MG: 325 TABLET, FILM COATED ORAL at 12:13

## 2022-04-07 RX ADMIN — CARVEDILOL 25 MG: 25 TABLET, FILM COATED ORAL at 19:28

## 2022-04-07 RX ADMIN — CARVEDILOL 25 MG: 25 TABLET, FILM COATED ORAL at 08:12

## 2022-04-07 RX ADMIN — CHLORHEXIDINE GLUCONATE 15 ML: 1.2 RINSE ORAL at 20:04

## 2022-04-07 RX ADMIN — LEVETIRACETAM 1000 MG: 10 INJECTION INTRAVENOUS at 15:36

## 2022-04-07 RX ADMIN — MINERAL OIL AND PETROLATUM 3.5 G: 150; 830 OINTMENT OPHTHALMIC at 05:02

## 2022-04-07 RX ADMIN — AMLODIPINE BESYLATE 5 MG: 5 TABLET ORAL at 08:12

## 2022-04-07 RX ADMIN — MINERAL OIL AND PETROLATUM 3.5 G: 150; 830 OINTMENT OPHTHALMIC at 15:40

## 2022-04-07 RX ADMIN — CHLORHEXIDINE GLUCONATE 15 ML: 1.2 RINSE ORAL at 08:12

## 2022-04-07 ASSESSMENT — ACTIVITIES OF DAILY LIVING (ADL)
ADLS_ACUITY_SCORE: 13
ADLS_ACUITY_SCORE: 17
ADLS_ACUITY_SCORE: 19
ADLS_ACUITY_SCORE: 17
ADLS_ACUITY_SCORE: 17
ADLS_ACUITY_SCORE: 13
ADLS_ACUITY_SCORE: 17
ADLS_ACUITY_SCORE: 13
ADLS_ACUITY_SCORE: 17
ADLS_ACUITY_SCORE: 13
ADLS_ACUITY_SCORE: 13
ADLS_ACUITY_SCORE: 19
ADLS_ACUITY_SCORE: 13

## 2022-04-07 NOTE — PROGRESS NOTES
[facility] ICU RESPIRATORY NOTE        Date of Admission: 3/27/2022    Date of Intubation (most recent): 3/30/2022    Reason for Mechanical Ventilation: Airway protection    Number of Days on Mechanical Ventilation: 8    Met Criteria for Spontaneous Breathing Trial: Yes    Significant Events Today: None    ABG Results:   Recent Labs   Lab 04/01/22  0416 03/31/22  2341 03/31/22  2115 03/31/22  1605 03/31/22  1244 03/31/22  0603   PH  --   --  7.45  --  7.53* 7.49*   PCO2  --   --  31*  --  27* 27*   PO2  --   --  117*  --  92 143*   HCO3  --   --  21  --  22 21   O2PER 40 40 40 40 30 30       Current Vent Settings: Vent Mode: CMV/AC  (Continuous Mandatory Ventilation/ Assist Control)  FiO2 (%): 30 %  Resp Rate (Set): 14 breaths/min  Tidal Volume (Set, mL): 450 mL  PEEP (cm H2O): 5 cmH2O  Pressure Support (cm H2O): (S) 8 cmH2O  Resp: 14      Skin Assessment: Clean and intact    Plan: Continue to wean from mechanical ventilation and oxygen as tolerated per protocol.     Vinaney Villafuerte, RT

## 2022-04-07 NOTE — PROGRESS NOTES
Critical Care Progress Note      04/07/2022    Name: Angelica Robledo MRN#: [id number]   Age: 51 year old YOB: 1970     [facility] Day# 11                 Problem List:   Active Problems:    NSTEMI (non-ST elevated myocardial infarction) (H)    Hypertensive emergency    Paroxysmal ventricular tachycardia (H)           Summary/Hospital Course:   Angelica Robledo is a 51 year old female with pertinent PMHx of prior MI at age 24-25 (in 1996) s/p angioplasty, HTN, anemia, and NSTEMI.  She presented on 3/27 with new onset chest pain similar to her previous MI.  Workup was consistent with NSTEMI.  Cardiac cath demonstrated severe multivessel CAD.  She was seen by CT surgery and CABG was scheduled for 3/30.  She underwent CABG x4 left radial, LIMA, left saph vein x2 (FAUSTINO taken down, but elected to not be used due to size).    No family history of clotting disorders.  No known history of DVT/PE/stroke. Siblings without cardiovascular disease. Father passed away of cardiac causes at an advanced age.  Mother had cardiac disease as well as an aortic aneurysm, but not at a young age.     4V CABG as follows: left radial artery graft to the right posterior descending coronary artery, reversed saphenous vein graft to the obtuse marginal branch of the left circumflex coronary artery, reversed saphenous vein graft to the first diagonal branch of the left anterior descending coronary artery, and pedicled left internal mammary artery to left anterior descending coronary.    POD 0 - initial hypoxia resolved with vent changes.  At 2355 patient suffered a PEA and Vfib arrest and underwent 3 shocks, 2 rounds of epi, and 1 round of CPR. ROSC obtained at 0005.  300 mg amiodarone bolus administered.  STAT cardiac cath lab performed demonstrating LIMA-LAD, VG-LADD1, and VG-LPLB were patent. The left radial artery to RDPA graft had an occlusion.  The mid RCA was treated with a DANA stent and a balloon angioplasty performed at the [facility].   Excellent flow was noted on completion coronary angiogram.  Echo demonstrated a drop in LVEF to 40-45% with aspects of hypokinesis. RV demonstrated mild-moderate RV function reduction.    POD1 - weaned from sedation with minimal arousal limited to briefly opening eyes to pain and occasionally sound, minimal extremity pain response.  CT head/CTA head and neck unremarkable for acute insult.  Ceribell placed overnight.    PD2-5: MRI x2 obtained demonstrating punctate foci of early subacute ischemic change. EEG waxes and wanes from mod-severe to severe diffuse slowing. With mixed workup results, neither of these findings portent a poor prognosis specifically; however, prognosis remains unclear.    POD6-current: Gradual clinical exam improvement. Possibility of trach and PEG introduced. Possible repeat MRI later this week pending clinical exam progression.          Assessment and plan :     Angelica Robledo IS a 51 year old female admitted on 3/27/2022 for chest pain with workup demonstrating severe multivessel CAD.  Patient underwent CABG x4.  POD#0 patient had a PEA arrest with ROSC obtained at 10 minutes.  Emergent coronary cath demonstrated radial artery occlusion.  A DANA was placed into the RCA and balloon angioplasty of the RPLB was performed with excellent completion angiography flow.     I have personally reviewed the daily labs, imaging studies, cultures and discussed the case with referring physician and consulting physicians.     My assessment and plan by system for this patient is as follows:    Neurology/Psychiatry:   1. Analgesia -- tylenol, prn narcotics - minimize narcotics during phase of trying to allow her to be more alert  2. Sedation -- hold all sedation  3. Encephalopathy s/p cardiac arrest -- head CT negative -- MRI w/ punctate foci of subacute ischemic change -- MRI C spine negative -- continuing EEG: diffuse slowing with gradual improvement, moderate-severe encephalpathy, no indices of seizure  activity however is at risk.  -- NCC following, appreciate management/recs.  -- Keppra for seizure ppx (no seizures per EEG)  -- Stable clinical exam  -- Repeat MRI pending clinical exam progression  -- Anticipate prolonged recovery course requiring trach and PEG - will wait until next week for trach to allow for potential neurologic recovery      Cardiovascular:   1.  S/p CABG x4 - left radial, LIMA, left saph vein x2 (FAUSTINO taken down, but elected to not be used due to size)   2. POD#0 PEA and Vfib arrest -- s/p cardiac cath demonstrated radial artery graft to RPDA occlusion -- intervention with DANA to RCA and balloon angioplasty RPLA -- on Kangrelor 3/31, now transitioned to aspirin and Brilinta with discussion per CV surgery and interventional cardiology  -- cards recommends Amiodarone taper -- PO regimen scheduled  3. Hx of MI at age 24-25  4. HTN - likely untreated prior to hospitalization - presented with hypertension emergency - PO regimen including amlodipine (radial artery graft) and carvedilol per CV surgery management     Pulmonary/Ventilator Management:   1. Airway -- intubated -- low vent settings required -- tolerates pressure support  2. No underlying pulmonary problems on medical history or cxr.   -- Long term: if her encephalopathy is not able to recover this week, a tracheostomy would be indicated. CV surgery would like to wait until 10-14 days post op -- tentative plan of trach and PEG early next week     GI and Nutrition :   1. Epistaxis - s/p rhino rocket removal - oozing has resolved  2. Nutrition - NJ present -- Continue enteral feeds at goal.  3. PPI ppx    Renal/Fluids/Electrolytes:   1. Monitor UOP/creatinine -- stable labs -- euvolemic status -- daily goal of near net even  2. Replete electrolytes PRN per protocol  3. External wick device     Infectious Disease:   1. UTI with E coli on admission -- negative on recheck on 4/1  2. Reactive leukocytosis, trend     Endocrine:   1. Monitor for  stress induced hyperglycemia. ICU insulin protocol, goal sugar <180   2. Incidental thyroid nodule -- follow-up outpatient per discharge referral     Hematology/Oncology:   1. Acute blood loss on chronic anemia -- stable, trend  2. Acute blood loss thrombocytopenia -- resolved, trend.  3. S/P DANA placement s/p cardiac arrest -- aspirin and brilinta  4. Rule out hypercoagulable state -- hypercoagulopathy panel pending per hematology guidance -- ultimately been negative; however, recommends full dose anticoagulation in setting of thrombus -- discussed with CV: plan for 500 U/hr for 1 day, then low dose for 1 day, then high dose.     IV/Access:   1. Venous access - peripheral  2. Arterial access - NA      ICU Prophylaxis:   1. DVT: hep gtt/mechanical  2. VAP: HOB 30 degrees, chlorhexidine rinse  3. Stress Ulcer: PPI  4. Restraints: No current indication. Will readdress daily.   5. Wound care - per unit routine   6. Feeding - enteral feeds  7. Family Update:  at bedside  8. Disposition - ICU       Key goals for next 24 hours:   1. Continue EEG  2. Continue cares            Interim History:   No acute events  No changes.  EEG - diffuse slowing. Moderate-severe encephalopathy. Recording excludes non-convulsive status epilepticus as a possible cause of this encephalopathy           Key Medications:       acetaminophen  975 mg Oral or Feeding Tube Q6H     [START ON 4/8/2022] amiodarone  200 mg Oral or Feeding Tube Daily     amiodarone  400 mg Oral or Feeding Tube BID     amLODIPine  5 mg Oral or Feeding Tube Daily     artificial tears   Both Eyes Q4H     aspirin  81 mg Oral or NG Tube Daily     atorvastatin  80 mg Oral or Feeding Tube Daily     carvedilol  25 mg Oral or Feeding Tube BID w/meals     chlorhexidine  15 mL Mouth/Throat Q12H     insulin aspart  1-6 Units Subcutaneous Q4H     lactated ringers  250 mL Intravenous Once     lactated ringers  250 mL Intravenous Once     levETIRAcetam  1,000 mg Intravenous Q12H      multivitamins w/minerals  15 mL Per Feeding Tube Daily     pantoprazole  40 mg Oral or NG Tube Daily    Or     pantoprazole  40 mg Oral Daily     perflutren diluted 1mL to 2mL with saline  9 mL Intravenous Once     sodium chloride (PF)  3 mL Intracatheter Q8H     ticagrelor  90 mg Oral or Feeding Tube BID       heparin 500 Units/hr (04/07/22 0400)     - MEDICATION INSTRUCTIONS -       Percutaneous Coronary Intervention orders placed (this is information for BPA alerting)       sodium chloride Stopped (04/01/22 1200)               Physical Examination:   Temp:  [98.6  F (37  C)-99.5  F (37.5  C)] 98.8  F (37.1  C)  Pulse:  [63-76] 68  Resp:  [13-21] 13  BP: ()/(61-92) 146/91  FiO2 (%):  [30 %] 30 %  SpO2:  [96 %-100 %] 99 %    Intake/Output Summary (Last 24 hours) at 3/31/2022 0921  Last data filed at 3/31/2022 0700  Gross per 24 hour   Intake 3676.54 ml   Output 2825 ml   Net 851.54 ml     Wt Readings from Last 4 Encounters:   04/07/22 94.1 kg (207 lb 7.3 oz)     BP - Mean:  [] 118  Vent Mode: CMV/AC  (Continuous Mandatory Ventilation/ Assist Control)  FiO2 (%): 30 %  Resp Rate (Set): 14 breaths/min  Tidal Volume (Set, mL): 450 mL  PEEP (cm H2O): 5 cmH2O  Pressure Support (cm H2O): (S) 8 cmH2O  Resp: 13    Recent Labs   Lab 04/01/22  0416 03/31/22  2341 03/31/22  2115 03/31/22  1605 03/31/22  1244   PH  --   --  7.45  --  7.53*   PCO2  --   --  31*  --  27*   PO2  --   --  117*  --  92   HCO3  --   --  21  --  22   O2PER 40 40 40 40 30       GEN: no acute distress   HEENT: head ncat, sclera anicteric, ETT present   PULM: unlabored, synchronous with vent, clear anteriorly    CV/Chest: RRR, S1S2 no gallop,  No rub, no murmur appreciated. Midline incision CDI.   ABD: soft, nontender  EXT:  Trace edema,  warm  NEURO: Off sedation. Opens eyes to stimulus and verbal stimulus. Pupillary light reflex brisk. Does not blink to threat. Cough reflex present. Corneal reflex present. No gag reflex present;  however, demonstrates turning head away with introduction of mouth swab.  BLE withdrawal R>L. BUE pain reflex withdrawal R>L. Does not follow commands.   : external device present  SKIN: no obvious rash  LINES: clean, dry intact         Data:   All data and imaging reviewed     ROUTINE ICU LABS (Last four results)  CMP  Recent Labs   Lab 04/07/22  0552 04/07/22  0502 04/07/22  0019 04/06/22  2029 04/06/22  1548 04/06/22  0745 04/06/22  0440 04/05/22  0821 04/05/22  0525 04/04/22  1155 04/04/22  0938 04/04/22  0453 04/04/22  0451 04/03/22  0411 04/03/22  0408 04/02/22  0357 04/02/22  0355 04/01/22  0419 04/01/22  0416 03/31/22  1420 03/31/22  1243   NA  --   --   --   --   --   --  144  --  146*  --   --   --  144  --  144  --  145*  --  146*  --  144   POTASSIUM  --   --   --   --   --   --  4.1  --  4.1  --  4.3  --  3.6   < > 3.5  --  3.6   < > 3.7   < > 3.4   CHLORIDE  --   --   --   --   --   --  112*  --  112*  --   --   --  111*  --  111*  --  115*  --  118*  --  115*   CO2  --   --   --   --   --   --  29  --  28  --   --   --  29  --  30  --  25  --  23  --  22   ANIONGAP  --   --   --   --   --   --  3  --  6  --   --   --  4  --  3  --  5  --  5  --  7   GLC  --  131* 125* 153* 140*   < > 151*   < > 159*   < >  --    < > 140*   < > 145*   < > 135*   < > 151*   < > 178*   BUN  --   --   --   --   --   --  28  --  31*  --   --   --  25  --  22  --  18  --  11  --  9   CR  --   --   --   --   --   --  0.95  --  1.04  --   --   --  1.00  --  0.96  --  0.76  --  0.79  --  0.73   GFRESTIMATED  --   --   --   --   --   --  73  --  65  --   --   --  68  --  72  --  >90  --  >90  --  >90   TOMAS  --   --   --   --   --   --  9.5  --  9.1  --   --   --  9.2  --  8.6  --  8.8  --  8.5  --  8.8   MAG 2.7*  --   --   --   --   --  2.7*  --  2.6*  --   --   --  2.5*  --  2.2  --  2.2   < > 2.0  --   --    PHOS 4.6*  --   --   --   --   --  3.9  --  4.1  --   --   --  4.0  --  3.7  --  3.6  --  3.5   < > 3.2   PROTTOTAL   --   --   --   --   --   --   --   --   --   --   --   --   --   --  6.2*  --  5.7*  --  5.5*  --  5.6*   ALBUMIN  --   --   --   --   --   --   --   --   --   --   --   --   --   --  2.3*  --  2.2*  --  2.3*  --  2.4*   BILITOTAL  --   --   --   --   --   --   --   --   --   --   --   --   --   --  0.2  --  0.2  --  0.4  --  0.4   ALKPHOS  --   --   --   --   --   --   --   --   --   --   --   --   --   --  108  --  72  --  65  --  62   AST  --   --   --   --   --   --   --   --   --   --   --   --   --   --  26  --  27  --  47*  --  89*   ALT  --   --   --   --   --   --   --   --   --   --   --   --   --   --  52*  --  45  --  50  --  59*    < > = values in this interval not displayed.     CBC  Recent Labs   Lab 04/07/22  0552 04/06/22  0945 04/06/22  0440 04/05/22  0525   WBC 14.7* 10.6 10.4 9.8   RBC 3.33* 3.44* 3.37* 3.46*   HGB 8.1* 8.4* 8.2* 8.6*   HCT 27.7* 29.6* 28.1* 29.3*   MCV 83 86 83 85   MCH 24.3* 24.4* 24.3* 24.9*   MCHC 29.2* 28.4* 29.2* 29.4*   RDW 17.7* 17.8* 18.0* 18.2*    344 334 349     INR  Recent Labs   Lab 04/01/22  1647   INR 1.31*     Arterial Blood Gas  Recent Labs   Lab 04/01/22  0416 03/31/22  2341 03/31/22  2115 03/31/22  1605 03/31/22  1244   PH  --   --  7.45  --  7.53*   PCO2  --   --  31*  --  27*   PO2  --   --  117*  --  92   HCO3  --   --  21  --  22   O2PER 40 40 40 40 30       All cultures:  No results for input(s): CULT in the last 168 hours.  No results found for this or any previous visit (from the past 24 hour(s)).        Adi Bazan MD  Surgical Critical Care Fellow

## 2022-04-07 NOTE — PROGRESS NOTES
[facility]    Neurocritical Care Progress Note    Interval EventsNo acute events overnight. Plan to repeat MRI brain today.     HPI Summary  51 year old female admitted on 3/27/2022 for chest pain with workup demonstrating severe multivessel CAD.  Patient underwent CABG x4.  POD#0 patient had a PEA arrest with ROSC obtained at 10 minutes.  Emergent coronary cath demonstrated radial artery occlusion.  A DANA was placed into the RCA and balloon angioplasty of the RPLB was performed with excellent completion angiography flow.        Evaluation Summarized     MRI/Head CT MRI brain #1: There are a few scattered punctate acute infarctions within the left frontal lobe, left temporal lobe, right occipital lobe, presumably embolic     MRI brain #2: No new acute findings or significant interval change when compared to 4/1/2022. Scattered punctate foci of early subacute ischemic change in the cerebral hemispheres    Intracranial Vasculature CTA head: No high-grade stenosis or large vessel occlusion involving the major intracranial arteries. Mild atherosclerosis involving the left carotid siphon   Cervical Vasculature CTA neck: Unremarkable CT angiogram of the neck      Other Testing MRI cervical spine: Unremarkable MRI of the cervical spine      LDL  3/27/2022: 106 mg/dL   A1C  3/28/2022: 5.7 %   Troponin No lab value available in past 48 hrs         Impression   1. Encephalopathy s/p cardiac arrest. No explanation for her encephalopathy seen on brain MRI, suspect anoxic injury below the level of MRI detection  2. A few incidental areas of restricted diffusion are seen on brain MRI which are common after catheter-based coronary angiography     Plan  - Okay to continue EEG head break  - Continue Keppra 1 gram BID  - Hold all sedation   - Maintain normothermia, normoglycemia, normonatremia  - Continue supportive care  - Continue neuro checks q4 hours  - Hypercoagulable work up per hematology  -  "Repeat MRI brain today     Patient Follow-up    - final recommendation pending work-up     We will continue to follow.     YRIS Vanegas, CNP  Neurology  04/07/2022 3:06 PM  To page stroke neurology after hours or on a subsequent day, click here: [facility]COM  Choose \"On Call\" tab at top, then search dropdown box for \"Neurology Adult\" & press Enter, look for Neuro ICU/Stroke     _____________________________________________________    Clinically Significant Risk Factors Present on Admission                  Medications   Scheduled Meds    acetaminophen  975 mg Oral or Feeding Tube Q6H     [START ON 4/8/2022] amiodarone  200 mg Oral or Feeding Tube Daily     amiodarone  400 mg Oral or Feeding Tube BID     amLODIPine  5 mg Oral or Feeding Tube Daily     artificial tears   Both Eyes Q4H     aspirin  81 mg Oral or NG Tube Daily     atorvastatin  80 mg Oral or Feeding Tube Daily     carvedilol  25 mg Oral or Feeding Tube BID w/meals     chlorhexidine  15 mL Mouth/Throat Q12H     insulin aspart  1-6 Units Subcutaneous Q4H     levETIRAcetam  1,000 mg Intravenous Q12H     multivitamins w/minerals  15 mL Per Feeding Tube Daily     pantoprazole  40 mg Oral or NG Tube Daily    Or     pantoprazole  40 mg Oral Daily     sodium chloride (PF)  3 mL Intracatheter Q8H     ticagrelor  90 mg Oral or Feeding Tube BID       Infusion Meds    heparin 1,150 Units/hr (04/07/22 1013)     - MEDICATION INSTRUCTIONS -       Percutaneous Coronary Intervention orders placed (this is information for BPA alerting)       sodium chloride Stopped (04/01/22 1200)       PRN Meds  acetaminophen **OR** acetaminophen, bisacodyl, artificial tears ophthalmic solution, glucose **OR** dextrose **OR** glucagon, hydrALAZINE, HYDROmorphone **OR** HYDROmorphone, ipratropium - albuterol 0.5 mg/2.5 mg/3 mL, lidocaine 4%, lidocaine 4%, lidocaine (buffered or not buffered), lidocaine (buffered or not buffered), lidocaine (buffered or not buffered), magnesium " hydroxide, - MEDICATION INSTRUCTIONS -, melatonin, methocarbamol, naloxone **OR** naloxone **OR** naloxone **OR** naloxone, ondansetron **OR** ondansetron, oxyCODONE **OR** oxyCODONE, Percutaneous Coronary Intervention orders placed (this is information for BPA alerting), polyethylene glycol, senna-docusate, sodium chloride (PF), sodium chloride (PF), sodium chloride (PF)       PHYSICAL EXAMINATION  Temp:  [98.6  F (37  C)-99.5  F (37.5  C)] 98.6  F (37  C)  Pulse:  [63-73] 67  Resp:  [13-21] 13  BP: ()/(61-97) 144/89  FiO2 (%):  [30 %] 30 %  SpO2:  [96 %-100 %] 100 %     General Exam  General:  patient lying in bed without any acute distress  HEENT:  intubated  Pulmonary:  on mechanical ventilation        Neuro Exam  Mental Status:  does not follow commands, eyes open and close spontaneously, eyes move back and forth without clearly tracking    Cranial Nerves:  PERRL, does not protrude tongue, does not blink to threat  Motor: no spontaneous movement, RUE twitch and grimace to noxious, LUE withdraws to noxious, no movement to noxious in bilateral LEs   Reflexes:  toes mute  Sensory:  as above to noxious  Coordination:  deferred  Station/Gait:  unable to test due to intubation       Imaging  I personally reviewed all imaging; relevant findings per HPI.     Lab Results Data   CBC  Recent Labs   Lab 04/07/22  1007 04/07/22  0552 04/06/22  0945   WBC 15.1* 14.7* 10.6   RBC 3.34* 3.33* 3.44*   HGB 8.3* 8.1* 8.4*   HCT 28.6* 27.7* 29.6*    338 344     Basic Metabolic Panel    Recent Labs   Lab 04/07/22  1208 04/07/22  0810 04/07/22  0502 04/06/22  0745 04/06/22  0440 04/05/22  0821 04/05/22  0525 04/04/22  1155 04/04/22  0938 04/04/22  0453 04/04/22  0451   NA  --   --   --   --  144  --  146*  --   --   --  144   POTASSIUM  --   --   --   --  4.1  --  4.1  --  4.3  --  3.6   CHLORIDE  --   --   --   --  112*  --  112*  --   --   --  111*   CO2  --   --   --   --  29  --  28  --   --   --  29   BUN  --   --    --   --  28  --  31*  --   --   --  25   CR  --   --   --   --  0.95  --  1.04  --   --   --  1.00   * 138* 131*   < > 151*   < > 159*   < >  --    < > 140*   TOMAS  --   --   --   --  9.5  --  9.1  --   --   --  9.2    < > = values in this interval not displayed.     Liver Panel  Recent Labs   Lab 04/03/22  0408 04/02/22  0355 04/01/22  0416   PROTTOTAL 6.2* 5.7* 5.5*   ALBUMIN 2.3* 2.2* 2.3*   BILITOTAL 0.2 0.2 0.4   ALKPHOS 108 72 65   AST 26 27 47*   ALT 52* 45 50     INR    Recent Labs   Lab Test 04/01/22  1647 03/31/22  0022 03/30/22  1622   INR 1.31* 1.37* 1.24*      Lipid Profile    Recent Labs   Lab Test 03/27/22  1028   CHOL 172   HDL 57   *   TRIG 47     A1C    Recent Labs   Lab Test 03/28/22  0600   A1C 5.7*     Troponin I    No results for input(s): TROPONINIS, TROPONINI, GHTROP in the last 168 hours.

## 2022-04-07 NOTE — PLAN OF CARE
Neuro: Patient opens eyes spontaneus, does not track does not fallow commands.. PERRL, cough, no gag, withdraw to pain BLE slightly.Continuous EEG, on Keppra and Heparin gtt.  CV:  SR, HR 70s, SBP <140  Pulmonary: LSC, moderate tan creamy secretions.  GI/: NJ in place TF increase to goal 65 mol/h, pt tolerate well, abd soft, pt have  BM x 1 this shift, pure wick in place with adequate UOP.  Continue with plan of care.

## 2022-04-07 NOTE — PROGRESS NOTES
[facility]  Cardiovascular and Thoracic Surgery Daily Note          Assessment and Plan:   Angelica Robledo is a 51 year old female with history of MI at age 25 s/p angioplasty who presented to the hospital with hypertensive emergency and NSTEMI. Coronary angio demonstrated severe multivessel CAD. Echocardiogram preoperatively with preserved biventricular function.     PMH includes: NSTEMI, CAD with previous coronary stents, HTN     Course has been complicated by PEA arrest with ventricular fibrillation on POD 0 with ROSC after defibrillation x3, 2 mg epinephrine and one round of CPR. Was taken to cath lab where radial artery graft was found to be occluded. PCI with DANA placement to mid RCA and angioplasty of the rPLB was performed. Echocardiogram at the conclusion of PCI demonstrated LVEF 40-45% with mild to moderate global hypokinesis of the left ventricle with severe hypokinesis of the mid anterior and anteroseptal walls, the entire inferior wall and all apical segments of the left ventricle; no pericardial effusion. Additionally, due to antiplatelet therapy post PCI, patient developed epistaxis during attempted NG placement requiring placement of b/l rhino rockets. Now, diffuse severe encephalopathy with minimal neurologic response.      POD #8 s/p:  1.  Coronary artery bypass grafting x 4 (radial artery graft to the right posterior descending coronary artery, reversed saphenous vein graft to the obtuse marginal branch of the left circumflex coronary artery, reversed saphenous vein graft to the first diagonal branch of the left anterior descending coronary artery, and pedicled left internal mammary artery to left anterior descending coronary artery).  2.  Endoscopic vein harvest of the greater saphenous vein from the left lower extremity.  3.  Endoscopic left radial artery harvest on 3/30/22 with Dr. Butterfield.     CVS:   NSTEMI and severe multivessel CAD s/p CABG x4 with radial grafts as  above  HTN  PEA and Vfib arrest s/p ROSC and DANA to mid RCA and angioplasty of the rPLB on 3/31  HD stable, lends to HTN; NSR rates 70-80s  - Goal MAP >65, SBP <140  - Amlodipine 5 mg daily for radial graft (at least 3-6 months)  - Carvedilol 25 mg BID  - PRN hydralazine available  - Brilinta load and daily per Cardiology, tolerated 500U straight rate heparin will transition to LMWH for 24 hrs this am  - Transitioned amiodarone gtt to PO taper per EP/Cards recs  - ASA 81 daily  - Atorvastatin 80 mg daily  - CTs removed 4/3. TPWs removed 4/1     Resp:   Postoperative ventilator management  - Current vent settings:  Vent Mode: CMV/AC  (Continuous Mandatory Ventilation/ Assist Control)  FiO2 (%): 30 %  Resp Rate (Set): 14 breaths/min  Tidal Volume (Set, mL): 450 mL  PEEP (cm H2O): 5 cmH2O  Resp: 13  - Intubated for airway protection due to encephalopathy  - Titrate O2/peep to maintain sats >92%  - PST as tolerated  - Dr. Butterfield ok with trach POD 10-14     Neuro:    Acute postoperative pain  Hx migraines  Encephalopathy   Did receive CPR on 3/31, will likely have pain from this also  Propofol turned off on 3/31 at 9 am, starting to open eyes more, unable to move extremities but withdraws BLE to pain, nothing in BUEs yet. CT head 3/31 negative for hemorrhage; CTA negative for strokes, vessels patent; per neurology, MRI stable w/o acute concern to explain clinical status.  EEG without e/o seizure activity, continues to have moderate to severe diffuse encephalopathy   4/5 EEG improved but c/f new spikes not c/w seizures but tendency to seize  MRI head and c-spine on 4/3 negative  - PRN dilaudid available  - PRN oxycodone and methocarbamol available  - Scheduled tylenol   - Neuro critical care consulted and following, greatly appreciate recommendations  - Keppra added 4/5     Renal/Electrolyte:   Lactic acidosis, resolved  Volume overload  Hypernatremia, resolved  Creat stable, below preop weight, much less edema;    Na/K stable  - Strict I/O, daily weights  - Avoid/limit nephrotoxins as able  - Replete lytes per protocol  - No further diuresis  - Continue FWF 60 mL q6h     GI/Nutrition:    Epistaxis, resolved  Elevated LFTs, likely shock liver 2/2 arrest, resolved  Orders Placed This Encounter      NPO for Medical/Clinical Reasons Except for: Other; Specify: NPO until Extubated      Advance Diet as Tolerated: Clear Liquid Diet  - S/P [place] placement on  in radiology  - Nutrition following, enteral feeds at goal, absorbing well  - Rhino rockets removed, continues to have some oozing from b/l nares  - Continue bowel regimen, + BMs  - PPI     :    - Cooper in place for strict I/O     Endo:  Stress induced hyperglycemia  Thyroid nodule   Preop A1c 5.7%  - Transitioned to sliding scale insulin   - Goal BG <180 for optimal healing  - Thyroid nodule noted on CT on 3/31, will have this worked up on an outpatient basis     ID:  Stress induced leukocytosis, resolved  E.Coli UTI preoperatively  WBC 14.7<10.4, Temp (24hrs), Av.9  F (37.2  C), Min:98.6  F (37  C), Max:99.5  F (37.5  C); new leukocytosis, respiratory culture ordered/pan culturing per intensivist   - Completed perioperative ABX  - Continue to follow fever curve, WBC and inflammatory markers as appropriate  - Repeat UA  without overt e/o infection     Heme:  Acute blood loss anemia  Acute blood loss thrombocytopenia  Hgb 8.1, Plts 338; no s/sx active bleeding  - CBC in AM  - Goal Hgb >7  - Consulted hematology given severe premature CAD and acute graft failure- appreciate recommendations. Multiple labs sent. Hypercoagulable workup appears to be negative but recommending repeat levels once acute issues resolve. Currently recommending antiplatelet and full anticoagulation (therapeutic heparin). Will d/w Dr. Butterfield. Plan will be straight rate heparin at 500 units/hr now transitioning to LIH gtt x48 hours, if continues to tolerate then transition to high  "intensity.  - Proph: PPI, subcutaneous heparin, SCDs  - Dispo: Continue in ICU; Family would like patient moved to [place] if/when trach established to be closer to daughter. SW consulted.  - Lines: Cooper, LISA, ETT, [place]          Interval History:   Lying in bed, intubated, PST for exercise as tolerable, eyes more midline, blinking more, not following commands,           Medications:       acetaminophen  975 mg Oral or Feeding Tube Q6H     [START ON 4/8/2022] amiodarone  200 mg Oral or Feeding Tube Daily     amiodarone  400 mg Oral or Feeding Tube BID     amLODIPine  5 mg Oral or Feeding Tube Daily     artificial tears   Both Eyes Q4H     aspirin  81 mg Oral or NG Tube Daily     atorvastatin  80 mg Oral or Feeding Tube Daily     carvedilol  25 mg Oral or Feeding Tube BID w/meals     chlorhexidine  15 mL Mouth/Throat Q12H     insulin aspart  1-6 Units Subcutaneous Q4H     levETIRAcetam  1,000 mg Intravenous Q12H     multivitamins w/minerals  15 mL Per Feeding Tube Daily     pantoprazole  40 mg Oral or NG Tube Daily    Or     pantoprazole  40 mg Oral Daily     sodium chloride (PF)  3 mL Intracatheter Q8H     ticagrelor  90 mg Oral or Feeding Tube BID     @MEDSPRN-@          Physical Exam:   Vitals were reviewed  Blood pressure 114/74, pulse 67, temperature 98.6  F (37  C), temperature source Axillary, resp. rate 13, height 1.651 m (5' 5\"), weight 94.1 kg (207 lb 7.3 oz), last menstrual period 02/27/2022, SpO2 99 %.  Rhythm: NSR    Lungs: course    Cardiovascular: s1/s2, no m/r/g    Abdomen: s/nt/nd    Extremeties: 1+ LE edema    Incision: cdi    CT: na    Weight:   Vitals:    04/03/22 0429 04/04/22 0415 04/05/22 0414 04/06/22 0454   Weight: 99 kg (218 lb 4.1 oz) 94.9 kg (209 lb 3.5 oz) 93 kg (205 lb 0.4 oz) 93.3 kg (205 lb 11 oz)    04/07/22 0500   Weight: 94.1 kg (207 lb 7.3 oz)            Data:   Labs:   Lab Results   Component Value Date    WBC 14.7 04/07/2022     Lab Results   Component Value Date    RBC 3.33 " 04/07/2022     Lab Results   Component Value Date    HGB 8.1 04/07/2022     Lab Results   Component Value Date    HCT 27.7 04/07/2022     No components found for: MCT  Lab Results   Component Value Date    MCV 83 04/07/2022     Lab Results   Component Value Date    MCH 24.3 04/07/2022     Lab Results   Component Value Date    MCHC 29.2 04/07/2022     Lab Results   Component Value Date    RDW 17.7 04/07/2022     Lab Results   Component Value Date     04/07/2022       Last Basic Metabolic Panel:  Lab Results   Component Value Date     04/06/2022      Lab Results   Component Value Date    POTASSIUM 4.1 04/06/2022     Lab Results   Component Value Date    CHLORIDE 112 04/06/2022     Lab Results   Component Value Date    TOMAS 9.5 04/06/2022     Lab Results   Component Value Date    CO2 29 04/06/2022     Lab Results   Component Value Date    BUN 28 04/06/2022     Lab Results   Component Value Date    CR 0.95 04/06/2022     Lab Results   Component Value Date     04/07/2022     04/06/2022       Seen and discussed with Dr. Babita Bocanegra PAKARAN  Pager #: [phone]

## 2022-04-07 NOTE — PROGRESS NOTES
[facility] ICU RESPIRATORY NOTE           Date of Admission: 3/27/2022     Date of Intubation (most recent): 3/30/2022     Reason for Mechanical Ventilation: Airway protection     Number of Days on Mechanical Ventilation: 8     Met Criteria for Spontaneous Breathing Trial: Yes. Pt PS for 5 hours and 50 minutes. Pt placed back to CMV/AC for MRI.     Significant Events Today: None     ABG Results:            Recent Labs   Lab 04/01/22  0416 03/31/22  2341 03/31/22  2115 03/31/22  1605 03/31/22  1244 03/31/22  0603   PH  --   --  7.45  --  7.53* 7.49*   PCO2  --   --  31*  --  27* 27*   PO2  --   --  117*  --  92 143*   HCO3  --   --  21  --  22 21   O2PER 40 40 40 40 30 30       Current Vent Settings: Vent Mode: CMV/AC  (Continuous Mandatory Ventilation/ Assist Control)  FiO2 (%): 30 %  Resp Rate (Set): 14 breaths/min  Tidal Volume (Set, mL): 450 mL  PEEP (cm H2O): 5 cmH2O  Pressure Support (cm H2O): (S) 10 cmH2O  Resp: 14        Skin Assessment: Clean and intact. Tongue injury      Plan: Continue to wean from mechanical ventilation and oxygen as tolerated per protocol.      Eleazar Suggs, RT

## 2022-04-08 LAB
ANION GAP SERPL CALCULATED.3IONS-SCNC: 7 MMOL/L (ref 3–14)
BUN SERPL-MCNC: 21 MG/DL (ref 7–30)
CALCIUM SERPL-MCNC: 8.9 MG/DL (ref 8.5–10.1)
CHLORIDE BLD-SCNC: 108 MMOL/L (ref 94–109)
CO2 SERPL-SCNC: 25 MMOL/L (ref 20–32)
CREAT SERPL-MCNC: 0.84 MG/DL (ref 0.52–1.04)
ERYTHROCYTE [DISTWIDTH] IN BLOOD BY AUTOMATED COUNT: 17.7 % (ref 10–15)
GFR SERPL CREATININE-BSD FRML MDRD: 84 ML/MIN/1.73M2
GLUCOSE BLD-MCNC: 137 MG/DL (ref 70–99)
GLUCOSE BLDC GLUCOMTR-MCNC: 122 MG/DL (ref 70–99)
GLUCOSE BLDC GLUCOMTR-MCNC: 124 MG/DL (ref 70–99)
GLUCOSE BLDC GLUCOMTR-MCNC: 129 MG/DL (ref 70–99)
GLUCOSE BLDC GLUCOMTR-MCNC: 129 MG/DL (ref 70–99)
GLUCOSE BLDC GLUCOMTR-MCNC: 137 MG/DL (ref 70–99)
GLUCOSE BLDC GLUCOMTR-MCNC: 147 MG/DL (ref 70–99)
HCT VFR BLD AUTO: 26.4 % (ref 35–47)
HGB BLD-MCNC: 7.8 G/DL (ref 11.7–15.7)
MAGNESIUM SERPL-MCNC: 2.5 MG/DL (ref 1.6–2.3)
MCH RBC QN AUTO: 24.8 PG (ref 26.5–33)
MCHC RBC AUTO-ENTMCNC: 29.5 G/DL (ref 31.5–36.5)
MCV RBC AUTO: 84 FL (ref 78–100)
PHOSPHATE SERPL-MCNC: 4.2 MG/DL (ref 2.5–4.5)
PLATELET # BLD AUTO: 343 10E3/UL (ref 150–450)
POTASSIUM BLD-SCNC: 4.2 MMOL/L (ref 3.4–5.3)
POTASSIUM BLD-SCNC: 4.3 MMOL/L (ref 3.4–5.3)
RBC # BLD AUTO: 3.14 10E6/UL (ref 3.8–5.2)
SODIUM SERPL-SCNC: 140 MMOL/L (ref 133–144)
UFH PPP CHRO-ACNC: 0.26 IU/ML
WBC # BLD AUTO: 15.7 10E3/UL (ref 4–11)

## 2022-04-08 PROCEDURE — 85027 COMPLETE CBC AUTOMATED: CPT | Performed by: NURSE PRACTITIONER

## 2022-04-08 PROCEDURE — 250N000009 HC RX 250: Performed by: NURSE PRACTITIONER

## 2022-04-08 PROCEDURE — 250N000013 HC RX MED GY IP 250 OP 250 PS 637: Performed by: NURSE PRACTITIONER

## 2022-04-08 PROCEDURE — 250N000013 HC RX MED GY IP 250 OP 250 PS 637: Performed by: STUDENT IN AN ORGANIZED HEALTH CARE EDUCATION/TRAINING PROGRAM

## 2022-04-08 PROCEDURE — 94003 VENT MGMT INPAT SUBQ DAY: CPT

## 2022-04-08 PROCEDURE — 250N000011 HC RX IP 250 OP 636: Performed by: PSYCHIATRY & NEUROLOGY

## 2022-04-08 PROCEDURE — 36415 COLL VENOUS BLD VENIPUNCTURE: CPT | Performed by: NURSE PRACTITIONER

## 2022-04-08 PROCEDURE — 99291 CRITICAL CARE FIRST HOUR: CPT | Mod: 24 | Performed by: ANESTHESIOLOGY

## 2022-04-08 PROCEDURE — 85520 HEPARIN ASSAY: CPT | Performed by: INTERNAL MEDICINE

## 2022-04-08 PROCEDURE — 999N000157 HC STATISTIC RCP TIME EA 10 MIN

## 2022-04-08 PROCEDURE — 250N000013 HC RX MED GY IP 250 OP 250 PS 637: Performed by: SURGERY

## 2022-04-08 PROCEDURE — 999N000253 HC STATISTIC WEANING TRIALS

## 2022-04-08 PROCEDURE — 80048 BASIC METABOLIC PNL TOTAL CA: CPT

## 2022-04-08 PROCEDURE — 83735 ASSAY OF MAGNESIUM: CPT | Performed by: NURSE PRACTITIONER

## 2022-04-08 PROCEDURE — 250N000011 HC RX IP 250 OP 636: Performed by: PHYSICIAN ASSISTANT

## 2022-04-08 PROCEDURE — 999N000009 HC STATISTIC AIRWAY CARE

## 2022-04-08 PROCEDURE — 80048 BASIC METABOLIC PNL TOTAL CA: CPT | Performed by: INTERNAL MEDICINE

## 2022-04-08 PROCEDURE — 250N000013 HC RX MED GY IP 250 OP 250 PS 637

## 2022-04-08 PROCEDURE — 200N000001 HC R&B ICU

## 2022-04-08 PROCEDURE — 258N000003 HC RX IP 258 OP 636: Performed by: SURGERY

## 2022-04-08 PROCEDURE — 84100 ASSAY OF PHOSPHORUS: CPT | Performed by: NURSE PRACTITIONER

## 2022-04-08 PROCEDURE — 99291 CRITICAL CARE FIRST HOUR: CPT | Performed by: PSYCHIATRY & NEUROLOGY

## 2022-04-08 RX ORDER — SULFAMETHOXAZOLE/TRIMETHOPRIM 800-160 MG
1 TABLET ORAL 2 TIMES DAILY
Status: COMPLETED | OUTPATIENT
Start: 2022-04-08 | End: 2022-04-10

## 2022-04-08 RX ADMIN — PANTOPRAZOLE SODIUM 40 MG: 40 TABLET, DELAYED RELEASE ORAL at 08:31

## 2022-04-08 RX ADMIN — MINERAL OIL AND PETROLATUM 3.5 G: 150; 830 OINTMENT OPHTHALMIC at 09:06

## 2022-04-08 RX ADMIN — TICAGRELOR 90 MG: 90 TABLET ORAL at 17:33

## 2022-04-08 RX ADMIN — LEVETIRACETAM 1000 MG: 10 INJECTION INTRAVENOUS at 15:20

## 2022-04-08 RX ADMIN — MINERAL OIL AND PETROLATUM 3.5 G: 150; 830 OINTMENT OPHTHALMIC at 00:07

## 2022-04-08 RX ADMIN — MINERAL OIL AND PETROLATUM 3.5 G: 150; 830 OINTMENT OPHTHALMIC at 20:04

## 2022-04-08 RX ADMIN — SULFAMETHOXAZOLE AND TRIMETHOPRIM 1 TABLET: 800; 160 TABLET ORAL at 10:35

## 2022-04-08 RX ADMIN — INSULIN ASPART 1 UNITS: 100 INJECTION, SOLUTION INTRAVENOUS; SUBCUTANEOUS at 15:06

## 2022-04-08 RX ADMIN — TICAGRELOR 90 MG: 90 TABLET ORAL at 04:05

## 2022-04-08 RX ADMIN — AMLODIPINE BESYLATE 5 MG: 5 TABLET ORAL at 08:31

## 2022-04-08 RX ADMIN — LEVETIRACETAM 1000 MG: 10 INJECTION INTRAVENOUS at 04:05

## 2022-04-08 RX ADMIN — ACETAMINOPHEN 975 MG: 325 TABLET, FILM COATED ORAL at 00:05

## 2022-04-08 RX ADMIN — CARVEDILOL 25 MG: 25 TABLET, FILM COATED ORAL at 17:33

## 2022-04-08 RX ADMIN — MINERAL OIL AND PETROLATUM 3.5 G: 150; 830 OINTMENT OPHTHALMIC at 04:10

## 2022-04-08 RX ADMIN — CARVEDILOL 25 MG: 25 TABLET, FILM COATED ORAL at 08:32

## 2022-04-08 RX ADMIN — HEPARIN SODIUM AND DEXTROSE 1150 UNITS/HR: 10000; 5 INJECTION INTRAVENOUS at 17:33

## 2022-04-08 RX ADMIN — MINERAL OIL AND PETROLATUM 3.5 G: 150; 830 OINTMENT OPHTHALMIC at 15:03

## 2022-04-08 RX ADMIN — ACETAMINOPHEN 975 MG: 325 TABLET, FILM COATED ORAL at 23:58

## 2022-04-08 RX ADMIN — MINERAL OIL AND PETROLATUM 3.5 G: 150; 830 OINTMENT OPHTHALMIC at 12:06

## 2022-04-08 RX ADMIN — ATORVASTATIN CALCIUM 80 MG: 40 TABLET, FILM COATED ORAL at 08:31

## 2022-04-08 RX ADMIN — AMIODARONE HYDROCHLORIDE 200 MG: 200 TABLET ORAL at 08:32

## 2022-04-08 RX ADMIN — ACETAMINOPHEN 975 MG: 325 TABLET, FILM COATED ORAL at 17:33

## 2022-04-08 RX ADMIN — Medication 15 ML: at 08:31

## 2022-04-08 RX ADMIN — CHLORHEXIDINE GLUCONATE 15 ML: 1.2 RINSE ORAL at 20:04

## 2022-04-08 RX ADMIN — ACETAMINOPHEN 975 MG: 325 TABLET, FILM COATED ORAL at 12:05

## 2022-04-08 RX ADMIN — ASPIRIN 81 MG CHEWABLE TABLET 81 MG: 81 TABLET CHEWABLE at 08:31

## 2022-04-08 RX ADMIN — MINERAL OIL AND PETROLATUM 3.5 G: 150; 830 OINTMENT OPHTHALMIC at 23:58

## 2022-04-08 RX ADMIN — SULFAMETHOXAZOLE AND TRIMETHOPRIM 1 TABLET: 800; 160 TABLET ORAL at 20:05

## 2022-04-08 RX ADMIN — SODIUM CHLORIDE: 9 INJECTION, SOLUTION INTRAVENOUS at 21:59

## 2022-04-08 RX ADMIN — ACETAMINOPHEN 975 MG: 325 TABLET, FILM COATED ORAL at 05:53

## 2022-04-08 RX ADMIN — CHLORHEXIDINE GLUCONATE 15 ML: 1.2 RINSE ORAL at 08:31

## 2022-04-08 ASSESSMENT — ACTIVITIES OF DAILY LIVING (ADL)
ADLS_ACUITY_SCORE: 19
ADLS_ACUITY_SCORE: 19
ADLS_ACUITY_SCORE: 17
ADLS_ACUITY_SCORE: 17
ADLS_ACUITY_SCORE: 19
ADLS_ACUITY_SCORE: 19
ADLS_ACUITY_SCORE: 21
ADLS_ACUITY_SCORE: 19
ADLS_ACUITY_SCORE: 21
ADLS_ACUITY_SCORE: 21
ADLS_ACUITY_SCORE: 17
ADLS_ACUITY_SCORE: 17
ADLS_ACUITY_SCORE: 19
ADLS_ACUITY_SCORE: 17
ADLS_ACUITY_SCORE: 21
ADLS_ACUITY_SCORE: 19
ADLS_ACUITY_SCORE: 21
ADLS_ACUITY_SCORE: 21
ADLS_ACUITY_SCORE: 17
ADLS_ACUITY_SCORE: 19
ADLS_ACUITY_SCORE: 17

## 2022-04-08 NOTE — PLAN OF CARE
Shift: 3206-3235      Neuro: Pupils are equal and reactive, opens eyes spontaneously but doesn't seem to track or follow; withdraws in all extremities     CV: Heart rhythm is sinus with BBB; BP's within limits; afebrile    Resp: Lungs are clear, large amount of oral secretions; pressure supporting 10/5 since 1000    GI: TF is at goal rate; pt had large BM x2 this shift     : Purewick in place, adequate output      Skin: Pt has small scab on her tongue that appears to be healing; graft sites are all intact and open to air     Mobility/Activity: Turning and repositioning in the bed, pt up to the chair via the lift for about 3hrs today     Pain: No indications of pain, scheduled Tylenol given as ordered     Family Updated: Pt's  and aunt at the bedside during the day

## 2022-04-08 NOTE — PLAN OF CARE
"Goal Outcome Evaluation:    Vitals: /84   Pulse 70   Temp 98.6  F (37  C) (Axillary)   Resp 13   Ht 1.651 m (5' 5\")   Wt 94.3 kg (207 lb 14.3 oz)   LMP 02/27/2022   SpO2 100%   BMI 34.60 kg/m      Neuro: opens eyes spontaneously, does not follow commands. Does not track when asked. Withdraws to painful stimuli. PERRL.       CV:  SR, BBB. Goal SBP <140.     Respiratory: CMV vent settings, FiO2 30%, PEEP 5. Copious oral secretions, blood tinged nasal secretions. ET @23 cm at lip. Diminished LS.      GI/: Purewick in place, had moderate liquid/loose BM overnight.    Skin: sternum with dermabond/DIAMOND, CT sites CDI, L leg and L radial with dermabond/DIAMOND.     Activity: bedrest, turn/repo q2 hrs    Pain: CPOT 0, scheduled Tylenol.     IV: 2 PIV.     Diet: NPO, tube feeding infusing at goal.        *See EPIC flowsheet for full nursing assessment findings       "

## 2022-04-08 NOTE — CONSULTS
Care Management Initial Consult    General Information  Assessment completed with: Spouse or significant otherMiller  Type of CM/SW Visit: CM Role Introduction  Miller Phone#[phone]  Sister Meme by Phone#[phone]    Primary Care Provider verified and updated as needed: No   Readmission within the last 30 days: no previous admission in last 30 days      Reason for Consult: discharge planning  Advance Care Planning:            Communication Assessment  Patient's communication style: spoken language (English or Bilingual)    Hearing Difficulty or Deaf: no   Wear Glasses or Blind: no    Cognitive  Cognitive/Neuro/Behavioral: .WDL except  Level of Consciousness: obtunded  Arousal Level: opens eyes spontaneously  Orientation: other (see comments) (SANDRA)  Mood/Behavior: calm  Best Language:  (SANDRA)  Speech: endotracheal tube    Living Environment:   People in home: spouse  Miller  Current living Arrangements: house      Able to return to prior arrangements: no       Family/Social Support:  Care provided by: self (prior to admission)  Provides care for: no one, unable/limited ability to care for self  Marital Status:   , Children, Sibling(s)  Miller       Description of Support System: Supportive, Involved    Support Assessment: Adequate family and caregiver support, Adequate social supports    Current Resources:   Patient receiving home care services: No     Community Resources:    Equipment currently used at home: none  Supplies currently used at home:      Employment/Financial:  Employment Status: employed full-time (works at a smaller company in HR)        Financial Concerns: other (see comments) (pending ongoing planning possible COBRA)   Referral to Financial Worker: No       Lifestyle & Psychosocial Needs:  Social Determinants of Health     Tobacco Use: Low Risk      Smoking Tobacco Use: Never Smoker     Smokeless Tobacco Use: Never Used   Alcohol Use: Not on file   Financial Resource  Strain: Not on file   Food Insecurity: Not on file   Transportation Needs: Not on file   Physical Activity: Not on file   Stress: Not on file   Social Connections: Not on file   Intimate Partner Violence: Not on file   Depression: Not on file   Housing Stability: Not on file       Functional Status:  Prior to admission patient needed assistance: patient was independent, 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                      Additional Information:  Writer met with the patient's spouse Miller and patients sister Meme by phone at the bedside. Patient was up in chair, but is not able to participate. Writer explained role and scope of discharge planning.  Angelica and Miller were visiting from [place] when she was hospitalized. Miller gave this writer his take on all of the events leading up to today.  Miller and Meme are asking for the following to be addressed in guiding them into the next steps for Angelica's discharge to rehabilitation facility that will be able to accommodate her needs.  They would prefer to have her go to where her sister resides in the [facility]/[place] area.   Writer discussed the following with Miller and Meme today.  1. We have two LTACH's here [facility] they are located in [place] and [place].  Patient is most likely going to be getting a Trach/Peg in the coming days, explained the rationale for LTACH for continued monitoring and Trach support for next level of care.  Again Miller and Meme prefer that the patient go closer to [facility] ([place] area). Writer sent a message to local [facility] to see if they have LTACH in this area since they are National also asked for additional input on LTACH's in this location.  Writer explained that we would send referrals to LTACH's for both clinical and financial acceptance, once patient is stable for discharge we would work on getting the patient to the accepting facility.    2. Patient and spouse are both covered under patients  "employer insurance. Spouse is aware that he may need to contact the employer HR to complete paperwork and keep patient on COBRA if needed.  Patient's sister stated that the business Angelica works for is so small that they do not have to follow the \"FMLA\" rules.  Meme states that Angelica was the one that took care of all of the insurance and paperwork Miller does not know what to do or how to inquire regarding ongoing approval etc. Writer again encouraged Miller and Meme to touch base with patient's employer for further information.   3. Patients spouse gave a number for [employer] phone [phone] writer explained that in most cases this is a policy that pays some costs for hospitalizations.    Writer attempted to call this number and there is no identifier unable to leave confidential information so left call back to inquire further.  4. Patient's sister asked this writer to see if a  from the insurance could be assigned due to the complexity of the case, ongoing needs and future needs will call the insurance to see how to initiate a CM for the patient.  5. Writer discussed transportation.  Once patient is medically stable, next level provider identified would need to work with the patient's insurance to determine transportation to facility.  Again explained that this would be something that we would work with insurance on.  6. Writer gave Miller the CTS Card with CTS phone number as we will have RN/SW following, however it may not be the same person everyday.    Will continue to follow the patient and update family and bedside for ongoing planning.     Addendum: 4/8/22 15:47  Did not receive a call back from RYLEY Blair phone# [phone]. May need to call him on Monday 4/11.    Called [employer] and they stated they would need the patient's spouse to text them at the number above with patient's Name, Company Name and SSN# once they have received this they will email the spouse a claim form for filling " out with benefit details.  Will ask CC RN following on Monday to pass this information along to Miller.    Called [facility] [phone] to request a  be assigned for medical complexity.  Transferred to the Provider service line, they unfortunately were unable to provide any assistance.  Attempted the RN 24/7 line [phone] to see if they can assist with information, again referred to the Customer service line [phone] or [phone]  Talked to Yesenia with Customer Service she took intake information to request for Case Management they will call the patient's spouse Miller once assigned.     Writer left a message for spouse Jarrett regarding [employer] instructions and  to call from [facility]. Will continue to follow for further discharge planning needs.     Cathy Leonard RN, BSN, AC   Care Transitions Specialist  [facility]  Care Transitions Specialist  [address] [id number]  [email or web address]  Office: [phone] Fax: [phone]  [facility]

## 2022-04-08 NOTE — PROGRESS NOTES
[facility] ICU RESPIRATORY NOTE        Date of Admission: 3/27/2022    Date of Intubation (most recent): 3/30/2022    Reason for Mechanical Ventilation: Airway protection.    Number of Days on Mechanical Ventilation: 9    Met Criteria for Spontaneous Breathing Trial: Yes      Significant Events Today: None.     ABG Results:   Recent Labs   Lab 04/01/22  0416   O2PER 40       Current Vent Settings: Vent Mode: CMV/AC  (Continuous Mandatory Ventilation/ Assist Control)  FiO2 (%): 30 %  Resp Rate (Set): 14 breaths/min  Tidal Volume (Set, mL): 450 mL  PEEP (cm H2O): 5 cmH2O  Pressure Support (cm H2O): 10 cmH2O  Resp: 14      Skin Assessment: Intact, patient has tongue injury.    Plan: Continue full vent support and wean in the morning.     Tommie Lorenzo, RT

## 2022-04-08 NOTE — PROGRESS NOTES
Please see previous recommendations for full dose anticoagulation in the setting of a thrombus, hypercoag workup negative. Oncology will sign off. Please call if questions    Tin Salinas MD

## 2022-04-08 NOTE — PROGRESS NOTES
CLINICAL NUTRITION SERVICES - REASSESSMENT NOTE      Malnutrition: (4/1)  % Weight Loss:  Unable to determine without recent weight history  % Intake:  Unable to determine without nutrition history  Subcutaneous Fat Loss:  None observed  Muscle Loss:  None observed  Fluid Retention:  Mild      Malnutrition Diagnosis: Unable to determine due to lack of nutrition history and recent weight history       EVALUATION OF PROGRESS TOWARD GOALS   Diet:  NPO on vent     Nutrition Support:  Patient continues on goal TF regimen as follows ~    Nutrition Support Enteral:  Type of Feeding Tube: Nasoduodenal   Enteral Frequency:  Continuous  Enteral Regimen: Promote (no fiber) at 65 mL/hr  Total Enteral Provisions: 1560 kcal (15 kcal/kg), 97 g protein (1.7 g/kg), 1309 mL H2O, 203 g CHO, no fbier   Free Water Flush: 30 mL every 4 hours      Intake/Tolerance:    K and Phos normal  Mg 2.5 (H)  BGM < 150  BM x 1 today and x 2 yesterday   I/O 2569/1100, wt 94.3 kg (stable)      ASSESSED NUTRITION NEEDS:  Dosing Weight: 93 kg (4/5 wt for energy), 56.8 kg (IBW for protein)   Estimated Energy Needs: 9682-3714 kcals (14-17 Kcal/Kg)  Justification: obese and vented  Estimated Protein Needs:  grams protein (1.5-1.8 g pro/Kg)  Justification: post-op, hypercatabolism with critical illness, obesity guidelines  and preservation of lean body mass      NEW FINDINGS:   4/7:  MRI of brain = 1. No new focus of ischemia, mass lesion or acute hemorrhage.   2. Tiny late subacute infarcts with enhancement cerebral hemispheres as described above.   3.  Stable diffuse small vessel ischemic disease advanced for age.     Noted possibility of Trach Monday     Previous Goals (4/5):   TF regimen will continue to meet % needs  Evaluation: Met    Previous Nutrition Diagnosis (4/5):   No nutrition diagnosis identified at this time  Evaluation: No change      CURRENT NUTRITION DIAGNOSIS  No nutrition diagnosis identified at this  time    INTERVENTIONS  Recommendations / Nutrition Prescription  Continue goal TF regimen as above     Implementation  Collaboration and Referral of Nutrition care:  Patient discussed today during interdisciplinary bedside rounds     Goals  TF regimen will continue to meet % needs     MONITORING AND EVALUATION:  Progress towards goals will be monitored and evaluated per protocol and Practice Guidelines    Sarah Esposito RD, LD, CNSC   Clinical Dietitian - [facility]

## 2022-04-08 NOTE — PROGRESS NOTES
[facility]  Cardiovascular and Thoracic Surgery Daily Note          Assessment and Plan:   Angelica Robledo is a 51 year old female with history of MI at age 25 s/p angioplasty who presented to the hospital with hypertensive emergency and NSTEMI. Coronary angio demonstrated severe multivessel CAD. Echocardiogram preoperatively with preserved biventricular function.  PMH includes: NSTEMI, CAD with previous coronary stents, HTN  Course has been complicated by PEA arrest with ventricular fibrillation on POD 0 with ROSC after defibrillation x3, 2 mg epinephrine and one round of CPR. Was taken to cath lab where radial artery graft was found to be occluded. PCI with DANA placement to mid RCA and angioplasty of the rPLB was performed. Echocardiogram at the conclusion of PCI demonstrated LVEF 40-45% with mild to moderate global hypokinesis of the left ventricle with severe hypokinesis of the mid anterior and anteroseptal walls, the entire inferior wall and all apical segments of the left ventricle; no pericardial effusion. Additionally, due to antiplatelet therapy post PCI, patient developed epistaxis during attempted NG placement requiring placement of b/l rhino rockets. Now, diffuse severe encephalopathy with minimal neurologic response.      POD #9 s/p:  1.  Coronary artery bypass grafting x 4 (radial artery graft to the right posterior descending coronary artery, reversed saphenous vein graft to the obtuse marginal branch of the left circumflex coronary artery, reversed saphenous vein graft to the first diagonal branch of the left anterior descending coronary artery, and pedicled left internal mammary artery to left anterior descending coronary artery).  2.  Endoscopic vein harvest of the greater saphenous vein from the left lower extremity.  3.  Endoscopic left radial artery harvest on 3/30/22 with Dr. Butterfield.     CVS:   NSTEMI and severe multivessel CAD s/p CABG x4 with radial grafts as above  HTN  PEA  and Vfib arrest s/p ROSC and DANA to mid RCA and angioplasty of the rPLB on 3/31  HD stable, lends to HTN; NSR rates 70-80s  - Goal MAP >65, SBP <140  - Amlodipine 5 mg daily for radial graft (at least 3-6 months)  - Carvedilol 25 mg BID  - PRN hydralazine available  - Brilinta load and daily per Cardiology, tolerated 500U straight rate heparin will transition to LIH for 48 hrs, HIH tomorrow  - Transitioned amiodarone gtt to PO taper per EP/Cards recs  - ASA 81 daily  - Atorvastatin 80 mg daily  - CTs removed 4/3. TPWs removed 4/1     Resp:   Postoperative ventilator management  - Current vent settings:  Vent Mode: CMV/AC  (Continuous Mandatory Ventilation/ Assist Control)  FiO2 (%): 30 %  Resp Rate (Set): 14 breaths/min  Tidal Volume (Set, mL): 450 mL  PEEP (cm H2O): 5 cmH2O  Resp: 13  - Intubated for airway protection due to encephalopathy  - Titrate O2/peep to maintain sats >92%  - PST as tolerated  - Dr. Butterfield ok with trach POD 10-14, considering Monday if no further improvement     Neuro:    Acute postoperative pain  Hx migraines  Encephalopathy   Did receive CPR on 3/31, will likely have pain from this also  Propofol turned off on 3/31 at 9 am, starting to open eyes more, unable to move extremities but withdraws BLE to pain, nothing in BUEs yet. CT head 3/31 negative for hemorrhage; CTA negative for strokes, vessels patent; per neurology, MRI stable w/o acute concern to explain clinical status.  EEG without e/o seizure activity, continues to have moderate to severe diffuse encephalopathy   4/5 EEG improved but c/f new spikes not c/w seizures but tendency to seize  MRI head and c-spine on 4/3, 4/8 negative  - PRN dilaudid available  - PRN oxycodone and methocarbamol available  - Scheduled tylenol   - Neuro critical care consulted and following, greatly appreciate recommendations  - Keppra added 4/5  - More blinking noted today with slt improvement in eye tracking, but does not blink to  threat     Renal/Electrolyte:   Lactic acidosis, resolved  Volume overload  Hypernatremia, resolved  Creat 0.84 stable, below preop weight, much less edema;   Na/K 140/4.3 stable  - Strict I/O, daily weights  - Avoid/limit nephrotoxins as able  - Replete lytes per protocol  - No further diuresis  - Continue FWF decrease to 30 ml q 6 hrs<60 mL q6h     GI/Nutrition:    Epistaxis, resolved  Elevated LFTs, likely shock liver 2/2 arrest, resolved  Orders Placed This Encounter      NPO for Medical/Clinical Reasons Except for: Other; Specify: NPO until Extubated      Advance Diet as Tolerated: Clear Liquid Diet  - S/P NJ placement on  in radiology  - Nutrition following, enteral feeds at goal, absorbing well  - Rhino rockets removed, continues to have some oozing from b/l nares  - Continue bowel regimen, + BMs  - PPI     :    - External wick in place for strict I/O  - Urine +, start Bactrim bid for 3 days     Endo:  Stress induced hyperglycemia  Thyroid nodule   Preop A1c 5.7%  - Transitioned to sliding scale insulin   - Goal BG <180 for optimal healing  - Thyroid nodule noted on CT on 3/31, will have this worked up on an outpatient basis     ID:  Stress induced leukocytosis, resolved  E.Coli UTI preoperatively  WBC 15.7<14.7<10.4, Temp (24hrs), Av.4  F (36.9  C), Min:98.3  F (36.8  C), Max:98.6  F (37  C); new leukocytosis, respiratory culture ordered/pan culturing per intensivist   - Completed perioperative ABX  - Continue to follow fever curve, WBC and inflammatory markers as appropriate  - Repeat UA  without overt e/o infection     Heme:  Acute blood loss anemia  Acute blood loss thrombocytopenia  Hgb 7.8<8.1, Plts 343; no s/sx active bleeding  - CBC in AM  - Goal Hgb >7  - Consulted hematology given severe premature CAD and acute graft failure- appreciate recommendations. Multiple labs sent. Hypercoagulable workup appears to be negative but recommending repeat levels once acute issues resolve. Currently  "recommending antiplatelet and full anticoagulation (therapeutic heparin). D/w Dr. Butterfield. Plan will be straight rate heparin at 500 units/hr now transitioned to LIH gtt x48 hours, if continues to tolerate then transition to high intensity.  - Proph: PPI, subcutaneous heparin, SCDs  - Dispo: Continue in ICU; Family would like patient moved to [place] if/when trach established to be closer to daughter. SW consulted.  - Lines: Kenneth, LISA, ETT, NJ  Seen and discussed with Dr. Jc          Interval History:   Lying in bed, intubated, sedation off, blinking more this am, eyes track movement more          Medications:       acetaminophen  975 mg Oral or Feeding Tube Q6H     amiodarone  200 mg Oral or Feeding Tube Daily     amLODIPine  5 mg Oral or Feeding Tube Daily     artificial tears   Both Eyes Q4H     aspirin  81 mg Oral or NG Tube Daily     atorvastatin  80 mg Oral or Feeding Tube Daily     carvedilol  25 mg Oral or Feeding Tube BID w/meals     chlorhexidine  15 mL Mouth/Throat Q12H     insulin aspart  1-6 Units Subcutaneous Q4H     levETIRAcetam  1,000 mg Intravenous Q12H     multivitamins w/minerals  15 mL Per Feeding Tube Daily     pantoprazole  40 mg Oral or NG Tube Daily    Or     pantoprazole  40 mg Oral Daily     sodium chloride (PF)  10 mL Intracatheter Q8H     sodium chloride (PF)  3 mL Intracatheter Q8H     sulfamethoxazole-trimethoprim  1 tablet Oral or Feeding Tube BID     ticagrelor  90 mg Oral or Feeding Tube BID     @[email or web address]-@          Physical Exam:   Vitals were reviewed  Blood pressure 133/84, pulse 70, temperature 98.6  F (37  C), temperature source Axillary, resp. rate 13, height 1.651 m (5' 5\"), weight 94.3 kg (207 lb 14.3 oz), last menstrual period 02/27/2022, SpO2 100 %.  Rhythm: NSR    Lungs: course    Cardiovascular: s1/s2, no m/r/g    Abdomen: s/nt/nd    Extremeties: trace LE edema    Incision: cdi    CT: na    Weight:   Vitals:    04/04/22 0415 04/05/22 0414 04/06/22 0454 04/07/22 " 0500   Weight: 94.9 kg (209 lb 3.5 oz) 93 kg (205 lb 0.4 oz) 93.3 kg (205 lb 11 oz) 94.1 kg (207 lb 7.3 oz)    04/08/22 0100   Weight: 94.3 kg (207 lb 14.3 oz)            Data:   Labs:   Lab Results   Component Value Date    WBC 15.7 04/08/2022     Lab Results   Component Value Date    RBC 3.14 04/08/2022     Lab Results   Component Value Date    HGB 7.8 04/08/2022     Lab Results   Component Value Date    HCT 26.4 04/08/2022     No components found for: MCT  Lab Results   Component Value Date    MCV 84 04/08/2022     Lab Results   Component Value Date    MCH 24.8 04/08/2022     Lab Results   Component Value Date    MCHC 29.5 04/08/2022     Lab Results   Component Value Date    RDW 17.7 04/08/2022     Lab Results   Component Value Date     04/08/2022       Last Basic Metabolic Panel:  Lab Results   Component Value Date     04/08/2022      Lab Results   Component Value Date    POTASSIUM 4.3 04/08/2022    POTASSIUM 4.2 04/08/2022     Lab Results   Component Value Date    CHLORIDE 108 04/08/2022     Lab Results   Component Value Date    TOMAS 8.9 04/08/2022     Lab Results   Component Value Date    CO2 25 04/08/2022     Lab Results   Component Value Date    BUN 21 04/08/2022     Lab Results   Component Value Date    CR 0.84 04/08/2022     Lab Results   Component Value Date     04/08/2022     04/08/2022     Seen and discussed with Dr. Babita Bocanegra PA-C  Pager #: [phone]

## 2022-04-08 NOTE — PROGRESS NOTES
Critical Care Progress Note      04/08/2022    Name: Angelica Robledo MRN#: [id number]   Age: 51 year old YOB: 1970     [facility] Day# 12                 Problem List:   Active Problems:    NSTEMI (non-ST elevated myocardial infarction) (H)    Hypertensive emergency    Paroxysmal ventricular tachycardia (H)           Summary/Hospital Course:   Angelica Robledo is a 51 year old female with pertinent PMHx of prior MI at age 24-26 (in 1996) s/p angioplasty, HTN, anemia, and NSTEMI.  She presented on 3/27 with new onset chest pain similar to her previous MI.  Workup was consistent with NSTEMI.  Cardiac cath demonstrated severe multivessel CAD.  She was seen by CT surgery and CABG was scheduled for 3/30.  She underwent CABG x4 left radial, LIMA, left saph vein x2 (FAUSTINO taken down, but elected to not be used due to size).    No family history of clotting disorders.  No known history of DVT/PE/stroke. Siblings without cardiovascular disease. Father passed away of cardiac causes at an advanced age.  Mother had cardiac disease as well as an aortic aneurysm, but not at a young age.     4V CABG as follows: left radial artery graft to the right posterior descending coronary artery, reversed saphenous vein graft to the obtuse marginal branch of the left circumflex coronary artery, reversed saphenous vein graft to the first diagonal branch of the left anterior descending coronary artery, and pedicled left internal mammary artery to left anterior descending coronary.    POD 0 - initial hypoxia resolved with vent changes.  At 2355 patient suffered a PEA and Vfib arrest and underwent 3 shocks, 2 rounds of epi, and 1 round of CPR. ROSC obtained at 0005.  300 mg amiodarone bolus administered.  STAT cardiac cath lab performed demonstrating LIMA-LAD, VG-LADD1, and VG-LPLB were patent. The left radial artery to RDPA graft had an occlusion.  The mid RCA was treated with a DANA stent and a balloon angioplasty performed at the [facility].   Excellent flow was noted on completion coronary angiogram.  Echo demonstrated a drop in LVEF to 40-45% with aspects of hypokinesis. RV demonstrated mild-moderate RV function reduction.    POD1 - weaned from sedation with minimal arousal limited to briefly opening eyes to pain and occasionally sound, minimal extremity pain response.  CT head/CTA head and neck unremarkable for acute insult.  Ceribell placed overnight.    PD2-5: MRI x2 obtained demonstrating punctate foci of early subacute ischemic change. EEG waxes and wanes from mod-severe to severe diffuse slowing. With mixed workup results, neither of these findings portent a poor prognosis specifically; however, prognosis remains unclear.    POD6-current: Gradual clinical exam improvement. Possibility of trach and PEG introduced. 4/7 MRI without new findings from previous.          Assessment and plan :     Angelica Robledo IS a 51 year old female admitted on 3/27/2022 for chest pain with workup demonstrating severe multivessel CAD.  Patient underwent CABG x4.  POD#0 patient had a PEA arrest with ROSC obtained at 10 minutes.  Emergent coronary cath demonstrated radial artery occlusion.  A DANA was placed into the RCA and balloon angioplasty of the RPLB was performed with excellent completion angiography flow.     I have personally reviewed the daily labs, imaging studies, cultures and discussed the case with referring physician and consulting physicians.     My assessment and plan by system for this patient is as follows:    Neurology/Psychiatry:   1. Analgesia -- tylenol, prn narcotics - minimize narcotics during phase of trying to allow her to be more alert  2. Sedation -- hold all sedation  3. Encephalopathy s/p cardiac arrest -- head CT negative -- MRI w/ punctate foci of subacute ischemic change -- MRI C spine negative -- continuing EEG: diffuse slowing with gradual improvement, moderate-severe encephalpathy, no indices of seizure activity however is at risk.  --  NCC following, appreciate management/recs.  -- Keppra for seizure ppx (no seizures per EEG)  -- Stable clinical exam, gradual improvements  -- Anticipate prolonged recovery course requiring trach and PEG - plan for early next week     Cardiovascular:   1.  S/p CABG x4 - left radial, LIMA, left saph vein x2 (FAUSTINO taken down, but elected to not be used due to size)   2. POD#0 PEA and Vfib arrest -- s/p cardiac cath demonstrated radial artery graft to RPDA occlusion -- intervention with DANA to RCA and balloon angioplasty RPLA -- on Kangrelor 3/31, now transitioned to aspirin and Brilinta with discussion per CV surgery and interventional cardiology  -- cards recommends Amiodarone taper -- PO regimen scheduled  3. Hx of MI at age 24-25  4. HTN - likely untreated prior to hospitalization - presented with hypertension emergency - PO regimen including amlodipine (radial artery graft) and carvedilol per CV surgery management     Pulmonary/Ventilator Management:   1. Airway -- intubated -- low vent settings required -- tolerates pressure support  2. No underlying pulmonary problems on medical history or cxr.   -- Long term: if her encephalopathy is not able to recover this week, a tracheostomy would be indicated. CV surgery would like to wait until 10-14 days post op -- tentative plan of trach and PEG early next week     GI and Nutrition :   1. Epistaxis - s/p rhino rocket removal - oozing has resolved  2. Nutrition - NJ present -- Continue enteral feeds at goal.  3. PPI ppx    Renal/Fluids/Electrolytes:   1. Monitor UOP/creatinine -- stable labs -- ~euvolemic status -- daily goal of near net even  2. Replete electrolytes PRN per protocol  3. External wick device     Infectious Disease:   1. UTI with E coli on admission -- 4/1 negative on recheck on -- 4/7 UA suggestive of UTI -- Bactrim DS started for 3 day course.  2. Leukocytosis, trend     Endocrine:   1. Monitor for stress induced hyperglycemia. ICU insulin protocol,  goal sugar <180   2. Incidental thyroid nodule -- follow-up outpatient per discharge referral     Hematology/Oncology:   1. Acute blood loss on chronic anemia -- stable, trend  2. Acute blood loss thrombocytopenia -- resolved, trend.  3. S/P DANA placement s/p cardiac arrest -- aspirin and brilinta  4. Rule out hypercoagulable state -- hypercoagulopathy panel pending per hematology guidance -- ultimately been negative; however, recommends full dose anticoagulation in setting of thrombus -- discussed with CV: plan for 500 U/hr for 1 day, then low dose, then high dose.     IV/Access:   1. Venous access - peripheral  2. Arterial access - NA      ICU Prophylaxis:   1. DVT: hep gtt/mechanical  2. VAP: HOB 30 degrees, chlorhexidine rinse  3. Stress Ulcer: PPI  4. Restraints: No current indication. Will readdress daily.   5. Wound care - per unit routine   6. Feeding - enteral feeds  7. Family Update: family at bedside  8. Disposition - ICU       Key goals for next 24 hours:   1. Continue cares and neuro exams           Interim History:   No acute events  No changes. Gradual neuro improvements.  MRI - no further changes           Key Medications:       acetaminophen  975 mg Oral or Feeding Tube Q6H     amiodarone  200 mg Oral or Feeding Tube Daily     amLODIPine  5 mg Oral or Feeding Tube Daily     artificial tears   Both Eyes Q4H     aspirin  81 mg Oral or NG Tube Daily     atorvastatin  80 mg Oral or Feeding Tube Daily     carvedilol  25 mg Oral or Feeding Tube BID w/meals     chlorhexidine  15 mL Mouth/Throat Q12H     insulin aspart  1-6 Units Subcutaneous Q4H     levETIRAcetam  1,000 mg Intravenous Q12H     multivitamins w/minerals  15 mL Per Feeding Tube Daily     pantoprazole  40 mg Oral or NG Tube Daily    Or     pantoprazole  40 mg Oral Daily     sodium chloride (PF)  10 mL Intracatheter Q8H     sodium chloride (PF)  3 mL Intracatheter Q8H     sulfamethoxazole-trimethoprim  1 tablet Oral or Feeding Tube BID      ticagrelor  90 mg Oral or Feeding Tube BID       heparin 1,150 Units/hr (04/08/22 0829)     - MEDICATION INSTRUCTIONS -       Percutaneous Coronary Intervention orders placed (this is information for BPA alerting)       sodium chloride 10 mL/hr at 04/08/22 0830               Physical Examination:   Temp:  [98.3  F (36.8  C)-99.1  F (37.3  C)] 99.1  F (37.3  C)  Pulse:  [63-71] 65  Resp:  [13-18] 13  BP: (100-164)/(67-97) 110/69  FiO2 (%):  [30 %] 30 %  SpO2:  [97 %-100 %] 97 %    Intake/Output Summary (Last 24 hours) at 3/31/2022 0921  Last data filed at 3/31/2022 0700  Gross per 24 hour   Intake 3676.54 ml   Output 2825 ml   Net 851.54 ml     Wt Readings from Last 4 Encounters:   04/08/22 94.3 kg (207 lb 14.3 oz)     BP - Mean:  [] 86  Vent Mode: (S) CPAP/PS  (Continuous positive airway pressure with Pressure Support)  FiO2 (%): 30 %  Resp Rate (Set): 14 breaths/min  Tidal Volume (Set, mL): 450 mL  PEEP (cm H2O): 5 cmH2O  Pressure Support (cm H2O): 10 cmH2O  Resp: 13    No lab results found in last 7 days.    GEN: no acute distress   HEENT: head ncat, sclera anicteric, ETT present   PULM: unlabored, synchronous with vent, clear anteriorly    CV/Chest: RRR, S1S2 no gallop,  No rub, no murmur appreciated. Midline incision CDI.   ABD: soft, nontender  EXT:  Trace edema,  warm  NEURO: Off sedation. Opens eyes to stimulus and verbal stimulus. Pupillary light reflex brisk. Does not blink to threat. Cough reflex present. Corneal reflex present.  BLE withdrawal R>L. BUE pain reflex withdrawal. Did not follow commands on my exam, reported she is starting to follow however.   : external device present  SKIN: no obvious rash  LINES: clean, dry intact         Data:   All data and imaging reviewed     ROUTINE ICU LABS (Last four results)  CMP  Recent Labs   Lab 04/08/22  0839 04/08/22  0531 04/08/22  0410 04/08/22  0012 04/07/22  0810 04/07/22  0552 04/06/22  0745 04/06/22  0440 04/05/22  0821 04/05/22  0525  04/04/22  1155 04/04/22  0938 04/04/22  0453 04/04/22  0451 04/03/22  0411 04/03/22  0408 04/02/22  0357 04/02/22  0355   NA  --  140  --   --   --   --   --  144  --  146*  --   --   --  144  --  144  --  145*   POTASSIUM  --  4.2  4.3  --   --   --   --   --  4.1  --  4.1  --  4.3  --  3.6   < > 3.5  --  3.6   CHLORIDE  --  108  --   --   --   --   --  112*  --  112*  --   --   --  111*  --  111*  --  115*   CO2  --  25  --   --   --   --   --  29  --  28  --   --   --  29  --  30  --  25   ANIONGAP  --  7  --   --   --   --   --  3  --  6  --   --   --  4  --  3  --  5   * 137* 137* 122*   < >  --    < > 151*   < > 159*   < >  --    < > 140*   < > 145*   < > 135*   BUN  --  21  --   --   --   --   --  28  --  31*  --   --   --  25  --  22  --  18   CR  --  0.84  --   --   --   --   --  0.95  --  1.04  --   --   --  1.00  --  0.96  --  0.76   GFRESTIMATED  --  84  --   --   --   --   --  73  --  65  --   --   --  68  --  72  --  >90   TOMAS  --  8.9  --   --   --   --   --  9.5  --  9.1  --   --   --  9.2  --  8.6  --  8.8   MAG  --  2.5*  --   --   --  2.7*  --  2.7*  --  2.6*  --   --   --  2.5*  --  2.2  --  2.2   PHOS  --  4.2  --   --   --  4.6*  --  3.9  --  4.1  --   --   --  4.0  --  3.7  --  3.6   PROTTOTAL  --   --   --   --   --   --   --   --   --   --   --   --   --   --   --  6.2*  --  5.7*   ALBUMIN  --   --   --   --   --   --   --   --   --   --   --   --   --   --   --  2.3*  --  2.2*   BILITOTAL  --   --   --   --   --   --   --   --   --   --   --   --   --   --   --  0.2  --  0.2   ALKPHOS  --   --   --   --   --   --   --   --   --   --   --   --   --   --   --  108  --  72   AST  --   --   --   --   --   --   --   --   --   --   --   --   --   --   --  26  --  27   ALT  --   --   --   --   --   --   --   --   --   --   --   --   --   --   --  52*  --  45    < > = values in this interval not displayed.     CBC  Recent Labs   Lab 04/08/22  0531 04/07/22  1007 04/07/22  0552  04/06/22  0945   WBC 15.7* 15.1* 14.7* 10.6   RBC 3.14* 3.34* 3.33* 3.44*   HGB 7.8* 8.3* 8.1* 8.4*   HCT 26.4* 28.6* 27.7* 29.6*   MCV 84 86 83 86   MCH 24.8* 24.9* 24.3* 24.4*   MCHC 29.5* 29.0* 29.2* 28.4*   RDW 17.7* 17.7* 17.7* 17.8*    360 338 344     INR  Recent Labs   Lab 04/01/22  1647   INR 1.31*     Arterial Blood Gas  No lab results found in last 7 days.    All cultures:  No results for input(s): CULT in the last 168 hours.  Recent Results (from the past 24 hour(s))   MR Brain w/o & w Contrast    Narrative    EXAM: MR BRAIN W/O and W CONTRAST  LOCATION: [facility]  DATE/TIME: 4/7/2022 6:19 PM    INDICATION: Neuro deficit, acute, stroke suspected  COMPARISON: 04/03/2022.  CONTRAST: 9mL Gadavist  TECHNIQUE: Multiplanar multisequence head MRI without and with intravenous contrast.    FINDINGS: The previous foci of increased restricted diffusion seen in the left frontal lobe, left temporal lobe and the right frontal lobe are no longer visualized on the diffusion images. There is faint visualization of the previous right occipital lobe   lesion. There is no definite new focus of acute ischemia. There is no discrete mass lesion or midline shift. There is no acute extra-axial fluid collection or intraparenchymal hemorrhage. There are appropriate flow voids within the cavernous portions of   the internal carotid arteries and the basilar artery. Again visualized on the FLAIR images are multiple foci of high signal within the periventricular and subcortical white matter especially the left frontal lobe subcortical white matter consistent with   small vessel ischemic disease. The ventricular system, basal cisterns and the cortical sulci are within normal limits for the patient's age. Following the administration of contrast, the previously seen areas of ischemia within the left frontal lobe,   right temporal lobe, right frontal lobe and right occipital lobe have tiny now have  tiny  foci of enhancement consistent with late subacute infarcts. No other abnormal enhancement is visualized.    There is no evidence of cerebellar tonsillar ectopia. The corpus callosum and the sella region have appropriate configuration and signal intensity for the patient's. The orbit regions are unremarkable. There is prominent diffuse mucosal thickening   throughout the sinuses with almost complete opacification of the ethmoid air cells, maxillary sinuses and the sphenoid sinuses. This was seen previously. There is fluid within the mastoid air cells and middle ear regions.      Impression    IMPRESSION:  1. No new focus of ischemia, mass lesion or acute hemorrhage.  2. Tiny late subacute infarcts with enhancement cerebral hemispheres as described above.  3.  Stable diffuse small vessel ischemic disease advanced for age.           Adi Bazan MD  Surgical Critical Care Fellow

## 2022-04-08 NOTE — PROGRESS NOTES
[facility]    Neurocritical Care Progress Note    Interval EventsRepeat MRI without new findings. Updated  at bedside today.     HPI Summary  51 year old female admitted on 3/27/2022 for chest pain with workup demonstrating severe multivessel CAD.  Patient underwent CABG x4.  POD#0 patient had a PEA arrest with ROSC obtained at 10 minutes.  Emergent coronary cath demonstrated radial artery occlusion.  A DANA was placed into the RCA and balloon angioplasty of the RPLB was performed with excellent completion angiography flow.        Evaluation Summarized     MRI/Head CT MRI brain #1: There are a few scattered punctate acute infarctions within the left frontal lobe, left temporal lobe, right occipital lobe, presumably embolic     MRI brain #2: No new acute findings or significant interval change when compared to 4/1/2022. Scattered punctate foci of early subacute ischemic change in the cerebral hemispheres    Intracranial Vasculature CTA head: No high-grade stenosis or large vessel occlusion involving the major intracranial arteries. Mild atherosclerosis involving the left carotid siphon   Cervical Vasculature CTA neck: Unremarkable CT angiogram of the neck      Other Testing MRI cervical spine: Unremarkable MRI of the cervical spine      LDL  3/27/2022: 106 mg/dL   A1C  3/28/2022: 5.7 %   Troponin No lab value available in past 48 hrs         Impression   1. Encephalopathy s/p cardiac arrest. No explanation for her encephalopathy seen on brain MRI, suspect anoxic injury below the level of MRI detection  2. A few incidental areas of restricted diffusion are seen on brain MRI which are common after catheter-based coronary angiography     Plan  - Continue Keppra 1 gram BID  - Hold all sedation   - Maintain normothermia, normoglycemia, normonatremia  - Continue supportive care  - Continue neuro checks q4 hours  - Discussed recommendation for trach/PEG with        Thank you  "for this consult. We will sign off, but are available for family support. Please contact us with additional questions.       YRIS Vanegas, CNP  Neurology  04/08/2022 4:49 PM  To page stroke neurology after hours or on a subsequent day, click here: [facility]  Choose \"On Call\" tab at top, then search dropdown box for \"Neurology Adult\" & press Enter, look for Neuro ICU/Stroke     _____________________________________________________    Clinically Significant Risk Factors Present on Admission                  Medications   Scheduled Meds    acetaminophen  975 mg Oral or Feeding Tube Q6H     amiodarone  200 mg Oral or Feeding Tube Daily     amLODIPine  5 mg Oral or Feeding Tube Daily     artificial tears   Both Eyes Q4H     aspirin  81 mg Oral or NG Tube Daily     atorvastatin  80 mg Oral or Feeding Tube Daily     carvedilol  25 mg Oral or Feeding Tube BID w/meals     chlorhexidine  15 mL Mouth/Throat Q12H     insulin aspart  1-6 Units Subcutaneous Q4H     levETIRAcetam  1,000 mg Intravenous Q12H     multivitamins w/minerals  15 mL Per Feeding Tube Daily     pantoprazole  40 mg Oral or NG Tube Daily    Or     pantoprazole  40 mg Oral Daily     sodium chloride (PF)  10 mL Intracatheter Q8H     sodium chloride (PF)  3 mL Intracatheter Q8H     sulfamethoxazole-trimethoprim  1 tablet Oral or Feeding Tube BID     ticagrelor  90 mg Oral or Feeding Tube BID       Infusion Meds    heparin 1,150 Units/hr (04/08/22 0829)     - MEDICATION INSTRUCTIONS -       Percutaneous Coronary Intervention orders placed (this is information for BPA alerting)       sodium chloride 10 mL/hr at 04/08/22 0830       PRN Meds  acetaminophen **OR** acetaminophen, bisacodyl, artificial tears ophthalmic solution, glucose **OR** dextrose **OR** glucagon, hydrALAZINE, HYDROmorphone **OR** HYDROmorphone, ipratropium - albuterol 0.5 mg/2.5 mg/3 mL, lidocaine 4%, lidocaine 4%, lidocaine (buffered or not buffered), lidocaine (buffered or not buffered), " lidocaine (buffered or not buffered), magnesium hydroxide, - MEDICATION INSTRUCTIONS -, melatonin, methocarbamol, naloxone **OR** naloxone **OR** naloxone **OR** naloxone, ondansetron **OR** ondansetron, oxyCODONE **OR** oxyCODONE, Percutaneous Coronary Intervention orders placed (this is information for BPA alerting), polyethylene glycol, senna-docusate, sodium chloride (PF), sodium chloride (PF), sodium chloride (PF)       PHYSICAL EXAMINATION  Temp:  [98.3  F (36.8  C)-99.2  F (37.3  C)] 99.2  F (37.3  C)  Pulse:  [63-71] 65  Resp:  [13-20] 17  BP: (100-148)/(67-89) 125/85  FiO2 (%):  [30 %] 30 %  SpO2:  [97 %-100 %] 98 %     General Exam  General:  patient lying in bed without any acute distress  HEENT:  intubated  Pulmonary:  on mechanical ventilation        Neuro Exam  Mental Status:  does not follow commands, eyes open and close spontaneously, eyes may turn towards examiner voice but not clearly reproducible  Cranial Nerves:  PERRL, does not protrude tongue, does not blink to threat  Motor: no spontaneous movement, RUE withdraws to noxious, no movement to LUE today, moved feet to noxious in bilateral LEs  Reflexes:  toes mute  Sensory:  as above to noxious  Coordination:  deferred  Station/Gait:  unable to test due to intubation       Imaging  I personally reviewed all imaging; relevant findings per HPI.     Lab Results Data   CBC  Recent Labs   Lab 04/08/22  0531 04/07/22  1007 04/07/22  0552   WBC 15.7* 15.1* 14.7*   RBC 3.14* 3.34* 3.33*   HGB 7.8* 8.3* 8.1*   HCT 26.4* 28.6* 27.7*    360 338     Basic Metabolic Panel    Recent Labs   Lab 04/08/22  1505 04/08/22  1210 04/08/22  0839 04/08/22  0531 04/06/22  0745 04/06/22  0440 04/05/22  0821 04/05/22  0525   NA  --   --   --  140  --  144  --  146*   POTASSIUM  --   --   --  4.2  4.3  --  4.1  --  4.1   CHLORIDE  --   --   --  108  --  112*  --  112*   CO2  --   --   --  25  --  29  --  28   BUN  --   --   --  21  --  28  --  31*   CR  --   --   --   0.84  --  0.95  --  1.04   * 129* 124* 137*   < > 151*   < > 159*   TOMAS  --   --   --  8.9  --  9.5  --  9.1    < > = values in this interval not displayed.     Liver Panel  Recent Labs   Lab 04/03/22  0408 04/02/22  0355   PROTTOTAL 6.2* 5.7*   ALBUMIN 2.3* 2.2*   BILITOTAL 0.2 0.2   ALKPHOS 108 72   AST 26 27   ALT 52* 45     INR    Recent Labs   Lab Test 04/01/22  1647 03/31/22  0022 03/30/22  1622   INR 1.31* 1.37* 1.24*      Lipid Profile    Recent Labs   Lab Test 03/27/22  1028   CHOL 172   HDL 57   *   TRIG 47     A1C    Recent Labs   Lab Test 03/28/22  0600   A1C 5.7*     Troponin I    No results for input(s): TROPONINIS, TROPONINI, GHTROP in the last 168 hours.

## 2022-04-09 ENCOUNTER — APPOINTMENT (OUTPATIENT)
Dept: GENERAL RADIOLOGY | Facility: CLINIC | Age: 52
End: 2022-04-09
Attending: PHYSICIAN ASSISTANT
Payer: COMMERCIAL

## 2022-04-09 LAB
ANION GAP SERPL CALCULATED.3IONS-SCNC: 7 MMOL/L (ref 3–14)
BACTERIA SPT CULT: NORMAL
BUN SERPL-MCNC: 18 MG/DL (ref 7–30)
CALCIUM SERPL-MCNC: 8.9 MG/DL (ref 8.5–10.1)
CHLORIDE BLD-SCNC: 107 MMOL/L (ref 94–109)
CO2 SERPL-SCNC: 25 MMOL/L (ref 20–32)
CREAT SERPL-MCNC: 0.82 MG/DL (ref 0.52–1.04)
ERYTHROCYTE [DISTWIDTH] IN BLOOD BY AUTOMATED COUNT: 17.6 % (ref 10–15)
ERYTHROCYTE [DISTWIDTH] IN BLOOD BY AUTOMATED COUNT: 17.7 % (ref 10–15)
GFR SERPL CREATININE-BSD FRML MDRD: 87 ML/MIN/1.73M2
GLUCOSE BLD-MCNC: 138 MG/DL (ref 70–99)
GLUCOSE BLDC GLUCOMTR-MCNC: 122 MG/DL (ref 70–99)
GLUCOSE BLDC GLUCOMTR-MCNC: 126 MG/DL (ref 70–99)
GLUCOSE BLDC GLUCOMTR-MCNC: 131 MG/DL (ref 70–99)
GLUCOSE BLDC GLUCOMTR-MCNC: 137 MG/DL (ref 70–99)
GLUCOSE BLDC GLUCOMTR-MCNC: 138 MG/DL (ref 70–99)
GLUCOSE BLDC GLUCOMTR-MCNC: 148 MG/DL (ref 70–99)
GLUCOSE BLDC GLUCOMTR-MCNC: 149 MG/DL (ref 70–99)
GRAM STAIN RESULT: NORMAL
GRAM STAIN RESULT: NORMAL
HCT VFR BLD AUTO: 26.2 % (ref 35–47)
HCT VFR BLD AUTO: 28.5 % (ref 35–47)
HGB BLD-MCNC: 7.9 G/DL (ref 11.7–15.7)
HGB BLD-MCNC: 8.5 G/DL (ref 11.7–15.7)
MAGNESIUM SERPL-MCNC: 2.2 MG/DL (ref 1.6–2.3)
MCH RBC QN AUTO: 24.9 PG (ref 26.5–33)
MCH RBC QN AUTO: 25.1 PG (ref 26.5–33)
MCHC RBC AUTO-ENTMCNC: 29.8 G/DL (ref 31.5–36.5)
MCHC RBC AUTO-ENTMCNC: 30.2 G/DL (ref 31.5–36.5)
MCV RBC AUTO: 83 FL (ref 78–100)
MCV RBC AUTO: 84 FL (ref 78–100)
PHOSPHATE SERPL-MCNC: 3.9 MG/DL (ref 2.5–4.5)
PLATELET # BLD AUTO: 345 10E3/UL (ref 150–450)
PLATELET # BLD AUTO: 358 10E3/UL (ref 150–450)
POTASSIUM BLD-SCNC: 4.5 MMOL/L (ref 3.4–5.3)
RBC # BLD AUTO: 3.15 10E6/UL (ref 3.8–5.2)
RBC # BLD AUTO: 3.41 10E6/UL (ref 3.8–5.2)
SODIUM SERPL-SCNC: 139 MMOL/L (ref 133–144)
UFH PPP CHRO-ACNC: 0.24 IU/ML
UFH PPP CHRO-ACNC: 0.26 IU/ML
UFH PPP CHRO-ACNC: 0.58 IU/ML
WBC # BLD AUTO: 15.2 10E3/UL (ref 4–11)
WBC # BLD AUTO: 16 10E3/UL (ref 4–11)

## 2022-04-09 PROCEDURE — 85520 HEPARIN ASSAY: CPT | Performed by: INTERNAL MEDICINE

## 2022-04-09 PROCEDURE — 85027 COMPLETE CBC AUTOMATED: CPT | Performed by: NURSE PRACTITIONER

## 2022-04-09 PROCEDURE — 85027 COMPLETE CBC AUTOMATED: CPT | Performed by: PHYSICIAN ASSISTANT

## 2022-04-09 PROCEDURE — 36415 COLL VENOUS BLD VENIPUNCTURE: CPT | Performed by: PHYSICIAN ASSISTANT

## 2022-04-09 PROCEDURE — 83735 ASSAY OF MAGNESIUM: CPT | Performed by: NURSE PRACTITIONER

## 2022-04-09 PROCEDURE — 36415 COLL VENOUS BLD VENIPUNCTURE: CPT | Performed by: NURSE PRACTITIONER

## 2022-04-09 PROCEDURE — 80048 BASIC METABOLIC PNL TOTAL CA: CPT

## 2022-04-09 PROCEDURE — 250N000013 HC RX MED GY IP 250 OP 250 PS 637: Performed by: SURGERY

## 2022-04-09 PROCEDURE — 71045 X-RAY EXAM CHEST 1 VIEW: CPT

## 2022-04-09 PROCEDURE — 250N000011 HC RX IP 250 OP 636: Performed by: PSYCHIATRY & NEUROLOGY

## 2022-04-09 PROCEDURE — 250N000013 HC RX MED GY IP 250 OP 250 PS 637: Performed by: NURSE PRACTITIONER

## 2022-04-09 PROCEDURE — 99291 CRITICAL CARE FIRST HOUR: CPT | Mod: 24 | Performed by: ANESTHESIOLOGY

## 2022-04-09 PROCEDURE — 94003 VENT MGMT INPAT SUBQ DAY: CPT

## 2022-04-09 PROCEDURE — 84100 ASSAY OF PHOSPHORUS: CPT | Performed by: NURSE PRACTITIONER

## 2022-04-09 PROCEDURE — 250N000013 HC RX MED GY IP 250 OP 250 PS 637

## 2022-04-09 PROCEDURE — 36415 COLL VENOUS BLD VENIPUNCTURE: CPT | Performed by: INTERNAL MEDICINE

## 2022-04-09 PROCEDURE — 250N000013 HC RX MED GY IP 250 OP 250 PS 637: Performed by: STUDENT IN AN ORGANIZED HEALTH CARE EDUCATION/TRAINING PROGRAM

## 2022-04-09 PROCEDURE — 999N000157 HC STATISTIC RCP TIME EA 10 MIN

## 2022-04-09 PROCEDURE — 250N000011 HC RX IP 250 OP 636: Performed by: PHYSICIAN ASSISTANT

## 2022-04-09 PROCEDURE — 200N000001 HC R&B ICU

## 2022-04-09 RX ADMIN — ACETAMINOPHEN 975 MG: 325 TABLET, FILM COATED ORAL at 12:07

## 2022-04-09 RX ADMIN — MINERAL OIL AND PETROLATUM 3.5 G: 150; 830 OINTMENT OPHTHALMIC at 19:59

## 2022-04-09 RX ADMIN — ACETAMINOPHEN 975 MG: 325 TABLET, FILM COATED ORAL at 23:36

## 2022-04-09 RX ADMIN — CARVEDILOL 25 MG: 25 TABLET, FILM COATED ORAL at 07:45

## 2022-04-09 RX ADMIN — ATORVASTATIN CALCIUM 80 MG: 40 TABLET, FILM COATED ORAL at 07:45

## 2022-04-09 RX ADMIN — Medication 40 MG: at 07:45

## 2022-04-09 RX ADMIN — INSULIN ASPART 1 UNITS: 100 INJECTION, SOLUTION INTRAVENOUS; SUBCUTANEOUS at 07:50

## 2022-04-09 RX ADMIN — LEVETIRACETAM 1000 MG: 10 INJECTION INTRAVENOUS at 15:27

## 2022-04-09 RX ADMIN — ACETAMINOPHEN 975 MG: 325 TABLET, FILM COATED ORAL at 17:00

## 2022-04-09 RX ADMIN — CHLORHEXIDINE GLUCONATE 15 ML: 1.2 RINSE ORAL at 07:45

## 2022-04-09 RX ADMIN — AMIODARONE HYDROCHLORIDE 200 MG: 200 TABLET ORAL at 08:00

## 2022-04-09 RX ADMIN — SULFAMETHOXAZOLE AND TRIMETHOPRIM 1 TABLET: 800; 160 TABLET ORAL at 08:00

## 2022-04-09 RX ADMIN — CHLORHEXIDINE GLUCONATE 15 ML: 1.2 RINSE ORAL at 19:59

## 2022-04-09 RX ADMIN — ASPIRIN 81 MG CHEWABLE TABLET 81 MG: 81 TABLET CHEWABLE at 07:45

## 2022-04-09 RX ADMIN — Medication 15 ML: at 07:45

## 2022-04-09 RX ADMIN — TICAGRELOR 90 MG: 90 TABLET ORAL at 17:00

## 2022-04-09 RX ADMIN — MINERAL OIL AND PETROLATUM 3.5 G: 150; 830 OINTMENT OPHTHALMIC at 23:36

## 2022-04-09 RX ADMIN — AMLODIPINE BESYLATE 5 MG: 5 TABLET ORAL at 07:45

## 2022-04-09 RX ADMIN — MINERAL OIL AND PETROLATUM 3.5 G: 150; 830 OINTMENT OPHTHALMIC at 07:51

## 2022-04-09 RX ADMIN — Medication 1 MG: at 21:22

## 2022-04-09 RX ADMIN — SULFAMETHOXAZOLE AND TRIMETHOPRIM 1 TABLET: 800; 160 TABLET ORAL at 20:03

## 2022-04-09 RX ADMIN — HEPARIN SODIUM AND DEXTROSE 1300 UNITS/HR: 10000; 5 INJECTION INTRAVENOUS at 12:13

## 2022-04-09 RX ADMIN — LEVETIRACETAM 1000 MG: 10 INJECTION INTRAVENOUS at 03:59

## 2022-04-09 RX ADMIN — ACETAMINOPHEN 975 MG: 325 TABLET, FILM COATED ORAL at 05:35

## 2022-04-09 RX ADMIN — CARVEDILOL 25 MG: 25 TABLET, FILM COATED ORAL at 17:00

## 2022-04-09 RX ADMIN — INSULIN ASPART 1 UNITS: 100 INJECTION, SOLUTION INTRAVENOUS; SUBCUTANEOUS at 12:07

## 2022-04-09 RX ADMIN — MINERAL OIL AND PETROLATUM 3.5 G: 150; 830 OINTMENT OPHTHALMIC at 12:07

## 2022-04-09 RX ADMIN — MINERAL OIL AND PETROLATUM 3.5 G: 150; 830 OINTMENT OPHTHALMIC at 15:33

## 2022-04-09 RX ADMIN — TICAGRELOR 90 MG: 90 TABLET ORAL at 04:05

## 2022-04-09 RX ADMIN — MINERAL OIL AND PETROLATUM 3.5 G: 150; 830 OINTMENT OPHTHALMIC at 03:59

## 2022-04-09 ASSESSMENT — ACTIVITIES OF DAILY LIVING (ADL)
ADLS_ACUITY_SCORE: 19

## 2022-04-09 NOTE — PROGRESS NOTES
[facility] ICU RESPIRATORY NOTE      Date of Admission: 3/27/2022    Date of Intubation (most recent): 3/30/22    Reason for Mechanical Ventilation:  Airway protection.    Number of Days on Mechanical Ventilation: 10    Met Criteria for Spontaneous Breathing Trial: Yes    Significant Events Today: PSV all day and full support overnight.     ABG Results: No lab results found in last 7 days.    Current Vent Settings: Vent Mode: PS  (Pressure Support)  FiO2 (%): 30 %  Resp Rate (Set): 14 breaths/min  Tidal Volume (Set, mL): 450 mL  PEEP (cm H2O): 5 cmH2O  Pressure Support (cm H2O): 10 cmH2O  Resp: 11      Skin Assessment: clean and dry.     Plan: Continue to wean from mechanical ventilation and oxygen as tolerated per protocol.    Colby Ashby, RRT

## 2022-04-09 NOTE — PROGRESS NOTES
[facility]  Cardiovascular and Thoracic Surgery Daily Note          Assessment and Plan:   Angelica Robledo is a 51 year old female with history of MI at age 25 s/p angioplasty who presented to the hospital with hypertensive emergency and NSTEMI. Coronary angio demonstrated severe multivessel CAD. Echocardiogram preoperatively with preserved biventricular function.  PMH includes: NSTEMI, CAD with previous coronary stents, HTN  Course has been complicated by PEA arrest with ventricular fibrillation on POD 0 with ROSC after defibrillation x3, 2 mg epinephrine and one round of CPR. Was taken to cath lab where radial artery graft was found to be occluded. PCI with DANA placement to mid RCA and angioplasty of the rPLB was performed. Echocardiogram at the conclusion of PCI demonstrated LVEF 40-45% with mild to moderate global hypokinesis of the left ventricle with severe hypokinesis of the mid anterior and anteroseptal walls, the entire inferior wall and all apical segments of the left ventricle; no pericardial effusion. Additionally, due to antiplatelet therapy post PCI, patient developed epistaxis during attempted NG placement requiring placement of b/l rhino rockets. Now, diffuse severe encephalopathy with minimal neurologic response.      POD #10 s/p:  1.  Coronary artery bypass grafting x 4 (radial artery graft to the right posterior descending coronary artery, reversed saphenous vein graft to the obtuse marginal branch of the left circumflex coronary artery, reversed saphenous vein graft to the first diagonal branch of the left anterior descending coronary artery, and pedicled left internal mammary artery to left anterior descending coronary artery).  2.  Endoscopic vein harvest of the greater saphenous vein from the left lower extremity.  3.  Endoscopic left radial artery harvest on 3/30/22 with Dr. Butterfield.     CVS:   NSTEMI and severe multivessel CAD s/p CABG x4 with radial grafts as above  HTN  PEA  and Vfib arrest s/p ROSC and DANA to mid RCA and angioplasty of the rPLB on 3/31  HD stable, lends to HTN; NSR rates 70-80s  - Goal MAP >65, SBP <140  - Amlodipine 5 mg daily for radial graft (at least 3-6 months)  - Carvedilol 25 mg BID  - PRN hydralazine available  - Brilinta load and daily per Cardiology, tolerated 500U straight rate heparin will transition to LIH for 48 hrs, HIH tomorrow  - Transitioned amiodarone gtt to PO taper per EP/Cards recs  - ASA 81 daily  - Atorvastatin 80 mg daily  - CTs removed 4/3. TPWs removed 4/1     Resp:   Postoperative ventilator management  - Current vent settings:  Vent Mode: CMV/AC  (Continuous Mandatory Ventilation/ Assist Control)  FiO2 (%): 30 %  Resp Rate (Set): 14 breaths/min  Tidal Volume (Set, mL): 450 mL  PEEP (cm H2O): 5 cmH2O  Resp: 13  - Intubated for airway protection due to encephalopathy  - Titrate O2/peep to maintain sats >92%  - PST as tolerated  - Dr. Butterfield ok with trach POD 10-14, considering Monday if no further improvement     Neuro:    Acute postoperative pain  Hx migraines  Encephalopathy   Did receive CPR on 3/31, will likely have pain from this also  Propofol turned off on 3/31 at 9 am, starting to open eyes more, unable to move extremities but withdraws BLE to pain, nothing in BUEs yet. CT head 3/31 negative for hemorrhage; CTA negative for strokes, vessels patent; per neurology, MRI stable w/o acute concern to explain clinical status.  EEG without e/o seizure activity, continues to have moderate to severe diffuse encephalopathy   4/5 EEG improved but c/f new spikes not c/w seizures but tendency to seize  MRI head and c-spine on 4/3, 4/8 negative  - PRN dilaudid available  - PRN oxycodone and methocarbamol available  - Scheduled tylenol   - Neuro critical care consulted and following, greatly appreciate recommendations  - Keppra added 4/5  - More eye tracking, yawned a few times, but does not blink to threat or follow  commands     Renal/Electrolyte:   Lactic acidosis, resolved  Volume overload  Hypernatremia, resolved  Creat 0.82 stable, below preop weight, much less edema;   Na/K 139/4.5 stable  - Strict I/O, daily weights  - Avoid/limit nephrotoxins as able  - Replete lytes per protocol  - No further diuresis  - Continue FWF decrease to 30 ml q 6 hrs<60 mL q6h     GI/Nutrition:    Epistaxis, resolved  Elevated LFTs, likely shock liver 2/2 arrest, resolved  Orders Placed This Encounter      NPO for Medical/Clinical Reasons Except for: Other; Specify: NPO until Extubated      Advance Diet as Tolerated: Clear Liquid Diet  - S/P [place] placement on  in radiology  - Nutrition following, enteral feeds at goal, absorbing well  - Rhino rockets removed, continues to have some oozing from b/l nares  - Continue bowel regimen, + BMs  - PPI     :    - External wick in place for strict I/O  - Urine +, start Bactrim bid for 3 days     Endo:  Stress induced hyperglycemia  Thyroid nodule   Preop A1c 5.7%  - Transitioned to sliding scale insulin   - Goal BG <180 for optimal healing  - Thyroid nodule noted on CT on 3/31, will have this worked up on an outpatient basis     ID:  Stress induced leukocytosis, resolved  E.Coli UTI preoperatively  <15.7<14.7<10.4, Temp (24hrs), Av.2  F (37.3  C), Min:98.8  F (37.1  C), Max:99.9  F (37.7  C); new leukocytosis, respiratory culture ordered/pan culturing per intensivist   - Completed perioperative ABX  - Continue to follow fever curve, WBC and inflammatory markers as appropriate  - Repeat UA  without overt e/o infection     Heme:  Acute blood loss anemia  Acute blood loss thrombocytopenia  Hgb 7.9<7.8<8.1, Plts 345; no s/sx active bleeding  - CBC in AM  - Goal Hgb >7  - Consulted hematology given severe premature CAD and acute graft failure- appreciate recommendations. Multiple labs sent. Hypercoagulable workup appears to be negative but recommending repeat levels once acute issues resolve.  "Currently recommending antiplatelet and full anticoagulation (therapeutic heparin). D/w Dr. Butterfield. Plan will be straight rate heparin at 500 units/hr now transitioned LIH gtt x48 hours, today transition to high intensity.  - Proph: PPI, subcutaneous heparin, SCDs  - Dispo: Continue in ICU; Family would like patient moved to [place] if/when trach established to be closer to daughter. SW consulted.  - Lines: Cooper, LISA, ETT, [place]  Seen and discussed with Dr. Cornelius          Interval History:   Sitting up in bed, yawned a few times, breathing stable, tolerating tube feeds, did ROM exercises with arms/legs          Medications:       acetaminophen  975 mg Oral or Feeding Tube Q6H     amiodarone  200 mg Oral or Feeding Tube Daily     amLODIPine  5 mg Oral or Feeding Tube Daily     artificial tears   Both Eyes Q4H     aspirin  81 mg Oral or NG Tube Daily     atorvastatin  80 mg Oral or Feeding Tube Daily     carvedilol  25 mg Oral or Feeding Tube BID w/meals     chlorhexidine  15 mL Mouth/Throat Q12H     insulin aspart  1-6 Units Subcutaneous Q4H     levETIRAcetam  1,000 mg Intravenous Q12H     multivitamins w/minerals  15 mL Per Feeding Tube Daily     pantoprazole  40 mg Oral or NG Tube Daily    Or     pantoprazole  40 mg Oral Daily     sodium chloride (PF)  10 mL Intracatheter Q8H     sodium chloride (PF)  3 mL Intracatheter Q8H     sulfamethoxazole-trimethoprim  1 tablet Oral or Feeding Tube BID     ticagrelor  90 mg Oral or Feeding Tube BID     @[email or web address]-@          Physical Exam:   Vitals were reviewed  Blood pressure (!) 143/82, pulse 71, temperature 99.3  F (37.4  C), temperature source Axillary, resp. rate 15, height 1.651 m (5' 5\"), weight 94.4 kg (208 lb 1.8 oz), last menstrual period 02/27/2022, SpO2 99 %.  Rhythm: NSR    Lungs: course    Cardiovascular: s1/s2, no m/r/g    Abdomen: s/nt/nd    Extremeties: no LE edema    Incision: cdi    CT: na    Weight:   Vitals:    04/05/22 0414 04/06/22 0454 04/07/22 0500 " 04/08/22 0100   Weight: 93 kg (205 lb 0.4 oz) 93.3 kg (205 lb 11 oz) 94.1 kg (207 lb 7.3 oz) 94.3 kg (207 lb 14.3 oz)    04/09/22 0600   Weight: 94.4 kg (208 lb 1.8 oz)            Data:   Labs:   Lab Results   Component Value Date    WBC 15.2 04/09/2022     Lab Results   Component Value Date    RBC 3.15 04/09/2022     Lab Results   Component Value Date    HGB 7.9 04/09/2022     Lab Results   Component Value Date    HCT 26.2 04/09/2022     No components found for: MCT  Lab Results   Component Value Date    MCV 83 04/09/2022     Lab Results   Component Value Date    MCH 25.1 04/09/2022     Lab Results   Component Value Date    MCHC 30.2 04/09/2022     Lab Results   Component Value Date    RDW 17.6 04/09/2022     Lab Results   Component Value Date     04/09/2022       Last Basic Metabolic Panel:  Lab Results   Component Value Date     04/09/2022      Lab Results   Component Value Date    POTASSIUM 4.5 04/09/2022     Lab Results   Component Value Date    CHLORIDE 107 04/09/2022     Lab Results   Component Value Date    TOMAS 8.9 04/09/2022     Lab Results   Component Value Date    CO2 25 04/09/2022     Lab Results   Component Value Date    BUN 18 04/09/2022     Lab Results   Component Value Date    CR 0.82 04/09/2022     Lab Results   Component Value Date     04/09/2022     04/09/2022       Seen and discussed with Dr. Adryan Bocanegra PA-C  Pager #: [phone]

## 2022-04-09 NOTE — PLAN OF CARE
Problem: Arrhythmia/Dysrhythmia (Cardiac Catheterization)  Goal: Stable Heart Rate and Rhythm  Outcome: Met  Remains in sinus rhythm overnight, SBP >140s   Problem: Pain (Cardiac Catheterization)  Goal: Acceptable Pain Control  Outcome: Met   Goal Outcome Evaluation:  No signs or symptoms of pain noted    Patient opens eyes with cares, will look at RN, seems to track at times, does not follow commands, able to move move extremities minimally, tolerating vent

## 2022-04-09 NOTE — PROGRESS NOTES
Critical Care Progress Note      04/09/2022    Name: Angelica Robledo MRN#: [id number]   Age: 51 year old YOB: 1970     Hsptl Day# 13  ICU DAY # 13    MV DAY # 13             Problem List:   Active Problems:    NSTEMI (non-ST elevated myocardial infarction) (H)    Hypertensive emergency    Paroxysmal ventricular tachycardia (H)           Summary/Hospital Course:     51-year-old female with significant past medical history for coronary artery disease and early history of MI.  She presented to our hospital with chest pain and underwent multivessel CABG.  Postoperative complication of PEA and V. fib arrest and patient has since been in persistent encephalopathic state.  Neurologically she has made minimal improvements and is awaiting tracheostomy placement.  She is currently being treated for UTI.      Assessment and plan :     Angelica Robledo IS a 51 year old female admitted on 3/27/2022 for postprocedural respiratory failure, endotracheal intubation for airway protection and encephalopathy most likely secondary to anoxic brain injury..   I have personally reviewed the daily labs, imaging studies, cultures and discussed the case with referring physician and consulting physicians.     My assessment and plan by system for this patient is as follows:    Neurology/Psychiatry:   1.  Encephalopathy most likely anoxic  2. Analgesia as needed opioids    Plan  Appreciate neurology input.  Continue patient on Keppra for seizure precautions.  Head CT has been negative so far.  EEG shows diffuse slowing with gradual improvement.  Patient will need tracheostomy for airway protection and rehabilitation.    Cardiovascular:   1.Hemodynamics -hemodynamically stable  2.Rhythm -normal sinus rhythm   3. Ischemia -patient with history of coronary artery disease status post CABG x4  Plan  Continue to monitor at this time continue with cardiac surgery were recommendations    Pulmonary/Ventilator Management:   1. Airway intubated  for airway protection  2. Oxygenation/ventilation/mechanics able to tolerate spontaneous breathing trials  Plan  -We will continue patient on spontaneous breathing trials, most likely will need tracheostomy for airway protection should be able to wean to high-humidity trach collar once tracheostomy in place.    GI and Nutrition :   1.  Concern for protein calorie malnutrition  2.  Enteral feedings  Plan  -Continue tube feeds at goal    Renal/Fluids/Electrolytes:   1.  No signs of kidney injury  2.  No electrolyte abnormalities  3.  Appears euvolemic    Plan  -Continue to monitor  - monitor function and electrolytes as needed with replacement per ICU protocols.  - generally avoid nephrotoxic agents such as NSAID, IV contrast unless specifically required  - adjust medications as needed for renal clearance  - follow I/O's as appropriate.    Infectious Disease:   1.  Leukocytosis  2.  Urinary tract infection  3.  Plan  -Continue current antibiotic regiment    Endocrine:   1.  Concern for stress-induced hyperglycemia    Plan  - ICU insulin protocol, goal sugar <180      Hematology/Oncology:   1.  Leukocytosis  2.  Acute blood loss anemia, no signs, symptoms of active blood loss  3.  Plan  -We will continue to monitor      IV/Access:   1. Venous access -peripheral  2. Arterial access -not indicated  3.      ICU Prophylaxis:   1. DVT: Heparin infusion mechanical  2. VAP: HOB 30 degrees, chlorhexidine rinse  3. Stress Ulcer: PPI/H2 blocker  4. Restraints: No current need for restraints  5. Wound care - per unit routine   6. Feeding -tube feeds  7. Family Update:  No family at bedside  8. Disposition -ICU        Key goals for next 24 hours:   1.  Continue supportive care  2.  Continue neuro exams  3.  Spontaneous breathing trials               Interim History:   Continues to do well on spontaneous breathing trials, continue to treat for UTI           Key Medications:       acetaminophen  975 mg Oral or Feeding Tube Q6H      amiodarone  200 mg Oral or Feeding Tube Daily     amLODIPine  5 mg Oral or Feeding Tube Daily     artificial tears   Both Eyes Q4H     aspirin  81 mg Oral or NG Tube Daily     atorvastatin  80 mg Oral or Feeding Tube Daily     carvedilol  25 mg Oral or Feeding Tube BID w/meals     chlorhexidine  15 mL Mouth/Throat Q12H     insulin aspart  1-6 Units Subcutaneous Q4H     levETIRAcetam  1,000 mg Intravenous Q12H     multivitamins w/minerals  15 mL Per Feeding Tube Daily     pantoprazole  40 mg Oral or NG Tube Daily    Or     pantoprazole  40 mg Oral Daily     sodium chloride (PF)  10 mL Intracatheter Q8H     sodium chloride (PF)  3 mL Intracatheter Q8H     sulfamethoxazole-trimethoprim  1 tablet Oral or Feeding Tube BID     ticagrelor  90 mg Oral or Feeding Tube BID       heparin 1,300 Units/hr (04/09/22 0730)     - MEDICATION INSTRUCTIONS -       Percutaneous Coronary Intervention orders placed (this is information for BPA alerting)       sodium chloride 10 mL/hr at 04/09/22 0731               Physical Examination:   Temp:  [98.8  F (37.1  C)-99.9  F (37.7  C)] 99.3  F (37.4  C)  Pulse:  [65-72] 71  Resp:  [8-20] 15  BP: (103-143)/() 143/82  FiO2 (%):  [30 %] 30 %  SpO2:  [97 %-100 %] 99 %    Intake/Output Summary (Last 24 hours) at 4/9/2022 0916  Last data filed at 4/9/2022 0800  Gross per 24 hour   Intake 2419.65 ml   Output 1250 ml   Net 1169.65 ml     Wt Readings from Last 4 Encounters:   04/09/22 94.4 kg (208 lb 1.8 oz)     BP - Mean:  [] 110  Vent Mode: (S) PS  (Pressure Support)  FiO2 (%): 30 %  Resp Rate (Set): 14 breaths/min  Tidal Volume (Set, mL): 450 mL  PEEP (cm H2O): 5 cmH2O  Pressure Support (cm H2O): 10 cmH2O  Resp: 15    No lab results found in last 7 days.    GEN: no acute distress   HEENT: head ncat, sclera anicteric, OP patent, trachea midline   PULM: unlabored synchronous with vent, clear anteriorly    CV/COR: RRR S1S2 no gallop,  No rub, no murmur  ABD: soft nontender, hypoactive  bowel sounds, no mass  EXT:  Edema   warm  NEURO: Intermittently blinks to command does not follow commands on extremities  SKIN: no obvious rash  LINES: clean, dry intact         Data:   All data and imaging reviewed     ROUTINE ICU LABS (Last four results)  CMP  Recent Labs   Lab 04/09/22  0749 04/09/22  0423 04/09/22  0403 04/09/22  0005 04/08/22  0839 04/08/22  0531 04/07/22  0810 04/07/22  0552 04/06/22  0745 04/06/22  0440 04/05/22  0821 04/05/22  0525 04/03/22  0411 04/03/22  0408   NA  --  139  --   --   --  140  --   --   --  144  --  146*   < > 144   POTASSIUM  --  4.5  --   --   --  4.2  4.3  --   --   --  4.1  --  4.1   < > 3.5   CHLORIDE  --  107  --   --   --  108  --   --   --  112*  --  112*   < > 111*   CO2  --  25  --   --   --  25  --   --   --  29  --  28   < > 30   ANIONGAP  --  7  --   --   --  7  --   --   --  3  --  6   < > 3   * 138* 122* 126*   < > 137*   < >  --    < > 151*   < > 159*   < > 145*   BUN  --  18  --   --   --  21  --   --   --  28  --  31*   < > 22   CR  --  0.82  --   --   --  0.84  --   --   --  0.95  --  1.04   < > 0.96   GFRESTIMATED  --  87  --   --   --  84  --   --   --  73  --  65   < > 72   TOMAS  --  8.9  --   --   --  8.9  --   --   --  9.5  --  9.1   < > 8.6   MAG  --  2.2  --   --   --  2.5*  --  2.7*  --  2.7*  --  2.6*   < > 2.2   PHOS  --  3.9  --   --   --  4.2  --  4.6*  --  3.9  --  4.1   < > 3.7   PROTTOTAL  --   --   --   --   --   --   --   --   --   --   --   --   --  6.2*   ALBUMIN  --   --   --   --   --   --   --   --   --   --   --   --   --  2.3*   BILITOTAL  --   --   --   --   --   --   --   --   --   --   --   --   --  0.2   ALKPHOS  --   --   --   --   --   --   --   --   --   --   --   --   --  108   AST  --   --   --   --   --   --   --   --   --   --   --   --   --  26   ALT  --   --   --   --   --   --   --   --   --   --   --   --   --  52*    < > = values in this interval not displayed.     CBC  Recent Labs   Lab 04/09/22  0421  04/08/22  0531 04/07/22  1007 04/07/22  0552   WBC 15.2* 15.7* 15.1* 14.7*   RBC 3.15* 3.14* 3.34* 3.33*   HGB 7.9* 7.8* 8.3* 8.1*   HCT 26.2* 26.4* 28.6* 27.7*   MCV 83 84 86 83   MCH 25.1* 24.8* 24.9* 24.3*   MCHC 30.2* 29.5* 29.0* 29.2*   RDW 17.6* 17.7* 17.7* 17.7*    343 360 338     INRNo lab results found in last 7 days.  Arterial Blood GasNo lab results found in last 7 days.    All cultures:  No results for input(s): CULT in the last 168 hours.  No results found for this or any previous visit (from the past 24 hour(s)).      Billing: This patient is critically ill: yes. Total critical care time today 31   min.

## 2022-04-09 NOTE — PLAN OF CARE
Shift: [id number]      Neuro: Pt is more alert today, pupils are equal and reactive, attempts to follow commands to blink, but does not blink to threat; does not move extremities, but does withdraw     CV: Heart rhythm is sinus; BP's within limits; afebrile    Resp: Lungs are coarse/diminihsed, pt pressure supporting 10/5 from [id number]    GI: Active bowel sounds, small BM this shift; TF is at goal rate of 65    : Pure wick in place, adequate output     Skin: Pt has a scabbed area on her tongue, incisions are all intact and healing     Mobility/Activity: Pt up in the chair for 4hrs today via the ceiling lift     Pain: Pt grimaces with suctioning and repositioning, scheduled Tylenol for pain     Family Updated: Pt's  and friend at the bedside for most of the day

## 2022-04-10 LAB
ALBUMIN UR-MCNC: 30 MG/DL
APPEARANCE UR: ABNORMAL
BACTERIA #/AREA URNS HPF: ABNORMAL /HPF
BILIRUB UR QL STRIP: NEGATIVE
COLOR UR AUTO: ABNORMAL
ERYTHROCYTE [DISTWIDTH] IN BLOOD BY AUTOMATED COUNT: 18 % (ref 10–15)
ERYTHROCYTE [DISTWIDTH] IN BLOOD BY AUTOMATED COUNT: 18.3 % (ref 10–15)
GLUCOSE BLDC GLUCOMTR-MCNC: 103 MG/DL (ref 70–99)
GLUCOSE BLDC GLUCOMTR-MCNC: 133 MG/DL (ref 70–99)
GLUCOSE BLDC GLUCOMTR-MCNC: 140 MG/DL (ref 70–99)
GLUCOSE BLDC GLUCOMTR-MCNC: 144 MG/DL (ref 70–99)
GLUCOSE BLDC GLUCOMTR-MCNC: 165 MG/DL (ref 70–99)
GLUCOSE UR STRIP-MCNC: NEGATIVE MG/DL
HCT VFR BLD AUTO: 25.6 % (ref 35–47)
HCT VFR BLD AUTO: 27.8 % (ref 35–47)
HGB BLD-MCNC: 7.7 G/DL (ref 11.7–15.7)
HGB BLD-MCNC: 8.3 G/DL (ref 11.7–15.7)
HGB UR QL STRIP: NEGATIVE
KETONES UR STRIP-MCNC: NEGATIVE MG/DL
LEUKOCYTE ESTERASE UR QL STRIP: ABNORMAL
MAGNESIUM SERPL-MCNC: 2.3 MG/DL (ref 1.6–2.3)
MCH RBC QN AUTO: 24.5 PG (ref 26.5–33)
MCH RBC QN AUTO: 24.8 PG (ref 26.5–33)
MCHC RBC AUTO-ENTMCNC: 29.9 G/DL (ref 31.5–36.5)
MCHC RBC AUTO-ENTMCNC: 30.1 G/DL (ref 31.5–36.5)
MCV RBC AUTO: 82 FL (ref 78–100)
MCV RBC AUTO: 83 FL (ref 78–100)
MUCOUS THREADS #/AREA URNS LPF: PRESENT /LPF
NITRATE UR QL: NEGATIVE
PH UR STRIP: 7 [PH] (ref 5–7)
PHOSPHATE SERPL-MCNC: 3.9 MG/DL (ref 2.5–4.5)
PLATELET # BLD AUTO: 357 10E3/UL (ref 150–450)
PLATELET # BLD AUTO: 366 10E3/UL (ref 150–450)
POTASSIUM BLD-SCNC: 4.4 MMOL/L (ref 3.4–5.3)
RBC # BLD AUTO: 3.14 10E6/UL (ref 3.8–5.2)
RBC # BLD AUTO: 3.35 10E6/UL (ref 3.8–5.2)
RBC URINE: 1 /HPF
SP GR UR STRIP: 1.01 (ref 1–1.03)
SQUAMOUS EPITHELIAL: 1 /HPF
UFH PPP CHRO-ACNC: 0.17 IU/ML
UFH PPP CHRO-ACNC: 0.51 IU/ML
UROBILINOGEN UR STRIP-MCNC: NORMAL MG/DL
WBC # BLD AUTO: 15.7 10E3/UL (ref 4–11)
WBC # BLD AUTO: 16 10E3/UL (ref 4–11)
WBC URINE: 25 /HPF

## 2022-04-10 PROCEDURE — 85520 HEPARIN ASSAY: CPT | Performed by: INTERNAL MEDICINE

## 2022-04-10 PROCEDURE — 999N000157 HC STATISTIC RCP TIME EA 10 MIN

## 2022-04-10 PROCEDURE — 36415 COLL VENOUS BLD VENIPUNCTURE: CPT | Performed by: SURGERY

## 2022-04-10 PROCEDURE — 250N000013 HC RX MED GY IP 250 OP 250 PS 637

## 2022-04-10 PROCEDURE — 87086 URINE CULTURE/COLONY COUNT: CPT | Performed by: ANESTHESIOLOGY

## 2022-04-10 PROCEDURE — 84100 ASSAY OF PHOSPHORUS: CPT | Performed by: NURSE PRACTITIONER

## 2022-04-10 PROCEDURE — 250N000011 HC RX IP 250 OP 636: Performed by: PSYCHIATRY & NEUROLOGY

## 2022-04-10 PROCEDURE — 84132 ASSAY OF SERUM POTASSIUM: CPT | Performed by: INTERNAL MEDICINE

## 2022-04-10 PROCEDURE — 87205 SMEAR GRAM STAIN: CPT | Performed by: ANESTHESIOLOGY

## 2022-04-10 PROCEDURE — 83735 ASSAY OF MAGNESIUM: CPT | Performed by: NURSE PRACTITIONER

## 2022-04-10 PROCEDURE — 94003 VENT MGMT INPAT SUBQ DAY: CPT

## 2022-04-10 PROCEDURE — 87077 CULTURE AEROBIC IDENTIFY: CPT | Performed by: ANESTHESIOLOGY

## 2022-04-10 PROCEDURE — 36415 COLL VENOUS BLD VENIPUNCTURE: CPT | Performed by: ANESTHESIOLOGY

## 2022-04-10 PROCEDURE — 85018 HEMOGLOBIN: CPT | Performed by: NURSE PRACTITIONER

## 2022-04-10 PROCEDURE — 250N000013 HC RX MED GY IP 250 OP 250 PS 637: Performed by: STUDENT IN AN ORGANIZED HEALTH CARE EDUCATION/TRAINING PROGRAM

## 2022-04-10 PROCEDURE — 250N000011 HC RX IP 250 OP 636: Performed by: NURSE PRACTITIONER

## 2022-04-10 PROCEDURE — 81001 URINALYSIS AUTO W/SCOPE: CPT | Performed by: ANESTHESIOLOGY

## 2022-04-10 PROCEDURE — 85027 COMPLETE CBC AUTOMATED: CPT | Performed by: PHYSICIAN ASSISTANT

## 2022-04-10 PROCEDURE — 200N000001 HC R&B ICU

## 2022-04-10 PROCEDURE — 87040 BLOOD CULTURE FOR BACTERIA: CPT | Performed by: ANESTHESIOLOGY

## 2022-04-10 PROCEDURE — 250N000013 HC RX MED GY IP 250 OP 250 PS 637: Performed by: SURGERY

## 2022-04-10 PROCEDURE — 36415 COLL VENOUS BLD VENIPUNCTURE: CPT | Performed by: PHYSICIAN ASSISTANT

## 2022-04-10 PROCEDURE — 250N000011 HC RX IP 250 OP 636: Performed by: PHYSICIAN ASSISTANT

## 2022-04-10 PROCEDURE — 85520 HEPARIN ASSAY: CPT | Performed by: SURGERY

## 2022-04-10 PROCEDURE — 99291 CRITICAL CARE FIRST HOUR: CPT | Mod: 24 | Performed by: ANESTHESIOLOGY

## 2022-04-10 PROCEDURE — 250N000013 HC RX MED GY IP 250 OP 250 PS 637: Performed by: NURSE PRACTITIONER

## 2022-04-10 RX ORDER — FUROSEMIDE 10 MG/ML
20 INJECTION INTRAMUSCULAR; INTRAVENOUS ONCE
Status: COMPLETED | OUTPATIENT
Start: 2022-04-10 | End: 2022-04-10

## 2022-04-10 RX ADMIN — AMIODARONE HYDROCHLORIDE 200 MG: 200 TABLET ORAL at 08:39

## 2022-04-10 RX ADMIN — ACETAMINOPHEN 975 MG: 325 TABLET, FILM COATED ORAL at 05:09

## 2022-04-10 RX ADMIN — INSULIN ASPART 1 UNITS: 100 INJECTION, SOLUTION INTRAVENOUS; SUBCUTANEOUS at 12:19

## 2022-04-10 RX ADMIN — MINERAL OIL AND PETROLATUM 3.5 G: 150; 830 OINTMENT OPHTHALMIC at 15:35

## 2022-04-10 RX ADMIN — HYDRALAZINE HYDROCHLORIDE 10 MG: 20 INJECTION INTRAMUSCULAR; INTRAVENOUS at 05:04

## 2022-04-10 RX ADMIN — LEVETIRACETAM 1000 MG: 10 INJECTION INTRAVENOUS at 15:35

## 2022-04-10 RX ADMIN — MINERAL OIL AND PETROLATUM 3.5 G: 150; 830 OINTMENT OPHTHALMIC at 12:19

## 2022-04-10 RX ADMIN — LEVETIRACETAM 1000 MG: 10 INJECTION INTRAVENOUS at 04:09

## 2022-04-10 RX ADMIN — TICAGRELOR 90 MG: 90 TABLET ORAL at 04:08

## 2022-04-10 RX ADMIN — ATORVASTATIN CALCIUM 80 MG: 40 TABLET, FILM COATED ORAL at 08:39

## 2022-04-10 RX ADMIN — MINERAL OIL AND PETROLATUM 3.5 G: 150; 830 OINTMENT OPHTHALMIC at 04:07

## 2022-04-10 RX ADMIN — SULFAMETHOXAZOLE AND TRIMETHOPRIM 1 TABLET: 800; 160 TABLET ORAL at 20:25

## 2022-04-10 RX ADMIN — MINERAL OIL AND PETROLATUM 3.5 G: 150; 830 OINTMENT OPHTHALMIC at 20:25

## 2022-04-10 RX ADMIN — CARVEDILOL 25 MG: 25 TABLET, FILM COATED ORAL at 17:42

## 2022-04-10 RX ADMIN — ACETAMINOPHEN 975 MG: 325 TABLET, FILM COATED ORAL at 12:19

## 2022-04-10 RX ADMIN — CHLORHEXIDINE GLUCONATE 15 ML: 1.2 RINSE ORAL at 20:25

## 2022-04-10 RX ADMIN — ASPIRIN 81 MG CHEWABLE TABLET 81 MG: 81 TABLET CHEWABLE at 08:38

## 2022-04-10 RX ADMIN — ACETAMINOPHEN 975 MG: 325 TABLET, FILM COATED ORAL at 17:43

## 2022-04-10 RX ADMIN — CHLORHEXIDINE GLUCONATE 15 ML: 1.2 RINSE ORAL at 08:37

## 2022-04-10 RX ADMIN — TICAGRELOR 90 MG: 90 TABLET ORAL at 17:43

## 2022-04-10 RX ADMIN — AMLODIPINE BESYLATE 5 MG: 5 TABLET ORAL at 08:38

## 2022-04-10 RX ADMIN — INSULIN ASPART 1 UNITS: 100 INJECTION, SOLUTION INTRAVENOUS; SUBCUTANEOUS at 20:24

## 2022-04-10 RX ADMIN — SULFAMETHOXAZOLE AND TRIMETHOPRIM 1 TABLET: 800; 160 TABLET ORAL at 08:38

## 2022-04-10 RX ADMIN — HEPARIN SODIUM AND DEXTROSE 1250 UNITS/HR: 10000; 5 INJECTION INTRAVENOUS at 11:26

## 2022-04-10 RX ADMIN — Medication 40 MG: at 08:38

## 2022-04-10 RX ADMIN — FUROSEMIDE 20 MG: 10 INJECTION, SOLUTION INTRAVENOUS at 08:39

## 2022-04-10 RX ADMIN — INSULIN ASPART 1 UNITS: 100 INJECTION, SOLUTION INTRAVENOUS; SUBCUTANEOUS at 15:35

## 2022-04-10 RX ADMIN — Medication 15 ML: at 08:38

## 2022-04-10 RX ADMIN — CARVEDILOL 25 MG: 25 TABLET, FILM COATED ORAL at 08:39

## 2022-04-10 RX ADMIN — MINERAL OIL AND PETROLATUM 3.5 G: 150; 830 OINTMENT OPHTHALMIC at 08:58

## 2022-04-10 ASSESSMENT — ACTIVITIES OF DAILY LIVING (ADL)
ADLS_ACUITY_SCORE: 19
ADLS_ACUITY_SCORE: 21
ADLS_ACUITY_SCORE: 19
ADLS_ACUITY_SCORE: 21
ADLS_ACUITY_SCORE: 19
ADLS_ACUITY_SCORE: 21
ADLS_ACUITY_SCORE: 19
ADLS_ACUITY_SCORE: 19
ADLS_ACUITY_SCORE: 21

## 2022-04-10 NOTE — PROGRESS NOTES
Patient opens eyes spontaneously. Does not track or follow commands. Blinks eyes but does not follow while blinking. Intermittently becomes rigid and turns arms inward, toes pointed downward. Hydralazine 10 mg iv once for sbp>140.  One large, loose BM. 825 ml urine out via pure wick. Changed and provided elvin cares. Continues to grimace with cares. Scheduled tylenol administered.

## 2022-04-10 NOTE — PROGRESS NOTES
Critical Care Progress Note      04/10/2022    Name: Angelica Robledo MRN#: [id number]   Age: 51 year old YOB: 1970     Hsptl Day# 14  ICU DAY # 14    MV DAY # 14             Problem List:   Active Problems:    NSTEMI (non-ST elevated myocardial infarction) (H)    Hypertensive emergency    Paroxysmal ventricular tachycardia (H)           Summary/Hospital Course:     51-year-old female with significant past medical history for coronary artery disease and early history of MI.  She presented to our hospital with chest pain and underwent multivessel CABG.  Postoperative complication of PEA and V. fib arrest and patient has since been in persistent encephalopathic state.  Neurologically she has made minimal improvements and is awaiting tracheostomy placement.  She is currently being treated for UTI.      Assessment and plan :     Angelica Robledo IS a 51 year old female admitted on 3/27/2022 for postprocedural respiratory failure, endotracheal intubation for airway protection and encephalopathy most likely secondary to anoxic brain injury..   I have personally reviewed the daily labs, imaging studies, cultures and discussed the case with referring physician and consulting physicians.     My assessment and plan by system for this patient is as follows:    Neurology/Psychiatry:   1.  Encephalopathy most likely anoxic  2. Analgesia as needed opioids    Plan  Appreciate neurology input.  Continue patient on Keppra for seizure precautions.  Head CT has been negative so far.  EEG shows diffuse slowing with gradual improvement.  Patient will need tracheostomy for airway protection and rehabilitation. Continue current supportive care    Cardiovascular:   1.Hemodynamics -hemodynamically stable  2.Rhythm -normal sinus rhythm   3. Ischemia -patient with history of coronary artery disease status post CABG x4  Plan  Continue to monitor at this time continue with cardiac surgery were recommendations    Pulmonary/Ventilator  Management:   1. Airway intubated for airway protection  2. Oxygenation/ventilation/mechanics able to tolerate spontaneous breathing trials  Plan  -We will continue patient on spontaneous breathing trials, most likely will need tracheostomy for airway protection should be able to wean to high-humidity trach collar once tracheostomy in place. Tolerated 8 hours of SBT, continue on SBT    GI and Nutrition :   1.  Concern for protein calorie malnutrition  2.  Enteral feedings  Plan  -Continue tube feeds at goal    Renal/Fluids/Electrolytes:   1.  No signs of kidney injury  2.  No electrolyte abnormalities  3.  Appears euvolemic    Plan  -Continue to monitor  - monitor function and electrolytes as needed with replacement per ICU protocols.  - generally avoid nephrotoxic agents such as NSAID, IV contrast unless specifically required  - adjust medications as needed for renal clearance  - follow I/O's as appropriate.    Infectious Disease:   1.  Leukocytosis  2.  Urinary tract infection  3.  Plan  -Continue current antibiotic regiment, WBC remains elevated would reculture    Endocrine:   1.  Concern for stress-induced hyperglycemia    Plan  - ICU insulin protocol, goal sugar <180      Hematology/Oncology:   1.  Leukocytosis  2.  Acute blood loss anemia, no signs, symptoms of active blood loss  3.  Plan  -We will continue to monitor      IV/Access:   1. Venous access -peripheral  2. Arterial access -not indicated  3.      ICU Prophylaxis:   1. DVT: Heparin infusion mechanical  2. VAP: HOB 30 degrees, chlorhexidine rinse  3. Stress Ulcer: PPI/H2 blocker  4. Restraints: No current need for restraints  5. Wound care - per unit routine   6. Feeding -tube feeds  7. Family Update:  Discussed continuing need for tracheostomy with sister  8. Disposition -ICU        Key goals for next 24 hours:   1.  Continue supportive care  2.  Continue neuro exams  3.  Spontaneous breathing trials               Interim History:   Continues to do  well on spontaneous breathing trials, continue to treat for UTI           Key Medications:       acetaminophen  975 mg Oral or Feeding Tube Q6H     amiodarone  200 mg Oral or Feeding Tube Daily     amLODIPine  5 mg Oral or Feeding Tube Daily     artificial tears   Both Eyes Q4H     aspirin  81 mg Oral or NG Tube Daily     atorvastatin  80 mg Oral or Feeding Tube Daily     carvedilol  25 mg Oral or Feeding Tube BID w/meals     chlorhexidine  15 mL Mouth/Throat Q12H     insulin aspart  1-6 Units Subcutaneous Q4H     levETIRAcetam  1,000 mg Intravenous Q12H     multivitamins w/minerals  15 mL Per Feeding Tube Daily     pantoprazole  40 mg Oral or NG Tube Daily    Or     pantoprazole  40 mg Oral Daily     sodium chloride (PF)  10 mL Intracatheter Q8H     sodium chloride (PF)  3 mL Intracatheter Q8H     sulfamethoxazole-trimethoprim  1 tablet Oral or Feeding Tube BID     ticagrelor  90 mg Oral or Feeding Tube BID       heparin 1,250 Units/hr (04/10/22 0901)     - MEDICATION INSTRUCTIONS -       Percutaneous Coronary Intervention orders placed (this is information for BPA alerting)       sodium chloride 10 mL/hr at 04/10/22 0902               Physical Examination:   Temp:  [97.7  F (36.5  C)-99.6  F (37.6  C)] 99.2  F (37.3  C)  Pulse:  [64-79] 77  Resp:  [7-22] 19  BP: (104-153)/() 107/70  FiO2 (%):  [30 %] 30 %  SpO2:  [97 %-100 %] 99 %    Intake/Output Summary (Last 24 hours) at 4/9/2022 0916  Last data filed at 4/9/2022 0800  Gross per 24 hour   Intake 2419.65 ml   Output 1250 ml   Net 1169.65 ml     Wt Readings from Last 4 Encounters:   04/10/22 94.9 kg (209 lb 3.5 oz)     BP - Mean:  [] 83  Vent Mode: PS  (Pressure Support)  FiO2 (%): 30 %  Resp Rate (Set): 14 breaths/min  Tidal Volume (Set, mL): 450 mL  PEEP (cm H2O): 5 cmH2O  Pressure Support (cm H2O): 10 cmH2O  Resp: 19    No lab results found in last 7 days.    GEN: no acute distress   HEENT: head ncat, sclera anicteric, OP patent, trachea midline    PULM: unlabored synchronous with vent, clear anteriorly    CV/COR: RRR S1S2 no gallop,  No rub, no murmur  ABD: soft nontender, hypoactive bowel sounds, no mass  EXT:  Edema   warm  NEURO: Intermittently blinks to command does not follow commands on extremities  SKIN: no obvious rash  LINES: clean, dry intact         Data:   All data and imaging reviewed     ROUTINE ICU LABS (Last four results)  CMP  Recent Labs   Lab 04/10/22  0848 04/10/22  0552 04/10/22  0457 04/09/22  2347 04/09/22 2002 04/09/22  0749 04/09/22  0423 04/08/22  0839 04/08/22  0531 04/07/22  0810 04/07/22  0552 04/06/22  0745 04/06/22  0440 04/05/22  0821 04/05/22  0525   NA  --   --   --   --   --   --  139  --  140  --   --   --  144  --  146*   POTASSIUM  --  4.4  --   --   --   --  4.5  --  4.2  4.3  --   --   --  4.1  --  4.1   CHLORIDE  --   --   --   --   --   --  107  --  108  --   --   --  112*  --  112*   CO2  --   --   --   --   --   --  25  --  25  --   --   --  29  --  28   ANIONGAP  --   --   --   --   --   --  7  --  7  --   --   --  3  --  6   *  --  103* 138* 131*   < > 138*   < > 137*   < >  --    < > 151*   < > 159*   BUN  --   --   --   --   --   --  18  --  21  --   --   --  28  --  31*   CR  --   --   --   --   --   --  0.82  --  0.84  --   --   --  0.95  --  1.04   GFRESTIMATED  --   --   --   --   --   --  87  --  84  --   --   --  73  --  65   TOMAS  --   --   --   --   --   --  8.9  --  8.9  --   --   --  9.5  --  9.1   MAG  --  2.3  --   --   --   --  2.2  --  2.5*  --  2.7*  --  2.7*  --  2.6*   PHOS  --  3.9  --   --   --   --  3.9  --  4.2  --  4.6*  --  3.9  --  4.1    < > = values in this interval not displayed.     CBC  Recent Labs   Lab 04/10/22  0552 04/09/22  2125 04/09/22  0423 04/08/22  0531   WBC 16.0* 16.0* 15.2* 15.7*   RBC 3.35* 3.41* 3.15* 3.14*   HGB 8.3* 8.5* 7.9* 7.8*   HCT 27.8* 28.5* 26.2* 26.4*   MCV 83 84 83 84   MCH 24.8* 24.9* 25.1* 24.8*   MCHC 29.9* 29.8* 30.2* 29.5*   RDW 18.0* 17.7*  17.6* 17.7*    358 345 343     INRNo lab results found in last 7 days.  Arterial Blood GasNo lab results found in last 7 days.    All cultures:  No results for input(s): CULT in the last 168 hours.  No results found for this or any previous visit (from the past 24 hour(s)).      Billing: This patient is critically ill: yes. Total critical care time today 30   min.

## 2022-04-10 NOTE — PROGRESS NOTES
[facility]  Cardiovascular and Thoracic Surgery Daily Note          Assessment and Plan:   Angelica Robledo is a 51 year old female with history of MI at age 25 s/p angioplasty who presented to the hospital with hypertensive emergency and NSTEMI. Coronary angio demonstrated severe multivessel CAD. Echocardiogram preoperatively with preserved biventricular function.    PMH includes: NSTEMI, CAD with previous coronary stents, HTN  Course has been complicated by PEA arrest with ventricular fibrillation on POD 0 with ROSC after defibrillation x3, 2 mg epinephrine and one round of CPR. Was taken to cath lab where radial artery graft was found to be occluded. PCI with DANA placement to mid RCA and angioplasty of the rPLB was performed. Echocardiogram at the conclusion of PCI demonstrated LVEF 40-45% with mild to moderate global hypokinesis of the left ventricle with severe hypokinesis of the mid anterior and anteroseptal walls, the entire inferior wall and all apical segments of the left ventricle; no pericardial effusion. Additionally, due to antiplatelet therapy post PCI, patient developed epistaxis during attempted NG placement requiring placement of b/l rhino rockets. Now, diffuse severe encephalopathy with minimal neurologic response.      POD #11 s/p:  1. Coronary artery bypass grafting x 4 (radial artery graft to the right posterior descending coronary artery, reversed saphenous vein graft to the obtuse marginal branch of the left circumflex coronary artery, reversed saphenous vein graft to the first diagonal branch of the left anterior descending coronary artery, and pedicled left internal mammary artery to left anterior descending coronary artery).  2.  Endoscopic vein harvest of the greater saphenous vein from the left lower extremity.  3.  Endoscopic left radial artery harvest on 3/30/22 with Dr. Butterfield.     CVS:   NSTEMI and severe multivessel CAD s/p CABG x4 with radial grafts as  above  HTN  PEA and Vfib arrest s/p ROSC and DANA to mid RCA and angioplasty of the rPLB on 3/31  HD stable, lends to HTN; NSR rates 70-80s  - Goal MAP >65, SBP <140  - Amlodipine 5 mg daily for radial graft (at least 3-6 months)  - Carvedilol 25 mg BID  - PRN hydralazine available  - Brilinta load and daily per Cardiology, tolerated 500U straight rate heparin will transition to LIH for 48 hrs, HIH tomorrow  - Transitioned amiodarone gtt to PO taper per EP/Cards recs  - ASA 81 daily  - Atorvastatin 80 mg daily  - CTs removed 4/3. TPWs removed 4/1     Resp:   Postoperative ventilator management  - Current vent settings:  Vent Mode: CMV/AC  (Continuous Mandatory Ventilation/ Assist Control)  FiO2 (%): 30 %  Resp Rate (Set): 14 breaths/min  Tidal Volume (Set, mL): 450 mL  PEEP (cm H2O): 5 cmH2O  Resp: 13  - Intubated for airway protection due to encephalopathy  - Titrate O2/peep to maintain sats >92%  - PST as tolerated  - Dr. Butterfield ok with trach POD 10-14, considering Tuesday if no further improvement     Neuro:    Acute postoperative pain  Hx migraines  Encephalopathy   Did receive CPR on 3/31, will likely have pain from this also  Propofol turned off on 3/31 at 9 am, starting to open eyes more, unable to move extremities but withdraws BLE to pain, nothing in BUEs yet. CT head 3/31 negative for hemorrhage; CTA negative for strokes, vessels patent; per neurology, MRI stable w/o acute concern to explain clinical status.  EEG without e/o seizure activity, continues to have moderate to severe diffuse encephalopathy   4/5 EEG improved but c/f new spikes not c/w seizures but tendency to seize  MRI head and c-spine on 4/3, 4/8 negative  - PRN dilaudid available  - PRN oxycodone and methocarbamol available  - Scheduled tylenol   - Neuro critical care consulted and following, greatly appreciate recommendations  - Keppra added 4/5  - More eye tracking, yawned a few times, but does not blink to threat or follow  commands     Renal/Electrolyte:   Lactic acidosis, resolved  Volume overload  Hypernatremia, resolved  Creat stable, below preop weight, much less edema;   Na/K 4.4 stable  - Strict I/O, daily weights  - Avoid/limit nephrotoxins as able  - Replete lytes per protocol  - No further diuresis  - Continue FWF decrease to 30 ml q 8 hrs<60 mL q6h     GI/Nutrition:    Epistaxis, resolved  Elevated LFTs, likely shock liver 2/2 arrest, resolved  Orders Placed This Encounter      NPO for Medical/Clinical Reasons Except for: Other; Specify: NPO until Extubated      Advance Diet as Tolerated: Clear Liquid Diet  - S/P NJ placement on  in radiology, PEG tuesday  - Nutrition following, enteral feeds at goal, absorbing well  - Rhino rockets removed, continues to have some oozing from b/l nares  - Continue bowel regimen, + BMs  - PPI     :    - External wick in place for strict I/O  - Urine +, start Bactrim bid for 3 days     Endo:  Stress induced hyperglycemia  Thyroid nodule   Preop A1c 5.7%  - Transitioned to sliding scale insulin   - Goal BG <180 for optimal healing  - Thyroid nodule noted on CT on 3/31, will have this worked up on an outpatient basis     ID:  Stress induced leukocytosis, recurrent  E.Coli UTI preoperatively  WBC 16.0<15.2<15.7<14.7<10.4, Temp (24hrs), Av  F (37.2  C), Min:97.7  F (36.5  C), Max:99.6  F (37.6  C); new leukocytosis, respiratory culture ordered/pan culturing per intensivist   - Completed perioperative ABX, Bactrim for UTI  - Continue to follow fever curve, WBC and inflammatory markers as appropriate  - Repeat UA  without overt e/o infection     Heme:  Acute blood loss anemia  Acute blood loss thrombocytopenia  Hgb 8.3<7.9<7.8<8.1, Plts 366; no s/sx active bleeding  - CBC in AM  - Goal Hgb >7  - Consulted hematology given severe premature CAD and acute graft failure- appreciate recommendations. Multiple labs sent. Hypercoagulable workup appears to be negative but recommending repeat  "levels once acute issues resolve. Currently recommending antiplatelet and full anticoagulation (therapeutic heparin). D/w Dr. Butterfield. Plan will be straight rate heparin at 500 units/hr now transitioned LIH gtt, consider transition to HIH once trach/PEG placed    - Proph: PPI, subcutaneous heparin, SCDs    - Dispo: Continue in ICU; Family would like patient moved to [place] if/when trach established to be closer to daughter. SW consulted.    - Lines: Kenneth, LISA, ETT, [place]    Seen and discussed with Dr. Cornelius          Interval History:   Lying in bed, far out gaze, suctioned with noted brow furrow, tolerating weaning on vent, OOB to chair with more weaning today         Medications:       acetaminophen  975 mg Oral or Feeding Tube Q6H     amiodarone  200 mg Oral or Feeding Tube Daily     amLODIPine  5 mg Oral or Feeding Tube Daily     artificial tears   Both Eyes Q4H     aspirin  81 mg Oral or NG Tube Daily     atorvastatin  80 mg Oral or Feeding Tube Daily     carvedilol  25 mg Oral or Feeding Tube BID w/meals     chlorhexidine  15 mL Mouth/Throat Q12H     insulin aspart  1-6 Units Subcutaneous Q4H     levETIRAcetam  1,000 mg Intravenous Q12H     multivitamins w/minerals  15 mL Per Feeding Tube Daily     pantoprazole  40 mg Oral or NG Tube Daily    Or     pantoprazole  40 mg Oral Daily     sodium chloride (PF)  10 mL Intracatheter Q8H     sodium chloride (PF)  3 mL Intracatheter Q8H     sulfamethoxazole-trimethoprim  1 tablet Oral or Feeding Tube BID     ticagrelor  90 mg Oral or Feeding Tube BID     @MEDSPRN-@          Physical Exam:   Vitals were reviewed  Blood pressure 119/67, pulse 77, temperature 99.2  F (37.3  C), temperature source Oral, resp. rate 19, height 1.651 m (5' 5\"), weight 94.9 kg (209 lb 3.5 oz), last menstrual period 02/27/2022, SpO2 99 %.  Rhythm: NSR    Lungs: course    Cardiovascular: s1/s2, no m/r/g    Abdomen: s/nt/nd    Extremeties: no LE edema    Incision: cdi    CT: na    Weight: "   Vitals:    04/06/22 0454 04/07/22 0500 04/08/22 0100 04/09/22 0600   Weight: 93.3 kg (205 lb 11 oz) 94.1 kg (207 lb 7.3 oz) 94.3 kg (207 lb 14.3 oz) 94.4 kg (208 lb 1.8 oz)    04/10/22 0600   Weight: 94.9 kg (209 lb 3.5 oz)            Data:   Labs:   Lab Results   Component Value Date    WBC 16.0 04/10/2022     Lab Results   Component Value Date    RBC 3.35 04/10/2022     Lab Results   Component Value Date    HGB 8.3 04/10/2022     Lab Results   Component Value Date    HCT 27.8 04/10/2022     No components found for: MCT  Lab Results   Component Value Date    MCV 83 04/10/2022     Lab Results   Component Value Date    MCH 24.8 04/10/2022     Lab Results   Component Value Date    MCHC 29.9 04/10/2022     Lab Results   Component Value Date    RDW 18.0 04/10/2022     Lab Results   Component Value Date     04/10/2022       Last Basic Metabolic Panel:  Lab Results   Component Value Date     04/09/2022      Lab Results   Component Value Date    POTASSIUM 4.4 04/10/2022     Lab Results   Component Value Date    CHLORIDE 107 04/09/2022     Lab Results   Component Value Date    TOMAS 8.9 04/09/2022     Lab Results   Component Value Date    CO2 25 04/09/2022     Lab Results   Component Value Date    BUN 18 04/09/2022     Lab Results   Component Value Date    CR 0.82 04/09/2022     Lab Results   Component Value Date     04/10/2022     04/09/2022       Ngozi Bocanegra PA-C  Pager #: [phone]

## 2022-04-10 NOTE — PROGRESS NOTES
[facility] ICU RESPIRATORY NOTE        Date of Admission: 3/27/2022    Date of Intubation (most recent): 3/30/22    Reason for Mechanical Ventilation: Airway protection    Number of Days on Mechanical Ventilation: 11    Met Criteria for Spontaneous Breathing Trial: Yes -  %    Reason for No Spontaneous Breathing Trial:     Significant Events Today: None    ABG Results: No lab results found in last 7 days.    Current Vent Settings: Vent Mode: CMV/AC  (Continuous Mandatory Ventilation/ Assist Control)  FiO2 (%): 30 %  Resp Rate (Set): 14 breaths/min  Tidal Volume (Set, mL): 450 mL  PEEP (cm H2O): 5 cmH2O  Pressure Support (cm H2O): 10 cmH2O  Resp: 17        Nikolai Lujan, RT

## 2022-04-10 NOTE — PLAN OF CARE
Neuro: Inconsistently follows commands to blink. Withdrawals to pain. Pt was crying/sobbing while on a facetime call with mom today.   CV: NS w/ ST segment depression. Pericardial rub. Pulses palpable/doppler.   Pulm: CPAP/PS 10/5 [id number]. Diminished LS. Copious amount of oral secretions. CMV/AC vent settings 30/450/14/5. ETT 23 @ lip.  GI/: Purewic in place producing adequate amounts of urine. Slight redness noted when changing Purewic. NG running TF at goal.   Skin: Sacral meplix in place.   Lines: 2 PIV  Plan: Working up for trach and peg. Sister and brother-in-law present at bedside throughout day.

## 2022-04-10 NOTE — PROGRESS NOTES
[facility] ICU RESPIRATORY NOTE      Date of Admission: 3/27/2022    Date of Intubation (most recent): 3/30/22    Reason for Mechanical Ventilation: Airway protection    Number of Days on Mechanical Ventilation: 11  Met Criteria for Spontaneous Breathing Trial: Yes    Significant Events Today: PSV 8hrs today.     ABG Results: No lab results found in last 7 days.    Current Vent Settings: Vent Mode: (S) CMV/AC  (Continuous Mandatory Ventilation/ Assist Control)  FiO2 (%): 30 %  Resp Rate (Set): 14 breaths/min  Tidal Volume (Set, mL): 450 mL  PEEP (cm H2O): 5 cmH2O  Pressure Support (cm H2O): 10 cmH2O  Resp: 16    Skin Assessment: clean and dry.     Plan: Continue to wean from mechanical ventilation and oxygen as tolerated per protocol.    Colby Ashby, RRT

## 2022-04-11 LAB
ANION GAP SERPL CALCULATED.3IONS-SCNC: 8 MMOL/L (ref 3–14)
BACTERIA UR CULT: ABNORMAL
BUN SERPL-MCNC: 23 MG/DL (ref 7–30)
CALCIUM SERPL-MCNC: 9.1 MG/DL (ref 8.5–10.1)
CHLORIDE BLD-SCNC: 105 MMOL/L (ref 94–109)
CO2 SERPL-SCNC: 23 MMOL/L (ref 20–32)
CREAT SERPL-MCNC: 0.96 MG/DL (ref 0.52–1.04)
ERYTHROCYTE [DISTWIDTH] IN BLOOD BY AUTOMATED COUNT: 18.3 % (ref 10–15)
GFR SERPL CREATININE-BSD FRML MDRD: 72 ML/MIN/1.73M2
GLUCOSE BLD-MCNC: 136 MG/DL (ref 70–99)
GLUCOSE BLDC GLUCOMTR-MCNC: 116 MG/DL (ref 70–99)
GLUCOSE BLDC GLUCOMTR-MCNC: 119 MG/DL (ref 70–99)
GLUCOSE BLDC GLUCOMTR-MCNC: 129 MG/DL (ref 70–99)
GLUCOSE BLDC GLUCOMTR-MCNC: 131 MG/DL (ref 70–99)
GLUCOSE BLDC GLUCOMTR-MCNC: 134 MG/DL (ref 70–99)
GLUCOSE BLDC GLUCOMTR-MCNC: 135 MG/DL (ref 70–99)
HCT VFR BLD AUTO: 25.3 % (ref 35–47)
HGB BLD-MCNC: 7.8 G/DL (ref 11.7–15.7)
MAGNESIUM SERPL-MCNC: 2.5 MG/DL (ref 1.6–2.3)
MCH RBC QN AUTO: 25.1 PG (ref 26.5–33)
MCHC RBC AUTO-ENTMCNC: 30.8 G/DL (ref 31.5–36.5)
MCV RBC AUTO: 81 FL (ref 78–100)
PHOSPHATE SERPL-MCNC: 4.6 MG/DL (ref 2.5–4.5)
PLATELET # BLD AUTO: 327 10E3/UL (ref 150–450)
POTASSIUM BLD-SCNC: 4.6 MMOL/L (ref 3.4–5.3)
RBC # BLD AUTO: 3.11 10E6/UL (ref 3.8–5.2)
SARS-COV-2 RNA RESP QL NAA+PROBE: NEGATIVE
SODIUM SERPL-SCNC: 136 MMOL/L (ref 133–144)
UFH PPP CHRO-ACNC: 0.18 IU/ML
UFH PPP CHRO-ACNC: 0.35 IU/ML
UFH PPP CHRO-ACNC: 0.37 IU/ML
WBC # BLD AUTO: 15.9 10E3/UL (ref 4–11)

## 2022-04-11 PROCEDURE — 250N000013 HC RX MED GY IP 250 OP 250 PS 637: Performed by: STUDENT IN AN ORGANIZED HEALTH CARE EDUCATION/TRAINING PROGRAM

## 2022-04-11 PROCEDURE — 99291 CRITICAL CARE FIRST HOUR: CPT | Mod: 24 | Performed by: SURGERY

## 2022-04-11 PROCEDURE — 85520 HEPARIN ASSAY: CPT | Performed by: INTERNAL MEDICINE

## 2022-04-11 PROCEDURE — U0003 INFECTIOUS AGENT DETECTION BY NUCLEIC ACID (DNA OR RNA); SEVERE ACUTE RESPIRATORY SYNDROME CORONAVIRUS 2 (SARS-COV-2) (CORONAVIRUS DISEASE [COVID-19]), AMPLIFIED PROBE TECHNIQUE, MAKING USE OF HIGH THROUGHPUT TECHNOLOGIES AS DESCRIBED BY CMS-2020-01-R: HCPCS | Performed by: PEDIATRICS

## 2022-04-11 PROCEDURE — 999N000157 HC STATISTIC RCP TIME EA 10 MIN

## 2022-04-11 PROCEDURE — 36415 COLL VENOUS BLD VENIPUNCTURE: CPT | Performed by: INTERNAL MEDICINE

## 2022-04-11 PROCEDURE — 80048 BASIC METABOLIC PNL TOTAL CA: CPT | Performed by: NURSE PRACTITIONER

## 2022-04-11 PROCEDURE — 250N000013 HC RX MED GY IP 250 OP 250 PS 637: Performed by: SURGERY

## 2022-04-11 PROCEDURE — 84100 ASSAY OF PHOSPHORUS: CPT | Performed by: NURSE PRACTITIONER

## 2022-04-11 PROCEDURE — 250N000011 HC RX IP 250 OP 636: Performed by: PHYSICIAN ASSISTANT

## 2022-04-11 PROCEDURE — 36415 COLL VENOUS BLD VENIPUNCTURE: CPT | Performed by: NURSE PRACTITIONER

## 2022-04-11 PROCEDURE — 85027 COMPLETE CBC AUTOMATED: CPT | Performed by: NURSE PRACTITIONER

## 2022-04-11 PROCEDURE — 85520 HEPARIN ASSAY: CPT | Performed by: SURGERY

## 2022-04-11 PROCEDURE — 200N000001 HC R&B ICU

## 2022-04-11 PROCEDURE — 83735 ASSAY OF MAGNESIUM: CPT | Performed by: NURSE PRACTITIONER

## 2022-04-11 PROCEDURE — 250N000013 HC RX MED GY IP 250 OP 250 PS 637: Performed by: NURSE PRACTITIONER

## 2022-04-11 PROCEDURE — 94003 VENT MGMT INPAT SUBQ DAY: CPT

## 2022-04-11 PROCEDURE — 250N000011 HC RX IP 250 OP 636: Performed by: PSYCHIATRY & NEUROLOGY

## 2022-04-11 PROCEDURE — 999N000253 HC STATISTIC WEANING TRIALS

## 2022-04-11 RX ORDER — CLINDAMYCIN PHOSPHATE 900 MG/50ML
900 INJECTION, SOLUTION INTRAVENOUS SEE ADMIN INSTRUCTIONS
Status: DISCONTINUED | OUTPATIENT
Start: 2022-04-12 | End: 2022-04-12 | Stop reason: HOSPADM

## 2022-04-11 RX ORDER — CLINDAMYCIN PHOSPHATE 900 MG/50ML
900 INJECTION, SOLUTION INTRAVENOUS
Status: DISCONTINUED | OUTPATIENT
Start: 2022-04-12 | End: 2022-04-12 | Stop reason: HOSPADM

## 2022-04-11 RX ADMIN — HEPARIN SODIUM AND DEXTROSE 1200 UNITS/HR: 10000; 5 INJECTION INTRAVENOUS at 07:53

## 2022-04-11 RX ADMIN — ASPIRIN 81 MG CHEWABLE TABLET 81 MG: 81 TABLET CHEWABLE at 07:55

## 2022-04-11 RX ADMIN — CARVEDILOL 25 MG: 25 TABLET, FILM COATED ORAL at 17:31

## 2022-04-11 RX ADMIN — ACETAMINOPHEN 975 MG: 325 TABLET, FILM COATED ORAL at 17:31

## 2022-04-11 RX ADMIN — MINERAL OIL AND PETROLATUM 3.5 G: 150; 830 OINTMENT OPHTHALMIC at 00:17

## 2022-04-11 RX ADMIN — MINERAL OIL AND PETROLATUM 3.5 G: 150; 830 OINTMENT OPHTHALMIC at 04:00

## 2022-04-11 RX ADMIN — Medication 15 ML: at 07:55

## 2022-04-11 RX ADMIN — AMLODIPINE BESYLATE 5 MG: 5 TABLET ORAL at 07:55

## 2022-04-11 RX ADMIN — AMIODARONE HYDROCHLORIDE 200 MG: 200 TABLET ORAL at 08:05

## 2022-04-11 RX ADMIN — ACETAMINOPHEN 975 MG: 325 TABLET, FILM COATED ORAL at 06:22

## 2022-04-11 RX ADMIN — Medication 40 MG: at 07:55

## 2022-04-11 RX ADMIN — MINERAL OIL AND PETROLATUM 3.5 G: 150; 830 OINTMENT OPHTHALMIC at 04:04

## 2022-04-11 RX ADMIN — TICAGRELOR 90 MG: 90 TABLET ORAL at 04:00

## 2022-04-11 RX ADMIN — CARVEDILOL 25 MG: 25 TABLET, FILM COATED ORAL at 07:55

## 2022-04-11 RX ADMIN — TICAGRELOR 90 MG: 90 TABLET ORAL at 17:31

## 2022-04-11 RX ADMIN — CHLORHEXIDINE GLUCONATE 15 ML: 1.2 RINSE ORAL at 07:55

## 2022-04-11 RX ADMIN — ACETAMINOPHEN 975 MG: 325 TABLET, FILM COATED ORAL at 00:16

## 2022-04-11 RX ADMIN — LEVETIRACETAM 1000 MG: 10 INJECTION INTRAVENOUS at 04:00

## 2022-04-11 RX ADMIN — CHLORHEXIDINE GLUCONATE 15 ML: 1.2 RINSE ORAL at 20:26

## 2022-04-11 RX ADMIN — LEVETIRACETAM 1000 MG: 10 INJECTION INTRAVENOUS at 15:46

## 2022-04-11 RX ADMIN — ATORVASTATIN CALCIUM 80 MG: 40 TABLET, FILM COATED ORAL at 07:55

## 2022-04-11 RX ADMIN — ACETAMINOPHEN 975 MG: 325 TABLET, FILM COATED ORAL at 11:53

## 2022-04-11 ASSESSMENT — ACTIVITIES OF DAILY LIVING (ADL)
ADLS_ACUITY_SCORE: 15
ADLS_ACUITY_SCORE: 21
ADLS_ACUITY_SCORE: 21
ADLS_ACUITY_SCORE: 15
ADLS_ACUITY_SCORE: 17
ADLS_ACUITY_SCORE: 17
ADLS_ACUITY_SCORE: 15
ADLS_ACUITY_SCORE: 17
ADLS_ACUITY_SCORE: 21
ADLS_ACUITY_SCORE: 21
ADLS_ACUITY_SCORE: 19
ADLS_ACUITY_SCORE: 17
ADLS_ACUITY_SCORE: 19
ADLS_ACUITY_SCORE: 15
ADLS_ACUITY_SCORE: 21
ADLS_ACUITY_SCORE: 17
ADLS_ACUITY_SCORE: 15
ADLS_ACUITY_SCORE: 17
ADLS_ACUITY_SCORE: 15
ADLS_ACUITY_SCORE: 21
ADLS_ACUITY_SCORE: 17
ADLS_ACUITY_SCORE: 15
ADLS_ACUITY_SCORE: 21
ADLS_ACUITY_SCORE: 17

## 2022-04-11 NOTE — PROGRESS NOTES
"Care Management Follow Up    Length of Stay (days): 15    Expected Discharge Date: 04/15/2022     Concerns to be Addressed: adjustment to diagnosis/illness, basic needs, coping/stress, decision making, discharge planning, employment/school  spouse is overwhelmed with next steps   Patient plan of care discussed at interdisciplinary rounds: Yes    Anticipated Discharge Disposition: LTACH     Anticipated Discharge Services: Transportation Services  Anticipated Discharge DME: Oxygen, Other (see comment)    Patient/family educated on Medicare website which has current facility and service quality ratings:    Education Provided on the Discharge Plan:    Patient/Family in Agreement with the Plan: yes    Referrals Placed by CM/SW: External Care Coordination, Transportation, Financial Services  Private pay costs discussed: TBD    Additional Information:  Following for discharge planning.  See detailed note from CC on 4/8.    CC reached out to Johnnie Liaison for [facility] ( [phone]). He notes they do have \"sister facilities\" through [facility].  They have LTACH in [place] and [place], [place].  If family selects Johnnie works with the facility.  CC talked with  Miller and Meme (by phone).  They would like to select facility in [place].     Updated Miller on information from [facility] regarding [employer] and [employer].  He has been in touch with [employer] and is awaiting documentation from them.  He has not heard from [employer] about setting up a .     CC called Johnnie and left message to inquire about admission to [place]. Family would like to meet with Johnnie if possible. Johnnie noted in earlier conversation that he now can come back into hospital for consults.     Family aware that once facility is accepted, transportation needs to be arranged.  CC called [facility] transport and they are checking to see if they could do stretcher to [place] with vent.     Addendum 1400:  [place] from [facility] that " "they can transport to [place].  It does require a 3 person crew so could take longer to arrange.  Once details are known need to reach out to transport to arrange and they will write up a \"good nabeel estimate\" with pricing.     CC to continue to follow.            Yessica Perez RN      "

## 2022-04-11 NOTE — PROGRESS NOTES
[facility]  Cardiovascular and Thoracic Surgery Daily Note          Assessment and Plan:   POD#Angelica Robledo is a 51 year old female with history of MI at age 25 s/p angioplasty who presented to the hospital with hypertensive emergency and NSTEMI. Coronary angio demonstrated severe multivessel CAD. Echocardiogram preoperatively with preserved biventricular function.     PMH includes: NSTEMI, CAD with previous coronary stents, HTN  Course has been complicated by PEA arrest with ventricular fibrillation on POD 0 with ROSC after defibrillation x3, 2 mg epinephrine and one round of CPR. Was taken to cath lab where radial artery graft was found to be occluded. PCI with DANA placement to mid RCA and angioplasty of the rPLB was performed. Echocardiogram at the conclusion of PCI demonstrated LVEF 40-45% with mild to moderate global hypokinesis of the left ventricle with severe hypokinesis of the mid anterior and anteroseptal walls, the entire inferior wall and all apical segments of the left ventricle; no pericardial effusion. Additionally, due to antiplatelet therapy post PCI, patient developed epistaxis during attempted NG placement requiring placement of b/l rhino rockets. Now, diffuse severe encephalopathy with minimal neurologic response.      POD #12 s/p:  1. Coronary artery bypass grafting x 4 (radial artery graft to the right posterior descending coronary artery, reversed saphenous vein graft to the obtuse marginal branch of the left circumflex coronary artery, reversed saphenous vein graft to the first diagonal branch of the left anterior descending coronary artery, and pedicled left internal mammary artery to left anterior descending coronary artery).  2.  Endoscopic vein harvest of the greater saphenous vein from the left lower extremity.  3.  Endoscopic left radial artery harvest on 3/30/22 with Dr. Butterfield.     CVS:   NSTEMI and severe multivessel CAD s/p CABG x4 with radial grafts as  above  HTN  PEA and Vfib arrest s/p ROSC and DANA to mid RCA and angioplasty of the rPLB on 3/31  HD stable, lends to HTN; NSR rates 70-80s  - Goal MAP >65, SBP <140  - Amlodipine 5 mg daily for radial graft (at least 3-6 months)  - Carvedilol 25 mg BID  - PRN hydralazine available  - Brilinta load and daily per Cardiology, tolerated 500U straight rate heparin will transition to LIH for 48 hrs, HIH tomorrow  - Transitioned amiodarone gtt to PO taper per EP/Cards recs  - ASA 81 daily  - Atorvastatin 80 mg daily  - CTs removed 4/3. TPWs removed 4/1     Resp:   Postoperative ventilator management  - Current vent settings:  Vent Mode: CMV/AC  (Continuous Mandatory Ventilation/ Assist Control) weaning in CPAP  FiO2 (%): 30 %  Resp Rate (Set): 14 breaths/min  Tidal Volume (Set, mL): 450 mL  PEEP (cm H2O): 5 cmH2O  Resp: 13  - Intubated for airway protection due to encephalopathy  - Titrate O2/peep to maintain sats >92%  - PST as tolerated  - Dr. Butterfield ok with trach POD 10-14, considering Tuesday if no further improvement, consults to gen surgery/thoracic today     Neuro:    Acute postoperative pain  Hx migraines  Encephalopathy   Did receive CPR on 3/31, will likely have pain from this also  Propofol turned off on 3/31 at 9 am, starting to open eyes more, unable to move extremities but withdraws BLE to pain, nothing in BUEs yet. CT head 3/31 negative for hemorrhage; CTA negative for strokes, vessels patent; per neurology, MRI stable w/o acute concern to explain clinical status.  EEG without e/o seizure activity, continues to have moderate to severe diffuse encephalopathy   4/5 EEG improved but c/f new spikes not c/w seizures but tendency to seize  MRI head and c-spine on 4/3, 4/8 negative  - PRN dilaudid available  - PRN oxycodone and methocarbamol available  - Scheduled tylenol   - Neuro critical care consulted and following, greatly appreciate recommendations  - Keppra added 4/5  - More eye tracking, yawned a few  times, but does not blink to threat or follow commands     Renal/Electrolyte:   Lactic acidosis, resolved  Volume overload  Hypernatremia, resolved  Creat stable, below preop weight, much less edema;   Na/K 4.4 stable  - Strict I/O, daily weights  - Avoid/limit nephrotoxins as able  - Replete lytes per protocol  - No further diuresis  - Continue FWF decrease to 30 ml q 6 hrs<60 mL q6h     GI/Nutrition:    Epistaxis, resolved  Elevated LFTs, likely shock liver 2/2 arrest, resolved  Orders Placed This Encounter      NPO for Medical/Clinical Reasons Except for: Other; Specify: NPO until Extubated      Advance Diet as Tolerated: Clear Liquid Diet  - S/P NJ placement on  in radiology, PEG tuesday  - Nutrition following, enteral feeds at goal, absorbing well  - Rhino rockets removed, continues to have some oozing from b/l nares  - Continue bowel regimen, + BMs  - PPI     :    - External wick in place for strict I/O  - Urine +, start Bactrim bid for 3 days, repeat UA still shows infection, continue bacterim? Will discuss with ICU team     Endo:  Stress induced hyperglycemia  Thyroid nodule   Preop A1c 5.7%  - Transitioned to sliding scale insulin   - Goal BG <180 for optimal healing  - Thyroid nodule noted on CT on 3/31, will have this worked up on an outpatient basis     ID:  Stress induced leukocytosis, recurrent  E.Coli UTI preoperatively  WBC 15.9<16.0<15.2<15.7<14.7<10.4, Temp (24hrs), Av.6  F (37  C), Min:98.5  F (36.9  C), Max:98.8  F (37.1  C); new leukocytosis, respiratory culture ordered/pan culturing per intensivist   - Completed perioperative ABX, Bactrim for UTI  - Continue to follow fever curve, WBC and inflammatory markers as appropriate  - Repeat UA  without overt e/o infection     Heme:  Acute blood loss anemia  Acute blood loss thrombocytopenia  Hgb 7.8<8.3<7.9<7.8<8.1, Plts 327; no s/sx active bleeding  - CBC in AM  - Goal Hgb >7  - Consulted hematology given severe premature CAD and acute  "graft failure- appreciate recommendations. Multiple labs sent. Hypercoagulable workup appears to be negative but recommending repeat levels once acute issues resolve. Currently recommending antiplatelet and full anticoagulation (therapeutic heparin). D/w Dr. Butterfield. Plan will be straight rate heparin at 500 units/hr now transitioned LIH gtt, consider transition to HIH once trach/PEG placed     - Proph: PPI, subcutaneous heparin, SCDs     - Dispo: Continue in ICU; Family would like patient moved to [place] if/when trach established to be closer to daughter. SW consulted.     - Lines: Cooper, PIV, ETT, NJ          Interval History:   Sitting up in chair, weaning on vent, not following commands, furrows brow to suctioning, noted movements in all extremities          Medications:       acetaminophen  975 mg Oral or Feeding Tube Q6H     amiodarone  200 mg Oral or Feeding Tube Daily     amLODIPine  5 mg Oral or Feeding Tube Daily     artificial tears   Both Eyes Q4H     aspirin  81 mg Oral or NG Tube Daily     atorvastatin  80 mg Oral or Feeding Tube Daily     carvedilol  25 mg Oral or Feeding Tube BID w/meals     chlorhexidine  15 mL Mouth/Throat Q12H     insulin aspart  1-6 Units Subcutaneous Q4H     levETIRAcetam  1,000 mg Intravenous Q12H     multivitamins w/minerals  15 mL Per Feeding Tube Daily     pantoprazole  40 mg Oral or NG Tube Daily    Or     pantoprazole  40 mg Oral Daily     sodium chloride (PF)  3 mL Intracatheter Q8H     [Held by provider] ticagrelor  90 mg Oral or Feeding Tube BID     @[email or web address]-@          Physical Exam:   Vitals were reviewed  Blood pressure 105/68, pulse 70, temperature 98.6  F (37  C), temperature source Axillary, resp. rate 9, height 1.651 m (5' 5\"), weight 93.5 kg (206 lb 2.1 oz), last menstrual period 02/27/2022, SpO2 100 %.  Rhythm: NSR    Lungs: course    Cardiovascular: s1/s2, no m/r/g    Abdomen: s/nt/nd    Extremeties: trace LE edema    Incision: cdi    CT: na    Weight: "   Vitals:    04/07/22 0500 04/08/22 0100 04/09/22 0600 04/10/22 0600   Weight: 94.1 kg (207 lb 7.3 oz) 94.3 kg (207 lb 14.3 oz) 94.4 kg (208 lb 1.8 oz) 94.9 kg (209 lb 3.5 oz)    04/11/22 0600   Weight: 93.5 kg (206 lb 2.1 oz)            Data:   Labs:   Lab Results   Component Value Date    WBC 15.9 04/11/2022     Lab Results   Component Value Date    RBC 3.11 04/11/2022     Lab Results   Component Value Date    HGB 7.8 04/11/2022     Lab Results   Component Value Date    HCT 25.3 04/11/2022     No components found for: MCT  Lab Results   Component Value Date    MCV 81 04/11/2022     Lab Results   Component Value Date    MCH 25.1 04/11/2022     Lab Results   Component Value Date    MCHC 30.8 04/11/2022     Lab Results   Component Value Date    RDW 18.3 04/11/2022     Lab Results   Component Value Date     04/11/2022       Last Basic Metabolic Panel:  Lab Results   Component Value Date     04/11/2022      Lab Results   Component Value Date    POTASSIUM 4.6 04/11/2022     Lab Results   Component Value Date    CHLORIDE 105 04/11/2022     Lab Results   Component Value Date    TOMAS 9.1 04/11/2022     Lab Results   Component Value Date    CO2 23 04/11/2022     Lab Results   Component Value Date    BUN 23 04/11/2022     Lab Results   Component Value Date    CR 0.96 04/11/2022     Lab Results   Component Value Date     04/11/2022     04/11/2022       Ngozi Bocanegra PA-C  Pager #: [phone]

## 2022-04-11 NOTE — PROGRESS NOTES
CLINICAL NUTRITION SERVICES - REASSESSMENT NOTE      Recommendations Ordered by Registered Dietitian (RD):   Will cancel MVI-Liquid daily as not needed.   Malnutrition:  (4/1)  % Weight Loss:  Unable to determine without recent weight history  % Intake:  Unable to determine without nutrition history  Subcutaneous Fat Loss:  None observed  Muscle Loss:  None observed  Fluid Retention:  Mild      Malnutrition Diagnosis: Unable to determine due to lack of nutrition history and recent weight history       EVALUATION OF PROGRESS TOWARD GOALS   Diet:  NPO on vent    Nutrition Support:  TF continues at goal as below:    Nutrition Support Enteral:  Type of Feeding Tube: Nasoduodenal   Enteral Frequency:  Continuous  Enteral Regimen: Promote (no fiber) at 65 mL/hr  Total Enteral Provisions: 1560 kcal (15 kcal/kg), 97 g protein (1.7 g/kg), 1309 mL H2O, 203 g CHO, no fbier   Free Water Flush: 30 mL every 6 hours  MVI-Liquid daily via FT    Intake/Tolerance:    Stool Pattern:  4/8:  x3  4/9:  x1  4/10:  x2  4/11:  x1  Phos 46 (H), Mg 2.5 (H)  BGM: 140, 144, 129, 116, 131, 135 - acceptable. Medium Resistant sliding scale insulin for glycemic control.  I/O: 2321/1500. Wt: 93.5 kg (down 3.4 kg since admit and up 0.5 kg since 4/5). Pt with 1+ trace generalized edema.     ASSESSED NUTRITION NEEDS:  Dosing Weight: 93 kg (4/5 wt for energy), 56.8 kg (IBW for protein)   Estimated Energy Needs: 9144-8911 kcals (14-17 Kcal/Kg)  Justification: obese and vented  Estimated Protein Needs:  grams protein (1.5-1.8 g pro/Kg)  Justification: post-op, hypercatabolism with critical illness, obesity guidelines  and preservation of lean body mass     NEW FINDINGS:   TF will be held at [facility] for procedures tomorrow (trach and PEG).    Previous Goals (4/8):   TF regimen will continue to meet % needs   Evaluation: Met    Previous Nutrition Diagnosis (4/8):   No nutrition diagnosis identified at this time  Evaluation: No change      CURRENT  NUTRITION DIAGNOSIS  No nutrition diagnosis identified at this time     INTERVENTIONS  Recommendations / Nutrition Prescription  Continue same TF as above    Will cancel MVI-Liquid daily as not needed.    Implementation  Collaboration and Referral of Nutrition care - Pt was discussed during ICU interdisciplinary rounds this morning.    Goals  TF Promote at 65 mL/hr will continue to meet % estimated needs    MONITORING AND EVALUATION:  Progress towards goals will be monitored and evaluated per protocol and Practice Guidelines    Jazmin Conroy, KEIRA, LD, CNSC

## 2022-04-11 NOTE — PROGRESS NOTES
[facility] ICU RESPIRATORY NOTE        Date of Admission: 3/27/2022    Date of Intubation (most recent):  3/30/22    Reason for Mechanical Ventilation: AW protection    Number of Days on Mechanical Ventilation: 13    Met Criteria for Spontaneous Breathing Trial: Yes PS 8/5 30% since 1000    Significant Events Today:  None    ABG Results: No lab results found in last 7 days.    Current Vent Settings: Vent Mode: PS  (Pressure Support)  FiO2 (%): 30 %  Resp Rate (Set): 14 breaths/min  Tidal Volume (Set, mL): 450 mL  PEEP (cm H2O): 5 cmH2O  Pressure Support (cm H2O): 8 cmH2O  Resp: 16        Plan: Pt to remain on full vent support overnight    Rafal Traore, RT

## 2022-04-11 NOTE — PROGRESS NOTES
[facility] ICU RESPIRATORY NOTE        Date of Admission: 3/27/2022    Date of Intubation (most recent): 3/30/22    Reason for Mechanical Ventilation: Airway protection    Number of Days on Mechanical Ventilation: 12    Met Criteria for Spontaneous Breathing Trial: Yes -  %    Reason for No Spontaneous Breathing Trial:     Significant Events Today: None    ABG Results: No lab results found in last 7 days.    Current Vent Settings: Vent Mode: CMV/AC  (Continuous Mandatory Ventilation/ Assist Control)  FiO2 (%): 30 %  Resp Rate (Set): 14 breaths/min  Tidal Volume (Set, mL): 450 mL  PEEP (cm H2O): 5 cmH2O  Pressure Support (cm H2O): 10 cmH2O  Resp: 14        Nikolai Lujan, RT

## 2022-04-11 NOTE — PLAN OF CARE
Goal Outcome Evaluation:    Pt does not follow commands- squeeze hands/move feet.  Possibly closing eyes/opens eyes to commands.  Up to chair x 2 hours today. Tolerated well.  Jarrett at bedside, supportive of care. Plan for PEG/trach tomorrow.  Call out to surgery for when they would like heparin stopped for procedure. Cont to monitor.

## 2022-04-11 NOTE — PROGRESS NOTES
Continues to be off sedation and meds that may cause sedation. Drowsy but spontaneously opens eyes. Does not follow commands. GARCIA spontaneously but not purposeful. Less movement on LUE, LLE than right. Multiple bed changes, voided x3-incontinent, 1 large loose BM. Copious oral secretions.

## 2022-04-11 NOTE — PROGRESS NOTES
Critical Care Progress Note      04/11/2022    Name: Angelica Robledo MRN#: [id number]   Age: 51 year old YOB: 1970     [facility] Day# 15                 Problem List:   Active Problems:    NSTEMI (non-ST elevated myocardial infarction) (H)    Hypertensive emergency    Paroxysmal ventricular tachycardia (H)           Summary/Hospital Course:   Angelica Robledo is a 51 year old female with pertinent PMHx of prior MI at age 24-25 (in 1996) s/p angioplasty, HTN, anemia, and NSTEMI.  She presented on 3/27 with new onset chest pain similar to her previous MI.  Workup was consistent with NSTEMI.  Cardiac cath demonstrated severe multivessel CAD.  She was seen by CT surgery and CABG was scheduled for 3/30.  She underwent CABG x4 left radial, LIMA, left saph vein x2 (FAUSTINO taken down, but elected to not be used due to size).    No family history of clotting disorders.  No known history of DVT/PE/stroke. Siblings without cardiovascular disease. Father passed away of cardiac causes at an advanced age.  Mother had cardiac disease as well as an aortic aneurysm, but not at a young age.     4V CABG as follows: left radial artery graft to the right posterior descending coronary artery, reversed saphenous vein graft to the obtuse marginal branch of the left circumflex coronary artery, reversed saphenous vein graft to the first diagonal branch of the left anterior descending coronary artery, and pedicled left internal mammary artery to left anterior descending coronary.    POD 0 - initial hypoxia resolved with vent changes.  At 2355 patient suffered a PEA and Vfib arrest and underwent 3 shocks, 2 rounds of epi, and 1 round of CPR. ROSC obtained at 0005.  300 mg amiodarone bolus administered.  STAT cardiac cath lab performed demonstrating LIMA-LAD, VG-LADD1, and VG-LPLB were patent. The left radial artery to RDPA graft had an occlusion.  The mid RCA was treated with a DANA stent and a balloon angioplasty performed at the [facility].   Excellent flow was noted on completion coronary angiogram.  Echo demonstrated a drop in LVEF to 40-45% with aspects of hypokinesis. RV demonstrated mild-moderate RV function reduction.    POD1 - weaned from sedation with minimal arousal limited to briefly opening eyes to pain and occasionally sound, minimal extremity pain response.  CT head/CTA head and neck unremarkable for acute insult.  Ceribell placed overnight.    PD2-5: MRI x2 obtained demonstrating punctate foci of early subacute ischemic change. EEG waxes and wanes from mod-severe to severe diffuse slowing. With mixed workup results, neither of these findings portent a poor prognosis specifically; however, prognosis remains unclear.    POD6-current: Gradual clinical exam improvement. Possibility of trach and PEG introduced. 4/7 MRI without new findings from previous.          Assessment and plan :     Angelica Robledo IS a 51 year old female admitted on 3/27/2022 for chest pain with workup demonstrating severe multivessel CAD.  Patient underwent CABG x4 on 3/30.  POD#0 patient had a PEA arrest with ROSC obtained at 10 minutes.  Emergent coronary cath demonstrated radial artery occlusion.  A DANA was placed into the RCA and balloon angioplasty of the RPLB was performed with excellent completion angiography flow.     I have personally reviewed the daily labs, imaging studies, cultures and discussed the case with referring physician and consulting physicians.     My assessment and plan by system for this patient is as follows:    Neurology/Psychiatry:   1. Analgesia -- tylenol, prn narcotics   2. Sedation -- hold all sedation  3. Encephalopathy s/p cardiac arrest - likely anoxic injury -- head CT negative -- MRI w/ punctate foci of subacute ischemic change -- MRI C spine negative -- continuing EEG: diffuse slowing with gradual improvement, moderate-severe encephalpathy, no indices of seizure activity however is at risk.  -- NCC following, appreciate  management/recs.  -- Keppra for seizure ppx (no seizures per EEG)  -- Stable clinical exam, gradual improvements  -- Anticipate prolonged recovery course requiring trach and PEG - plan for 4/12     Cardiovascular:   1.  S/p CABG x4 - left radial, LIMA, left saph vein x2 (FAUSTINO taken down, but elected to not be used due to size)   2. POD#0 PEA and Vfib arrest -- s/p cardiac cath demonstrated radial artery graft to RPDA occlusion -- intervention with DANA to RCA and balloon angioplasty RPLA -- on Kangrelor 3/31, now transitioned to aspirin and Brilinta with discussion per CV surgery and interventional cardiology  -- cards recommends Amiodarone taper -- PO regimen scheduled  -- Brilinta needs to be continued for a minimum of 1 month w/ DANA  3. Hx of MI at age 24-25  4. HTN - likely untreated prior to hospitalization - presented with hypertension emergency - PO regimen     Pulmonary/Ventilator Management:   1. Airway -- intubated -- low vent settings required -- tolerates pressure support  2. No underlying pulmonary problems on medical history or cxr.   -- Long term: if her encephalopathy is not able to recover this week, a tracheostomy would be indicated. CV surgery would like to wait until 10-14 days post op -- tentative plan of trach and PEG early next week     GI and Nutrition :   1. Epistaxis - s/p rhino rocket removal - oozing has resolved  2. Nutrition - NJ present -- Continue enteral feeds at goal, FWF.  3. PPI ppx    Hold enteral feeds at midnight.    Renal/Fluids/Electrolytes:   1. Monitor UOP/creatinine -- stable labs   2. Replete electrolytes PRN per protocol  3. External wick device     Infectious Disease:   1. UTI with E coli on admission -- 4/1 negative on recheck   2. Uncomplicated UTI -- 4/7 UA positive for UTI for E. coli-- Bactrim DS started for 3 day course - completed.  Monitor for fever, leukocytosis if longer duration. F/u 4/10 urine culture repeat.  2. Leukocytosis, trend -- stable 15-16 range for  several days     Endocrine:   1. Monitor for stress induced hyperglycemia. ICU insulin protocol, goal sugar <180   2. Incidental thyroid nodule -- follow-up outpatient per discharge referral     Hematology/Oncology:   1. Acute blood loss on chronic anemia -- stable, trend  2. Acute blood loss thrombocytopenia -- resolved, trend.  3. S/P DANA placement s/p cardiac arrest -- aspirin and brilinta  4. Rule out hypercoagulable state -- workup negative; however, recommends full dose anticoagulation in setting of thrombus -- discussed with CV: now at high dose heparin gtt following titration up.     - hold heparin gtt 6 hours prior to procedure or as preferred by surgical teams performing procedure  - continue Brilinta s/p DANA on 3/31    IV/Access:    1. Venous access - peripheral  2. Arterial access - NA    ICU Prophylaxis:   1. DVT: hep gtt/mechanical  2. VAP: HOB 30 degrees, chlorhexidine rinse  3. Stress Ulcer: PPI  4. Restraints: No current indication. Will readdress daily.   5. Wound care - per unit routine   6. Feeding - enteral feeds  7. Family Update: family at bedside  8. Disposition - ICU       Key goals for next 24 hours:   1. Continue supportive cares  2. Trach, possible PEG, planned for tomorrow         Interim History:   No acute events  No significant changes. Gradual neuro improvements.         Key Medications:       acetaminophen  975 mg Oral or Feeding Tube Q6H     amiodarone  200 mg Oral or Feeding Tube Daily     amLODIPine  5 mg Oral or Feeding Tube Daily     artificial tears   Both Eyes Q4H     aspirin  81 mg Oral or NG Tube Daily     atorvastatin  80 mg Oral or Feeding Tube Daily     carvedilol  25 mg Oral or Feeding Tube BID w/meals     chlorhexidine  15 mL Mouth/Throat Q12H     insulin aspart  1-6 Units Subcutaneous Q4H     levETIRAcetam  1,000 mg Intravenous Q12H     multivitamins w/minerals  15 mL Per Feeding Tube Daily     pantoprazole  40 mg Oral or NG Tube Daily    Or     pantoprazole  40 mg  Oral Daily     sodium chloride (PF)  3 mL Intracatheter Q8H     [Held by provider] ticagrelor  90 mg Oral or Feeding Tube BID       heparin 1,200 Units/hr (04/11/22 0753)     - MEDICATION INSTRUCTIONS -       Percutaneous Coronary Intervention orders placed (this is information for BPA alerting)       sodium chloride 10 mL/hr at 04/10/22 0902               Physical Examination:   Temp:  [98.5  F (36.9  C)-98.8  F (37.1  C)] 98.6  F (37  C)  Pulse:  [65-73] 66  Resp:  [9-20] 14  BP: ()/(58-77) 101/71  FiO2 (%):  [30 %] 30 %  SpO2:  [95 %-100 %] 100 %    Intake/Output Summary (Last 24 hours) at 3/31/2022 0921  Last data filed at 3/31/2022 0700  Gross per 24 hour   Intake 3676.54 ml   Output 2825 ml   Net 851.54 ml     Wt Readings from Last 4 Encounters:   04/11/22 93.5 kg (206 lb 2.1 oz)     BP - Mean:  [64-91] 78  Vent Mode: PS  (Pressure Support)  FiO2 (%): 30 %  Resp Rate (Set): 14 breaths/min  Tidal Volume (Set, mL): 450 mL  PEEP (cm H2O): 5 cmH2O  Pressure Support (cm H2O): 8 cmH2O  Resp: 14    No lab results found in last 7 days.    GEN: no acute distress   HEENT: head ncat, sclera anicteric, ETT present   PULM: unlabored, synchronous with vent, clear anteriorly    CV/Chest: RRR, S1S2 no gallop,  No rub, no murmur appreciated. Midline incision CDI.   ABD: soft, nontender  EXT:  Trace edema,  warm  NEURO: Off sedation. Opens eyes to stimulus and verbal stimulus. Pupillary light reflex brisk. Does not blink to threat. Cough reflex present. Corneal reflex present.  BLE withdrawal R>L. BUE pain reflex withdrawal. Did not follow commands on my exam, reported she is starting to follow however.   : external device present  SKIN: no obvious rash  LINES: clean, dry intact         Data:   All data and imaging reviewed     ROUTINE ICU LABS (Last four results)  CMP  Recent Labs   Lab 04/11/22  0752 04/11/22  0551 04/11/22  0355 04/11/22  0011 04/10/22  0848 04/10/22  0552 04/09/22  0749 04/09/22  0423 04/08/22  0839  04/08/22  0531 04/06/22  0745 04/06/22  0440   NA  --  136  --   --   --   --   --  139  --  140  --  144   POTASSIUM  --  4.6  --   --   --  4.4  --  4.5  --  4.2  4.3  --  4.1   CHLORIDE  --  105  --   --   --   --   --  107  --  108  --  112*   CO2  --  23  --   --   --   --   --  25  --  25  --  29   ANIONGAP  --  8  --   --   --   --   --  7  --  7  --  3   * 136* 116* 129*   < >  --    < > 138*   < > 137*   < > 151*   BUN  --  23  --   --   --   --   --  18  --  21  --  28   CR  --  0.96  --   --   --   --   --  0.82  --  0.84  --  0.95   GFRESTIMATED  --  72  --   --   --   --   --  87  --  84  --  73   TOMAS  --  9.1  --   --   --   --   --  8.9  --  8.9  --  9.5   MAG  --  2.5*  --   --   --  2.3  --  2.2  --  2.5*   < > 2.7*   PHOS  --  4.6*  --   --   --  3.9  --  3.9  --  4.2   < > 3.9    < > = values in this interval not displayed.     CBC  Recent Labs   Lab 04/11/22  0551 04/10/22  2314 04/10/22  0552 04/09/22 2125   WBC 15.9* 15.7* 16.0* 16.0*   RBC 3.11* 3.14* 3.35* 3.41*   HGB 7.8* 7.7* 8.3* 8.5*   HCT 25.3* 25.6* 27.8* 28.5*   MCV 81 82 83 84   MCH 25.1* 24.5* 24.8* 24.9*   MCHC 30.8* 30.1* 29.9* 29.8*   RDW 18.3* 18.3* 18.0* 17.7*    357 366 358     INR  No lab results found in last 7 days.  Arterial Blood Gas  No lab results found in last 7 days.    All cultures:  No results for input(s): CULT in the last 168 hours.  No results found for this or any previous visit (from the past 24 hour(s)).        Adi Bazan MD  Surgical Critical Care Fellow

## 2022-04-12 ENCOUNTER — ANESTHESIA (OUTPATIENT)
Dept: SURGERY | Facility: CLINIC | Age: 52
End: 2022-04-12
Payer: COMMERCIAL

## 2022-04-12 ENCOUNTER — ANESTHESIA EVENT (OUTPATIENT)
Dept: SURGERY | Facility: CLINIC | Age: 52
End: 2022-04-12
Payer: COMMERCIAL

## 2022-04-12 LAB
ABO/RH(D): NORMAL
ANTIBODY SCREEN: NEGATIVE
BACTERIA BLD CULT: NO GROWTH
BACTERIA BLD CULT: NO GROWTH
ERYTHROCYTE [DISTWIDTH] IN BLOOD BY AUTOMATED COUNT: 18.4 % (ref 10–15)
GLUCOSE BLDC GLUCOMTR-MCNC: 113 MG/DL (ref 70–99)
GLUCOSE BLDC GLUCOMTR-MCNC: 129 MG/DL (ref 70–99)
GLUCOSE BLDC GLUCOMTR-MCNC: 68 MG/DL (ref 70–99)
GLUCOSE BLDC GLUCOMTR-MCNC: 79 MG/DL (ref 70–99)
GLUCOSE BLDC GLUCOMTR-MCNC: 84 MG/DL (ref 70–99)
GLUCOSE BLDC GLUCOMTR-MCNC: 88 MG/DL (ref 70–99)
GLUCOSE BLDC GLUCOMTR-MCNC: 91 MG/DL (ref 70–99)
HCT VFR BLD AUTO: 26.7 % (ref 35–47)
HGB BLD-MCNC: 7.8 G/DL (ref 11.7–15.7)
MAGNESIUM SERPL-MCNC: 2.5 MG/DL (ref 1.6–2.3)
MCH RBC QN AUTO: 24.6 PG (ref 26.5–33)
MCHC RBC AUTO-ENTMCNC: 29.2 G/DL (ref 31.5–36.5)
MCV RBC AUTO: 84 FL (ref 78–100)
PHOSPHATE SERPL-MCNC: 4.7 MG/DL (ref 2.5–4.5)
PLATELET # BLD AUTO: 350 10E3/UL (ref 150–450)
POTASSIUM BLD-SCNC: 4.6 MMOL/L (ref 3.4–5.3)
RBC # BLD AUTO: 3.17 10E6/UL (ref 3.8–5.2)
SPECIMEN EXPIRATION DATE: NORMAL
UFH PPP CHRO-ACNC: 0.28 IU/ML
WBC # BLD AUTO: 14 10E3/UL (ref 4–11)

## 2022-04-12 PROCEDURE — 250N000013 HC RX MED GY IP 250 OP 250 PS 637: Performed by: PHYSICIAN ASSISTANT

## 2022-04-12 PROCEDURE — 99291 CRITICAL CARE FIRST HOUR: CPT | Mod: 24 | Performed by: SURGERY

## 2022-04-12 PROCEDURE — 250N000009 HC RX 250: Performed by: THORACIC SURGERY (CARDIOTHORACIC VASCULAR SURGERY)

## 2022-04-12 PROCEDURE — 36415 COLL VENOUS BLD VENIPUNCTURE: CPT | Performed by: PHYSICIAN ASSISTANT

## 2022-04-12 PROCEDURE — 999N000157 HC STATISTIC RCP TIME EA 10 MIN

## 2022-04-12 PROCEDURE — 85027 COMPLETE CBC AUTOMATED: CPT | Performed by: NURSE PRACTITIONER

## 2022-04-12 PROCEDURE — 83735 ASSAY OF MAGNESIUM: CPT | Performed by: NURSE PRACTITIONER

## 2022-04-12 PROCEDURE — 86901 BLOOD TYPING SEROLOGIC RH(D): CPT | Performed by: PHYSICIAN ASSISTANT

## 2022-04-12 PROCEDURE — 258N000001 HC RX 258: Performed by: PHYSICIAN ASSISTANT

## 2022-04-12 PROCEDURE — 258N000003 HC RX IP 258 OP 636: Performed by: REGISTERED NURSE

## 2022-04-12 PROCEDURE — 250N000011 HC RX IP 250 OP 636: Performed by: PHYSICIAN ASSISTANT

## 2022-04-12 PROCEDURE — 94002 VENT MGMT INPAT INIT DAY: CPT

## 2022-04-12 PROCEDURE — 84100 ASSAY OF PHOSPHORUS: CPT | Performed by: NURSE PRACTITIONER

## 2022-04-12 PROCEDURE — 43246 EGD PLACE GASTROSTOMY TUBE: CPT | Performed by: SURGERY

## 2022-04-12 PROCEDURE — 0B110F4 BYPASS TRACHEA TO CUTANEOUS WITH TRACHEOSTOMY DEVICE, OPEN APPROACH: ICD-10-PCS | Performed by: THORACIC SURGERY (CARDIOTHORACIC VASCULAR SURGERY)

## 2022-04-12 PROCEDURE — 250N000011 HC RX IP 250 OP 636: Performed by: PSYCHIATRY & NEUROLOGY

## 2022-04-12 PROCEDURE — 99252 IP/OBS CONSLTJ NEW/EST SF 35: CPT | Mod: 25 | Performed by: SURGERY

## 2022-04-12 PROCEDURE — 94003 VENT MGMT INPAT SUBQ DAY: CPT

## 2022-04-12 PROCEDURE — 86923 COMPATIBILITY TEST ELECTRIC: CPT | Performed by: PEDIATRICS

## 2022-04-12 PROCEDURE — 200N000001 HC R&B ICU

## 2022-04-12 PROCEDURE — 250N000013 HC RX MED GY IP 250 OP 250 PS 637: Performed by: STUDENT IN AN ORGANIZED HEALTH CARE EDUCATION/TRAINING PROGRAM

## 2022-04-12 PROCEDURE — 84132 ASSAY OF SERUM POTASSIUM: CPT | Performed by: INTERNAL MEDICINE

## 2022-04-12 PROCEDURE — 272N000001 HC OR GENERAL SUPPLY STERILE: Performed by: THORACIC SURGERY (CARDIOTHORACIC VASCULAR SURGERY)

## 2022-04-12 PROCEDURE — 250N000013 HC RX MED GY IP 250 OP 250 PS 637: Performed by: NURSE PRACTITIONER

## 2022-04-12 PROCEDURE — 85520 HEPARIN ASSAY: CPT | Performed by: INTERNAL MEDICINE

## 2022-04-12 PROCEDURE — 250N000025 HC SEVOFLURANE, PER MIN: Performed by: THORACIC SURGERY (CARDIOTHORACIC VASCULAR SURGERY)

## 2022-04-12 PROCEDURE — 370N000017 HC ANESTHESIA TECHNICAL FEE, PER MIN: Performed by: THORACIC SURGERY (CARDIOTHORACIC VASCULAR SURGERY)

## 2022-04-12 PROCEDURE — 36415 COLL VENOUS BLD VENIPUNCTURE: CPT | Performed by: NURSE PRACTITIONER

## 2022-04-12 PROCEDURE — 250N000013 HC RX MED GY IP 250 OP 250 PS 637: Performed by: SURGERY

## 2022-04-12 PROCEDURE — 86923 COMPATIBILITY TEST ELECTRIC: CPT

## 2022-04-12 PROCEDURE — 0DH63UZ INSERTION OF FEEDING DEVICE INTO STOMACH, PERCUTANEOUS APPROACH: ICD-10-PCS | Performed by: SURGERY

## 2022-04-12 PROCEDURE — 250N000009 HC RX 250: Performed by: REGISTERED NURSE

## 2022-04-12 PROCEDURE — 250N000011 HC RX IP 250 OP 636: Performed by: REGISTERED NURSE

## 2022-04-12 PROCEDURE — 360N000075 HC SURGERY LEVEL 2, PER MIN: Performed by: THORACIC SURGERY (CARDIOTHORACIC VASCULAR SURGERY)

## 2022-04-12 RX ORDER — LIDOCAINE HYDROCHLORIDE 10 MG/ML
INJECTION, SOLUTION EPIDURAL; INFILTRATION; INTRACAUDAL; PERINEURAL PRN
Status: DISCONTINUED | OUTPATIENT
Start: 2022-04-12 | End: 2022-04-12 | Stop reason: HOSPADM

## 2022-04-12 RX ORDER — HEPARIN SODIUM 10000 [USP'U]/100ML
0-5000 INJECTION, SOLUTION INTRAVENOUS CONTINUOUS
Status: DISCONTINUED | OUTPATIENT
Start: 2022-04-12 | End: 2022-04-17

## 2022-04-12 RX ORDER — MAGNESIUM HYDROXIDE 1200 MG/15ML
LIQUID ORAL PRN
Status: DISCONTINUED | OUTPATIENT
Start: 2022-04-12 | End: 2022-04-12 | Stop reason: HOSPADM

## 2022-04-12 RX ORDER — FENTANYL CITRATE 50 UG/ML
INJECTION, SOLUTION INTRAMUSCULAR; INTRAVENOUS PRN
Status: DISCONTINUED | OUTPATIENT
Start: 2022-04-12 | End: 2022-04-12

## 2022-04-12 RX ORDER — ONDANSETRON 2 MG/ML
INJECTION INTRAMUSCULAR; INTRAVENOUS PRN
Status: DISCONTINUED | OUTPATIENT
Start: 2022-04-12 | End: 2022-04-12

## 2022-04-12 RX ORDER — PROPOFOL 10 MG/ML
INJECTION, EMULSION INTRAVENOUS PRN
Status: DISCONTINUED | OUTPATIENT
Start: 2022-04-12 | End: 2022-04-12

## 2022-04-12 RX ADMIN — FENTANYL CITRATE 50 MCG: 50 INJECTION, SOLUTION INTRAMUSCULAR; INTRAVENOUS at 12:14

## 2022-04-12 RX ADMIN — FENTANYL CITRATE 50 MCG: 50 INJECTION, SOLUTION INTRAMUSCULAR; INTRAVENOUS at 11:46

## 2022-04-12 RX ADMIN — ACETAMINOPHEN 975 MG: 325 TABLET, FILM COATED ORAL at 18:09

## 2022-04-12 RX ADMIN — CHLORHEXIDINE GLUCONATE 15 ML: 1.2 RINSE ORAL at 08:25

## 2022-04-12 RX ADMIN — PROPOFOL 100 MG: 10 INJECTION, EMULSION INTRAVENOUS at 11:46

## 2022-04-12 RX ADMIN — TICAGRELOR 90 MG: 90 TABLET ORAL at 18:09

## 2022-04-12 RX ADMIN — PHENYLEPHRINE HYDROCHLORIDE 100 MCG: 10 INJECTION INTRAVENOUS at 12:34

## 2022-04-12 RX ADMIN — CHLORHEXIDINE GLUCONATE 15 ML: 1.2 RINSE ORAL at 19:36

## 2022-04-12 RX ADMIN — CARVEDILOL 25 MG: 25 TABLET, FILM COATED ORAL at 18:09

## 2022-04-12 RX ADMIN — TICAGRELOR 90 MG: 90 TABLET ORAL at 04:33

## 2022-04-12 RX ADMIN — PHENYLEPHRINE HYDROCHLORIDE 100 MCG: 10 INJECTION INTRAVENOUS at 12:45

## 2022-04-12 RX ADMIN — LEVETIRACETAM 1000 MG: 10 INJECTION INTRAVENOUS at 15:55

## 2022-04-12 RX ADMIN — ROCURONIUM BROMIDE 50 MG: 50 INJECTION, SOLUTION INTRAVENOUS at 11:47

## 2022-04-12 RX ADMIN — LEVETIRACETAM 1000 MG: 10 INJECTION INTRAVENOUS at 03:41

## 2022-04-12 RX ADMIN — PHENYLEPHRINE HYDROCHLORIDE 100 MCG: 10 INJECTION INTRAVENOUS at 11:49

## 2022-04-12 RX ADMIN — OXYCODONE HYDROCHLORIDE 5 MG: 5 TABLET ORAL at 00:24

## 2022-04-12 RX ADMIN — PHENYLEPHRINE HYDROCHLORIDE 200 MCG: 10 INJECTION INTRAVENOUS at 11:56

## 2022-04-12 RX ADMIN — ONDANSETRON 4 MG: 2 INJECTION INTRAMUSCULAR; INTRAVENOUS at 12:07

## 2022-04-12 RX ADMIN — ACETAMINOPHEN 975 MG: 325 TABLET, FILM COATED ORAL at 00:25

## 2022-04-12 RX ADMIN — MIDAZOLAM 2 MG: 1 INJECTION INTRAMUSCULAR; INTRAVENOUS at 11:46

## 2022-04-12 RX ADMIN — DEXTROSE MONOHYDRATE 25 ML: 25 INJECTION, SOLUTION INTRAVENOUS at 15:57

## 2022-04-12 ASSESSMENT — ACTIVITIES OF DAILY LIVING (ADL)
ADLS_ACUITY_SCORE: 17
ADLS_ACUITY_SCORE: 15
ADLS_ACUITY_SCORE: 15
ADLS_ACUITY_SCORE: 17
ADLS_ACUITY_SCORE: 15
ADLS_ACUITY_SCORE: 17
ADLS_ACUITY_SCORE: 15
ADLS_ACUITY_SCORE: 17

## 2022-04-12 ASSESSMENT — LIFESTYLE VARIABLES: TOBACCO_USE: 0

## 2022-04-12 ASSESSMENT — ENCOUNTER SYMPTOMS: FEVER: 1

## 2022-04-12 NOTE — PROGRESS NOTES
[facility]  Cardiovascular and Thoracic Surgery Daily Note          Assessment and Plan:   Angelica Robledo is a 51 year old female with history of MI at age 25 s/p angioplasty who presented to the hospital with hypertensive emergency and NSTEMI. Coronary angio demonstrated severe multivessel CAD. Echocardiogram preoperatively with preserved biventricular function.     PMH includes: NSTEMI, CAD with previous coronary stents, HTN  Course has been complicated by PEA arrest with ventricular fibrillation on POD 0 with ROSC after defibrillation x3, 2 mg epinephrine and one round of CPR. Was taken to cath lab where radial artery graft was found to be occluded. PCI with DANA placement to mid RCA and angioplasty of the rPLB was performed. Echocardiogram at the conclusion of PCI demonstrated LVEF 40-45% with mild to moderate global hypokinesis of the left ventricle with severe hypokinesis of the mid anterior and anteroseptal walls, the entire inferior wall and all apical segments of the left ventricle; no pericardial effusion. Additionally, due to antiplatelet therapy post PCI, patient developed epistaxis during attempted NG placement requiring placement of b/l rhino rockets. Now, diffuse severe encephalopathy with minimal neurologic response.      POD #13 s/p:  1. Coronary artery bypass grafting x 4 (radial artery graft to the right posterior descending coronary artery, reversed saphenous vein graft to the obtuse marginal branch of the left circumflex coronary artery, reversed saphenous vein graft to the first diagonal branch of the left anterior descending coronary artery, and pedicled left internal mammary artery to left anterior descending coronary artery).  2.  Endoscopic vein harvest of the greater saphenous vein from the left lower extremity.  3.  Endoscopic left radial artery harvest on 3/30/22 with Dr. Butterfield.     CVS:   NSTEMI and severe multivessel CAD s/p CABG x4 with radial grafts as  above  HTN  PEA and Vfib arrest s/p ROSC and DANA to mid RCA and angioplasty of the rPLB on 3/31  HD stable, lends to HTN; NSR rates 70-80s  - Goal MAP >65, SBP <140  - Amlodipine 5 mg daily for radial graft (at least 3-6 months)  - Carvedilol 25 mg BID  - PRN hydralazine available  - Brilinta load and daily per Cardiology, tolerated 500U straight rate heparin-tolerated, on LMW heparin gtt, hold 4 hrs before surgery  - Transitioned amiodarone gtt to PO taper per EP/Cards recs  - ASA 81 daily  - Atorvastatin 80 mg daily  - CTs removed 4/3. TPWs removed 4/1     Resp:   Postoperative ventilator management  - Current vent settings:  Vent Mode: CMV/AC  (Continuous Mandatory Ventilation/ Assist Control) weaning in CPAP  FiO2 (%): 30 %  Resp Rate (Set): 14 breaths/min  Tidal Volume (Set, mL): 450 mL  PEEP (cm H2O): 5 cmH2O  Resp: 13  - Intubated for airway protection due to encephalopathy  - Titrate O2/peep to maintain sats >92%  - PST as tolerated  - Trach/PEG today     Neuro:    Acute postoperative pain  Hx migraines  Encephalopathy   Did receive CPR on 3/31, will likely have pain from this also  Propofol turned off on 3/31 at 9 am, starting to open eyes more, unable to move extremities but withdraws BLE to pain, nothing in BUEs yet. CT head 3/31 negative for hemorrhage; CTA negative for strokes, vessels patent; per neurology, MRI stable w/o acute concern to explain clinical status.  EEG without e/o seizure activity, continues to have moderate to severe diffuse encephalopathy   4/5 EEG improved but c/f new spikes not c/w seizures but tendency to seize  MRI head and c-spine on 4/3, 4/8 negative  - PRN dilaudid available  - PRN oxycodone and methocarbamol available  - Scheduled tylenol   - Neuro critical care consulted and following, greatly appreciate recommendations  - Keppra added 4/5  - More eye tracking, yawned a few times, but does not blink to threat or follow commands     Renal/Electrolyte:   Lactic acidosis,  resolved  Volume overload  Hypernatremia, resolved  Creat stable, below preop weight, much less edema;   Na/K 4.6 stable  - Strict I/O, daily weights  - Avoid/limit nephrotoxins as able  - Replete lytes per protocol  - No further diuresis  - Continue FWF decrease to 30 ml q 6 hrs<60 mL q6h     GI/Nutrition:    Epistaxis, resolved  Elevated LFTs, likely shock liver 2/2 arrest, resolved  Orders Placed This Encounter      NPO for Medical/Clinical Reasons Except for: Other; Specify: NPO for PEG tube  - S/P NJ placement on  in radiology, PEG tuesday  - Nutrition following, enteral feeds at goal, absorbing well  - Rhino rockets removed, continues to have some oozing from b/l nares  - Continue bowel regimen, + BMs  - PPI     :    - External wick in place for strict I/O  - Urine +, start Bactrim bid for 3 days, afebrile     Endo:  Stress induced hyperglycemia  Thyroid nodule   Preop A1c 5.7%  - Transitioned to sliding scale insulin   - Goal BG <180 for optimal healing  - Thyroid nodule noted on CT on 3/31, will have this worked up on an outpatient basis     ID:  Stress induced leukocytosis, recurrent  E.Coli UTI preoperatively  WBC 14.0<15.9<16.0<15.2<15.7<14.7<10.4, Temp (24hrs), Av.6  F (37  C), Min:98.4  F (36.9  C), Max:98.8  F (37.1  C); new leukocytosis, respiratory culture ordered/pan culturing per intensivist   - Completed perioperative ABX, Bactrim for UTI  - Continue to follow fever curve, WBC and inflammatory markers as appropriate  - Repeat UA  without overt e/o infection     Heme:  Acute blood loss anemia  Acute blood loss thrombocytopenia  Hgb 7.8<8.3<7.9<7.8<8.1, Plts 350; no s/sx active bleeding  - CBC in AM  - Goal Hgb >7  - Consulted hematology given severe premature CAD and acute graft failure- appreciate recommendations. Multiple labs sent. Hypercoagulable workup appears to be negative but recommending repeat levels once acute issues resolve. Currently recommending antiplatelet and full  "anticoagulation (therapeutic heparin). D/w Dr. Butterfield. Plan will be straight rate heparin at 500 units/hr transitioned LIH gtt, consider transition to [facility] after trach/PEG placed     - Proph: PPI, subcutaneous heparin, SCDs     - Dispo: Continue in ICU; Family would like patient moved to [place] if/when trach established to be closer to daughter. SW consulted.     - Lines: Cooper, PIV, ETT, [place]    Seen and discussed with Dr. Jc          Interval History:   Lying in bed, breathing stable, NPO for trach/PEG today          Medications:       acetaminophen  975 mg Oral or Feeding Tube Q6H     amiodarone  200 mg Oral or Feeding Tube Daily     amLODIPine  5 mg Oral or Feeding Tube Daily     aspirin  81 mg Oral or NG Tube Daily     atorvastatin  80 mg Oral or Feeding Tube Daily     carvedilol  25 mg Oral or Feeding Tube BID w/meals     chlorhexidine  15 mL Mouth/Throat Q12H     clindamycin  900 mg Intravenous Pre-Op/Pre-procedure x 1 dose     clindamycin  900 mg Intravenous See Admin Instructions     insulin aspart  1-6 Units Subcutaneous Q4H     levETIRAcetam  1,000 mg Intravenous Q12H     pantoprazole  40 mg Oral or NG Tube Daily    Or     pantoprazole  40 mg Oral Daily     sodium chloride (PF)  3 mL Intracatheter Q8H     ticagrelor  90 mg Oral or Feeding Tube BID     @[email or web address]-@          Physical Exam:   Vitals were reviewed  Blood pressure 136/73, pulse 62, temperature 98.4  F (36.9  C), temperature source Oral, resp. rate 15, height 1.651 m (5' 5\"), weight 92.6 kg (204 lb 2.3 oz), last menstrual period 02/27/2022, SpO2 100 %.  Rhythm: NSR    Lungs: course    Cardiovascular: s1/s2, no m/r/g    Abdomen: s/nt/nd    Extremeties: no LE edema    Incision: cdi    CT: na    Weight:   Vitals:    04/08/22 0100 04/09/22 0600 04/10/22 0600 04/11/22 0600   Weight: 94.3 kg (207 lb 14.3 oz) 94.4 kg (208 lb 1.8 oz) 94.9 kg (209 lb 3.5 oz) 93.5 kg (206 lb 2.1 oz)    04/12/22 0421   Weight: 92.6 kg (204 lb 2.3 oz)            Data: "   Labs:   Lab Results   Component Value Date    WBC 14.0 04/12/2022     Lab Results   Component Value Date    RBC 3.17 04/12/2022     Lab Results   Component Value Date    HGB 7.8 04/12/2022     Lab Results   Component Value Date    HCT 26.7 04/12/2022     No components found for: MCT  Lab Results   Component Value Date    MCV 84 04/12/2022     Lab Results   Component Value Date    MCH 24.6 04/12/2022     Lab Results   Component Value Date    MCHC 29.2 04/12/2022     Lab Results   Component Value Date    RDW 18.4 04/12/2022     Lab Results   Component Value Date     04/12/2022       Last Basic Metabolic Panel:  Lab Results   Component Value Date     04/11/2022      Lab Results   Component Value Date    POTASSIUM 4.6 04/12/2022     Lab Results   Component Value Date    CHLORIDE 105 04/11/2022     Lab Results   Component Value Date    TOMAS 9.1 04/11/2022     Lab Results   Component Value Date    CO2 23 04/11/2022     Lab Results   Component Value Date    BUN 23 04/11/2022     Lab Results   Component Value Date    CR 0.96 04/11/2022     Lab Results   Component Value Date    GLC 84 04/12/2022     04/11/2022     Seen and discussed with Dr. Babita Bocanegra PA-C  Pager #: [phone]

## 2022-04-12 NOTE — CONSULTS
"[facility] General Surgery Consultation    Angelica Robledo MRN# [id number]   YOB: 1970 Age: 51 year old      Date of Admission:  3/27/2022  Date of Consult: 2022         Assessment and Plan:   Patient is a 51 year old female who was admitted for STEMI and is s/p CABG and has had prolonged intubation. I was consulted for possible PEG placement. I discussed the risks, benefits, indications and alternatives to surgery and they would like to proceed. They understand slightly increased bleeding risk due to brilinta.     PLAN:  PEG placement         Requesting Physician:      Ngozi Bocanegra PA-C        Chief Complaint:     Chief Complaint   Patient presents with     Chest Pain          History of Present Illness:   Angelica Robledo is a 51 year old female who was seen in consultation at the request of Ngozi Bocanegra PA-C who has h/o of CAD and underwent CABG 3/30. Postoperative cardiac arrest secondary to graft occlusion, now s/p PTCA. Remains encephalopathic with possible anoxic injury.  Pt intubated in the ICU. Mildly elevated WBC. Heparin held at 0700 this morning. Afebrile. Prior h/o .           Physical Exam:   Blood pressure 117/69, pulse 66, temperature 98.8  F (37.1  C), temperature source Oral, resp. rate 14, height 1.651 m (5' 5\"), weight 92.6 kg (204 lb 2.3 oz), last menstrual period 2022, SpO2 94 %.  204 lbs 2.34 oz  General: opens eyes to voice  Psych: unable to assess  Respiratory:  nmech vent  Cardiovascular:  Regular Rate and Rhythm, bandage over prior mediastinal tube sites.   GI: soft, obese, no upper abdominal scars   Lymphatic/Hematologic/Immune:  No femoral or cervical lymphadenopathy.  Integumentary:  No rashes         Past Medical History:     Past Medical History:   Diagnosis Date     Bilateral external ear infections     frequent infections as child     H/O lithotripsy      Kidney stone      Myocardial infarction (H)             Past Surgical " History:     Past Surgical History:   Procedure Laterality Date     ANGIOPLASTY       BYPASS GRAFT ARTERY CORONARY N/A 3/30/2022    Procedure: CORONARY ARTERY BYPASS GRAFT X4, Endoscopic left radial artery harvest , endoscopic left leg vein harvest.TARGETS: LAD, LPL, RAMUS,PDA  ON CBP, WITH YAMEL READ BY ANESTHESIOLOGIST.;  Surgeon: Mark Butterfield MD;  Location:  OR      SECTION      ,      CV CORONARY ANGIOGRAM N/A 3/28/2022    Procedure: Coronary Angiogram;  Surgeon: Sylvia Cruz MD;  Location:  HEART CARDIAC CATH LAB     CV CORONARY ANGIOGRAM N/A 3/31/2022    Procedure: Coronary Angiogram;  Surgeon: Mayank Skinner MD;  Location:  HEART CARDIAC CATH LAB     CV INTRAVASULAR ULTRASOUND N/A 3/31/2022    Procedure: Intravascular Ultrasound;  Surgeon: Mayank Skinner MD;  Location:  HEART CARDIAC CATH LAB     CV LEFT HEART CATH N/A 3/28/2022    Procedure: Left Heart Catheterization;  Surgeon: Sylvia Cruz MD;  Location:  HEART CARDIAC CATH LAB     CV PCI N/A 3/31/2022    Procedure: Percutaneous Coronary Intervention;  Surgeon: Mayank Skinner MD;  Location:  HEART CARDIAC CATH LAB     MYRINGOTOMY, INSERT TUBE BILATERAL, COMBINED       ORTHOPEDIC SURGERY Left     left ankle            Current Medications:           acetaminophen  975 mg Oral or Feeding Tube Q6H     amiodarone  200 mg Oral or Feeding Tube Daily     amLODIPine  5 mg Oral or Feeding Tube Daily     aspirin  81 mg Oral or NG Tube Daily     atorvastatin  80 mg Oral or Feeding Tube Daily     carvedilol  25 mg Oral or Feeding Tube BID w/meals     chlorhexidine  15 mL Mouth/Throat Q12H     clindamycin  900 mg Intravenous Pre-Op/Pre-procedure x 1 dose     clindamycin  900 mg Intravenous See Admin Instructions     insulin aspart  1-6 Units Subcutaneous Q4H     levETIRAcetam  1,000 mg Intravenous Q12H     pantoprazole  40 mg Oral or NG Tube Daily    Or     pantoprazole  40 mg Oral Daily     sodium  chloride (PF)  3 mL Intracatheter Q8H     ticagrelor  90 mg Oral or Feeding Tube BID       acetaminophen **OR** acetaminophen, artificial tears, bisacodyl, artificial tears ophthalmic solution, glucose **OR** dextrose **OR** glucagon, HOLD MEDICATION (one time), hydrALAZINE, HYDROmorphone **OR** HYDROmorphone, ipratropium - albuterol 0.5 mg/2.5 mg/3 mL, lidocaine 4%, lidocaine (buffered or not buffered), lidocaine (buffered or not buffered), magnesium hydroxide, - MEDICATION INSTRUCTIONS -, melatonin, methocarbamol, naloxone **OR** naloxone **OR** naloxone **OR** naloxone, ondansetron **OR** ondansetron, oxyCODONE **OR** oxyCODONE, Percutaneous Coronary Intervention orders placed (this is information for BPA alerting), polyethylene glycol, senna-docusate, sodium chloride (PF), sodium chloride (PF), sodium chloride (PF)         Home Medications:     Prior to Admission medications    Medication Sig Last Dose Taking? Auth Provider   aspirin (ASA) 81 MG EC tablet Take 1 tablet (81 mg) by mouth daily Start tomorrow.  Yes Lamberto Alarcon MD   aspirin-acetaminophen-caffeine (EXCEDRIN MIGRAINE) 250-250-65 MG tablet Take 1 tablet by mouth every 6 hours as needed for headaches 3/26/2022 at Unknown time Yes Reported, Patient   atorvastatin (LIPITOR) 80 MG tablet Take 1 tablet (80 mg) by mouth daily  Yes Lamberto Alarcon MD   Multiple Vitamin (MULTIVITAMIN ADULT PO)  Past Week at Unknown time Yes Reported, Patient            Allergies:     Allergies   Allergen Reactions     Amoxicillin Rash            Family History:   History reviewed. No pertinent family history.        Social History:   Angelica Robledo  reports that she has never smoked. She has never used smokeless tobacco. She reports current alcohol use. She reports that she does not use drugs.          Review of Systems:   The 10 point Review of Systems is negative other than noted in the HPI.         Labs/Imaging   All new lab and imaging data was reviewed.   I have  personally reviewed the imaging studies        Mary Carmen Carver MD

## 2022-04-12 NOTE — PROGRESS NOTES
FYI-- Pt's glucose level was 68 at approximately 1550, but at the time of this note the level still has not appeared. This level was addressed w/ IV dextrose--see MAR    Also, the pt's blood glucose levels were also checked per unit protocol around 0830 AM and prior to the pt going to surgery at 1130 AM, however, these values still have not resulted on the results page as well. I do not recall the values, but they were WDL.

## 2022-04-12 NOTE — PLAN OF CARE
Shift 6420-9862: VSS. Neuro: SANDRA orientation, does not follow commands, does open eyes spontaneously. Pulm: Lungs: clear in upper lobes, diminished in bases bilaterally, trached. GI/: BS present, no BM; External cath in place: WDL output. Diet: NPO ex. meds (ordered plan is to re-start TF tomorrow in AM). Access: PIV's; NS for TKO. Pain managed w/ scheduled Tylenol. Family updated at bedside    AM Shift:  - Trach and PEG completed   - Hep gtt to re-start this evening; TF to re-start in AM-- see active orders for specific times

## 2022-04-12 NOTE — ANESTHESIA CARE TRANSFER NOTE
Patient: Angelica Robledo    Procedure: Procedure(s):  TRACHEOSTOMY  PERCUTANEOUS ENDOSCOPIC GASTROSTOMY TUBE PLACEMENT       Diagnosis: Respiratory failure (H) [J96.90]  Myocardial infarction (H) [I21.9]  Diagnosis Additional Information: No value filed.    Anesthesia Type:   General     Note:    Oropharynx: ventilatory support  Level of Consciousness: iatrogenic sedation      Independent Airway: airway patency not satisfactory and stable  Dentition: dentition unchanged  Vital Signs Stable: post-procedure vital signs reviewed and stable  Report to RN Given: handoff report given  Patient transferred to: ICU  Comments: Patient connected to SpO2, EKG, and arterial blood pressure transport monitors and accompanied by CRNA, circulating RN to ICU room. Patient ventilated by CRNA with ambu via trache with O2 at 10 liters per minute during transport.     On arrival to ICU, trache position unchanged, equal, bilateral breath sounds auscultated in ICU room, patient placed on ICU ventilator by respiratory therapist, teeth and oral mucosa intact and unchanged at handoff of care. At anesthesia handoff of care, clinical monitors and alarms on and functioning, report on patient's clinical status given to ICU RN, ICU staff questions answered.  ICU Handoff: Call for PAUSE to initiate/utilize ICU HANDOFF, Identified Patient, Identified Responsible Provider, Reviewed the Pertinent Medical History, Discussed Surgical Course, Reviewed Intra-OP Anesthesia Management and Issues during Anesthesia, Set Expectations for Post Procedure Period and Allowed Opportunity for Questions and Acknowledgement of Understanding      Vitals:  Vitals Value Taken Time   /75    Temp 98    Pulse 80    Resp 14    SpO2 98        Electronically Signed By: YRIS Faye CRNA  April 12, 2022  1:05 PM

## 2022-04-12 NOTE — PROGRESS NOTES
[facility] ICU RESPIRATORY NOTE        Date of Admission: 3/27/2022    Date of Intubation (most recent): 3/30/2022    Reason for Mechanical Ventilation: Airway protection    Number of Days on Mechanical Ventilation: 14    Met Criteria for Spontaneous Breathing Trial: Yes    Reason for No Spontaneous Breathing Trial: Per MD    Significant Events Today: None    ABG Results: No lab results found in last 7 days.    Current Vent Settings: Vent Mode: CMV/AC  (Continuous Mandatory Ventilation/ Assist Control)  FiO2 (%): 30 %  Resp Rate (Set): 14 breaths/min  Tidal Volume (Set, mL): 450 mL  PEEP (cm H2O): 5 cmH2O  Pressure Support (cm H2O): 8 cmH2O  Resp: 15        Genna King, RT

## 2022-04-12 NOTE — PROGRESS NOTES
[facility]      General Surgery  Short Progress Note      Angelica Robledo underwent PEG placement without complications. Will keep PEG to gravity drainage until tomorrow at 7:00 4/13. Okay for meds through PEG in 4 hours and can clamp PEG 1 hour following meds. Start trickle tube feeds tomorrow (7:00 4/13) and advance per nutrition. Flush PEG per protocol. PEG secured with suture at 5 marking. Other cares per critical care team.      Dalila Gregorio PA-C

## 2022-04-12 NOTE — PROGRESS NOTES
Critical Care Progress Note      04/12/2022    Name: Angelica Robledo MRN#: [id number]   Age: 51 year old YOB: 1970     [facility]               Problem List:   Active Problems:    NSTEMI (non-ST elevated myocardial infarction) (H)    Hypertensive emergency    Paroxysmal ventricular tachycardia (H)           Summary/Hospital Course:   Angelica Robledo is a 51 year old female with pertinent PMHx of prior MI at age 24-25 (in 1996) s/p angioplasty, HTN, anemia, and NSTEMI.  She presented on 3/27 with new onset chest pain similar to her previous MI.  Workup was consistent with NSTEMI.  Cardiac cath demonstrated severe multivessel CAD.  She was seen by CT surgery and CABG was scheduled for 3/30.  She underwent CABG x4 left radial, LIMA, left saph vein x2 (FAUSTINO taken down, but elected to not be used due to size).    No family history of clotting disorders.  No known history of DVT/PE/stroke. Siblings without cardiovascular disease. Father passed away of cardiac causes at an advanced age.  Mother had cardiac disease as well as an aortic aneurysm, but not at a young age.     4V CABG as follows: left radial artery graft to the right posterior descending coronary artery, reversed saphenous vein graft to the obtuse marginal branch of the left circumflex coronary artery, reversed saphenous vein graft to the first diagonal branch of the left anterior descending coronary artery, and pedicled left internal mammary artery to left anterior descending coronary.    POD 0 - initial hypoxia resolved with vent changes.  At 2355 patient suffered a PEA and Vfib arrest and underwent 3 shocks, 2 rounds of epi, and 1 round of CPR. ROSC obtained at 0005.  300 mg amiodarone bolus administered.  STAT cardiac cath lab performed demonstrating LIMA-LAD, VG-LADD1, and VG-LPLB were patent. The left radial artery to RDPA graft had an occlusion.  The mid RCA was treated with a DANA stent and a balloon angioplasty performed at the [facility].   Excellent flow was noted on completion coronary angiogram.  Echo demonstrated a drop in LVEF to 40-45% with aspects of hypokinesis. RV demonstrated mild-moderate RV function reduction.    POD1 - weaned from sedation with minimal arousal limited to briefly opening eyes to pain and occasionally sound, minimal extremity pain response.  CT head/CTA head and neck unremarkable for acute insult.  Ceribell placed overnight.    PD2-5: MRI x2 obtained demonstrating punctate foci of early subacute ischemic change. EEG waxes and wanes from mod-severe to severe diffuse slowing. With mixed workup results, neither of these findings portent a poor prognosis specifically; however, prognosis remains unclear.    POD6-current: Gradual clinical exam improvement. 4/7 MRI without new findings from previous.          Assessment and plan :     Angelica Robledo IS a 51 year old female admitted on 3/27/2022 for chest pain with workup demonstrating severe multivessel CAD.  Patient underwent CABG x4 on 3/30.  POD#0 patient had a PEA arrest with ROSC obtained at 10 minutes.  Emergent coronary cath demonstrated radial artery occlusion.  A DANA was placed into the RCA and balloon angioplasty of the RPLB was performed with excellent completion angiography flow.     I have personally reviewed the daily labs, imaging studies, cultures and discussed the case with referring physician and consulting physicians.     My assessment and plan by system for this patient is as follows:    Neurology/Psychiatry:   1. Analgesia -- tylenol, prn narcotics   2. Sedation -- hold all sedation  3. Encephalopathy s/p cardiac arrest - likely anoxic injury -- head CT negative -- MRI w/ punctate foci of subacute ischemic change -- MRI C spine negative -- continuing EEG: diffuse slowing with gradual improvement, moderate-severe encephalpathy, no indices of seizure activity however is at risk.  -- NCC following, appreciate management/recs.  -- Keppra for seizure ppx (no seizures  per EEG)  -- Stable clinical exam, gradual improvements  -- Anticipate prolonged recovery course requiring trach and PEG - planned today     Cardiovascular:   1.  S/p CABG x4 - left radial, LIMA, left saph vein x2 (FAUSTINO taken down, but elected to not be used due to size)   2. POD#0 PEA and Vfib arrest -- s/p cardiac cath demonstrated radial artery graft to RPDA occlusion -- intervention with DANA to RCA and balloon angioplasty RPLA -- on Kangrelor 3/31, now transitioned to aspirin and Brilinta with discussion per CV surgery and interventional cardiology  -- cards recommends Amiodarone taper -- PO regimen scheduled  -- Brilinta needs to be continued for a minimum of 1 month w/ DANA  3. Hx of MI at age 24-25  4. HTN - likely untreated prior to hospitalization - presented with hypertension emergency - PO regimen     Pulmonary/Ventilator Management:   1. Airway -- intubated -- low vent settings required -- tolerates pressure support very well  2. No underlying pulmonary problems on medical history or cxr.   -- Plan for tracheostomy and PEG today  -- will transition to weaning from mechanical ventilation following procedure     GI and Nutrition :   1. Epistaxis - s/p rhino rocket removal - oozing has resolved  2. Nutrition - NJ present -- Continue enteral feeds at goal, FWF.  3. PPI ppx  - continue enteral feeds once deemed appropriate by surgeon following PEG tube placement    Renal/Fluids/Electrolytes:   1. Monitor UOP/creatinine -- stable labs   2. Replete electrolytes PRN per protocol  3. External wick device     Infectious Disease:   1. UTI with E coli on admission -- 4/1 negative on recheck   2. Uncomplicated UTI -- 4/7 UA positive for UTI for E. coli-- Bactrim DS started for 3 day course - completed.  Monitor for fever, leukocytosis if longer duration. F/u 4/10 urine culture repeat -- no growth to date  2. Leukocytosis, trend -- stable 15-16 range for several days, 14 today     Endocrine:   1. Monitor for stress  induced hyperglycemia. ICU insulin protocol, goal sugar <180   2. Incidental thyroid nodule -- follow-up outpatient per discharge referral     Hematology/Oncology:   1. Acute blood loss on chronic anemia -- stable, trend  2. Acute blood loss thrombocytopenia -- resolved, trend.  3. S/P DANA placement s/p cardiac arrest -- aspirin and brilinta  4. Rule out hypercoagulable state -- workup negative; however, recommends full dose anticoagulation in setting of thrombus -- discussed with CV: now at high dose heparin gtt following titration up.     - heparin gtt 4 hours prior to anticipated procedure start time  - continue Brilinta s/p DANA on 3/31    IV/Access:    1. Venous access - peripheral  2. Arterial access - NA    ICU Prophylaxis:   1. DVT: hep gtt/mechanical  2. VAP: HOB 30 degrees, chlorhexidine rinse  3. Stress Ulcer: PPI  4. Restraints: No current indication. Will readdress daily.   5. Wound care - per unit routine   6. Feeding - enteral feeds  7. Family Update: family updated at bedside  8. Disposition - ICU       Key goals for next 24 hours:   1. Trach and PEG planned for today         Interim History:   No acute events  No significant changes.  Gradual neuro improvements.         Key Medications:       acetaminophen  975 mg Oral or Feeding Tube Q6H     amiodarone  200 mg Oral or Feeding Tube Daily     amLODIPine  5 mg Oral or Feeding Tube Daily     aspirin  81 mg Oral or NG Tube Daily     atorvastatin  80 mg Oral or Feeding Tube Daily     carvedilol  25 mg Oral or Feeding Tube BID w/meals     chlorhexidine  15 mL Mouth/Throat Q12H     clindamycin  900 mg Intravenous Pre-Op/Pre-procedure x 1 dose     clindamycin  900 mg Intravenous See Admin Instructions     insulin aspart  1-6 Units Subcutaneous Q4H     levETIRAcetam  1,000 mg Intravenous Q12H     pantoprazole  40 mg Oral or NG Tube Daily    Or     pantoprazole  40 mg Oral Daily     sodium chloride (PF)  3 mL Intracatheter Q8H     ticagrelor  90 mg Oral or  Feeding Tube BID       heparin Stopped (04/12/22 0500)     - MEDICATION INSTRUCTIONS -       Percutaneous Coronary Intervention orders placed (this is information for BPA alerting)       sodium chloride 10 mL/hr (04/12/22 0823)               Physical Examination:   Temp:  [98.4  F (36.9  C)-98.8  F (37.1  C)] 98.8  F (37.1  C)  Pulse:  [62-74] 71  Resp:  [8-19] 14  BP: ()/(58-89) 128/83  FiO2 (%):  [30 %] 30 %  SpO2:  [98 %-100 %] 100 %    Intake/Output Summary (Last 24 hours) at 3/31/2022 0921  Last data filed at 3/31/2022 0700  Gross per 24 hour   Intake 3676.54 ml   Output 2825 ml   Net 851.54 ml     Wt Readings from Last 4 Encounters:   04/12/22 92.6 kg (204 lb 2.3 oz)     BP - Mean:  [] 102  Vent Mode: CMV/AC  (Continuous Mandatory Ventilation/ Assist Control)  FiO2 (%): 30 %  Resp Rate (Set): 14 breaths/min  Tidal Volume (Set, mL): 450 mL  PEEP (cm H2O): 5 cmH2O  Pressure Support (cm H2O): 8 cmH2O  Resp: 14    No lab results found in last 7 days.    GEN: no acute distress   HEENT: head ncat, sclera anicteric, ETT present   PULM: unlabored, synchronous with vent, clear anteriorly    CV/Chest: RRR. Midline incision CDI.   ABD: soft, nontender  EXT:  Trace edema,  warm  NEURO: Off sedation. Opens spontaneously, to stimulus, and verbal stimulus. Pupillary light reflex brisk. Does not blink to threat. Moving extremities more frequently.   Did not follow commands on my exam.    SKIN: no obvious rash  LINES: clean, dry intact         Data:   All data and imaging reviewed     ROUTINE ICU LABS (Last four results)  CMP  Recent Labs   Lab 04/12/22  0429 04/12/22  0419 04/12/22  0020 04/11/22 2025 04/11/22  1550 04/11/22  0752 04/11/22  0551 04/10/22  0848 04/10/22  0552 04/09/22  0749 04/09/22  0423 04/08/22  0839 04/08/22  0531 04/06/22  0745 04/06/22  0440   NA  --   --   --   --   --   --  136  --   --   --  139  --  140  --  144   POTASSIUM 4.6  --   --   --   --   --  4.6  --  4.4  --  4.5  --  4.2   4.3  --  4.1   CHLORIDE  --   --   --   --   --   --  105  --   --   --  107  --  108  --  112*   CO2  --   --   --   --   --   --  23  --   --   --  25  --  25  --  29   ANIONGAP  --   --   --   --   --   --  8  --   --   --  7  --  7  --  3   GLC  --  84 129* 134* 119*   < > 136*   < >  --    < > 138*   < > 137*   < > 151*   BUN  --   --   --   --   --   --  23  --   --   --  18  --  21  --  28   CR  --   --   --   --   --   --  0.96  --   --   --  0.82  --  0.84  --  0.95   GFRESTIMATED  --   --   --   --   --   --  72  --   --   --  87  --  84  --  73   TOMAS  --   --   --   --   --   --  9.1  --   --   --  8.9  --  8.9  --  9.5   MAG 2.5*  --   --   --   --   --  2.5*  --  2.3  --  2.2  --  2.5*   < > 2.7*   PHOS 4.7*  --   --   --   --   --  4.6*  --  3.9  --  3.9  --  4.2   < > 3.9    < > = values in this interval not displayed.     CBC  Recent Labs   Lab 04/12/22  0429 04/11/22  0551 04/10/22  2314 04/10/22  0552   WBC 14.0* 15.9* 15.7* 16.0*   RBC 3.17* 3.11* 3.14* 3.35*   HGB 7.8* 7.8* 7.7* 8.3*   HCT 26.7* 25.3* 25.6* 27.8*   MCV 84 81 82 83   MCH 24.6* 25.1* 24.5* 24.8*   MCHC 29.2* 30.8* 30.1* 29.9*   RDW 18.4* 18.3* 18.3* 18.0*    327 357 366     INR  No lab results found in last 7 days.  Arterial Blood Gas  No lab results found in last 7 days.    All cultures:  No results for input(s): CULT in the last 168 hours.  No results found for this or any previous visit (from the past 24 hour(s)).        Adi Bazan MD  Surgical Critical Care Fellow

## 2022-04-12 NOTE — OP NOTE
DATE OF PROCEDURE: April 12, 2022      SURGEON:  Nathan Du MD   FIRST ASSIST: Jessica Koenig PA-C      PREOPERATIVE DIAGNOSIS:  Respiratory failure.       POSTOPERATIVE DIAGNOSIS:  Respiratory failure.       PROCEDURE:  Tracheostomy with Shiley #6 PROXIMAL XLT cuffed nonfenestrated tube.       ANESTHESIA:  General.       INDICATIONS:  Patient has respiratory failure and will require prolonged mechanical ventilation.       DESCRIPTION OF PROCEDURE:  The patient was brought to the operating room on the ICU bed.  Under general anesthesia, the patient's neck was extended.  Neck and upper chest were prepared and draped in the usual fashion using ChloraPrep.  A transverse incision was made approximately 2 cm above the sternal notch.  Dissection was carried down to the midline of the strap muscle.  The inferior aspect of the isthmus of the thyroid was divided.  Hemostasis was excellent.  The second ring of the trachea was clearly identified, and some sutures of 2-0 Prolene were placed around the second ring laterally on each side.  A longitudinal tracheotomy was made and dilated.  The endotracheal tube was pulled just above the tracheotomy, and a Shiley #6 PROXIMAL XLT cuffed nonfenestrated tube was advanced without any difficulty.  The cuff was inflated.  Ventilation was excellent.  Hemostasis was again verified and was excellent.  Incision was closed with interrupted 3-0 nylon suture on the skin along the tracheostomy.  A dressing was applied, and the tracheostomy was secured around the patient's neck.       Then Dr. Carver placed the PEG          NATHAN DU MD

## 2022-04-12 NOTE — OP NOTE
General Surgery Operative Note    PREOPERATIVE DIAGNOSIS: prolonged intubation    POSTOPERATIVE DIAGNOSIS: prolonged intubation    PROCEDURE:   1. Esophagogastroduodenoscopy  2. Percutaneous endoscopic gastrostomy tube placement    SURGEON:  Mary Carmen Carver MD    ASSISTANT:  Dalila Gregorio PA-C  The Physician Assistant was medically necessary for their expertise in retraction/exposure, prepping the abdominal wall, and actual placement of the gastrostomy tube.    ANESTHESIA:  General.    BLOOD LOSS: 2ml    FINDINGS: PEG tube placed into stomach without complication    INDICATIONS:   Mrs. Robledo has been intubated in the ICU for a prolonged period and has been unable to be extubated. Given the prolonged intubation and need for enteral nutrition her family elected to proceed with percutaneous endoscopic gastrostomy tube placement.    DETAILS OF PROCEDURE: The patient was brought to the operating room per Anesthesia, placed in supine position, and intubated without difficulty. A Surgical timeout was then performed, verifying the correct surgeon, site, procedure, and patient and all in the room were in agreement.     The standard EGD scope was used to evaluate the esophagus, stomach and first portion of the duodenum. All areas were meticulously evaluated and did not show any significant abnormalities. I palpated the anterior abdominal wall and easily saw an indentation. The area was then transilluminated and the light was easily seen. Once the area was selected it was prepped and draped in the usual fashion. The area was injected with 1% lidocaine with epinephrine. A small incision was then made with a scalpel. Using the 14-gauge catheter it was slowly brought through the anterior abdominal wall while aspirating. No air bubbles were seen until the needle was visualized into the stomach. The guidewire was then inserted through the catheter. It was grasped with a snare and brought out through the mouth under direct  visualization. The gastrostomy tube was then placed around the tube. The gastrostomy tube was then slowly brought through the mouth and anterior abdominal wall under direct visualization. This was done without difficulty. The gastrostomy was then spun around with no tension. There was no bleeding whatsoever. It was anchored at 5 CM. The stomach was then evaluated meticulously which showed no injuries or bleeding from gastrostomy tube placement. The scope was slowly brought out evaluating the esophagus which also showed no injuries. The gastrostomy was then anchored in the usual fashion. There was no external bleeding. All sponge, instrument, and needle counts were correct at the conclusion of the case. The patient tolerated the procedure well.       Mary Carmen Carver MD

## 2022-04-12 NOTE — PLAN OF CARE
Neuro: Patient opens eyes spontaneus, does not track does not fallow commands. PERRL, cough, no gag, withdraw to pain BLE slightly.  CV:  SR, HR 70s, SBP <140  Pulmonary: LSC, moderate tan creamy secretions.  GI/: TF of at midnight, abd soft no BM this shift, pure wick in place with adequate UOP.  Integ: no changed.  Plan: OR at 1125  For Trache and PEG.

## 2022-04-12 NOTE — ANESTHESIA PREPROCEDURE EVALUATION
Anesthesia Pre-Procedure Evaluation    Patient: Angelica Robledo   MRN: [id number] : 1970        Procedure : Procedure(s):  TRACHEOSTOMY  PERCUTANEOUS ENDOSCOPIC GASTROSTOMY TUBE PLACEMENT          Past Medical History:   Diagnosis Date     Bilateral external ear infections     frequent infections as child     H/O lithotripsy      Kidney stone      Myocardial infarction (H)       Past Surgical History:   Procedure Laterality Date     ANGIOPLASTY       BYPASS GRAFT ARTERY CORONARY N/A 3/30/2022    Procedure: CORONARY ARTERY BYPASS GRAFT X4, Endoscopic left radial artery harvest , endoscopic left leg vein harvest.TARGETS: LAD, LPL, RAMUS,PDA  ON CBP, WITH YAMEL READ BY ANESTHESIOLOGIST.;  Surgeon: Mark Butterfield MD;  Location:  OR      SECTION      2003     CV CORONARY ANGIOGRAM N/A 3/28/2022    Procedure: Coronary Angiogram;  Surgeon: Sylvia Cruz MD;  Location:  HEART CARDIAC CATH LAB     CV CORONARY ANGIOGRAM N/A 3/31/2022    Procedure: Coronary Angiogram;  Surgeon: Mayank Skinner MD;  Location:  HEART CARDIAC CATH LAB     CV INTRAVASULAR ULTRASOUND N/A 3/31/2022    Procedure: Intravascular Ultrasound;  Surgeon: Mayank Skinner MD;  Location:  HEART CARDIAC CATH LAB     CV LEFT HEART CATH N/A 3/28/2022    Procedure: Left Heart Catheterization;  Surgeon: Sylvia Cruz MD;  Location:  HEART CARDIAC CATH LAB     CV PCI N/A 3/31/2022    Procedure: Percutaneous Coronary Intervention;  Surgeon: Mayank Skinner MD;  Location:  HEART CARDIAC CATH LAB     MYRINGOTOMY, INSERT TUBE BILATERAL, COMBINED       ORTHOPEDIC SURGERY Left     left ankle      Allergies   Allergen Reactions     Amoxicillin Rash      Social History     Tobacco Use     Smoking status: Never Smoker     Smokeless tobacco: Never Used   Substance Use Topics     Alcohol use: Yes     Comment: socially      Wt Readings from Last 1 Encounters:   22 92.6 kg (204 lb 2.3 oz)         Anesthesia Evaluation            ROS/MED HX  ENT/Pulmonary:     (+) LORENA risk factors, snores loudly, hypertension, obese,  (-) tobacco use and sleep apnea   Neurologic: Comment: Encephalopathic possible anoxic injury       Cardiovascular: Comment: cardiac arrest POD 0 with failed graft   MI at age 25, s/p angioplasty    (+) hypertension--CAD -past MI --Previous cardiac testing   Echo: Date: 3/27/22 Results:  Interpretation Summary     1. Left ventricular systolic function is low normal. Ejection fraction is 50-  55%.  2. There is hypokinesis of LV apex and apical segments. Differential diagnosis  includes LAD infarct versus Takotsubo cardiomyopathy.  3. No hemodynamically significant valvular disease on limited evaluation.  No prior study available for comparison.  Findings discussed with Dr. Sherman at 12:30 PM.  ______________________________________________________________________________  Left Ventricle  The left ventricle is normal in size. Left ventricular systolic function is  low normal. The visual ejection fraction is 50-55%. There is hypokinesis of LV  apex and apical segments of all the walls. Differential diagnosis includes  coronary disease involving LAD versus Takotsubo cardiomyopathy.     Right Ventricle  The right ventricle is not well visualized.     Mitral Valve  The mitral valve leaflets appear normal. There is no evidence of stenosis,  fluttering, or prolapse.     Tricuspid Valve  Normal tricuspid valve. There is trace tricuspid regurgitation.     Aortic Valve  The aortic valve is not well visualized. No aortic stenosis is present.     Pulmonic Valve  The pulmonic valve is not well visualized.     Vessels  Normal size aorta. Inferior vena cava not well visualized for estimation of  right atrial pressure.     Pericardium  There is no pericardial effusion.     Rhythm  Sinus rhythm was noted.  Stress Test: Date: Results:    ECG Reviewed: Date: 3/28/22 Results:  Sinus rhythm   Left  ventricular hypertrophy with repolarization abnormality   Cath:  Date: 3/28/22 Results:  Conclusion      Multivessel coronary artery disease identified angiographically.    Left main: No significant lesion.    LAD: 50% lesion at the takeoff of the second diagonal and about 80% lesion at the takeoff of the third diagonal. Proximal D3 has a 50% lesion.    LCx:  after takeoff of the first major obtuse marginal. Distal OM and LPL branches are supplied by epicardial collaterals from the apical LAD.    RCA: 99% lesion in the mid RCA.    Left ventricular filling pressures are moderately elevated (about 25 mmHg).    Cardiovascular surgery has been consulted for CABG evaluation.        METS/Exercise Tolerance:     Hematologic:       Musculoskeletal:       GI/Hepatic:    (-) GERD   Renal/Genitourinary:     (+) Nephrolithiasis ,     Endo:     (+) Obesity,     Psychiatric/Substance Use:       Infectious Disease:       Malignancy:       Other:            Physical Exam    Airway   unable to assess          Respiratory Devices and Support    ETT:      Dental    unable to assess        Cardiovascular          Rhythm and rate: regular and normal     Pulmonary   pulmonary exam normal                OUTSIDE LABS:  CBC:   Lab Results   Component Value Date    WBC 14.0 (H) 04/12/2022    WBC 15.9 (H) 04/11/2022    HGB 7.8 (L) 04/12/2022    HGB 7.8 (L) 04/11/2022    HCT 26.7 (L) 04/12/2022    HCT 25.3 (L) 04/11/2022     04/12/2022     04/11/2022     BMP:   Lab Results   Component Value Date     04/11/2022     04/09/2022    POTASSIUM 4.6 04/12/2022    POTASSIUM 4.6 04/11/2022    CHLORIDE 105 04/11/2022    CHLORIDE 107 04/09/2022    CO2 23 04/11/2022    CO2 25 04/09/2022    BUN 23 04/11/2022    BUN 18 04/09/2022    CR 0.96 04/11/2022    CR 0.82 04/09/2022    GLC 84 04/12/2022     (H) 04/12/2022     COAGS:   Lab Results   Component Value Date    PTT 36 03/31/2022    INR 1.31 (H) 04/01/2022    FIBR 729 (H)  04/01/2022     POC: No results found for: BGM, HCG, HCGS  HEPATIC:   Lab Results   Component Value Date    ALBUMIN 2.3 (L) 04/03/2022    PROTTOTAL 6.2 (L) 04/03/2022    ALT 52 (H) 04/03/2022    AST 26 04/03/2022    ALKPHOS 108 04/03/2022    BILITOTAL 0.2 04/03/2022    FAHEEM 20 04/04/2022     OTHER:   Lab Results   Component Value Date    PH 7.45 03/31/2022    LACT 0.9 04/01/2022    A1C 5.7 (H) 03/28/2022    TOMAS 9.1 04/11/2022    PHOS 4.7 (H) 04/12/2022    MAG 2.5 (H) 04/12/2022       Anesthesia Plan    ASA Status:  4   NPO Status:  NPO Appropriate    Anesthesia Type: General.     - Airway: ETT   Induction: Intravenous.   Maintenance: Balanced.        Consents    Anesthesia Plan(s) and associated risks, benefits, and realistic alternatives discussed. Questions answered and patient/representative(s) expressed understanding.    - Discussed:     - Discussed with:  Patient, Spouse         Postoperative Care    Pain management: Multi-modal analgesia.   PONV prophylaxis: Background Propofol Infusion, Dexamethasone or Solumedrol, Ondansetron (or other 5HT-3)     Comments:                Silverio Rogers MD

## 2022-04-12 NOTE — PROGRESS NOTES
THORACIC SURGERY    Discussed tracheostomy with pt's   Operation, goals and risks discussed    MILLIE MEJÍA MD [facility] ONCOLOGY THORACIC SURGERY  CELL:  [phone]  OFFICE: [phone]

## 2022-04-12 NOTE — ANESTHESIA POSTPROCEDURE EVALUATION
Patient: Angelica Robledo    Procedure: Procedure(s):  TRACHEOSTOMY  PERCUTANEOUS ENDOSCOPIC GASTROSTOMY TUBE PLACEMENT       Anesthesia Type:  General    Note:  Disposition: ICU            ICU Sign Out: Unable to perform physician to physician sign out   Postop Pain Control: Uneventful            Sign Out: Well controlled pain; Pain at Preoperative BASELINE   PONV: No   Neuro/Psych: Uneventful            Sign Out: PLANNED postop sedation   Airway/Respiratory: Uneventful            Sign Out: AIRWAY IN SITU/Resp. Support               Airway in situ/Resp. Support: Tracheostomy   CV/Hemodynamics: Uneventful            Sign Out: Acceptable CV status; No obvious hypovolemia; No obvious fluid overload   Other NRE: NONE   DID A NON-ROUTINE EVENT OCCUR? No           Last vitals:  Vitals Value Taken Time   /80 04/12/22 1530   Temp     Pulse 69 04/12/22 1549   Resp 21 04/12/22 1549   SpO2 86 % 04/12/22 1549   Vitals shown include unvalidated device data.    Electronically Signed By: Silverio Rogers MD  April 12, 2022  3:50 PM

## 2022-04-13 LAB
ANION GAP SERPL CALCULATED.3IONS-SCNC: 6 MMOL/L (ref 3–14)
BACTERIA UR CULT: NO GROWTH
BASE EXCESS BLDV CALC-SCNC: 0.7 MMOL/L (ref -7.7–1.9)
BUN SERPL-MCNC: 22 MG/DL (ref 7–30)
CALCIUM SERPL-MCNC: 9.5 MG/DL (ref 8.5–10.1)
CHLORIDE BLD-SCNC: 105 MMOL/L (ref 94–109)
CO2 SERPL-SCNC: 27 MMOL/L (ref 20–32)
CREAT SERPL-MCNC: 0.98 MG/DL (ref 0.52–1.04)
ERYTHROCYTE [DISTWIDTH] IN BLOOD BY AUTOMATED COUNT: 18.6 % (ref 10–15)
ERYTHROCYTE [DISTWIDTH] IN BLOOD BY AUTOMATED COUNT: 18.6 % (ref 10–15)
GFR SERPL CREATININE-BSD FRML MDRD: 70 ML/MIN/1.73M2
GLUCOSE BLD-MCNC: 103 MG/DL (ref 70–99)
GLUCOSE BLDC GLUCOMTR-MCNC: 102 MG/DL (ref 70–99)
GLUCOSE BLDC GLUCOMTR-MCNC: 103 MG/DL (ref 70–99)
GLUCOSE BLDC GLUCOMTR-MCNC: 108 MG/DL (ref 70–99)
GLUCOSE BLDC GLUCOMTR-MCNC: 111 MG/DL (ref 70–99)
GLUCOSE BLDC GLUCOMTR-MCNC: 98 MG/DL (ref 70–99)
GLUCOSE BLDC GLUCOMTR-MCNC: 99 MG/DL (ref 70–99)
HCO3 BLDV-SCNC: 25 MMOL/L (ref 21–28)
HCT VFR BLD AUTO: 24.8 % (ref 35–47)
HCT VFR BLD AUTO: 25.6 % (ref 35–47)
HGB BLD-MCNC: 7.3 G/DL (ref 11.7–15.7)
HGB BLD-MCNC: 7.6 G/DL (ref 11.7–15.7)
HOLD SPECIMEN: NORMAL
MAGNESIUM SERPL-MCNC: 2.5 MG/DL (ref 1.6–2.3)
MCH RBC QN AUTO: 24.2 PG (ref 26.5–33)
MCH RBC QN AUTO: 24.5 PG (ref 26.5–33)
MCHC RBC AUTO-ENTMCNC: 29.4 G/DL (ref 31.5–36.5)
MCHC RBC AUTO-ENTMCNC: 29.7 G/DL (ref 31.5–36.5)
MCV RBC AUTO: 82 FL (ref 78–100)
MCV RBC AUTO: 83 FL (ref 78–100)
O2/TOTAL GAS SETTING VFR VENT: 21 %
OXYHGB MFR BLDV: 91 % (ref 70–75)
PCO2 BLDV: 35 MM HG (ref 40–50)
PH BLDV: 7.46 [PH] (ref 7.32–7.43)
PHOSPHATE SERPL-MCNC: 5.3 MG/DL (ref 2.5–4.5)
PLATELET # BLD AUTO: 337 10E3/UL (ref 150–450)
PLATELET # BLD AUTO: 338 10E3/UL (ref 150–450)
PO2 BLDV: 65 MM HG (ref 25–47)
POTASSIUM BLD-SCNC: 4.3 MMOL/L (ref 3.4–5.3)
RBC # BLD AUTO: 3.02 10E6/UL (ref 3.8–5.2)
RBC # BLD AUTO: 3.1 10E6/UL (ref 3.8–5.2)
SODIUM SERPL-SCNC: 138 MMOL/L (ref 133–144)
UFH PPP CHRO-ACNC: 0.25 IU/ML
UFH PPP CHRO-ACNC: 0.29 IU/ML
WBC # BLD AUTO: 13.5 10E3/UL (ref 4–11)
WBC # BLD AUTO: 16.9 10E3/UL (ref 4–11)

## 2022-04-13 PROCEDURE — 84100 ASSAY OF PHOSPHORUS: CPT | Performed by: PHYSICIAN ASSISTANT

## 2022-04-13 PROCEDURE — 85027 COMPLETE CBC AUTOMATED: CPT | Performed by: PHYSICIAN ASSISTANT

## 2022-04-13 PROCEDURE — 250N000013 HC RX MED GY IP 250 OP 250 PS 637: Performed by: PHYSICIAN ASSISTANT

## 2022-04-13 PROCEDURE — 82435 ASSAY OF BLOOD CHLORIDE: CPT | Performed by: PHYSICIAN ASSISTANT

## 2022-04-13 PROCEDURE — 250N000011 HC RX IP 250 OP 636

## 2022-04-13 PROCEDURE — 85520 HEPARIN ASSAY: CPT | Performed by: INTERNAL MEDICINE

## 2022-04-13 PROCEDURE — 200N000001 HC R&B ICU

## 2022-04-13 PROCEDURE — 82805 BLOOD GASES W/O2 SATURATION: CPT

## 2022-04-13 PROCEDURE — 250N000011 HC RX IP 250 OP 636: Performed by: PHYSICIAN ASSISTANT

## 2022-04-13 PROCEDURE — 250N000009 HC RX 250

## 2022-04-13 PROCEDURE — 94003 VENT MGMT INPAT SUBQ DAY: CPT

## 2022-04-13 PROCEDURE — 999N000157 HC STATISTIC RCP TIME EA 10 MIN

## 2022-04-13 PROCEDURE — 250N000011 HC RX IP 250 OP 636: Performed by: THORACIC SURGERY (CARDIOTHORACIC VASCULAR SURGERY)

## 2022-04-13 PROCEDURE — 99291 CRITICAL CARE FIRST HOUR: CPT | Mod: 24 | Performed by: SURGERY

## 2022-04-13 PROCEDURE — 36415 COLL VENOUS BLD VENIPUNCTURE: CPT

## 2022-04-13 PROCEDURE — 83735 ASSAY OF MAGNESIUM: CPT | Performed by: PHYSICIAN ASSISTANT

## 2022-04-13 PROCEDURE — 36415 COLL VENOUS BLD VENIPUNCTURE: CPT | Performed by: PHYSICIAN ASSISTANT

## 2022-04-13 RX ORDER — ACETAMINOPHEN 160 MG
TABLET,DISINTEGRATING ORAL EVERY 8 HOURS PRN
Status: DISCONTINUED | OUTPATIENT
Start: 2022-04-13 | End: 2022-04-18 | Stop reason: HOSPADM

## 2022-04-13 RX ORDER — CEFAZOLIN SODIUM 2 G/100ML
2 INJECTION, SOLUTION INTRAVENOUS EVERY 8 HOURS
Status: DISCONTINUED | OUTPATIENT
Start: 2022-04-13 | End: 2022-04-18 | Stop reason: HOSPADM

## 2022-04-13 RX ADMIN — Medication 40 MG: at 08:43

## 2022-04-13 RX ADMIN — ATORVASTATIN CALCIUM 80 MG: 40 TABLET, FILM COATED ORAL at 08:43

## 2022-04-13 RX ADMIN — CARVEDILOL 25 MG: 25 TABLET, FILM COATED ORAL at 17:25

## 2022-04-13 RX ADMIN — LEVETIRACETAM 1000 MG: 10 INJECTION INTRAVENOUS at 16:56

## 2022-04-13 RX ADMIN — ASPIRIN 81 MG CHEWABLE TABLET 81 MG: 81 TABLET CHEWABLE at 08:43

## 2022-04-13 RX ADMIN — CHLORHEXIDINE GLUCONATE 15 ML: 1.2 RINSE ORAL at 08:43

## 2022-04-13 RX ADMIN — TICAGRELOR 90 MG: 90 TABLET ORAL at 04:56

## 2022-04-13 RX ADMIN — ACETAMINOPHEN 975 MG: 325 TABLET, FILM COATED ORAL at 04:56

## 2022-04-13 RX ADMIN — LEVETIRACETAM 1000 MG: 10 INJECTION INTRAVENOUS at 03:15

## 2022-04-13 RX ADMIN — CARVEDILOL 25 MG: 25 TABLET, FILM COATED ORAL at 08:43

## 2022-04-13 RX ADMIN — AMLODIPINE BESYLATE 5 MG: 5 TABLET ORAL at 08:43

## 2022-04-13 RX ADMIN — HYDROGEN PEROXIDE: 30 LIQUID TOPICAL at 17:44

## 2022-04-13 RX ADMIN — ACETAMINOPHEN 975 MG: 325 TABLET, FILM COATED ORAL at 11:48

## 2022-04-13 RX ADMIN — HEPARIN SODIUM AND DEXTROSE 1200 UNITS/HR: 10000; 5 INJECTION INTRAVENOUS at 17:23

## 2022-04-13 RX ADMIN — AMIODARONE HYDROCHLORIDE 200 MG: 200 TABLET ORAL at 08:43

## 2022-04-13 RX ADMIN — OXYCODONE HYDROCHLORIDE 5 MG: 5 TABLET ORAL at 03:14

## 2022-04-13 RX ADMIN — TICAGRELOR 90 MG: 90 TABLET ORAL at 16:57

## 2022-04-13 RX ADMIN — SENNOSIDES AND DOCUSATE SODIUM 1 TABLET: 50; 8.6 TABLET ORAL at 22:24

## 2022-04-13 RX ADMIN — CEFAZOLIN SODIUM 2 G: 2 INJECTION, SOLUTION INTRAVENOUS at 09:35

## 2022-04-13 RX ADMIN — ACETAMINOPHEN 975 MG: 325 TABLET, FILM COATED ORAL at 00:18

## 2022-04-13 RX ADMIN — CHLORHEXIDINE GLUCONATE 15 ML: 1.2 RINSE ORAL at 19:36

## 2022-04-13 RX ADMIN — ACETAMINOPHEN 975 MG: 325 TABLET, FILM COATED ORAL at 17:56

## 2022-04-13 RX ADMIN — POLYETHYLENE GLYCOL 3350 17 G: 17 POWDER, FOR SOLUTION ORAL at 22:24

## 2022-04-13 RX ADMIN — CEFAZOLIN SODIUM 2 G: 2 INJECTION, SOLUTION INTRAVENOUS at 17:33

## 2022-04-13 ASSESSMENT — ACTIVITIES OF DAILY LIVING (ADL)
ADLS_ACUITY_SCORE: 17

## 2022-04-13 NOTE — PROGRESS NOTES
No sedation, no narcotics, lethargic, opens eyes spontaneously, doesn't follow commands. withdraws on painful stimuli. VSS. Trached, 30%, peep 5. 10/5 PS attempted couple times, apneic. Tube feed at goal @ 1900 today with flush. Pure wick red/pink output, appears to be having period for multiple days? Provider aware. Hep gtt, 10 a due tomorrow am. VBG done. Up in chair for couple hours.  by bedside, supportive. Social work following.

## 2022-04-13 NOTE — PROGRESS NOTES
Patient seen and care plan discussed with Dr. Babita Olea [facility]  Cardiovascular and Thoracic Surgery Daily Note          Assessment and Plan:   Angelica Robledo is a 51 year old female with history of MI at age 25 s/p angioplasty who presented to the hospital with hypertensive emergency and NSTEMI. Coronary angio demonstrated severe multivessel CAD. Echocardiogram preoperatively with preserved biventricular function.     PMH includes: NSTEMI, CAD with previous coronary stents, HTN  Course has been complicated by PEA arrest with ventricular fibrillation on POD 0 with ROSC after defibrillation x3, 2 mg epinephrine and one round of CPR. Was taken to cath lab where radial artery graft was found to be occluded. PCI with DANA placement to mid RCA and angioplasty of the rPLB was performed. Echocardiogram at the conclusion of PCI demonstrated LVEF 40-45% with mild to moderate global hypokinesis of the left ventricle with severe hypokinesis of the mid anterior and anteroseptal walls, the entire inferior wall and all apical segments of the left ventricle; no pericardial effusion. Additionally, due to antiplatelet therapy post PCI, patient developed epistaxis during attempted NG placement requiring placement of b/l rhino rockets. Now, diffuse severe encephalopathy with minimal neurologic response.      POD #14 s/p:  1. Coronary artery bypass grafting x 4 (radial artery graft to the right posterior descending coronary artery, reversed saphenous vein graft to the obtuse marginal branch of the left circumflex coronary artery, reversed saphenous vein graft to the first diagonal branch of the left anterior descending coronary artery, and pedicled left internal mammary artery to left anterior descending coronary artery).  2.  Endoscopic vein harvest of the greater saphenous vein from the left lower extremity.  3.  Endoscopic left radial artery harvest on 3/30/22 with Dr. Butterfield.     CVS:   NSTEMI and severe  multivessel CAD s/p CABG x4 with radial grafts as above  HTN  PEA and Vfib arrest s/p ROSC and DANA to mid RCA and angioplasty of the rPLB on 3/31  HD stable, lends to HTN; NSR rates 70-80s  - Goal MAP >65, SBP <140  - Amlodipine 5 mg daily for radial graft (at least 3-6 months)  - Carvedilol 25 mg BID  - PRN hydralazine available  - Brilinta load and daily per Cardiology, tolerated 500U straight rate heparin-tolerated, on LMW heparin gtt  - Transitioned amiodarone gtt to PO taper per EP/Cards recs  - ASA 81 daily  - Atorvastatin 80 mg daily  - CTs removed 4/3. TPWs removed 4/1     Resp:   Postoperative ventilator management  - Current vent settings:  Vent Mode: CMV/AC  (Continuous Mandatory Ventilation/ Assist Control) weaning in CPAP  FiO2 (%): 30 %  Resp Rate (Set): 14 breaths/min  Tidal Volume (Set, mL): 450 mL  PEEP (cm H2O): 5 cmH2O  Resp: 13  - Intubated for airway protection due to encephalopathy  - Titrate O2/peep to maintain sats >92%  - PST as tolerated  - Trach/PEG done yesterday     Neuro:    Acute postoperative pain  Hx migraines  Encephalopathy   Did receive CPR on 3/31, will likely have pain from this also  Propofol turned off on 3/31 at 9 am, starting to open eyes more, unable to move extremities but withdraws BLE to pain, nothing in BUEs yet. CT head 3/31 negative for hemorrhage; CTA negative for strokes, vessels patent; per neurology, MRI stable w/o acute concern to explain clinical status.  EEG without e/o seizure activity, continues to have moderate to severe diffuse encephalopathy   4/5 EEG improved but c/f new spikes not c/w seizures but tendency to seize  MRI head and c-spine on 4/3, 4/8 negative  - PRN dilaudid available  - PRN oxycodone and methocarbamol available  - Scheduled tylenol   - Neuro critical care consulted and following, greatly appreciate recommendations  - Keppra added 4/5  - More eye tracking today, yawned a few times, but does not follow  commands     Renal/Electrolyte:   Lactic acidosis, resolved  Volume overload  Hypernatremia, resolved  Creat stable, below preop weight, much less edema;   Na/K 4.6 stable  - Strict I/O, daily weights  - Avoid/limit nephrotoxins as able  - Replete lytes per protocol  - No further diuresis  - Continue FWF decrease to 30 ml q 6 hrs<60 mL q6h     GI/Nutrition:    Epistaxis, resolved  Elevated LFTs, likely shock liver 2/2 arrest, resolved  - Nutrition following  - Rhino rockets removed, continues to have some oozing from b/l nares  - Continue bowel regimen, + BMs  - PPI  -PEG placed, tolerating feeds.      :    - External wick in place for strict I/O  - Urine +, start Bactrim bid for 3 days-- completed, afebrile     Endo:  Stress induced hyperglycemia  Thyroid nodule   Preop A1c 5.7%  - Transitioned to sliding scale insulin   - Goal BG <180 for optimal healing  - Thyroid nodule noted on CT on 3/31, will have this worked up on an outpatient basis     ID:  Stress induced leukocytosis, recurrent  E.Coli UTI preoperatively  WBC 14.0<15.9<16.0<15.2<15.7<14.7<10.4, Temp (24hrs), Av.6  F (37  C), Min:98.4  F (36.9  C), Max:98.8  F (37.1  C); new leukocytosis, respiratory culture ordered/pan culturing per intensivist   - Completed perioperative ABX, Bactrim for UTI  - Continue to follow fever curve, WBC and inflammatory markers as appropriate  - Repeat UA  without overt e/o infection     Heme:  Acute blood loss anemia  Acute blood loss thrombocytopenia  Hgb 7.6<7.8<8.3<7.9<7.8<8.1, Plts 350; no s/sx active bleeding  - CBC in AM  - Goal Hgb >7  - Consulted hematology given severe premature CAD and acute graft failure- appreciate recommendations. Multiple labs sent. Hypercoagulable workup appears to be negative but recommending repeat levels once acute issues resolve. Currently recommending antiplatelet and full anticoagulation (therapeutic heparin). D/w Dr. Butterfield. Plan will be straight rate heparin at 500 units/hr  "transitioned LIH gtt, consider transition to HIH after trach/PEG placed     - Proph: PPI, subcutaneous heparin, SCDs     - Dispo: Continue in ICU; Family would like patient moved to [place] if/when trach established to be closer to daughter. SW consulted.     - Lines: LISA Cooper, [place], [place]    Seen and discussed with Dr. Jc          Interval History:   No acute events overnight. Trach and PEG yesterday went well. Eye tracking today but does not follow commands         Medications:       acetaminophen  975 mg Oral or Feeding Tube Q6H     amiodarone  200 mg Oral or Feeding Tube Daily     amLODIPine  5 mg Oral or Feeding Tube Daily     aspirin  81 mg Oral or NG Tube Daily     atorvastatin  80 mg Oral or Feeding Tube Daily     carvedilol  25 mg Oral or Feeding Tube BID w/meals     ceFAZolin  2 g Intravenous Q8H     chlorhexidine  15 mL Mouth/Throat Q12H     insulin aspart  1-6 Units Subcutaneous Q4H     levETIRAcetam  1,000 mg Intravenous Q12H     pantoprazole  40 mg Oral or NG Tube Daily    Or     pantoprazole  40 mg Oral Daily     sodium chloride (PF)  3 mL Intracatheter Q8H     ticagrelor  90 mg Oral or Feeding Tube BID     @MEDSPRN-@          Physical Exam:   Vitals were reviewed  Blood pressure 110/73, pulse 69, temperature 98.1  F (36.7  C), temperature source Axillary, resp. rate 14, height 1.651 m (5' 5\"), weight 92.5 kg (203 lb 14.8 oz), last menstrual period 02/27/2022, SpO2 99 %.  Rhythm: NSR    Lungs: course    Cardiovascular: s1/s2, no m/r/g    Abdomen: s/nt/nd    Extremeties: no LE edema    Incision: cdi    CT: na    Weight:   Vitals:    04/09/22 0600 04/10/22 0600 04/11/22 0600 04/12/22 0421   Weight: 94.4 kg (208 lb 1.8 oz) 94.9 kg (209 lb 3.5 oz) 93.5 kg (206 lb 2.1 oz) 92.6 kg (204 lb 2.3 oz)    04/13/22 0423   Weight: 92.5 kg (203 lb 14.8 oz)            Data:   Labs:   Lab Results   Component Value Date    WBC 14.0 04/12/2022     Lab Results   Component Value Date    RBC 3.17 04/12/2022     Lab " Results   Component Value Date    HGB 7.8 04/12/2022     Lab Results   Component Value Date    HCT 26.7 04/12/2022     No components found for: MCT  Lab Results   Component Value Date    MCV 84 04/12/2022     Lab Results   Component Value Date    MCH 24.6 04/12/2022     Lab Results   Component Value Date    MCHC 29.2 04/12/2022     Lab Results   Component Value Date    RDW 18.4 04/12/2022     Lab Results   Component Value Date     04/12/2022       Last Basic Metabolic Panel:  Lab Results   Component Value Date     04/11/2022      Lab Results   Component Value Date    POTASSIUM 4.6 04/12/2022     Lab Results   Component Value Date    CHLORIDE 105 04/11/2022     Lab Results   Component Value Date    TOMAS 9.1 04/11/2022     Lab Results   Component Value Date    CO2 23 04/11/2022     Lab Results   Component Value Date    BUN 23 04/11/2022     Lab Results   Component Value Date    CR 0.96 04/11/2022     Lab Results   Component Value Date    GLC 84 04/12/2022     04/11/2022     Seen and discussed with Dr. Babita Craig PAJackieC  Pager #: [phone]

## 2022-04-13 NOTE — PROGRESS NOTES
"General Surgery Progress Note    On vent, trach in place. Does not follow commands. 190 ml per PEG to gravity since procedure yesterday. Trickle feeds starting this morning. Hgb stable at 7.6. White count up 14.0->16.9. Afebrile.    Vitals: Blood pressure 105/72, pulse 74, temperature 98.6  F (37  C), temperature source Oral, resp. rate 20, height 1.651 m (5' 5\"), weight 92.5 kg (203 lb 14.8 oz), last menstrual period 2022, SpO2 98 %.  Temperature Temp  Av.6  F (37  C)  Min: 98.2  F (36.8  C)  Max: 98.9  F (37.2  C)   I/O last 3 completed shifts:  In: 1006.2 [I.V.:906.2; NG/GT:100]  Out: 1915 [Urine:1725; Emesis/NG output:190]    Abd: soft, nt, nd, +BS. G tube intact and without erythema or drainage.     51 year old female S/P G tube, 1 Day Post-Op.  - Ok to start trickle tube feeds.  - Advance to goal per nutrition as tolerated.  - Continue to flush per protocol.  - General Surgery will sign off. Please call with questions.    Dalila Gregorio PA-C  [facility]  [phone]    "

## 2022-04-13 NOTE — PROGRESS NOTES
Care Management Follow Up    Length of Stay (days): 17    Expected Discharge Date: 04/15/2022     Concerns to be Addressed: adjustment to diagnosis/illness, basic needs, coping/stress, decision making, discharge planning, employment/school  spouse is overwhelmed with next steps   Patient plan of care discussed at interdisciplinary rounds: Yes    Anticipated Discharge Disposition: LTACH     Anticipated Discharge Services: Transportation Services  Anticipated Discharge DME: Oxygen, Other (see comment)    Patient/family educated on Medicare website which has current facility and service quality ratings:    Education Provided on the Discharge Plan:    Patient/Family in Agreement with the Plan: yes    Referrals Placed by CM/SW: External Care Coordination, Transportation, Financial Services  Private pay costs discussed: transportation costs    Additional Information:  Following for discharge to LTACH hopefully in [place], [place].  Spoke with  and Meme.  Updated that CC would reach out to Johnnie to see if he had update on LTACH in [place].    Family also had questions regarding [employer]. Encouraged them to reach out to insurance to follow up on establishing a CC.   CC called Johnnie who had not heard from facility in [place].  He will reach out to them and update CC.  Johnnie is aware that family would like to meet with him.    Family updated that transport could take 2 days to arrange through [facility].  MD's state earliest Pt would be ready to discharge would be Friday.  Johnnie feels that it would be early next week before transfer could be arranged.   CC will continue to follow.       Yessica Perez RN

## 2022-04-13 NOTE — PROGRESS NOTES
THORACIC SURGERY    Tracheostomy OK  No bleeding    MILLIE MEJÍA MD [facility] ONCOLOGY THORACIC SURGERY  CELL:  [phone]  OFFICE: [phone]

## 2022-04-13 NOTE — PROGRESS NOTES
[facility] ICU RESPIRATORY NOTE        Date of Admission: 3/27/2022    Date of Intubation (most recent): 3/30/2022    Reason for Mechanical Ventilation: Airway protection    Number of Days on Mechanical Ventilation: 15    Met Criteria for Spontaneous Breathing Trial: No    Reason for No Spontaneous Breathing Trial: Per MD    Significant Events Today: None    ABG Results: No lab results found in last 7 days.    Current Vent Settings: Vent Mode: CMV/AC  (Continuous Mandatory Ventilation/ Assist Control)  FiO2 (%): 30 %  Resp Rate (Set): 14 breaths/min  Tidal Volume (Set, mL): 450 mL  PEEP (cm H2O): 5 cmH2O  Pressure Support (cm H2O): 8 cmH2O  Resp: 20        Genna King, RT

## 2022-04-13 NOTE — PLAN OF CARE
Shift [id number]: VSS. Neuro: SANDRA orientation, does not follow commands, does open eyes spontaneously, withdraws in lower extremities bilaterally. Pulm: Lungs: clear in upper lobes, diminished in bases bilaterally, trached. GI/: BS present, no BM; External cath in place: WDL output, red/pink tinged output. Diet: Continuous enteral feedings. Access: PIV's; Hep gtt, NS for TKO. Pain managed w/ scheduled Tylenol. Family updated at bedside     AM Shift:  - TF re-started

## 2022-04-13 NOTE — PROGRESS NOTES
Critical Care Progress Note      04/13/2022    Name: Angelica Robledo MRN#: [id number]   Age: 51 year old YOB: 1970     [facility] Day# 17               Problem List:   Active Problems:    NSTEMI (non-ST elevated myocardial infarction) (H)    Hypertensive emergency    Paroxysmal ventricular tachycardia (H)           Summary/Hospital Course:   Angelica Robledo is a 51 year old female with pertinent PMHx of prior MI at age 24-25 (in 1996) s/p angioplasty, HTN, anemia, and NSTEMI.  She presented on 3/27 with new onset chest pain similar to her previous MI.  Workup was consistent with NSTEMI.  Cardiac cath demonstrated severe multivessel CAD.  She was seen by CT surgery and CABG was scheduled for 3/30.  She underwent CABG x4 left radial, LIMA, left saph vein x2 (FAUSTINO taken down, but elected to not be used due to size).    No family history of clotting disorders.  No known history of DVT/PE/stroke. Siblings without cardiovascular disease. Father passed away of cardiac causes at an advanced age.  Mother had cardiac disease as well as an aortic aneurysm, but not at a young age.     4V CABG as follows: left radial artery graft to the right posterior descending coronary artery, reversed saphenous vein graft to the obtuse marginal branch of the left circumflex coronary artery, reversed saphenous vein graft to the first diagonal branch of the left anterior descending coronary artery, and pedicled left internal mammary artery to left anterior descending coronary.    POD 0 - initial hypoxia resolved with vent changes.  At 2355 patient suffered a PEA and Vfib arrest and underwent 3 shocks, 2 rounds of epi, and 1 round of CPR. ROSC obtained at 0005.  300 mg amiodarone bolus administered.  STAT cardiac cath lab performed demonstrating LIMA-LAD, VG-LADD1, and VG-LPLB were patent. The left radial artery to RDPA graft had an occlusion.  The mid RCA was treated with a DANA stent and a balloon angioplasty performed at the [facility].   Excellent flow was noted on completion coronary angiogram.  Echo demonstrated a drop in LVEF to 40-45% with aspects of hypokinesis. RV demonstrated mild-moderate RV function reduction.    POD1 - weaned from sedation with minimal arousal limited to briefly opening eyes to pain and occasionally sound, minimal extremity pain response.  CT head/CTA head and neck unremarkable for acute insult.  Ceribell placed overnight.    PD2-5: MRI x2 obtained demonstrating punctate foci of early subacute ischemic change. EEG waxes and wanes from mod-severe to severe diffuse slowing. With mixed workup results, neither of these findings portent a poor prognosis specifically; however, prognosis remains unclear.    POD6-current: Gradual clinical exam improvement. 4/7 MRI without new findings from previous.    4/12: Trach and PEG performed        Assessment and plan :     Angelica Robledo IS a 51 year old female admitted on 3/27/2022 for chest pain with workup demonstrating severe multivessel CAD.  Patient underwent CABG x4 on 3/30.  POD#0 patient had a PEA arrest with ROSC obtained at 10 minutes.  Emergent coronary cath demonstrated radial artery occlusion.  A DANA was placed into the RCA and balloon angioplasty of the RPLB was performed with excellent completion angiography flow. Trach and PEG 4/12.     I have personally reviewed the daily labs, imaging studies, cultures and discussed the case with referring physician and consulting physicians.     My assessment and plan by system for this patient is as follows:    Neurology/Psychiatry:   1. Analgesia -- tylenol, prn narcotics   2. Sedation -- hold all sedation  3. Encephalopathy s/p cardiac arrest - likely anoxic injury -- head CT negative -- MRI w/ punctate foci of subacute ischemic change -- MRI C spine negative -- continuing EEG: diffuse slowing with gradual improvement, moderate-severe encephalpathy, no indices of seizure activity however is at risk.  -- NCC following, appreciate  management/recs.  -- Keppra for seizure ppx (no seizures per EEG)  -- Stable clinical exam, continued gradual improvements     Cardiovascular:   1.  S/p CABG x4 - left radial, LIMA, left saph vein x2 (FAUSTINO taken down, but elected to not be used due to size)   2. POD#0 PEA and Vfib arrest -- s/p cardiac cath demonstrated radial artery graft to RPDA occlusion -- intervention with DANA to RCA and balloon angioplasty RPLA -- on Kangrelor 3/31, now transitioned to aspirin and Brilinta with discussion per CV surgery and interventional cardiology  -- cards recommends Amiodarone taper -- PO regimen scheduled  -- Brilinta needs to be continued for a minimum of 1 month w/ DANA  3. Hx of MI at age 24-25  4. HTN - likely untreated prior to hospitalization - presented with hypertension emergency - PO regimen per CVS     Pulmonary/Ventilator Management:   1. Airway -- trach present  2. No underlying pulmonary problems on medical history or cxr.   -- wean to trach dome      GI and Nutrition :   1. Epistaxis - s/p rhino rocket removal - oozing has resolved  2. Nutrition - PEG tube present - feedings resumed today  3. PPI ppx    Renal/Fluids/Electrolytes:   1. Monitor UOP/creatinine -- stable labs   2. Replete electrolytes PRN per protocol  3. External wick device     Infectious Disease:   1. UTI with E coli on admission -- 4/1 negative on recheck   2. Uncomplicated UTI -- 4/7 UA positive for UTI for E. coli-- Bactrim DS started for 3 day course - completed.  Monitor for fever, leukocytosis if longer duration. F/u 4/10 urine culture repeat -- no growth to date  2. Leukocytosis, trend -- slight uptrend to 16.9.   3. Staph Aureus Pneumonia -- previously MRSA negative -- start q8hr Ancef, plan for 7 day course     Endocrine:   1. Monitor for stress induced hyperglycemia. ICU insulin protocol, goal sugar <180   2. Incidental thyroid nodule -- follow-up outpatient per discharge referral     Hematology/Oncology:   1. Acute blood loss on  chronic anemia -- stable, trend  2. Acute blood loss thrombocytopenia -- resolved, trend.  3. S/P DANA placement s/p cardiac arrest -- aspirin and brilinta  4. Rule out hypercoagulable state -- workup negative; however, hem/onc recommends full dose anticoagulation in setting of thrombus of radial graft -- discussed with CV: now at high dose heparin gtt following titration up.    IV/Access:    1. Venous access - peripheral  2. Arterial access - NA    ICU Prophylaxis:   1. DVT: hep gtt/mechanical  2. VAP: HOB 30 degrees, chlorhexidine rinse  3. Stress Ulcer: PPI  4. Restraints: No current indication. Will readdress daily.   5. Wound care - per unit routine   6. Feeding - enteral feeds  7. Family Update: family updated at bedside  8. Disposition - ICU       Key goals for next 24 hours:   1. Wean to trach dome         Interim History:   No acute events  No significant changes.  Gradual neuro improvements.  S/p trach and peg         Key Medications:       acetaminophen  975 mg Oral or Feeding Tube Q6H     amiodarone  200 mg Oral or Feeding Tube Daily     amLODIPine  5 mg Oral or Feeding Tube Daily     aspirin  81 mg Oral or NG Tube Daily     atorvastatin  80 mg Oral or Feeding Tube Daily     carvedilol  25 mg Oral or Feeding Tube BID w/meals     ceFAZolin  2 g Intravenous Q8H     chlorhexidine  15 mL Mouth/Throat Q12H     insulin aspart  1-6 Units Subcutaneous Q4H     levETIRAcetam  1,000 mg Intravenous Q12H     pantoprazole  40 mg Oral or NG Tube Daily    Or     pantoprazole  40 mg Oral Daily     sodium chloride (PF)  3 mL Intracatheter Q8H     ticagrelor  90 mg Oral or Feeding Tube BID       heparin 1,200 Units/hr (04/13/22 0833)     - MEDICATION INSTRUCTIONS -       Percutaneous Coronary Intervention orders placed (this is information for BPA alerting)       sodium chloride 10 mL/hr at 04/13/22 0835               Physical Examination:   Temp:  [98.2  F (36.8  C)-98.9  F (37.2  C)] 98.6  F (37  C)  Pulse:  [64-77]  71  Resp:  [0-21] 16  BP: ()/(58-97) 117/76  FiO2 (%):  [30 %] 30 %  SpO2:  [93 %-100 %] 95 %    Intake/Output Summary (Last 24 hours) at 3/31/2022 0921  Last data filed at 3/31/2022 0700  Gross per 24 hour   Intake 3676.54 ml   Output 2825 ml   Net 851.54 ml     Wt Readings from Last 4 Encounters:   04/13/22 92.5 kg (203 lb 14.8 oz)     BP - Mean:  [] 91  Vent Mode: CMV/AC  (Continuous Mandatory Ventilation/ Assist Control)  FiO2 (%): 30 %  Resp Rate (Set): 14 breaths/min  Tidal Volume (Set, mL): 450 mL  PEEP (cm H2O): 5 cmH2O  Pressure Support (cm H2O): 8 cmH2O  Resp: 16    No lab results found in last 7 days.    GEN: no acute distress   HEENT: head ncat, sclera anicteric, trach present, CDI - thick secretions on suctioning  PULM: unlabored, synchronous with vent, clear anteriorly    CV/Chest: RRR. Midline incision CDI.   ABD: soft, nontender. Gastric feeding tube present: CDI.  EXT:  No significant edema,  warm  NEURO: Opens spontaneously, to stimulus, and verbal stimulus. Pupillary light reflex brisk. Moving extremities more frequently, grimace on pain stimulus much more than previous.   Did not follow commands on my exam.    SKIN: no obvious rash  LINES: clean, dry intact         Data:   All data and imaging reviewed     ROUTINE ICU LABS (Last four results)  CMP  Recent Labs   Lab 04/13/22  0826 04/13/22  0407 04/13/22  0200 04/13/22  0015 04/12/22  0819 04/12/22  0429 04/11/22  0752 04/11/22  0551 04/10/22  0848 04/10/22  0552 04/09/22  0749 04/09/22  0423 04/08/22  0839 04/08/22  0531   NA  --   --  138  --   --   --   --  136  --   --   --  139  --  140   POTASSIUM  --   --  4.3  --   --  4.6  --  4.6  --  4.4  --  4.5  --  4.2  4.3   CHLORIDE  --   --  105  --   --   --   --  105  --   --   --  107  --  108   CO2  --   --  27  --   --   --   --  23  --   --   --  25  --  25   ANIONGAP  --   --  6  --   --   --   --  8  --   --   --  7  --  7   * 99 103* 103*   < >  --    < > 136*   < >   --    < > 138*   < > 137*   BUN  --   --  22  --   --   --   --  23  --   --   --  18  --  21   CR  --   --  0.98  --   --   --   --  0.96  --   --   --  0.82  --  0.84   GFRESTIMATED  --   --  70  --   --   --   --  72  --   --   --  87  --  84   TOMAS  --   --  9.5  --   --   --   --  9.1  --   --   --  8.9  --  8.9   MAG  --   --   --   --   --  2.5*  --  2.5*  --  2.3  --  2.2  --  2.5*   PHOS  --   --   --   --   --  4.7*  --  4.6*  --  3.9  --  3.9  --  4.2    < > = values in this interval not displayed.     CBC  Recent Labs   Lab 04/13/22  0200 04/12/22  0429 04/11/22  0551 04/10/22  2314   WBC 16.9* 14.0* 15.9* 15.7*   RBC 3.10* 3.17* 3.11* 3.14*   HGB 7.6* 7.8* 7.8* 7.7*   HCT 25.6* 26.7* 25.3* 25.6*   MCV 83 84 81 82   MCH 24.5* 24.6* 25.1* 24.5*   MCHC 29.7* 29.2* 30.8* 30.1*   RDW 18.6* 18.4* 18.3* 18.3*    350 327 357     INR  No lab results found in last 7 days.  Arterial Blood Gas  No lab results found in last 7 days.    All cultures:  No results for input(s): CULT in the last 168 hours.  No results found for this or any previous visit (from the past 24 hour(s)).        Adi Bazan MD  Surgical Critical Care Fellow

## 2022-04-14 LAB
BACTERIA SPT CULT: ABNORMAL
BACTERIA SPT CULT: ABNORMAL
BLD PROD TYP BPU: NORMAL
BLD PROD TYP BPU: NORMAL
BLOOD COMPONENT TYPE: NORMAL
BLOOD COMPONENT TYPE: NORMAL
CODING SYSTEM: NORMAL
CODING SYSTEM: NORMAL
CROSSMATCH: NORMAL
CROSSMATCH: NORMAL
ERYTHROCYTE [DISTWIDTH] IN BLOOD BY AUTOMATED COUNT: 18.4 % (ref 10–15)
GLUCOSE BLDC GLUCOMTR-MCNC: 121 MG/DL (ref 70–99)
GLUCOSE BLDC GLUCOMTR-MCNC: 128 MG/DL (ref 70–99)
GLUCOSE BLDC GLUCOMTR-MCNC: 131 MG/DL (ref 70–99)
GLUCOSE BLDC GLUCOMTR-MCNC: 140 MG/DL (ref 70–99)
GLUCOSE BLDC GLUCOMTR-MCNC: 143 MG/DL (ref 70–99)
GLUCOSE BLDC GLUCOMTR-MCNC: 153 MG/DL (ref 70–99)
GRAM STAIN RESULT: ABNORMAL
GRAM STAIN RESULT: ABNORMAL
HCT VFR BLD AUTO: 23.1 % (ref 35–47)
HGB BLD-MCNC: 6.9 G/DL (ref 11.7–15.7)
HGB BLD-MCNC: 8.1 G/DL (ref 11.7–15.7)
ISSUE DATE AND TIME: NORMAL
MAGNESIUM SERPL-MCNC: 2.3 MG/DL (ref 1.6–2.3)
MCH RBC QN AUTO: 24.3 PG (ref 26.5–33)
MCHC RBC AUTO-ENTMCNC: 29.9 G/DL (ref 31.5–36.5)
MCV RBC AUTO: 81 FL (ref 78–100)
PHOSPHATE SERPL-MCNC: 4.8 MG/DL (ref 2.5–4.5)
PLATELET # BLD AUTO: 322 10E3/UL (ref 150–450)
RBC # BLD AUTO: 2.84 10E6/UL (ref 3.8–5.2)
UFH PPP CHRO-ACNC: 0.21 IU/ML
UFH PPP CHRO-ACNC: 0.33 IU/ML
UFH PPP CHRO-ACNC: 0.35 IU/ML
UNIT ABO/RH: NORMAL
UNIT ABO/RH: NORMAL
UNIT NUMBER: NORMAL
UNIT NUMBER: NORMAL
UNIT STATUS: NORMAL
UNIT STATUS: NORMAL
UNIT TYPE ISBT: 600
UNIT TYPE ISBT: 600
WBC # BLD AUTO: 12.3 10E3/UL (ref 4–11)

## 2022-04-14 PROCEDURE — 36415 COLL VENOUS BLD VENIPUNCTURE: CPT | Performed by: PHYSICIAN ASSISTANT

## 2022-04-14 PROCEDURE — 85520 HEPARIN ASSAY: CPT | Performed by: PEDIATRICS

## 2022-04-14 PROCEDURE — 99291 CRITICAL CARE FIRST HOUR: CPT | Mod: 24 | Performed by: SURGERY

## 2022-04-14 PROCEDURE — 200N000001 HC R&B ICU

## 2022-04-14 PROCEDURE — 250N000013 HC RX MED GY IP 250 OP 250 PS 637: Performed by: PHYSICIAN ASSISTANT

## 2022-04-14 PROCEDURE — 85027 COMPLETE CBC AUTOMATED: CPT | Performed by: PHYSICIAN ASSISTANT

## 2022-04-14 PROCEDURE — 84100 ASSAY OF PHOSPHORUS: CPT | Performed by: PHYSICIAN ASSISTANT

## 2022-04-14 PROCEDURE — 36415 COLL VENOUS BLD VENIPUNCTURE: CPT | Performed by: SURGERY

## 2022-04-14 PROCEDURE — P9016 RBC LEUKOCYTES REDUCED: HCPCS

## 2022-04-14 PROCEDURE — 999N000157 HC STATISTIC RCP TIME EA 10 MIN

## 2022-04-14 PROCEDURE — 94003 VENT MGMT INPAT SUBQ DAY: CPT

## 2022-04-14 PROCEDURE — 999N000009 HC STATISTIC AIRWAY CARE

## 2022-04-14 PROCEDURE — 85520 HEPARIN ASSAY: CPT | Performed by: INTERNAL MEDICINE

## 2022-04-14 PROCEDURE — 250N000011 HC RX IP 250 OP 636: Performed by: THORACIC SURGERY (CARDIOTHORACIC VASCULAR SURGERY)

## 2022-04-14 PROCEDURE — 85520 HEPARIN ASSAY: CPT | Performed by: SURGERY

## 2022-04-14 PROCEDURE — 85018 HEMOGLOBIN: CPT | Performed by: SURGERY

## 2022-04-14 PROCEDURE — 250N000011 HC RX IP 250 OP 636: Performed by: PHYSICIAN ASSISTANT

## 2022-04-14 PROCEDURE — 83735 ASSAY OF MAGNESIUM: CPT | Performed by: PHYSICIAN ASSISTANT

## 2022-04-14 PROCEDURE — 36415 COLL VENOUS BLD VENIPUNCTURE: CPT | Performed by: PEDIATRICS

## 2022-04-14 PROCEDURE — 250N000011 HC RX IP 250 OP 636

## 2022-04-14 RX ADMIN — HEPARIN SODIUM AND DEXTROSE 1350 UNITS/HR: 10000; 5 INJECTION INTRAVENOUS at 23:57

## 2022-04-14 RX ADMIN — CEFAZOLIN SODIUM 2 G: 2 INJECTION, SOLUTION INTRAVENOUS at 11:31

## 2022-04-14 RX ADMIN — CEFAZOLIN SODIUM 2 G: 2 INJECTION, SOLUTION INTRAVENOUS at 20:51

## 2022-04-14 RX ADMIN — CARVEDILOL 25 MG: 25 TABLET, FILM COATED ORAL at 18:26

## 2022-04-14 RX ADMIN — AMLODIPINE BESYLATE 5 MG: 5 TABLET ORAL at 09:30

## 2022-04-14 RX ADMIN — CHLORHEXIDINE GLUCONATE 15 ML: 1.2 RINSE ORAL at 20:55

## 2022-04-14 RX ADMIN — OXYCODONE HYDROCHLORIDE 5 MG: 5 TABLET ORAL at 16:09

## 2022-04-14 RX ADMIN — OXYCODONE HYDROCHLORIDE 5 MG: 5 TABLET ORAL at 00:00

## 2022-04-14 RX ADMIN — CEFAZOLIN SODIUM 2 G: 2 INJECTION, SOLUTION INTRAVENOUS at 00:59

## 2022-04-14 RX ADMIN — ATORVASTATIN CALCIUM 80 MG: 40 TABLET, FILM COATED ORAL at 08:05

## 2022-04-14 RX ADMIN — INSULIN ASPART 1 UNITS: 100 INJECTION, SOLUTION INTRAVENOUS; SUBCUTANEOUS at 12:22

## 2022-04-14 RX ADMIN — ACETAMINOPHEN 975 MG: 325 TABLET, FILM COATED ORAL at 23:56

## 2022-04-14 RX ADMIN — ASPIRIN 81 MG CHEWABLE TABLET 81 MG: 81 TABLET CHEWABLE at 08:05

## 2022-04-14 RX ADMIN — Medication 40 MG: at 08:04

## 2022-04-14 RX ADMIN — LEVETIRACETAM 1000 MG: 10 INJECTION INTRAVENOUS at 16:01

## 2022-04-14 RX ADMIN — ACETAMINOPHEN 975 MG: 325 TABLET, FILM COATED ORAL at 06:32

## 2022-04-14 RX ADMIN — TICAGRELOR 90 MG: 90 TABLET ORAL at 16:10

## 2022-04-14 RX ADMIN — HEPARIN SODIUM AND DEXTROSE 1350 UNITS/HR: 10000; 5 INJECTION INTRAVENOUS at 06:31

## 2022-04-14 RX ADMIN — ACETAMINOPHEN 975 MG: 325 TABLET, FILM COATED ORAL at 18:25

## 2022-04-14 RX ADMIN — CHLORHEXIDINE GLUCONATE 15 ML: 1.2 RINSE ORAL at 08:04

## 2022-04-14 RX ADMIN — ACETAMINOPHEN 975 MG: 325 TABLET, FILM COATED ORAL at 12:21

## 2022-04-14 RX ADMIN — AMIODARONE HYDROCHLORIDE 200 MG: 200 TABLET ORAL at 09:30

## 2022-04-14 RX ADMIN — LEVETIRACETAM 1000 MG: 10 INJECTION INTRAVENOUS at 04:49

## 2022-04-14 RX ADMIN — TICAGRELOR 90 MG: 90 TABLET ORAL at 04:49

## 2022-04-14 RX ADMIN — ACETAMINOPHEN 975 MG: 325 TABLET, FILM COATED ORAL at 00:00

## 2022-04-14 RX ADMIN — INSULIN ASPART 1 UNITS: 100 INJECTION, SOLUTION INTRAVENOUS; SUBCUTANEOUS at 00:04

## 2022-04-14 RX ADMIN — CARVEDILOL 25 MG: 25 TABLET, FILM COATED ORAL at 09:30

## 2022-04-14 RX ADMIN — INSULIN ASPART 1 UNITS: 100 INJECTION, SOLUTION INTRAVENOUS; SUBCUTANEOUS at 08:11

## 2022-04-14 ASSESSMENT — ACTIVITIES OF DAILY LIVING (ADL)
ADLS_ACUITY_SCORE: 15
ADLS_ACUITY_SCORE: 17
ADLS_ACUITY_SCORE: 15
ADLS_ACUITY_SCORE: 17
ADLS_ACUITY_SCORE: 17
ADLS_ACUITY_SCORE: 15
ADLS_ACUITY_SCORE: 15
ADLS_ACUITY_SCORE: 17
ADLS_ACUITY_SCORE: 15
ADLS_ACUITY_SCORE: 17
ADLS_ACUITY_SCORE: 15
ADLS_ACUITY_SCORE: 17
ADLS_ACUITY_SCORE: 15
ADLS_ACUITY_SCORE: 17
ADLS_ACUITY_SCORE: 17
ADLS_ACUITY_SCORE: 15
ADLS_ACUITY_SCORE: 17
ADLS_ACUITY_SCORE: 15
ADLS_ACUITY_SCORE: 17
ADLS_ACUITY_SCORE: 17

## 2022-04-14 NOTE — PROGRESS NOTES
Critical Care Progress Note      04/14/2022    Name: Angelica Robledo MRN#: [id number]   Age: 51 year old YOB: 1970     [facility] Day# 18               Problem List:   Active Problems:    NSTEMI (non-ST elevated myocardial infarction) (H)    Hypertensive emergency    Paroxysmal ventricular tachycardia (H)           Summary/Hospital Course:   Angelica Robledo is a 51 year old female with pertinent PMHx of prior MI at age 24-25 (in 1996) s/p angioplasty, HTN, anemia, and NSTEMI.  She presented on 3/27 with new onset chest pain similar to her previous MI.  Workup was consistent with NSTEMI.  Cardiac cath demonstrated severe multivessel CAD.  She was seen by CT surgery and CABG was scheduled for 3/30.  She underwent CABG x4 left radial, LIMA, left saph vein x2 (FAUSTINO taken down, but elected to not be used due to size).    No family history of clotting disorders.  No known history of DVT/PE/stroke. Siblings without cardiovascular disease. Father passed away of cardiac causes at an advanced age.  Mother had cardiac disease as well as an aortic aneurysm, but not at a young age.     4V CABG as follows: left radial artery graft to the right posterior descending coronary artery, reversed saphenous vein graft to the obtuse marginal branch of the left circumflex coronary artery, reversed saphenous vein graft to the first diagonal branch of the left anterior descending coronary artery, and pedicled left internal mammary artery to left anterior descending coronary.    POD 0 - initial hypoxia resolved with vent changes.  At 2355 patient suffered a PEA and Vfib arrest and underwent 3 shocks, 2 rounds of epi, and 1 round of CPR. ROSC obtained at 0005.  300 mg amiodarone bolus administered.  STAT cardiac cath lab performed demonstrating LIMA-LAD, VG-LADD1, and VG-LPLB were patent. The left radial artery to RDPA graft had an occlusion.  The mid RCA was treated with a DANA stent and a balloon angioplasty performed at the [facility].   Excellent flow was noted on completion coronary angiogram.  Echo demonstrated a drop in LVEF to 40-45% with aspects of hypokinesis. RV demonstrated mild-moderate RV function reduction.    POD1 - weaned from sedation with minimal arousal limited to briefly opening eyes to pain and occasionally sound, minimal extremity pain response.  CT head/CTA head and neck unremarkable for acute insult.  Ceribell placed overnight.    PD2-5: MRI x2 obtained demonstrating punctate foci of early subacute ischemic change. EEG waxes and wanes from mod-severe to severe diffuse slowing. With mixed workup results, neither of these findings portent a poor prognosis specifically; however, prognosis remains unclear.    POD6-current: Gradual clinical exam improvement. 4/7 MRI without new findings from previous.    4/12: Trach and PEG performed        Assessment and plan :     Angelica Robledo IS a 51 year old female admitted on 3/27/2022 for chest pain with workup demonstrating severe multivessel CAD.  Patient underwent CABG x4 on 3/30.  POD#0 patient had a PEA arrest with ROSC obtained at 10 minutes.  Emergent coronary cath demonstrated radial artery occlusion.  A DANA was placed into the RCA and balloon angioplasty of the RPLB was performed with excellent completion angiography flow. Trach and PEG 4/12.     I have personally reviewed the daily labs, imaging studies, cultures and discussed the case with referring physician and consulting physicians.     My assessment and plan by system for this patient is as follows:    Neurology/Psychiatry:   1. Analgesia -- tylenol, prn narcotics   2. Sedation -- hold sedation  3. Encephalopathy s/p cardiac arrest - likely anoxic injury -- head CT negative -- MRI w/ punctate foci of subacute ischemic change -- MRI C spine negative -- continuing EEG: diffuse slowing with gradual improvement, moderate-severe encephalpathy, no indices of seizure activity however is at risk.  -- NCC following, appreciate  management/recs.  -- Keppra for seizure ppx (no seizures per EEG)  -- Stable clinical exam, continued gradual improvements     Cardiovascular:   1.  S/p CABG x4 - left radial, LIMA, left saph vein x2 (FAUSTINO taken down, but elected to not be used due to size)   2. POD#0 PEA and Vfib arrest -- s/p cardiac cath demonstrated radial artery graft to RPDA occlusion -- intervention with DANA to RCA and balloon angioplasty RPLA -- on Kangrelor 3/31, now transitioned to aspirin and Brilinta with discussion per CV surgery and interventional cardiology  -- cards recommends Amiodarone taper -- PO regimen scheduled  -- Brilinta needs to be continued for a minimum of 1 month w/ DANA  3. Hx of MI at age 24-25  4. HTN - likely untreated prior to hospitalization - presented with hypertension emergency - PO regimen per CVS     Pulmonary/Ventilator Management:   1. Airway -- trach present  2. No underlying pulmonary problems on medical history or cxr.   -- wean to trach dome as able  -- pressure support weaning today     GI and Nutrition :   1. Epistaxis - s/p rhino rocket removal - oozing has resolved  2. Nutrition - PEG tube present - continue to goal  3. PPI ppx    Renal/Fluids/Electrolytes:   1. Monitor UOP/creatinine -- stable labs   2. Replete electrolytes PRN per protocol  3. External wick device     Infectious Disease:   1. UTI with E coli on admission -- 4/1 negative on recheck   2. Uncomplicated UTI -- 4/7 UA positive for UTI for E. coli-- Bactrim DS started for 3 day course - completed.  Monitor for fever, leukocytosis if longer duration. F/u 4/10 urine culture repeat -- no growth to date  2. Leukocytosis, trend -- down to 12.3   3. Staph Aureus Pneumonia -- previously MRSA negative -- q8hr Ancef, plan for 7 day course (4/20)     Endocrine:   1. Monitor for stress induced hyperglycemia. ICU insulin protocol, goal sugar <180   2. Incidental thyroid nodule -- follow-up outpatient per discharge referral     Hematology/Oncology:    1. Acute blood loss on chronic anemia -- stable, trend  2. Acute blood loss thrombocytopenia -- resolved, trend.  3. S/P DANA placement s/p cardiac arrest -- aspirin and brilinta  4. Rule out hypercoagulable state -- workup negative; however, hem/onc recommends full dose anticoagulation in setting of thrombus of radial graft -- discussed with CV: now at high dose heparin gtt following titration up.    IV/Access:    1. Venous access - peripheral  2. Arterial access - NA    ICU Prophylaxis:   1. DVT: hep gtt/mechanical  2. VAP: HOB 30 degrees, chlorhexidine rinse  3. Stress Ulcer: PPI  4. Restraints: No current indication. Will readdress daily.   5. Wound care - per unit routine   6. Feeding - enteral feeds  7. Family Update: family updated at bedside  8. Disposition - ICU       Key goals for next 24 hours:   1. Wean to trach dome         Interim History:   No acute events  No significant changes.         Key Medications:       acetaminophen  975 mg Oral or Feeding Tube Q6H     amiodarone  200 mg Oral or Feeding Tube Daily     amLODIPine  5 mg Oral or Feeding Tube Daily     aspirin  81 mg Oral or NG Tube Daily     atorvastatin  80 mg Oral or Feeding Tube Daily     carvedilol  25 mg Oral or Feeding Tube BID w/meals     ceFAZolin  2 g Intravenous Q8H     chlorhexidine  15 mL Mouth/Throat Q12H     insulin aspart  1-6 Units Subcutaneous Q4H     levETIRAcetam  1,000 mg Intravenous Q12H     pantoprazole  40 mg Oral or NG Tube Daily    Or     pantoprazole  40 mg Oral Daily     sodium chloride (PF)  3 mL Intracatheter Q8H     ticagrelor  90 mg Oral or Feeding Tube BID       heparin 1,350 Units/hr (04/14/22 0631)     - MEDICATION INSTRUCTIONS -       Percutaneous Coronary Intervention orders placed (this is information for BPA alerting)       sodium chloride 10 mL/hr at 04/13/22 0835               Physical Examination:   Temp:  [98  F (36.7  C)-98.4  F (36.9  C)] 98.4  F (36.9  C)  Pulse:  [63-75] 71  Resp:  [11-22] 11  BP:  ()/(54-86) 107/67  FiO2 (%):  [30 %] 30 %  SpO2:  [95 %-100 %] 99 %    Intake/Output Summary (Last 24 hours) at 3/31/2022 0921  Last data filed at 3/31/2022 0700  Gross per 24 hour   Intake 3676.54 ml   Output 2825 ml   Net 851.54 ml     Wt Readings from Last 4 Encounters:   04/14/22 92.3 kg (203 lb 7.8 oz)     BP - Mean:  [] 79  Vent Mode: CPAP/PS  (Continuous positive airway pressure with Pressure Support)  FiO2 (%): 30 %  Resp Rate (Set): 14 breaths/min  Tidal Volume (Set, mL): 450 mL  PEEP (cm H2O): 5 cmH2O  Pressure Support (cm H2O): 10 cmH2O  Resp: 11    Recent Labs   Lab 04/13/22  1503   O2PER 21       GEN: no acute distress   HEENT: head ncat, sclera anicteric, trach present, CDI   PULM: unlabored, tolerating pressure support, clear anteriorly    CV/Chest: RRR. Midline incision CDI.   ABD: soft, nontender. Gastric feeding tube present: CDI.  EXT:  No significant edema,  warm  NEURO: Opens spontaneously, to stimulus, and verbal stimulus. Pupillary light reflex brisk. Moving extremities more frequently. Did not follow commands on my exam.    SKIN: no obvious rash  LINES: clean, dry intact         Data:   All data and imaging reviewed     ROUTINE ICU LABS (Last four results)  CMP  Recent Labs   Lab 04/14/22  0532 04/13/22  1936 04/13/22  1659 04/13/22  1203 04/13/22  1103 04/13/22  0826 04/13/22  0407 04/13/22  0200 04/12/22  0819 04/12/22  0429 04/11/22  0752 04/11/22  0551 04/10/22  0848 04/10/22  0552 04/09/22  0749 04/09/22  0423 04/08/22  0839 04/08/22  0531   NA  --   --   --   --   --   --   --  138  --   --   --  136  --   --   --  139  --  140   POTASSIUM  --   --   --   --   --   --   --  4.3  --  4.6  --  4.6  --  4.4  --  4.5  --  4.2  4.3   CHLORIDE  --   --   --   --   --   --   --  105  --   --   --  105  --   --   --  107  --  108   CO2  --   --   --   --   --   --   --  27  --   --   --  23  --   --   --  25  --  25   ANIONGAP  --   --   --   --   --   --   --  6  --   --   --  8   --   --   --  7  --  7   GLC  --  108* 111* 98  --  102*   < > 103*   < >  --    < > 136*   < >  --    < > 138*   < > 137*   BUN  --   --   --   --   --   --   --  22  --   --   --  23  --   --   --  18  --  21   CR  --   --   --   --   --   --   --  0.98  --   --   --  0.96  --   --   --  0.82  --  0.84   GFRESTIMATED  --   --   --   --   --   --   --  70  --   --   --  72  --   --   --  87  --  84   TOMAS  --   --   --   --   --   --   --  9.5  --   --   --  9.1  --   --   --  8.9  --  8.9   MAG 2.3  --   --   --  2.5*  --   --   --   --  2.5*  --  2.5*  --  2.3  --  2.2  --  2.5*   PHOS 4.8*  --   --   --  5.3*  --   --   --   --  4.7*  --  4.6*  --  3.9  --  3.9  --  4.2    < > = values in this interval not displayed.     CBC  Recent Labs   Lab 04/14/22  0532 04/13/22  1103 04/13/22  0200 04/12/22  0429   WBC 12.3* 13.5* 16.9* 14.0*   RBC 2.84* 3.02* 3.10* 3.17*   HGB 6.9* 7.3* 7.6* 7.8*   HCT 23.1* 24.8* 25.6* 26.7*   MCV 81 82 83 84   MCH 24.3* 24.2* 24.5* 24.6*   MCHC 29.9* 29.4* 29.7* 29.2*   RDW 18.4* 18.6* 18.6* 18.4*    338 337 350     INR  No lab results found in last 7 days.  Arterial Blood Gas  Recent Labs   Lab 04/13/22  1503   O2PER 21       All cultures:  No results for input(s): CULT in the last 168 hours.  No results found for this or any previous visit (from the past 24 hour(s)).        Adi Bazan MD  Surgical Critical Care Fellow

## 2022-04-14 NOTE — PROGRESS NOTES
[facility] ICU RESPIRATORY NOTE      Date of Admission: 3/27/2022     Date of Intubation (most recent): 3/30     Reason for Mechanical Ventilation: Airway protection     Number of Days on Mechanical Ventilation: 14     Met Criteria for Spontaneous Breathing Trial: Yes     Reason for No Spontaneous Breathing Trial: Per MD     Significant Events Today: Pt pressure supported for approximately 6 hours in the AM. Returned to vent settings for 2 hours and now back on spontaneous breathing trail.      Recent Labs   Lab 04/13/22  1503   O2PER 21        Current Vent Settings:   Vent Mode: CPAP/PS  (Continuous positive airway pressure with Pressure Support)  FiO2 (%): 30 %  Resp Rate (Set): 14 breaths/min  Tidal Volume (Set, mL): 450 mL  PEEP (cm H2O): 5 cmH2O  Pressure Support (cm H2O): 5 cmH2O  Resp: 14      Will continue to follow.    Brigitte Guerra RT on 4/14/2022 at 5:01 PM

## 2022-04-14 NOTE — PROGRESS NOTES
[facility] ICU RESPIRATORY NOTE        Date of Admission: 3/27/2022    Date of Intubation (most recent): 3/30    Reason for Mechanical Ventilation: Airwayprotection    Number of Days on Mechanical Ventilation: 15    Met Criteria for Spontaneous Breathing Trial: No    Reason for No Spontaneous Breathing Trial: Per MD    Significant Events Today: None    ABG Results:   Recent Labs   Lab 04/13/22  1503   O2PER 21       Current Vent Settings: Vent Mode: CMV/AC  (Continuous Mandatory Ventilation/ Assist Control)  FiO2 (%): 30 %  Resp Rate (Set): 14 breaths/min  Tidal Volume (Set, mL): 450 mL  PEEP (cm H2O): 5 cmH2O  Pressure Support (cm H2O): 8 cmH2O  Resp: 14        Plan: We will continue with full ventilatory support.    Margie Estes, RT

## 2022-04-14 NOTE — PROGRESS NOTES
Care Management Follow Up    Length of Stay (days): 18    Expected Discharge Date: 04/15/2022     Concerns to be Addressed: adjustment to diagnosis/illness, basic needs, coping/stress, decision making, discharge planning, employment/school  spouse is overwhelmed with next steps   Patient plan of care discussed at interdisciplinary rounds: Yes    Anticipated Discharge Disposition: LTACH     Anticipated Discharge Services: Transportation Services  Anticipated Discharge DME: Oxygen, Other (see comment)    Patient/family educated on Medicare website which has current facility and service quality ratings:    Education Provided on the Discharge Plan:    Patient/Family in Agreement with the Plan: yes    Referrals Placed by CM/SW: External Care Coordination, Transportation, Financial Services  Private pay costs discussed: transportation costs    Additional Information:  Following for discharge planning to [facility] in [facility].   Johnnie Liaison for [facility] (a sister agency) is following.  [facility] needs MD-MD report to accept  ICU MD to call Dr. Angel [phone] to review and for clinical acceptance.   [facility] does have beds.   Pending MD review they could accept patient early next week.   Johnnie will come and meet with  at bedside on 4/15 (most likely late morning- noon)  ICU MD updated to call report.   Bedside RN updated and will update  who had stepped out.  CC to continue to follow.  Once clinically accepted (hopefully by tomorrow) CC should start to arrange transport with [facility].     Johnnie will fax initial information needed.  Pt should be sent with a packet and disc of records    Addendum 1445: Received update from [facility] that per Fellow Adi Bazan he had done face to face with ADIS Cochran MD and they had clinically accepted.     Johnnie updated and will be up tomorrow to see family.   RYLEY address:  [place] [address] [place] [address]  Phone:  [phone]  Fax: [phone]  Referral Phone: [phone]  Referral Fax: [phone]    CC called [facility] transportation ( [employer]) who will work to see if transportation could be lined up for Monday 4/18.  They took information and are working with billing to provide an estimate of cost.    Time on Monday still needs to be arranged.    CC to follow.           Yessica Perez RN

## 2022-04-14 NOTE — PROGRESS NOTES
CLINICAL NUTRITION SERVICES - REASSESSMENT NOTE      Future/Additional Recommendations:   If a fiber feeding desired, could change to Promote with Fiber at same rate 65 mL/hr = 22 gm fiber/day.   Malnutrition:  (4/1)  % Weight Loss:  Unable to determine without recent weight history  % Intake:  Unable to determine without nutrition history  Subcutaneous Fat Loss:  None observed  Muscle Loss:  None observed  Fluid Retention:  Mild      Malnutrition Diagnosis: Unable to determine due to lack of nutrition history and recent weight history       EVALUATION OF PROGRESS TOWARD GOALS   Diet:  NPO with trach    Nutrition Support:  TF was restarted on 4/13 (after procedures completed on 4/12) at 10 mL/hr; then after 4 hrs increased to 25 mL/hr; then increased by 20 mL every 4 hrs to goal (achieved 4/13 at 1900) as below:    Nutrition Support Enteral:  Type of Feeding Tube: PEG   Enteral Frequency:  Continuous  Enteral Regimen: Promote (no fiber) at 65 mL/hr  Total Enteral Provisions: 1560 kcal (15 kcal/kg), 97 g protein (1.7 g/kg), 1309 mL H2O, 203 g CHO, no fbier   Free Water Flush: 30 mL every 6 hours    Intake/Tolerance:    Stool Pattern:  4/11:  x3  4/12-4/13:  None --> PRN bowel meds Senna, Dulcolax, and MOM were given last night.  4/14:  x1   Phos 4.8 (H).  BGM: 108 - acceptable.  I/O: 2141/1775. Wt: 92.3 kg (up 0.7 kg since 4/5). Pt with 1+ trace generalized edema.    ASSESSED NUTRITION NEEDS:  Dosing Weight: 93 kg (4/5 wt for energy), 56.8 kg (IBW for protein)   Estimated Energy Needs: 6005-0159 kcals (14-17 Kcal/Kg)  Justification: obese and vented  Estimated Protein Needs:  grams protein (1.5-1.8 g pro/Kg)  Justification: post-op, hypercatabolism with critical illness, obesity guidelines  and preservation of lean body mass    NEW FINDINGS:   4/12:  Procedures = Trach and PEG     Plan discharge to LTACH Monday.    Previous Goals (4/11)   TF Promote at 65 mL/hr will continue to meet % estimated  needs  Evaluation: Met    Previous Nutrition Diagnosis (4/11):   No nutrition diagnosis identified at this time   Evaluation: No change      CURRENT NUTRITION DIAGNOSIS  No nutrition diagnosis identified at this time     INTERVENTIONS  Recommendations / Nutrition Prescription  Continue TF as above.    If a fiber feeding desired, could change to Promote with Fiber at same rate 65 mL/hr = 22 gm fiber/day.    Implementation  Collaboration and Referral of Nutrition care - Pt was discussed during ICU interdisciplinary rounds this morning.    Goals  TF will continue to meet % estimated needs.  Pt will stool at least once daily.    MONITORING AND EVALUATION:  Progress towards goals will be monitored and evaluated per protocol and Practice Guidelines    Jazmin Conroy, RD, LD, CNSC

## 2022-04-14 NOTE — PLAN OF CARE
Neuro: Patient opens eyes spontaneus, does not track does not fallow commands. PERRL, withdraw to pain BLE slightly.  CV:  SR, HR 70s, SBP <140  Pulmonary: LSC, moderate tan creamy secretions. Trach dressing with old drainage.  Vasc: PIV x 2  Gtt: heparin gtt   GI/: TF @ 10 ml/h, restarted at 0700 , abd soft no BM this shift, pure wick in place with adequate UOP.  Integ: no changed.  Labs: Hb 6.9, Young DUKES notified , one unit RBC ordered.  .

## 2022-04-14 NOTE — PROGRESS NOTES
[facility] ICU RESPIRATORY NOTE           Date of Admission: 3/27/2022     Date of Intubation (most recent): 3/30     Reason for Mechanical Ventilation: Airwayprotection     Number of Days on Mechanical Ventilation: 14     Met Criteria for Spontaneous Breathing Trial: No     Reason for No Spontaneous Breathing Trial: Per MD     Significant Events Today: None    Recent Labs   Lab 04/13/22  1503   O2PER 21       Current Vent Settings: Vent Mode: CMV/AC  (Continuous Mandatory Ventilation/ Assist Control)  FiO2 (%): 30 %  Resp Rate (Set): 14 breaths/min  Tidal Volume (Set, mL): 450 mL  PEEP (cm H2O): 5 cmH2O    Plan: We will continue with full ventilatory support.    Eleazar Suggs, RT

## 2022-04-14 NOTE — PLAN OF CARE
Neuro: Inconsistently follows commands by blinking. Withdrawals to and flexion towards pain. Grimaces with cares-oxy given.   CV: SR. VSS. Hgb 6.9, transfused 1 unit. Heparin gtt.   Pulm: Trached.Pt p/s 7/5 for 6 hrs. Switched back to CMV for a couple hours, now p/s 5/5 while in the chair. Tolerating well. Large amounts of oral/trach secretions.   GI/: Loose BM today. Purewick producing adequate amounts of urine. PEG TF at goal, tolerating fine.   Skin:  Midline, chest tube, and graft sites clean and open to air. Sacral meplix in place.   Lines: PIVx2.   Plan: Potential transfer to LTACH in [place], [place] on Monday 4/18. Family present during cares.

## 2022-04-14 NOTE — PROGRESS NOTES
Patient seen and care plan discussed with Dr. Butterfield    [facility]  Cardiovascular and Thoracic Surgery Daily Note          Assessment and Plan:   Angelica Robledo is a 51 year old female with history of MI at age 25 s/p angioplasty who presented to the hospital with hypertensive emergency and NSTEMI. Coronary angio demonstrated severe multivessel CAD. Echocardiogram preoperatively with preserved biventricular function.     PMH includes: NSTEMI, CAD with previous coronary stents, HTN  Course has been complicated by PEA arrest with ventricular fibrillation on POD 0 with ROSC after defibrillation x3, 2 mg epinephrine and one round of CPR. Was taken to cath lab where radial artery graft was found to be occluded. PCI with DANA placement to mid RCA and angioplasty of the rPLB was performed. Echocardiogram at the conclusion of PCI demonstrated LVEF 40-45% with mild to moderate global hypokinesis of the left ventricle with severe hypokinesis of the mid anterior and anteroseptal walls, the entire inferior wall and all apical segments of the left ventricle; no pericardial effusion. Additionally, due to antiplatelet therapy post PCI, patient developed epistaxis during attempted NG placement requiring placement of b/l rhino rockets. Now, diffuse severe encephalopathy with minimal neurologic response.      POD #15 s/p:  1. Coronary artery bypass grafting x 4 (radial artery graft to the right posterior descending coronary artery, reversed saphenous vein graft to the obtuse marginal branch of the left circumflex coronary artery, reversed saphenous vein graft to the first diagonal branch of the left anterior descending coronary artery, and pedicled left internal mammary artery to left anterior descending coronary artery).  2.  Endoscopic vein harvest of the greater saphenous vein from the left lower extremity.  3.  Endoscopic left radial artery harvest on 3/30/22 with Dr. Butterfield.     CVS:   NSTEMI and severe  multivessel CAD s/p CABG x4 with radial grafts as above  HTN  PEA and Vfib arrest s/p ROSC and DANA to mid RCA and angioplasty of the rPLB on 3/31  HD stable, lends to HTN; NSR rates 70-80s  - Goal MAP >65, SBP <140  - Amlodipine 5 mg daily for radial graft (at least 3-6 months)  - Carvedilol 25 mg BID  - PRN hydralazine available  - Brilinta load and daily per Cardiology, tolerated 500U straight rate heparin-tolerated, on LMW heparin gtt-- Plan to transition to start coumadin 4/15 if hemoglobin stable  - Transitioned amiodarone gtt to PO taper per EP/Cards recs  - ASA 81 daily  - Atorvastatin 80 mg daily  - CTs removed 4/3. TPWs removed 4/1     Resp:   Postoperative ventilator management  - Current vent settings:  Vent Mode: CMV/AC  (Continuous Mandatory Ventilation/ Assist Control) weaning in CPAP  FiO2 (%): 30 %  Resp Rate (Set): 14 breaths/min  Tidal Volume (Set, mL): 450 mL  PEEP (cm H2O): 5 cmH2O  Resp: 13  - Intubated for airway protection due to encephalopathy  - Titrate O2/peep to maintain sats >92%  - PST as tolerated  - Trach/PEG done 4/12     Neuro:    Acute postoperative pain  Hx migraines  Encephalopathy   Did receive CPR on 3/31, will likely have pain from this also  Propofol turned off on 3/31 at 9 am, starting to open eyes more, unable to move extremities but withdraws BLE to pain, nothing in BUEs yet. CT head 3/31 negative for hemorrhage; CTA negative for strokes, vessels patent; per neurology, MRI stable w/o acute concern to explain clinical status.  EEG without e/o seizure activity, continues to have moderate to severe diffuse encephalopathy   4/5 EEG improved but c/f new spikes not c/w seizures but tendency to seize  MRI head and c-spine on 4/3, 4/8 negative  - PRN dilaudid available  - PRN oxycodone and methocarbamol available  - Scheduled tylenol   - Neuro critical care consulted and following, greatly appreciate recommendations  - Keppra added 4/5  - More eye tracking today, yawned a few  times, but does not follow commands     Renal/Electrolyte:   Lactic acidosis, resolved  Volume overload  Hypernatremia, resolved  Creat stable, below preop weight, much less edema;   Na/K  stable  - Strict I/O, daily weights  - Avoid/limit nephrotoxins as able  - Replete lytes per protocol  - No further diuresis  - Continue FWF decrease to 30 ml q 6 hrs<60 mL q6h     GI/Nutrition:    Epistaxis, resolved  Elevated LFTs, likely shock liver 2/2 arrest, resolved  - Nutrition following  - Rhino rockets removed, continues to have some oozing from b/l nares  - Continue bowel regimen, + BMs  - PPI  -PEG placed, tolerating feeds.      :    - External wick in place for strict I/O  - Urine +, start Bactrim bid for 3 days-- completed, afebrile     Endo:  Stress induced hyperglycemia  Thyroid nodule   Preop A1c 5.7%  - Transitioned to sliding scale insulin   - Goal BG <180 for optimal healing  - Thyroid nodule noted on CT on 3/31, will have this worked up on an outpatient basis     ID:  Stress induced leukocytosis, recurrent  E.Coli UTI preoperatively  WBC 12.3<14.0<15.9<16.0<15.2<15.7<14.7<10.4, Temp (24hrs), Av.6  F (37  C), Min:98.4  F (36.9  C), Max:98.8  F (37.1  C); new leukocytosis, respiratory culture ordered/pan culturing per intensivist   - Completed perioperative ABX, Bactrim for UTI-completed  - Continue to follow fever curve, WBC and inflammatory markers as appropriate  - Repeat UA  without overt e/o infection     Heme:  Acute blood loss anemia  Acute blood loss thrombocytopenia  Hgb 6.9<7.6<7.8<8.3<7.9<7.8<8.1, Plts 350; no s/sx active bleeding  - CBC in AM  - Goal Hgb >7  - Consulted hematology given severe premature CAD and acute graft failure- appreciate recommendations. Multiple labs sent. Hypercoagulable workup appears to be negative but recommending repeat levels once acute issues resolve. Currently recommending antiplatelet and full anticoagulation (therapeutic heparin). D/w Dr. Butterfield. Plan  "will be straight rate heparin at 500 units/hr transitioned LIH gtt, consider transition to HIH after trach/PEG placed  -Transfused 4/14 for Hgb 6.9. Discussed with Dr. Butterfield plan to eventually transition to coumadin. Will continue heparin gtt and plan to start coumadin on 4/15 if hemoglobin stable     - Proph: PPI, subcutaneous heparin, SCDs     - Dispo: Continue in ICU; Family would like patient moved to [place] if/when trach established to be closer to daughter. SW consulted.     - Lines: Cooper, LISA, ETT, [place]            Interval History:   No acute events overnight. Up in chair. PST trial today.          Medications:       acetaminophen  975 mg Oral or Feeding Tube Q6H     amiodarone  200 mg Oral or Feeding Tube Daily     amLODIPine  5 mg Oral or Feeding Tube Daily     aspirin  81 mg Oral or NG Tube Daily     atorvastatin  80 mg Oral or Feeding Tube Daily     carvedilol  25 mg Oral or Feeding Tube BID w/meals     ceFAZolin  2 g Intravenous Q8H     chlorhexidine  15 mL Mouth/Throat Q12H     insulin aspart  1-6 Units Subcutaneous Q4H     levETIRAcetam  1,000 mg Intravenous Q12H     pantoprazole  40 mg Oral or NG Tube Daily    Or     pantoprazole  40 mg Oral Daily     sodium chloride (PF)  3 mL Intracatheter Q8H     ticagrelor  90 mg Oral or Feeding Tube BID     @MEDSPRN-@          Physical Exam:   Vitals were reviewed  Blood pressure 101/61, pulse 67, temperature 98.7  F (37.1  C), resp. rate 14, height 1.651 m (5' 5\"), weight 92.3 kg (203 lb 7.8 oz), last menstrual period 02/27/2022, SpO2 100 %.  Rhythm: NSR    Lungs: course    Cardiovascular: s1/s2, no m/r/g    Abdomen: s/nt/nd    Extremeties: no LE edema    Incision: cdi    CT: na    Weight:   Vitals:    04/10/22 0600 04/11/22 0600 04/12/22 0421 04/13/22 0423   Weight: 94.9 kg (209 lb 3.5 oz) 93.5 kg (206 lb 2.1 oz) 92.6 kg (204 lb 2.3 oz) 92.5 kg (203 lb 14.8 oz)    04/14/22 0458   Weight: 92.3 kg (203 lb 7.8 oz)            Data:   Labs:   Lab Results "   Component Value Date    WBC 14.0 04/12/2022     Lab Results   Component Value Date    RBC 3.17 04/12/2022     Lab Results   Component Value Date    HGB 7.8 04/12/2022     Lab Results   Component Value Date    HCT 26.7 04/12/2022     No components found for: MCT  Lab Results   Component Value Date    MCV 84 04/12/2022     Lab Results   Component Value Date    MCH 24.6 04/12/2022     Lab Results   Component Value Date    MCHC 29.2 04/12/2022     Lab Results   Component Value Date    RDW 18.4 04/12/2022     Lab Results   Component Value Date     04/12/2022       Last Basic Metabolic Panel:  Lab Results   Component Value Date     04/11/2022      Lab Results   Component Value Date    POTASSIUM 4.6 04/12/2022     Lab Results   Component Value Date    CHLORIDE 105 04/11/2022     Lab Results   Component Value Date    TOMAS 9.1 04/11/2022     Lab Results   Component Value Date    CO2 23 04/11/2022     Lab Results   Component Value Date    BUN 23 04/11/2022     Lab Results   Component Value Date    CR 0.96 04/11/2022     Lab Results   Component Value Date    GLC 84 04/12/2022     04/11/2022       Suzi Craig PA-C  Pager #: [phone]

## 2022-04-15 LAB
BACTERIA BLD CULT: NO GROWTH
BACTERIA BLD CULT: NO GROWTH
ERYTHROCYTE [DISTWIDTH] IN BLOOD BY AUTOMATED COUNT: 17.5 % (ref 10–15)
GLUCOSE BLDC GLUCOMTR-MCNC: 106 MG/DL (ref 70–99)
GLUCOSE BLDC GLUCOMTR-MCNC: 108 MG/DL (ref 70–99)
GLUCOSE BLDC GLUCOMTR-MCNC: 117 MG/DL (ref 70–99)
GLUCOSE BLDC GLUCOMTR-MCNC: 119 MG/DL (ref 70–99)
GLUCOSE BLDC GLUCOMTR-MCNC: 123 MG/DL (ref 70–99)
GLUCOSE BLDC GLUCOMTR-MCNC: 138 MG/DL (ref 70–99)
HCT VFR BLD AUTO: 26.6 % (ref 35–47)
HGB BLD-MCNC: 8 G/DL (ref 11.7–15.7)
INR PPP: 1.22 (ref 0.85–1.15)
MAGNESIUM SERPL-MCNC: 2.1 MG/DL (ref 1.6–2.3)
MCH RBC QN AUTO: 24.8 PG (ref 26.5–33)
MCHC RBC AUTO-ENTMCNC: 30.1 G/DL (ref 31.5–36.5)
MCV RBC AUTO: 83 FL (ref 78–100)
PHOSPHATE SERPL-MCNC: 3.8 MG/DL (ref 2.5–4.5)
PLATELET # BLD AUTO: 336 10E3/UL (ref 150–450)
RBC # BLD AUTO: 3.22 10E6/UL (ref 3.8–5.2)
UFH PPP CHRO-ACNC: 0.28 IU/ML
WBC # BLD AUTO: 10.5 10E3/UL (ref 4–11)

## 2022-04-15 PROCEDURE — 250N000013 HC RX MED GY IP 250 OP 250 PS 637: Performed by: PHYSICIAN ASSISTANT

## 2022-04-15 PROCEDURE — 84100 ASSAY OF PHOSPHORUS: CPT | Performed by: PHYSICIAN ASSISTANT

## 2022-04-15 PROCEDURE — 94003 VENT MGMT INPAT SUBQ DAY: CPT

## 2022-04-15 PROCEDURE — 36415 COLL VENOUS BLD VENIPUNCTURE: CPT | Performed by: PHYSICIAN ASSISTANT

## 2022-04-15 PROCEDURE — 99291 CRITICAL CARE FIRST HOUR: CPT | Mod: 24 | Performed by: SURGERY

## 2022-04-15 PROCEDURE — 250N000011 HC RX IP 250 OP 636

## 2022-04-15 PROCEDURE — 200N000001 HC R&B ICU

## 2022-04-15 PROCEDURE — 85027 COMPLETE CBC AUTOMATED: CPT | Performed by: PHYSICIAN ASSISTANT

## 2022-04-15 PROCEDURE — 85520 HEPARIN ASSAY: CPT | Performed by: THORACIC SURGERY (CARDIOTHORACIC VASCULAR SURGERY)

## 2022-04-15 PROCEDURE — 83735 ASSAY OF MAGNESIUM: CPT | Performed by: PHYSICIAN ASSISTANT

## 2022-04-15 PROCEDURE — 999N000157 HC STATISTIC RCP TIME EA 10 MIN

## 2022-04-15 PROCEDURE — 250N000011 HC RX IP 250 OP 636: Performed by: PHYSICIAN ASSISTANT

## 2022-04-15 PROCEDURE — 85610 PROTHROMBIN TIME: CPT | Performed by: PHYSICIAN ASSISTANT

## 2022-04-15 PROCEDURE — 250N000011 HC RX IP 250 OP 636: Performed by: THORACIC SURGERY (CARDIOTHORACIC VASCULAR SURGERY)

## 2022-04-15 RX ORDER — WARFARIN SODIUM 5 MG/1
5 TABLET ORAL
Status: COMPLETED | OUTPATIENT
Start: 2022-04-15 | End: 2022-04-15

## 2022-04-15 RX ADMIN — WARFARIN SODIUM 5 MG: 5 TABLET ORAL at 17:42

## 2022-04-15 RX ADMIN — CHLORHEXIDINE GLUCONATE 15 ML: 1.2 RINSE ORAL at 08:37

## 2022-04-15 RX ADMIN — AMIODARONE HYDROCHLORIDE 200 MG: 200 TABLET ORAL at 08:25

## 2022-04-15 RX ADMIN — HEPARIN SODIUM AND DEXTROSE 1350 UNITS/HR: 10000; 5 INJECTION INTRAVENOUS at 17:41

## 2022-04-15 RX ADMIN — ACETAMINOPHEN 975 MG: 325 TABLET, FILM COATED ORAL at 06:09

## 2022-04-15 RX ADMIN — AMLODIPINE BESYLATE 5 MG: 5 TABLET ORAL at 08:25

## 2022-04-15 RX ADMIN — TICAGRELOR 90 MG: 90 TABLET ORAL at 04:48

## 2022-04-15 RX ADMIN — CEFAZOLIN SODIUM 2 G: 2 INJECTION, SOLUTION INTRAVENOUS at 12:12

## 2022-04-15 RX ADMIN — CARVEDILOL 25 MG: 25 TABLET, FILM COATED ORAL at 17:41

## 2022-04-15 RX ADMIN — CHLORHEXIDINE GLUCONATE 15 ML: 1.2 RINSE ORAL at 20:33

## 2022-04-15 RX ADMIN — CEFAZOLIN SODIUM 2 G: 2 INJECTION, SOLUTION INTRAVENOUS at 20:33

## 2022-04-15 RX ADMIN — LEVETIRACETAM 1000 MG: 10 INJECTION INTRAVENOUS at 16:03

## 2022-04-15 RX ADMIN — ASPIRIN 81 MG CHEWABLE TABLET 81 MG: 81 TABLET CHEWABLE at 08:25

## 2022-04-15 RX ADMIN — CARVEDILOL 25 MG: 25 TABLET, FILM COATED ORAL at 08:25

## 2022-04-15 RX ADMIN — HYDRALAZINE HYDROCHLORIDE 20 MG: 20 INJECTION INTRAMUSCULAR; INTRAVENOUS at 20:33

## 2022-04-15 RX ADMIN — LEVETIRACETAM 1000 MG: 10 INJECTION INTRAVENOUS at 04:52

## 2022-04-15 RX ADMIN — TICAGRELOR 90 MG: 90 TABLET ORAL at 16:03

## 2022-04-15 RX ADMIN — OXYCODONE HYDROCHLORIDE 5 MG: 5 TABLET ORAL at 20:33

## 2022-04-15 RX ADMIN — CEFAZOLIN SODIUM 2 G: 2 INJECTION, SOLUTION INTRAVENOUS at 04:13

## 2022-04-15 RX ADMIN — OXYCODONE HYDROCHLORIDE 5 MG: 5 TABLET ORAL at 16:15

## 2022-04-15 RX ADMIN — ATORVASTATIN CALCIUM 80 MG: 40 TABLET, FILM COATED ORAL at 08:25

## 2022-04-15 RX ADMIN — Medication 40 MG: at 08:27

## 2022-04-15 RX ADMIN — ACETAMINOPHEN 975 MG: 325 TABLET, FILM COATED ORAL at 12:11

## 2022-04-15 ASSESSMENT — ACTIVITIES OF DAILY LIVING (ADL)
ADLS_ACUITY_SCORE: 15

## 2022-04-15 NOTE — PROGRESS NOTES
Critical Care Progress Note      04/15/2022    Name: Angelica Robledo MRN#: [id number]   Age: 51 year old YOB: 1970     [facility] Day# 19               Problem List:   Active Problems:    NSTEMI (non-ST elevated myocardial infarction) (H)    Hypertensive emergency    Paroxysmal ventricular tachycardia (H)           Summary/Hospital Course:   Angelica Robledo is a 51 year old female with pertinent PMHx of prior MI at age 24-25 (in 1996) s/p angioplasty, HTN, anemia, and NSTEMI.  She presented on 3/27 with new onset chest pain similar to her previous MI.  Workup was consistent with NSTEMI.  Cardiac cath demonstrated severe multivessel CAD.  She was seen by CT surgery and CABG was scheduled for 3/30.  She underwent CABG x4 left radial, LIMA, left saph vein x2 (FAUSTINO taken down, but elected to not be used due to size).    No family history of clotting disorders.  No known history of DVT/PE/stroke. Siblings without cardiovascular disease. Father passed away of cardiac causes at an advanced age.  Mother had cardiac disease as well as an aortic aneurysm, but not at a young age.     4V CABG as follows: left radial artery graft to the right posterior descending coronary artery, reversed saphenous vein graft to the obtuse marginal branch of the left circumflex coronary artery, reversed saphenous vein graft to the first diagonal branch of the left anterior descending coronary artery, and pedicled left internal mammary artery to left anterior descending coronary.    POD 0 - initial hypoxia resolved with vent changes.  At 2355 patient suffered a PEA and Vfib arrest and underwent 3 shocks, 2 rounds of epi, and 1 round of CPR. ROSC obtained at 0005.  300 mg amiodarone bolus administered.  STAT cardiac cath lab performed demonstrating LIMA-LAD, VG-LADD1, and VG-LPLB were patent. The left radial artery to RDPA graft had an occlusion.  The mid RCA was treated with a DANA stent and a balloon angioplasty performed at the [facility].   Excellent flow was noted on completion coronary angiogram.  Echo demonstrated a drop in LVEF to 40-45% with aspects of hypokinesis. RV demonstrated mild-moderate RV function reduction.    POD1 - weaned from sedation with minimal arousal limited to briefly opening eyes to pain and occasionally sound, minimal extremity pain response.  CT head/CTA head and neck unremarkable for acute insult.  Ceribell placed overnight.    PD2-5: MRI x2 obtained demonstrating punctate foci of early subacute ischemic change. EEG waxes and wanes from mod-severe to severe diffuse slowing. With mixed workup results, neither of these findings portent a poor prognosis specifically; however, prognosis remains unclear.    POD6-current: Gradual clinical exam improvement. 4/7 MRI without new findings from previous.    4/12: Trach and PEG performed        Assessment and plan :     Angelica Robledo IS a 51 year old female admitted on 3/27/2022 for chest pain with workup demonstrating severe multivessel CAD.  Patient underwent CABG x4 on 3/30.  POD#0 patient had a PEA arrest with ROSC obtained at 10 minutes.  Emergent coronary cath demonstrated radial artery occlusion.  A DANA was placed into the RCA and balloon angioplasty of the RPLB was performed with excellent completion angiography flow. Trach and PEG 4/12.     I have personally reviewed the daily labs, imaging studies, cultures and discussed the case with referring physician and consulting physicians.     My assessment and plan by system for this patient is as follows:    Neurology/Psychiatry:   1. Analgesia -- tylenol, prn narcotics   2. Sedation -- hold sedation  3. Encephalopathy s/p cardiac arrest - likely anoxic injury -- head CT negative -- MRI w/ punctate foci of subacute ischemic change -- MRI C spine negative -- EEG: diffuse slowing with gradual improvement, moderate-severe encephalpathy, no indices of seizure activity however is at risk.  -- Keppra for seizure ppx (no seizures per  EEG)  -- Stable clinical exam, continued gradual improvements     Cardiovascular:   1.  S/p CABG x4 - left radial, LIMA, left saph vein x2 (FAUSTINO taken down, but elected to not be used due to size)   2. POD#0 PEA and Vfib arrest -- s/p cardiac cath demonstrated radial artery graft to RPDA occlusion -- intervention with DANA to RCA and balloon angioplasty RPLA -- on Kangrelor 3/31, now transitioned to aspirin and Brilinta with discussion per CV surgery and interventional cardiology  -- cards recommends Amiodarone taper -- PO regimen scheduled  -- Brilinta needs to be continued for a minimum of 1 month w/ DANA -- per CTS/cards management  3. Hx of MI at age 24-25  4. HTN - likely untreated prior to hospitalization - presented with hypertension emergency - PO regimen per CVS     Pulmonary/Ventilator Management:   1. Airway -- trach present  2. PNA - see ID  -- wean to trach dome as able  -- pressure support of 5 today tolerated     GI and Nutrition :   1. Epistaxis - s/p rhino rocket removal - oozing has resolved  2. Nutrition - PEG tube present - continue to goal  3. PPI ppx    Renal/Fluids/Electrolytes:   1. Monitor UOP/creatinine -- stable labs   2. Replete electrolytes PRN per protocol  3. External wick device     Infectious Disease:   1. UTI with E coli on admission -- 4/1 negative on recheck   2. Uncomplicated UTI -- 4/7 UA positive for UTI for E. coli-- Bactrim DS started for 3 day course - completed.  F/u 4/10 urine culture repeat -- no growth to date  2. Leukocytosis, trend -- down to 10.5  3. Staph Aureus Pneumonia -- previously MRSA negative -- q8hr Ancef, plan for 7 day course (4/20)     Endocrine:   1. Monitor for stress induced hyperglycemia. ICU insulin protocol, goal sugar <180   2. Incidental thyroid nodule -- follow-up outpatient per discharge referral     Hematology/Oncology:   1. Acute blood loss on chronic anemia -- stable, trend  2. Acute blood loss thrombocytopenia -- resolved, trend.  3. S/P DANA  placement s/p cardiac arrest -- aspirin and brilinta  4. Rule out hypercoagulable state -- workup negative; however, hem/onc recommends full dose anticoagulation in setting of thrombus of radial graft -- discussed with CV: now at high dose heparin gtt following titration up.   -- CTS plan is to bridge to coumadin, continue brilinta, discontinue ASA    IV/Access:    1. Venous access - peripheral  2. Arterial access - NA    ICU Prophylaxis:   1. DVT: hep gtt/mechanical  2. VAP: HOB 30 degrees, chlorhexidine rinse  3. Stress Ulcer: PPI  4. Restraints: No current indication. Will readdress daily.   5. Wound care - per unit routine   6. Feeding - enteral feeds  7. Family Update: family updated at bedside  8. Disposition - ICU     Tentative plan: LTACH in [place] on Monday.      Key goals for next 24 hours:   1. Wean to trach dome         Interim History:   No acute events  No significant changes.         Key Medications:       acetaminophen  975 mg Oral or Feeding Tube Q6H     amiodarone  200 mg Oral or Feeding Tube Daily     amLODIPine  5 mg Oral or Feeding Tube Daily     atorvastatin  80 mg Oral or Feeding Tube Daily     carvedilol  25 mg Oral or Feeding Tube BID w/meals     ceFAZolin  2 g Intravenous Q8H     chlorhexidine  15 mL Mouth/Throat Q12H     insulin aspart  1-6 Units Subcutaneous Q4H     levETIRAcetam  1,000 mg Intravenous Q12H     pantoprazole  40 mg Oral or NG Tube Daily    Or     pantoprazole  40 mg Oral Daily     sodium chloride (PF)  3 mL Intracatheter Q8H     ticagrelor  90 mg Oral or Feeding Tube BID     warfarin ANTICOAGULANT  5 mg Oral or Feeding Tube ONCE at 18:00       heparin 1,350 Units/hr (04/15/22 0800)     - MEDICATION INSTRUCTIONS -       Percutaneous Coronary Intervention orders placed (this is information for BPA alerting)       sodium chloride 10 mL/hr at 04/14/22 1602     Warfarin Therapy Reminder                 Physical Examination:   Temp:  [98.4  F (36.9  C)-98.7  F (37.1  C)] 98.5  F  (36.9  C)  Pulse:  [62-75] 71  Resp:  [10-21] 19  BP: ()/(61-87) 143/87  FiO2 (%):  [30 %] 30 %  SpO2:  [96 %-100 %] 99 %    Intake/Output Summary (Last 24 hours) at 3/31/2022 0921  Last data filed at 3/31/2022 0700  Gross per 24 hour   Intake 3676.54 ml   Output 2825 ml   Net 851.54 ml     Wt Readings from Last 4 Encounters:   04/15/22 94 kg (207 lb 3.7 oz)     BP - Mean:  [] 108  Vent Mode: PS  (Pressure Support)  FiO2 (%): 30 %  Resp Rate (Set): 14 breaths/min  Tidal Volume (Set, mL): 450 mL  PEEP (cm H2O): 5 cmH2O  Pressure Support (cm H2O): 5 cmH2O  Resp: 19    Recent Labs   Lab 04/13/22  1503   O2PER 21       GEN: no acute distress   HEENT: head ncat, sclera anicteric, trach present, CDI   PULM: unlabored, tolerating pressure support, clear anteriorly    CV/Chest: RRR. Midline incision CDI.   ABD: soft, nontender. Gastric feeding tube present  EXT:  No significant edema,  warm  NEURO: Opens spontaneously, to stimulus, and verbal stimulus. Moving extremities spontaneously. Followed commands to wiggle toes -- repeated x3.   SKIN: no obvious rash         Data:   All data and imaging reviewed     ROUTINE ICU LABS (Last four results)  CMP  Recent Labs   Lab 04/15/22  1212 04/15/22  0836 04/15/22  0532 04/15/22  0416 04/15/22  0005 04/14/22  0757 04/14/22  0532 04/13/22  1203 04/13/22  1103 04/13/22  0407 04/13/22  0200 04/12/22  0819 04/12/22  0429 04/11/22  0752 04/11/22  0551 04/10/22  0848 04/10/22  0552 04/09/22  0749 04/09/22  0423   NA  --   --   --   --   --   --   --   --   --   --  138  --   --   --  136  --   --   --  139   POTASSIUM  --   --   --   --   --   --   --   --   --   --  4.3  --  4.6  --  4.6  --  4.4  --  4.5   CHLORIDE  --   --   --   --   --   --   --   --   --   --  105  --   --   --  105  --   --   --  107   CO2  --   --   --   --   --   --   --   --   --   --  27  --   --   --  23  --   --   --  25   ANIONGAP  --   --   --   --   --   --   --   --   --   --  6  --   --   --   8  --   --   --  7   * 138*  --  119* 106*   < >  --    < >  --    < > 103*   < >  --    < > 136*   < >  --    < > 138*   BUN  --   --   --   --   --   --   --   --   --   --  22  --   --   --  23  --   --   --  18   CR  --   --   --   --   --   --   --   --   --   --  0.98  --   --   --  0.96  --   --   --  0.82   GFRESTIMATED  --   --   --   --   --   --   --   --   --   --  70  --   --   --  72  --   --   --  87   TOMAS  --   --   --   --   --   --   --   --   --   --  9.5  --   --   --  9.1  --   --   --  8.9   MAG  --   --  2.1  --   --   --  2.3  --  2.5*  --   --   --  2.5*  --  2.5*  --  2.3  --  2.2   PHOS  --   --  3.8  --   --   --  4.8*  --  5.3*  --   --   --  4.7*  --  4.6*  --  3.9  --  3.9    < > = values in this interval not displayed.     CBC  Recent Labs   Lab 04/15/22  0532 04/14/22  1421 04/14/22  0532 04/13/22  1103 04/13/22  0200   WBC 10.5  --  12.3* 13.5* 16.9*   RBC 3.22*  --  2.84* 3.02* 3.10*   HGB 8.0* 8.1* 6.9* 7.3* 7.6*   HCT 26.6*  --  23.1* 24.8* 25.6*   MCV 83  --  81 82 83   MCH 24.8*  --  24.3* 24.2* 24.5*   MCHC 30.1*  --  29.9* 29.4* 29.7*   RDW 17.5*  --  18.4* 18.6* 18.6*     --  [phone]     INR  Recent Labs   Lab 04/15/22  0532   INR 1.22*     Arterial Blood Gas  Recent Labs   Lab 04/13/22  1503   O2PER 21       All cultures:  No results for input(s): CULT in the last 168 hours.  No results found for this or any previous visit (from the past 24 hour(s)).        Adi Bazan MD  Surgical Critical Care Fellow

## 2022-04-15 NOTE — PHARMACY-ANTICOAGULATION SERVICE
Clinical Pharmacy - Warfarin Dosing Consult     Pharmacy has been consulted to manage this patient s warfarin therapy.  Indication: Other - specify in comments (Acute Graft Thrombosis)  Therapy Goal: INR 2-3  Warfarin Prior to Admission: No  Significant drug interactions: Amiodarone  Recent documented change in oral intake/nutrition: Unknown    INR   Date Value Ref Range Status   04/15/2022 1.22 (H) 0.85 - 1.15 Final   04/01/2022 1.31 (H) 0.85 - 1.15 Final       Recommend warfarin 5 mg today.  Pharmacy will monitor Angelica Robledo daily and order warfarin doses to achieve specified goal.      Please contact pharmacy as soon as possible if the warfarin needs to be held for a procedure or if the warfarin goals change.      Faye Munoz, PharmD, BCPS

## 2022-04-15 NOTE — PROGRESS NOTES
[facility] ICU RESPIRATORY NOTE      Date of Admission: 3/27/2022     Date of Intubation (most recent): 3/30     Reason for Mechanical Ventilation: Airway protection     Number of Days on Mechanical Ventilation: 15     Met Criteria for Spontaneous Breathing Trial: Yes     PATIENT PS  UNTIL 2330 THEN TO FULL SUPPORT OVER NIGHT.     Significant Events Today:NONE.  Recent Labs   Lab 04/13/22  1503   O2PER 21   Vent Mode: CMV/AC  (Continuous Mandatory Ventilation/ Assist Control)  FiO2 (%): 30 %  Resp Rate (Set): 14 breaths/min  Tidal Volume (Set, mL): 450 mL  PEEP (cm H2O): 5 cmH2O  Pressure Support (cm H2O): 5 cmH2O  Resp: 18

## 2022-04-15 NOTE — PROGRESS NOTES
Care Management Follow Up    Length of Stay (days): 19    Expected Discharge Date: 04/18/2022     Concerns to be Addressed: adjustment to diagnosis/illness, basic needs, coping/stress, decision making, discharge planning, employment/school  spouse is overwhelmed with next steps   Patient plan of care discussed at interdisciplinary rounds: Yes    Anticipated Discharge Disposition: LTACH     Anticipated Discharge Services: Transportation Services  Anticipated Discharge DME: Oxygen, Other (see comment)    Patient/family educated on Medicare website which has current facility and service quality ratings:    Education Provided on the Discharge Plan:    Patient/Family in Agreement with the Plan: yes    Referrals Placed by CM/SW: External Care Coordination, Transportation, Financial Services  Private pay costs discussed: insurance costs out of pocket expenses    Additional Information:  Following for discharge planning to [facility].  Received good nabeel estimate for  [employer] [facility] transportation= $12,058.62  Spoke with Miller Yu   Communication Center Supervisor  Communication Center: [phone]  Mobile: [phone] who is working with [employer] billing to see if this would be covered by insurance.   Good nabeel quote provided to  Miller.  He voiced understanding of cost.     Spoke with Johnnie who will be up to see family today and answer questions. Per Johnnie [facility] is working on getting insurance authorization    CC spoke with CV surgery THONY Archer who stated Pt may still be on Heparin on 4/18.  CC spoke with Miller who stated Pt can be transported on Heparin.     CC will continue to follow and await billing information for transportation.     Addendum 1500:    CC received call from Jarrett  [employer] supervisor that they tentatively have a ride set up for 10am Monday April 18th. Pending crew availability.  Anticipate arrival in [facility] between 3-5pm.  Reviewed current vent setting  with Miller.  He noted they would prefer patient on vent settings vs trach dome/pressure support.     CC spoke with Farhana Laws ( [phone]) in transportation billing who notes bill will be sent to insurance and any not covered will then be billed to patient.  No money/signature is needed at time of transport.     CC called RYLEY Blair Liaison ([phone]) to update of above information  1. 10am ride on Monday  2. Patient may be on heparin  He stated both of these would be ok for accepting LTACH.  Johnnie will check if LTACH has gotten Authorization.. suggested that if auth is not received by end of day today we will move transport to Tuesday to allow for authorization.     Addendum 1542:   Johnnie updated that authorization received.  Plan 10am ride on 4/18.  If over weekend patient condition changes and not stable for transfer.  CC/SW on duty should call [facility] transport to cancel transport.   updated.   He had received a call from TATIANNA Gonsales   CC left message with Ilda at [phone] (ext [phone]) to see if she needs anything from us at discharge.     HUC and bedside nurse updated of transfer plans.   CC to follow            Yessica Perez RN

## 2022-04-15 NOTE — PLAN OF CARE
Neuro: withdraws to pain in all extremities. PERRL.   CV: SR/ST, SBP in the 150s  Resp: tried PS today for a few hours then trach dome and currently tolerating well, trach site WNL. copious oral secretions.   GI/: multiple loose BMs, pure wick in with good UOP. TF at goal PEG tube.   Skin: intact  Pain/Gtts: oxycodone given x1, scheduled tylenol. Heparin gtt.    Family:  at bedside and updated.   POC: Up in chair for 2 hours today and tolerated well. plan to go to Ltach Monday morning

## 2022-04-15 NOTE — PROGRESS NOTES
Patient seen and care plan [facility]  Cardiovascular and [facility]acic Surgery Daily Note          Assessment and Plan:   Angelica Robledo is a 51 year old female with history of MI at age 25 s/p angioplasty who presented to the hospital with hypertensive emergency and NSTEMI. Coronary angio demonstrated severe multivessel CAD. Echocardiogram preoperatively with preserved biventricular function.     PMH includes: NSTEMI, CAD with previous coronary stents, HTN  Course has been complicated by PEA arrest with ventricular fibrillation on POD 0 with ROSC after defibrillation x3, 2 mg epinephrine and one round of CPR. Was taken to cath lab where radial artery graft was found to be occluded. PCI with DANA placement to mid RCA and angioplasty of the rPLB was performed. Echocardiogram at the conclusion of PCI demonstrated LVEF 40-45% with mild to moderate global hypokinesis of the left ventricle with severe hypokinesis of the mid anterior and anteroseptal walls, the entire inferior wall and all apical segments of the left ventricle; no pericardial effusion. Additionally, due to antiplatelet therapy post PCI, patient developed epistaxis during attempted NG placement requiring placement of b/l rhino rockets. Now, diffuse severe encephalopathy with minimal neurologic response.      POD #16 s/p:  1. Coronary artery bypass grafting x 4 (radial artery graft to the right posterior descending coronary artery, reversed saphenous vein graft to the obtuse marginal branch of the left circumflex coronary artery, reversed saphenous vein graft to the first diagonal branch of the left anterior descending coronary artery, and pedicled left internal mammary artery to left anterior descending coronary artery).  2.  Endoscopic vein harvest of the greater saphenous vein from the left lower extremity.  3.  Endoscopic left radial artery harvest on 3/30/22 with Dr. Butterfield.     CVS:   NSTEMI and severe  multivessel CAD s/p CABG x4 with radial grafts as above  HTN  PEA and Vfib arrest s/p ROSC and DANA to mid RCA and angioplasty of the rPLB on 3/31  HD stable, lends to HTN; NSR rates 70-80s  - Goal MAP >65, SBP <140  - Amlodipine 5 mg daily for radial graft (at least 3-6 months)  - Carvedilol 25 mg BID  - PRN hydralazine available  - Brilinta load and daily per Cardiology, tolerated 500U straight rate heparin-tolerated, on LMW heparin gtt-- Plan to transition to start coumadin 4/15 if hemoglobin stable  - Transitioned amiodarone gtt to PO taper per EP/Cards recs  - ASA 81 daily  - Atorvastatin 80 mg daily  - CTs removed 4/3. TPWs removed 4/1  -Discussed Anticoagulation with Dr. Butterfield 4/14-- Will plan to anticoagulate with Brilinta and coumadin. plan to discontinue aspirin, start coumadin 4/15 and continue heparin gtt until therapeutic INR 2-3.     Resp:   Postoperative ventilator management  - Current vent settings:  Vent Mode: CMV/AC  (Continuous Mandatory Ventilation/ Assist Control) weaning in CPAP  FiO2 (%): 30 %  Resp Rate (Set): 14 breaths/min  Tidal Volume (Set, mL): 450 mL  PEEP (cm H2O): 5 cmH2O  Resp: 13  - Intubated for airway protection due to encephalopathy  - Titrate O2/peep to maintain sats >92%  - PST as tolerated  - Trach/PEG done 4/12     Neuro:    Acute postoperative pain  Hx migraines  Encephalopathy   Did receive CPR on 3/31, will likely have pain from this also  Propofol turned off on 3/31 at 9 am, starting to open eyes more, unable to move extremities but withdraws BLE to pain, nothing in BUEs yet. CT head 3/31 negative for hemorrhage; CTA negative for strokes, vessels patent; per neurology, MRI stable w/o acute concern to explain clinical status.  EEG without e/o seizure activity, continues to have moderate to severe diffuse encephalopathy   4/5 EEG improved but c/f new spikes not c/w seizures but tendency to seize  MRI head and c-spine on 4/3, 4/8 negative  - PRN dilaudid available  -  PRN oxycodone and methocarbamol available  - Scheduled tylenol   - Neuro critical care consulted and following, greatly appreciate recommendations  - Keppra added   - More eye tracking, yawned a few times, but does not follow commands     Renal/Electrolyte:   Lactic acidosis, resolved  Volume overload  Hypernatremia, resolved  Creat stable, below preop weight, much less edema;   Na/K  stable  - Strict I/O, daily weights  - Avoid/limit nephrotoxins as able  - Replete lytes per protocol  - No further diuresis  - Continue FWF decrease to 30 ml q 6 hrs<60 mL q6h     GI/Nutrition:    Epistaxis, resolved  Elevated LFTs, likely shock liver 2/2 arrest, resolved  - Nutrition following  - Rhino rockets removed  - Continue bowel regimen, + BMs  - PPI  -PEG placed, tolerating feeds.      :    - External wick in place for strict I/O  - Urine +, start Bactrim bid for 3 days-- completed, afebrile     Endo:  Stress induced hyperglycemia  Thyroid nodule   Preop A1c 5.7%  - Transitioned to sliding scale insulin   - Goal BG <180 for optimal healing  - Thyroid nodule noted on CT on 3/31, will have this worked up on an outpatient basis     ID:  Stress induced leukocytosis, recurrent  E.Coli UTI preoperatively  WBC 10.5<12.3<14.0<15.9<16.0<15.2<15.7<14.7<10.4, Temp (24hrs), Av.6  F (37  C), Min:98.4  F (36.9  C), Max:98.8  F (37.1  C); new leukocytosis, respiratory culture ordered/pan culturing per intensivist   - Completed perioperative ABX, Bactrim for UTI-completed  - Continue to follow fever curve, WBC and inflammatory markers as appropriate  - Repeat UA  without overt e/o infection     Heme:  Acute blood loss anemia  Acute blood loss thrombocytopenia  Hgb 8.0<6.9<7.6<7.8<8.3<7.9<7.8<8.1, Plts 350; no s/sx active bleeding  - CBC in AM  - Goal Hgb >7  - Consulted hematology given severe premature CAD and acute graft failure- appreciate recommendations. Multiple labs sent. Hypercoagulable workup appears to be negative but  "recommending repeat levels once acute issues resolve. Currently recommending antiplatelet and full anticoagulation (therapeutic heparin). D/w Dr. Butterfield. Plan will be straight rate heparin at 500 units/hr transitioned LIH gtt, consider transition to HIH after trach/PEG placed  -Transfused 4/14 for Hgb 6.9. Discussed with Dr. Butterfield plan to eventually transition to coumadin. Will continue heparin gtt and plan to start coumadin on 4/15 if hemoglobin stable     - Proph: PPI, subcutaneous heparin, SCDs     - Dispo: Continue in ICU; Family would like patient moved to [place] if/when trach established to be closer to daughter. SW consulted.     - Lines: [place], PIV, [place], [place]            Interval History:   No acute events overnight. Tolerating PST during the day with full support overnight. Tube feeds to goal.          Medications:       acetaminophen  975 mg Oral or Feeding Tube Q6H     amiodarone  200 mg Oral or Feeding Tube Daily     amLODIPine  5 mg Oral or Feeding Tube Daily     atorvastatin  80 mg Oral or Feeding Tube Daily     carvedilol  25 mg Oral or Feeding Tube BID w/meals     ceFAZolin  2 g Intravenous Q8H     chlorhexidine  15 mL Mouth/Throat Q12H     insulin aspart  1-6 Units Subcutaneous Q4H     levETIRAcetam  1,000 mg Intravenous Q12H     pantoprazole  40 mg Oral or NG Tube Daily    Or     pantoprazole  40 mg Oral Daily     sodium chloride (PF)  3 mL Intracatheter Q8H     ticagrelor  90 mg Oral or Feeding Tube BID     @[email or web address]-@          Physical Exam:   Vitals were reviewed  Blood pressure 125/75, pulse 66, temperature 98.4  F (36.9  C), temperature source Axillary, resp. rate 14, height 1.651 m (5' 5\"), weight 94 kg (207 lb 3.7 oz), last menstrual period 02/27/2022, SpO2 100 %.  Rhythm: NSR    Lungs: course    Cardiovascular: s1/s2, no m/r/g    Abdomen: s/nt/nd    Extremeties: no LE edema    Incision: cdi    CT: na    Weight:   Vitals:    04/11/22 0600 04/12/22 0421 04/13/22 0423 04/14/22 0458 "   Weight: 93.5 kg (206 lb 2.1 oz) 92.6 kg (204 lb 2.3 oz) 92.5 kg (203 lb 14.8 oz) 92.3 kg (203 lb 7.8 oz)    04/15/22 0400   Weight: 94 kg (207 lb 3.7 oz)            Data:     Lab Results   Component Value Date    WBC 10.5 04/15/2022     Lab Results   Component Value Date    RBC 3.22 04/15/2022     Lab Results   Component Value Date    HGB 8.0 04/15/2022     Lab Results   Component Value Date    HCT 26.6 04/15/2022     No components found for: MCT  Lab Results   Component Value Date    MCV 83 04/15/2022     Lab Results   Component Value Date    MCH 24.8 04/15/2022     Lab Results   Component Value Date    MCHC 30.1 04/15/2022     Lab Results   Component Value Date    RDW 17.5 04/15/2022     Lab Results   Component Value Date     04/15/2022     Last Comprehensive Metabolic Panel:  Sodium   Date Value Ref Range Status   04/13/2022 138 133 - 144 mmol/L Final     Potassium   Date Value Ref Range Status   04/13/2022 4.3 3.4 - 5.3 mmol/L Final     Chloride   Date Value Ref Range Status   04/13/2022 105 94 - 109 mmol/L Final     Carbon Dioxide (CO2)   Date Value Ref Range Status   04/13/2022 27 20 - 32 mmol/L Final     Anion Gap   Date Value Ref Range Status   04/13/2022 6 3 - 14 mmol/L Final     Glucose   Date Value Ref Range Status   04/13/2022 103 (H) 70 - 99 mg/dL Final     GLUCOSE BY METER POCT   Date Value Ref Range Status   04/15/2022 138 (H) 70 - 99 mg/dL Final     Urea Nitrogen   Date Value Ref Range Status   04/13/2022 22 7 - 30 mg/dL Final     Creatinine   Date Value Ref Range Status   04/13/2022 0.98 0.52 - 1.04 mg/dL Final     GFR Estimate   Date Value Ref Range Status   04/13/2022 70 >60 mL/min/1.73m2 Final     Comment:     Effective December 21, 2021 eGFRcr in adults is calculated using the 2021 CKD-EPI creatinine equation which includes age and gender (Ashleigh addison al., NEJM, DOI: 10.1056/DRXXyy5759731)     Calcium   Date Value Ref Range Status   04/13/2022 9.5 8.5 - 10.1 mg/dL Final       Suzi  KENDRA Craig  Pager #: [phone]

## 2022-04-15 NOTE — PROGRESS NOTES
Critical Care Progress Note      04/15/2022    Name: Angelica Robledo MRN#: [id number]   Age: 51 year old YOB: 1970     [facility] Day# 19               Problem List:   Active Problems:    NSTEMI (non-ST elevated myocardial infarction) (H)    Hypertensive emergency    Paroxysmal ventricular tachycardia (H)           Summary/Hospital Course:   Angelica Robledo is a 51 year old female with pertinent PMHx of prior MI at age 24-26 (in 1996) s/p angioplasty, HTN, anemia, and NSTEMI.  She presented on 3/27 with new onset chest pain similar to her previous MI.  Workup was consistent with NSTEMI.  Cardiac cath demonstrated severe multivessel CAD.  She was seen by CT surgery and CABG was scheduled for 3/30.  She underwent CABG x4 left radial, LIMA, left saph vein x2 (FAUSTINO taken down, but elected to not be used due to size).    No family history of clotting disorders.  No known history of DVT/PE/stroke. Siblings without cardiovascular disease. Father passed away of cardiac causes at an advanced age.  Mother had cardiac disease as well as an aortic aneurysm, but not at a young age.     4V CABG as follows: left radial artery graft to the right posterior descending coronary artery, reversed saphenous vein graft to the obtuse marginal branch of the left circumflex coronary artery, reversed saphenous vein graft to the first diagonal branch of the left anterior descending coronary artery, and pedicled left internal mammary artery to left anterior descending coronary.    POD 0 - initial hypoxia resolved with vent changes.  At 2355 patient suffered a PEA and Vfib arrest and underwent 3 shocks, 2 rounds of epi, and 1 round of CPR. ROSC obtained at 0005.  300 mg amiodarone bolus administered.  STAT cardiac cath lab performed demonstrating LIMA-LAD, VG-LADD1, and VG-LPLB were patent. The left radial artery to RDPA graft had an occlusion.  The mid RCA was treated with a DANA stent and a balloon angioplasty performed at the [facility].   Excellent flow was noted on completion coronary angiogram.  Echo demonstrated a drop in LVEF to 40-45% with aspects of hypokinesis. RV demonstrated mild-moderate RV function reduction.    POD1 - weaned from sedation with minimal arousal limited to briefly opening eyes to pain and occasionally sound, minimal extremity pain response.  CT head/CTA head and neck unremarkable for acute insult.  Ceribell placed overnight.    PD2-5: MRI x2 obtained demonstrating punctate foci of early subacute ischemic change. EEG waxes and wanes from mod-severe to severe diffuse slowing. With mixed workup results, neither of these findings portent a poor prognosis specifically; however, prognosis remains unclear.    POD6-current: Gradual clinical exam improvement. 4/7 MRI without new findings from previous. Now doing some time on trach dome.    4/12: Trach and PEG performed    4/16: No new issues overnight. Opens eyes to voice but doesn't interact with me this morning        Assessment and plan :     Angelica Robledo IS a 51 year old female admitted on 3/27/2022 for chest pain with workup demonstrating severe multivessel CAD.  Patient underwent CABG x4 on 3/30.  POD#0 patient had a PEA arrest with ROSC obtained at 10 minutes.  Emergent coronary cath demonstrated radial artery occlusion.  A DANA was placed into the RCA and balloon angioplasty of the RPLB was performed with excellent completion angiography flow. Trach and PEG 4/12.     I have personally reviewed the daily labs, imaging studies, cultures and discussed the case with referring physician and consulting physicians.     My assessment and plan by system for this patient is as follows:    Neurology/Psychiatry:   1.. Encephalopathy s/p cardiac arrest - likely anoxic injury -- head CT negative -- MRI w/ punctate foci of subacute ischemic change -- MRI C spine negative -- EEG: diffuse slowing with gradual improvement, moderate-severe encephalpathy, no indices of seizure activity however  is at risk. Mental status appears to be slowly improving  -- Keppra for seizure ppx (no seizures per EEG)  -- Minimize CNS clouding medications     Cardiovascular:   1.  History of premature coronary artery disease with first MI at age 26. S/p CABG x4 - left radial, LIMA, left saph vein x2 (FAUSTINO taken down, but elected to not be used due to size)   2. POD#0 PEA and Vfib arrest -- s/p cardiac cath demonstrated radial artery graft to RPDA occlusion -- intervention with DANA to RCA and balloon angioplasty RPLA -- on Kangrelor 3/31, now transitioned to aspirin and Brilinta with discussion per CV surgery and interventional cardiology  -- cards recommends Amiodarone taper -- PO regimen scheduled  -- Brilinta needs to be continued for a minimum of 1 month w/ DANA -- per CTS/cards management  3. HTN - likely untreated prior to hospitalization - presented with hypertension emergency - PO regimen per CVS     Pulmonary/Ventilator Management/ID:   1. Airway -- trach present and now doing some time on trach dome alternating with PS  2. MSSA PNEUMONIA. Will complete a 7 day course of Ancef on 4/20.  3. UTI with E. Coli. Had 3 day course of Bactrim.   --alternate PS/trach dome. Ideally would have her off CMV completely in the next few days     GI and Nutrition :   1. Epistaxis - s/p rhino rocket removal - oozing has resolved  2. Nutrition - PEG tube present - continue to goal  3. PPI ppx    Renal/Fluids/Electrolytes:   1. Monitor UOP/creatinine -- stable labs   2. Replete electrolytes PRN per protocol  3. External wick device     Endocrine:   1. Monitor for stress induced hyperglycemia. ICU insulin protocol, goal sugar <180   2. Incidental thyroid nodule -- follow-up outpatient per discharge referral     Hematology/Oncology:   1. Acute blood loss on chronic anemia -- stable, trend  2. Acute blood loss thrombocytopenia -- resolved, trend.  3. S/P DANA placement s/p cardiac arrest -- aspirin and brilinta  4. Rule out hypercoagulable  state -- workup negative; however, hem/onc recommends full dose anticoagulation in setting of thrombus of radial graft -- discussed with CV: now at high dose heparin gtt following titration up.   -- CTS plan is to bridge to coumadin, continue brilinta, discontinue ASA. First dose of Warfarin was on 4/15.    IV/Access:    1. Venous access - peripheral  2. Arterial access - NA    ICU Prophylaxis:   1. DVT: hep gtt/mechanical  2. VAP: HOB 30 degrees, chlorhexidine rinse  3. Stress Ulcer: PPI  4. Restraints: No current indication. Will readdress daily.   5. Wound care - per unit routine   6. Feeding - enteral feeds  7. Family Update  pending  8. Disposition - ICU     Tentative plan: LTACH in [place] on Monday.      Key goals for next 24 hours:   Transition to only PS/Trach dome         Interim History:     No significant changes.         Key Medications:       acetaminophen  975 mg Oral or Feeding Tube Q6H     amiodarone  200 mg Oral or Feeding Tube Daily     amLODIPine  5 mg Oral or Feeding Tube Daily     atorvastatin  80 mg Oral or Feeding Tube Daily     carvedilol  25 mg Oral or Feeding Tube BID w/meals     ceFAZolin  2 g Intravenous Q8H     chlorhexidine  15 mL Mouth/Throat Q12H     insulin aspart  1-6 Units Subcutaneous Q4H     levETIRAcetam  1,000 mg Intravenous Q12H     pantoprazole  40 mg Oral or NG Tube Daily    Or     pantoprazole  40 mg Oral Daily     sodium chloride (PF)  3 mL Intracatheter Q8H     ticagrelor  90 mg Oral or Feeding Tube BID       heparin 1,350 Units/hr (04/15/22 1741)     - MEDICATION INSTRUCTIONS -       Percutaneous Coronary Intervention orders placed (this is information for BPA alerting)       sodium chloride 10 mL/hr at 04/14/22 1602     Warfarin Therapy Reminder                 Physical Examination:   Temp:  [98.4  F (36.9  C)-98.9  F (37.2  C)] 98.9  F (37.2  C)  Pulse:  [62-80] 80  Resp:  [10-23] 21  BP: ()/(61-99) 153/93  FiO2 (%):  [30 %-40 %] 40 %  SpO2:  [96 %-100 %] 100  %      Intake/Output Summary (Last 24 hours) at 4/16/2022 0926  Last data filed at 4/16/2022 0800  Gross per 24 hour   Intake 2259 ml   Output 2150 ml   Net 109 ml       Wt Readings from Last 4 Encounters:   04/15/22 94 kg (207 lb 3.7 oz)     BP - Mean:  [] 120  Vent Mode: PS  (Pressure Support)  FiO2 (%): 40 %  Resp Rate (Set): 14 breaths/min  Tidal Volume (Set, mL): 450 mL  PEEP (cm H2O): 5 cmH2O  Pressure Support (cm H2O): 5 cmH2O  Resp: 21    Recent Labs   Lab 04/13/22  1503   O2PER 21       GEN: no acute distress   HEENT: head ncat, sclera anicteric, trach present, CDI   PULM: unlabored, clear   CV/Chest: RRR. Midline incision CDI.   ABD: soft, nontender. Gastric feeding tube present. Site clean  EXT:  No significant edema,  warm  NEURO: Opens spontaneously, to stimulus, and verbal stimulus. Doesn't follow commands for me today.   SKIN: no obvious rash         Data:   All data and imaging reviewed     ROUTINE ICU LABS (Last four results)  CMP  Recent Labs   Lab 04/15/22  1605 04/15/22  1212 04/15/22  0836 04/15/22  0532 04/15/22  0416 04/14/22  0757 04/14/22  0532 04/13/22  1203 04/13/22  1103 04/13/22  0407 04/13/22  0200 04/12/22  0819 04/12/22  0429 04/11/22  0752 04/11/22  0551 04/10/22  0848 04/10/22  0552 04/09/22  0749 04/09/22  0423   NA  --   --   --   --   --   --   --   --   --   --  138  --   --   --  136  --   --   --  139   POTASSIUM  --   --   --   --   --   --   --   --   --   --  4.3  --  4.6  --  4.6  --  4.4  --  4.5   CHLORIDE  --   --   --   --   --   --   --   --   --   --  105  --   --   --  105  --   --   --  107   CO2  --   --   --   --   --   --   --   --   --   --  27  --   --   --  23  --   --   --  25   ANIONGAP  --   --   --   --   --   --   --   --   --   --  6  --   --   --  8  --   --   --  7   * 108* 138*  --  119*   < >  --    < >  --    < > 103*   < >  --    < > 136*   < >  --    < > 138*   BUN  --   --   --   --   --   --   --   --   --   --  22  --   --   --   23  --   --   --  18   CR  --   --   --   --   --   --   --   --   --   --  0.98  --   --   --  0.96  --   --   --  0.82   GFRESTIMATED  --   --   --   --   --   --   --   --   --   --  70  --   --   --  72  --   --   --  87   TOMAS  --   --   --   --   --   --   --   --   --   --  9.5  --   --   --  9.1  --   --   --  8.9   MAG  --   --   --  2.1  --   --  2.3  --  2.5*  --   --   --  2.5*  --  2.5*  --  2.3  --  2.2   PHOS  --   --   --  3.8  --   --  4.8*  --  5.3*  --   --   --  4.7*  --  4.6*  --  3.9  --  3.9    < > = values in this interval not displayed.     CBC  Recent Labs   Lab 04/15/22  0532 04/14/22  1421 04/14/22  0532 04/13/22  1103 04/13/22  0200   WBC 10.5  --  12.3* 13.5* 16.9*   RBC 3.22*  --  2.84* 3.02* 3.10*   HGB 8.0* 8.1* 6.9* 7.3* 7.6*   HCT 26.6*  --  23.1* 24.8* 25.6*   MCV 83  --  81 82 83   MCH 24.8*  --  24.3* 24.2* 24.5*   MCHC 30.1*  --  29.9* 29.4* 29.7*   RDW 17.5*  --  18.4* 18.6* 18.6*     --  322 338 337     INR  Recent Labs   Lab 04/15/22  0532   INR 1.22*     Arterial Blood Gas  Recent Labs   Lab 04/13/22  1503   O2PER 21       All cultures:  No results for input(s): CULT in the last 168 hours.  No results found for this or any previous visit (from the past 24 hour(s)).          Melania Bland MD  35 Minutes of Critical Care Time

## 2022-04-15 NOTE — PLAN OF CARE
Neuro: Alert, Withdraws in BUE/BLE, Unable to follow commands, blinks at times to communicate. PERRL- 3mm, equal, round, brisk.    Cardio: SR, HR 60s-70s, SBP 90s-130s, MAP >65.    Resp: Trach Shiley 6.0, Vent: CMV/AC, RR 14, Vt 450, FiO2 30%, PEEP 5, SpO2 >90%.    GI: TF @ 65mL/hr via PEG tube, FWF 30mL Q6H. Abd: soft, active. BM 4/14    : Purewick- UOP adequate    Endocrine: Insulin sliding scale Q4H.    Skin: Surgical incision sites- WDL. Scattered bruising.    Pain: Tylenol 975mg.    Gtts: Heparin gtt @ 1350u/hr

## 2022-04-15 NOTE — PROGRESS NOTES
Patient pressure supported on mechanical ventilator 5/5, 30% from 0930 to 1350 until they were placed on a trial of heated trach dome at 35LPM, 40% FiO2.     Patient SpO2: 100%, RR: 17/min, HR 76BPM.    RT will continue to follow.

## 2022-04-16 LAB
ERYTHROCYTE [DISTWIDTH] IN BLOOD BY AUTOMATED COUNT: 17.8 % (ref 10–15)
GLUCOSE BLDC GLUCOMTR-MCNC: 101 MG/DL (ref 70–99)
GLUCOSE BLDC GLUCOMTR-MCNC: 118 MG/DL (ref 70–99)
GLUCOSE BLDC GLUCOMTR-MCNC: 124 MG/DL (ref 70–99)
GLUCOSE BLDC GLUCOMTR-MCNC: 138 MG/DL (ref 70–99)
GLUCOSE BLDC GLUCOMTR-MCNC: 139 MG/DL (ref 70–99)
GLUCOSE BLDC GLUCOMTR-MCNC: 142 MG/DL (ref 70–99)
GLUCOSE BLDC GLUCOMTR-MCNC: 142 MG/DL (ref 70–99)
HCT VFR BLD AUTO: 27.3 % (ref 35–47)
HGB BLD-MCNC: 8 G/DL (ref 11.7–15.7)
INR PPP: 1.23 (ref 0.85–1.15)
MCH RBC QN AUTO: 25.2 PG (ref 26.5–33)
MCHC RBC AUTO-ENTMCNC: 29.3 G/DL (ref 31.5–36.5)
MCV RBC AUTO: 86 FL (ref 78–100)
PLATELET # BLD AUTO: 353 10E3/UL (ref 150–450)
RBC # BLD AUTO: 3.18 10E6/UL (ref 3.8–5.2)
UFH PPP CHRO-ACNC: 0.3 IU/ML
WBC # BLD AUTO: 9.2 10E3/UL (ref 4–11)

## 2022-04-16 PROCEDURE — 250N000013 HC RX MED GY IP 250 OP 250 PS 637: Performed by: PHYSICIAN ASSISTANT

## 2022-04-16 PROCEDURE — 85610 PROTHROMBIN TIME: CPT | Performed by: PHYSICIAN ASSISTANT

## 2022-04-16 PROCEDURE — 99233 SBSQ HOSP IP/OBS HIGH 50: CPT | Mod: 24 | Performed by: INTERNAL MEDICINE

## 2022-04-16 PROCEDURE — 94003 VENT MGMT INPAT SUBQ DAY: CPT

## 2022-04-16 PROCEDURE — 250N000011 HC RX IP 250 OP 636

## 2022-04-16 PROCEDURE — 36415 COLL VENOUS BLD VENIPUNCTURE: CPT | Performed by: PHYSICIAN ASSISTANT

## 2022-04-16 PROCEDURE — 250N000011 HC RX IP 250 OP 636: Performed by: PHYSICIAN ASSISTANT

## 2022-04-16 PROCEDURE — 999N000157 HC STATISTIC RCP TIME EA 10 MIN

## 2022-04-16 PROCEDURE — 200N000001 HC R&B ICU

## 2022-04-16 PROCEDURE — 85014 HEMATOCRIT: CPT | Performed by: PHYSICIAN ASSISTANT

## 2022-04-16 PROCEDURE — 250N000011 HC RX IP 250 OP 636: Performed by: THORACIC SURGERY (CARDIOTHORACIC VASCULAR SURGERY)

## 2022-04-16 PROCEDURE — 85520 HEPARIN ASSAY: CPT | Performed by: THORACIC SURGERY (CARDIOTHORACIC VASCULAR SURGERY)

## 2022-04-16 PROCEDURE — 250N000009 HC RX 250

## 2022-04-16 RX ORDER — HYDRALAZINE HYDROCHLORIDE 20 MG/ML
10 INJECTION INTRAMUSCULAR; INTRAVENOUS EVERY 4 HOURS PRN
Status: DISCONTINUED | OUTPATIENT
Start: 2022-04-16 | End: 2022-04-18 | Stop reason: HOSPADM

## 2022-04-16 RX ORDER — WARFARIN SODIUM 5 MG/1
5 TABLET ORAL
Status: COMPLETED | OUTPATIENT
Start: 2022-04-16 | End: 2022-04-16

## 2022-04-16 RX ADMIN — CEFAZOLIN SODIUM 2 G: 2 INJECTION, SOLUTION INTRAVENOUS at 05:28

## 2022-04-16 RX ADMIN — ACETAMINOPHEN 975 MG: 325 TABLET, FILM COATED ORAL at 00:38

## 2022-04-16 RX ADMIN — LEVETIRACETAM 1000 MG: 10 INJECTION INTRAVENOUS at 04:26

## 2022-04-16 RX ADMIN — CHLORHEXIDINE GLUCONATE 15 ML: 1.2 RINSE ORAL at 19:46

## 2022-04-16 RX ADMIN — HYDROGEN PEROXIDE: 30 LIQUID TOPICAL at 23:45

## 2022-04-16 RX ADMIN — OXYCODONE HYDROCHLORIDE 5 MG: 5 TABLET ORAL at 08:13

## 2022-04-16 RX ADMIN — CEFAZOLIN SODIUM 2 G: 2 INJECTION, SOLUTION INTRAVENOUS at 19:46

## 2022-04-16 RX ADMIN — LEVETIRACETAM 1000 MG: 10 INJECTION INTRAVENOUS at 16:03

## 2022-04-16 RX ADMIN — Medication 40 MG: at 08:13

## 2022-04-16 RX ADMIN — CARVEDILOL 25 MG: 25 TABLET, FILM COATED ORAL at 18:07

## 2022-04-16 RX ADMIN — ACETAMINOPHEN 975 MG: 325 TABLET, FILM COATED ORAL at 23:41

## 2022-04-16 RX ADMIN — AMLODIPINE BESYLATE 5 MG: 5 TABLET ORAL at 08:14

## 2022-04-16 RX ADMIN — AMIODARONE HYDROCHLORIDE 200 MG: 200 TABLET ORAL at 08:13

## 2022-04-16 RX ADMIN — TICAGRELOR 90 MG: 90 TABLET ORAL at 04:26

## 2022-04-16 RX ADMIN — ACETAMINOPHEN 975 MG: 325 TABLET, FILM COATED ORAL at 11:41

## 2022-04-16 RX ADMIN — CEFAZOLIN SODIUM 2 G: 2 INJECTION, SOLUTION INTRAVENOUS at 11:37

## 2022-04-16 RX ADMIN — HYDROGEN PEROXIDE: 30 LIQUID TOPICAL at 08:31

## 2022-04-16 RX ADMIN — HEPARIN SODIUM AND DEXTROSE 1350 UNITS/HR: 10000; 5 INJECTION INTRAVENOUS at 13:04

## 2022-04-16 RX ADMIN — INSULIN ASPART 1 UNITS: 100 INJECTION, SOLUTION INTRAVENOUS; SUBCUTANEOUS at 08:23

## 2022-04-16 RX ADMIN — ACETAMINOPHEN 975 MG: 325 TABLET, FILM COATED ORAL at 05:28

## 2022-04-16 RX ADMIN — CHLORHEXIDINE GLUCONATE 15 ML: 1.2 RINSE ORAL at 08:14

## 2022-04-16 RX ADMIN — TICAGRELOR 90 MG: 90 TABLET ORAL at 18:07

## 2022-04-16 RX ADMIN — CARVEDILOL 25 MG: 25 TABLET, FILM COATED ORAL at 08:13

## 2022-04-16 RX ADMIN — INSULIN ASPART 1 UNITS: 100 INJECTION, SOLUTION INTRAVENOUS; SUBCUTANEOUS at 04:27

## 2022-04-16 RX ADMIN — WARFARIN SODIUM 5 MG: 5 TABLET ORAL at 18:08

## 2022-04-16 RX ADMIN — ACETAMINOPHEN 975 MG: 325 TABLET, FILM COATED ORAL at 18:08

## 2022-04-16 RX ADMIN — ATORVASTATIN CALCIUM 80 MG: 40 TABLET, FILM COATED ORAL at 08:13

## 2022-04-16 ASSESSMENT — ACTIVITIES OF DAILY LIVING (ADL)
ADLS_ACUITY_SCORE: 15

## 2022-04-16 NOTE — PROGRESS NOTES
Critical Care Progress Note      04/16/2022    Name: Angelica Robledo MRN#: [id number]   Age: 51 year old YOB: 1970     [facility] Day# 20               Problem List:   Active Problems:    NSTEMI (non-ST elevated myocardial infarction) (H)    Hypertensive emergency    Paroxysmal ventricular tachycardia (H)           Summary/Hospital Course:   Angelica Robledo is a 51 year old female with pertinent PMHx of prior MI at age 24-25 (in 1996) s/p angioplasty, HTN, anemia, and NSTEMI.  She presented on 3/27 with new onset chest pain similar to her previous MI.  Workup was consistent with NSTEMI.  Cardiac cath demonstrated severe multivessel CAD.  She was seen by CT surgery and CABG was scheduled for 3/30.  She underwent CABG x4 left radial, LIMA, left saph vein x2 (FAUSTINO taken down, but elected to not be used due to size).    No family history of clotting disorders.  No known history of DVT/PE/stroke. Siblings without cardiovascular disease. Father passed away of cardiac causes at an advanced age.  Mother had cardiac disease as well as an aortic aneurysm, but not at a young age.     4V CABG as follows: left radial artery graft to the right posterior descending coronary artery, reversed saphenous vein graft to the obtuse marginal branch of the left circumflex coronary artery, reversed saphenous vein graft to the first diagonal branch of the left anterior descending coronary artery, and pedicled left internal mammary artery to left anterior descending coronary.    POD 0 - initial hypoxia resolved with vent changes.  At 2355 patient suffered a PEA and Vfib arrest and underwent 3 shocks, 2 rounds of epi, and 1 round of CPR. ROSC obtained at 0005.  300 mg amiodarone bolus administered.  STAT cardiac cath lab performed demonstrating LIMA-LAD, VG-LADD1, and VG-LPLB were patent. The left radial artery to RDPA graft had an occlusion.  The mid RCA was treated with a DANA stent and a balloon angioplasty performed at the [facility].   Excellent flow was noted on completion coronary angiogram.  Echo demonstrated a drop in LVEF to 40-45% with aspects of hypokinesis. RV demonstrated mild-moderate RV function reduction.    POD1 - weaned from sedation with minimal arousal limited to briefly opening eyes to pain and occasionally sound, minimal extremity pain response.  CT head/CTA head and neck unremarkable for acute insult.  Ceribell placed overnight.    PD2-5: MRI x2 obtained demonstrating punctate foci of early subacute ischemic change. EEG waxes and wanes from mod-severe to severe diffuse slowing. With mixed workup results, neither of these findings portent a poor prognosis specifically; however, prognosis remains unclear.    POD6-current: Gradual clinical exam improvement. 4/7 MRI without new findings from previous. Now doing some time on trach dome.    4/12: Trach and PEG performed    4/16: No new issues overnight. Opens eyes to voice but doesn't interact with me this morning        Assessment and plan :     Angelica Robledo IS a 51 year old female admitted on 3/27/2022 for chest pain with workup demonstrating severe multivessel CAD.  Patient underwent CABG x4 on 3/30.  POD#0 patient had a PEA arrest with ROSC obtained at 10 minutes.  Emergent coronary cath demonstrated radial artery occlusion.  A DANA was placed into the RCA and balloon angioplasty of the RPLB was performed with excellent completion angiography flow. Trach and PEG 4/12.     I have personally reviewed the daily labs, imaging studies, cultures and discussed the case with referring physician and consulting physicians.     My assessment and plan by system for this patient is as follows:    Neurology/Psychiatry:   1.. Encephalopathy s/p cardiac arrest - likely anoxic injury -- head CT negative -- MRI w/ punctate foci of subacute ischemic change -- MRI C spine negative -- EEG: diffuse slowing with gradual improvement, moderate-severe encephalpathy, no indices of seizure activity however  is at risk. Mental status appears to be slowly improving  -- Keppra for seizure ppx (no seizures per EEG)  -- Minimize CNS clouding medications     Cardiovascular:   1.  History of premature coronary artery disease with first MI at age 26. S/p CABG x4 - left radial, LIMA, left saph vein x2 (FAUSTINO taken down, but elected to not be used due to size)   2. POD#0 PEA and Vfib arrest -- s/p cardiac cath demonstrated radial artery graft to RPDA occlusion -- intervention with DANA to RCA and balloon angioplasty RPLA -- on Kangrelor 3/31, now transitioned to aspirin and Brilinta with discussion per CV surgery and interventional cardiology  -- cards recommends Amiodarone taper -- PO regimen scheduled  -- Brilinta needs to be continued for a minimum of 1 month w/ DANA -- per CTS/cards management  3. HTN - likely untreated prior to hospitalization - presented with hypertension emergency - PO regimen per CVS     Pulmonary/Ventilator Management/ID:   1. Airway -- trach present and now doing some time on trach dome alternating with PS  2. MSSA PNEUMONIA. Will complete a 7 day course of Ancef on 4/20.  3. UTI with E. Coli. Had 3 day course of Bactrim.   --alternate PS/trach dome. Ideally would have her off CMV completely in the next few days     GI and Nutrition :   1. Epistaxis - s/p rhino rocket removal - oozing has resolved  2. Nutrition - PEG tube present - continue to goal  3. PPI ppx    Renal/Fluids/Electrolytes:   1. Monitor UOP/creatinine -- stable labs   2. Replete electrolytes PRN per protocol  3. External wick device     Endocrine:   1. Monitor for stress induced hyperglycemia. ICU insulin protocol, goal sugar <180   2. Incidental thyroid nodule -- follow-up outpatient per discharge referral     Hematology/Oncology:   1. Acute blood loss on chronic anemia -- stable, trend  2. Acute blood loss thrombocytopenia -- resolved, trend.  3. S/P DANA placement s/p cardiac arrest -- aspirin and brilinta  4. Rule out hypercoagulable  state -- workup negative; however, hem/onc recommends full dose anticoagulation in setting of thrombus of radial graft -- discussed with CV: now at high dose heparin gtt following titration up.   -- CTS plan is to bridge to coumadin, continue brilinta, discontinue ASA. First dose of Warfarin was on 4/15.    IV/Access:    1. Venous access - peripheral  2. Arterial access - NA    ICU Prophylaxis:   1. DVT: hep gtt/mechanical  2. VAP: HOB 30 degrees, chlorhexidine rinse  3. Stress Ulcer: PPI  4. Restraints: No current indication. Will readdress daily.   5. Wound care - per unit routine   6. Feeding - enteral feeds  7. Family Update  pending  8. Disposition - ICU     Tentative plan: LTACH in [place] on Monday.      Key goals for next 24 hours:   Transition to only PS/Trach dome         Interim History:     No significant changes.         Key Medications:       acetaminophen  975 mg Oral or Feeding Tube Q6H     amiodarone  200 mg Oral or Feeding Tube Daily     amLODIPine  5 mg Oral or Feeding Tube Daily     atorvastatin  80 mg Oral or Feeding Tube Daily     carvedilol  25 mg Oral or Feeding Tube BID w/meals     ceFAZolin  2 g Intravenous Q8H     chlorhexidine  15 mL Mouth/Throat Q12H     insulin aspart  1-6 Units Subcutaneous Q4H     levETIRAcetam  1,000 mg Intravenous Q12H     pantoprazole  40 mg Oral or NG Tube Daily    Or     pantoprazole  40 mg Oral Daily     sodium chloride (PF)  3 mL Intracatheter Q8H     ticagrelor  90 mg Oral or Feeding Tube BID     warfarin ANTICOAGULANT  5 mg Oral or Feeding Tube ONCE at 18:00       heparin 1,350 Units/hr (04/16/22 0651)     - MEDICATION INSTRUCTIONS -       Percutaneous Coronary Intervention orders placed (this is information for BPA alerting)       sodium chloride 10 mL/hr at 04/14/22 1602     Warfarin Therapy Reminder                 Physical Examination:   Temp:  [98.5  F (36.9  C)-98.9  F (37.2  C)] 98.8  F (37.1  C)  Pulse:  [65-80] 70  Resp:  [9-23] 13  BP:  ()/() 101/64  FiO2 (%):  [30 %-40 %] 30 %  SpO2:  [98 %-100 %] 100 %      Intake/Output Summary (Last 24 hours) at 4/16/2022 0926  Last data filed at 4/16/2022 0800  Gross per 24 hour   Intake 2259 ml   Output 2150 ml   Net 109 ml       Wt Readings from Last 4 Encounters:   04/16/22 94.5 kg (208 lb 5.4 oz)     BP - Mean:  [] 73  Vent Mode: CMV/AC  (Continuous Mandatory Ventilation/ Assist Control)  FiO2 (%): 30 %  Resp Rate (Set): 14 breaths/min  Tidal Volume (Set, mL): 450 mL  PEEP (cm H2O): 5 cmH2O  Pressure Support (cm H2O): 5 cmH2O  Resp: 13    Recent Labs   Lab 04/13/22  1503   O2PER 21       GEN: no acute distress   HEENT: head ncat, sclera anicteric, trach present, CDI   PULM: unlabored, clear   CV/Chest: RRR. Midline incision CDI.   ABD: soft, nontender. Gastric feeding tube present. Site clean  EXT:  No significant edema,  warm  NEURO: Opens spontaneously, to stimulus, and verbal stimulus. Doesn't follow commands for me today.   SKIN: no obvious rash         Data:   All data and imaging reviewed     ROUTINE ICU LABS (Last four results)  CMP  Recent Labs   Lab 04/16/22  0822 04/16/22  0425 04/16/22  0003 04/15/22  2027 04/15/22  0836 04/15/22  0532 04/14/22  0757 04/14/22  0532 04/13/22  1203 04/13/22  1103 04/13/22  0407 04/13/22  0200 04/12/22  0819 04/12/22  0429 04/11/22  0752 04/11/22  0551 04/10/22  0848 04/10/22  0552   NA  --   --   --   --   --   --   --   --   --   --   --  138  --   --   --  136  --   --    POTASSIUM  --   --   --   --   --   --   --   --   --   --   --  4.3  --  4.6  --  4.6  --  4.4   CHLORIDE  --   --   --   --   --   --   --   --   --   --   --  105  --   --   --  105  --   --    CO2  --   --   --   --   --   --   --   --   --   --   --  27  --   --   --  23  --   --    ANIONGAP  --   --   --   --   --   --   --   --   --   --   --  6  --   --   --  8  --   --    * 142* 118* 123*   < >  --    < >  --    < >  --    < > 103*   < >  --    < > 136*   < >   --    BUN  --   --   --   --   --   --   --   --   --   --   --  22  --   --   --  23  --   --    CR  --   --   --   --   --   --   --   --   --   --   --  0.98  --   --   --  0.96  --   --    GFRESTIMATED  --   --   --   --   --   --   --   --   --   --   --  70  --   --   --  72  --   --    TOMAS  --   --   --   --   --   --   --   --   --   --   --  9.5  --   --   --  9.1  --   --    MAG  --   --   --   --   --  2.1  --  2.3  --  2.5*  --   --   --  2.5*  --  2.5*  --  2.3   PHOS  --   --   --   --   --  3.8  --  4.8*  --  5.3*  --   --   --  4.7*  --  4.6*  --  3.9    < > = values in this interval not displayed.     CBC  Recent Labs   Lab 04/16/22  0511 04/15/22  0532 04/14/22  1421 04/14/22  0532 04/13/22  1103   WBC 9.2 10.5  --  12.3* 13.5*   RBC 3.18* 3.22*  --  2.84* 3.02*   HGB 8.0* 8.0* 8.1* 6.9* 7.3*   HCT 27.3* 26.6*  --  23.1* 24.8*   MCV 86 83  --  81 82   MCH 25.2* 24.8*  --  24.3* 24.2*   MCHC 29.3* 30.1*  --  29.9* 29.4*   RDW 17.8* 17.5*  --  18.4* 18.6*    336  --  322 338     INR  Recent Labs   Lab 04/16/22 0511 04/15/22  0532   INR 1.23* 1.22*     Arterial Blood Gas  Recent Labs   Lab 04/13/22  1503   O2PER 21       All cultures:  No results for input(s): CULT in the last 168 hours.  No results found for this or any previous visit (from the past 24 hour(s)).          Melania Bland MD  35 Minutes of Critical Care Time

## 2022-04-16 NOTE — PLAN OF CARE
Neuro: withdraws to pain in all 4 extremities. Appears more alert today than yesterday, interacting with family by smiling at them. PERRL.   CV:  SR, BP stable  Resp: tolerated trach dome for only a few hours then became tachypnic and diaphoretic and switched back to PS 5/5  GI/: purewick, +BM  Pain/Gtts: oxycodone PRN given for pain- grimacing/crying  Family: at bedside  POC:  Plan to go to L tach Monday at 10am

## 2022-04-16 NOTE — PROGRESS NOTES
[facility] ICU RESPIRATORY NOTE        Date of Admission: 3/27/2022    Date of Intubation (most recent): 3/30/22    Reason for Mechanical Ventilation: Airwayprotection    Number of Days on Mechanical Ventilation: 15    Met Criteria for Spontaneous Breathing Trial: Yes   Pt was on High flowTrach dome all evening until 2026. Pt was switched to full support to rest for tonight.    Significant Events Today: None    ABG Results:   Recent Labs   Lab 04/13/22  1503   O2PER 21       Current Vent Settings: Vent Mode: CMV/AC  (Continuous Mandatory Ventilation/ Assist Control)  FiO2 (%): 40 %  Resp Rate (Set): 14 breaths/min  Tidal Volume (Set, mL): 450 mL  PEEP (cm H2O): 5 cmH2O  Pressure Support (cm H2O): 5 cmH2O  Resp: 14      Plan: Will keep the pt on full ventilatory support to rest tonight and continue with Ps tomorrow.    Margie Estes, RT

## 2022-04-16 NOTE — PROGRESS NOTES
[facility] ICU RESPIRATORY NOTE        Date of Admission: 3/27/2022    Date of Intubation (most recent): 4/12/22 Trached    Reason for Mechanical Ventilation: Airway protection     Number of Days on Mechanical Ventilation: 16    Met Criteria for Spontaneous Breathing Trial: Yes PS 5/5 30% from 10am-noon and trach dome 4hrs and back on PS 5/5 at 1600 and still continuing.     Significant Events Today: none    ABG Results:   Recent Labs   Lab 04/13/22  1503   O2PER 21       Current Vent Settings: Vent Mode: PS  (Pressure Support)  FiO2 (%): 30 %  Resp Rate (Set): 14 breaths/min  Tidal Volume (Set, mL): 450 mL  PEEP (cm H2O): 5 cmH2O  Pressure Support (cm H2O): 5 cmH2O  Resp: 17      Skin Assessment: intact.    Plan: Continue PS trail and rest full ventilator support at [facility].    Chris Gutierrez RT  4/16/2022

## 2022-04-16 NOTE — PROGRESS NOTES
Shift Summary: Pt stable overnight. Administered PRN hydralazine along with pain meds.   Neuro: PERRL. Follows slowly, and inconsistently at times.   CV: SR  Resp: Lungs coars, mild secretions. Tolerating vent settings. Trach dressing changed.   GI: BS active, no BM. Tube feeds @ goal.    : Purewick in place, changd.   Pain: Oxy for pain  Activity: Assist of 2   Gtt: Gep @ 13.5.   Skin: R arm incision DIAMOND, intact. L groin and knee incision DIAMOND, intact. Chest incision DIAMOND, intact. Chest tube dressing site changed.      Plan:  Continue with plan of cares.

## 2022-04-16 NOTE — PROGRESS NOTES
Patient seen and care plan discussed with Dr. Jessica Olae [facility]  Cardiovascular and Thoracic Surgery Daily Note          Assessment and Plan:   Angelica Robledo is a 51 year old female with history of MI at age 25 s/p angioplasty who presented to the hospital with hypertensive emergency and NSTEMI. Coronary angio demonstrated severe multivessel CAD. Echocardiogram preoperatively with preserved biventricular function.     PMH includes: NSTEMI, CAD with previous coronary stents, HTN  Course has been complicated by PEA arrest with ventricular fibrillation on POD 0 with ROSC after defibrillation x3, 2 mg epinephrine and one round of CPR. Was taken to cath lab where radial artery graft was found to be occluded. PCI with DANA placement to mid RCA and angioplasty of the rPLB was performed. Echocardiogram at the conclusion of PCI demonstrated LVEF 40-45% with mild to moderate global hypokinesis of the left ventricle with severe hypokinesis of the mid anterior and anteroseptal walls, the entire inferior wall and all apical segments of the left ventricle; no pericardial effusion. Additionally, due to antiplatelet therapy post PCI, patient developed epistaxis during attempted NG placement requiring placement of b/l rhino rockets. Now, diffuse severe encephalopathy with minimal neurologic response.      POD #17 s/p:  1. Coronary artery bypass grafting x 4 (radial artery graft to the right posterior descending coronary artery, reversed saphenous vein graft to the obtuse marginal branch of the left circumflex coronary artery, reversed saphenous vein graft to the first diagonal branch of the left anterior descending coronary artery, and pedicled left internal mammary artery to left anterior descending coronary artery).  2.  Endoscopic vein harvest of the greater saphenous vein from the left lower extremity.  3.  Endoscopic left radial artery harvest on 3/30/22 with Dr. Butterfield.     CVS:   NSTEMI and severe  multivessel CAD s/p CABG x4 with radial grafts as above  HTN  PEA and Vfib arrest s/p ROSC and DANA to mid RCA and angioplasty of the rPLB on 3/31  HD stable, lends to HTN; NSR rates 70-80s  - Goal MAP >65, SBP <140  - Amlodipine 5 mg daily for radial graft (at least 3-6 months)  - Carvedilol 25 mg BID  - PRN hydralazine available- decreased to only 10mg available PRN  - Brilinta load and daily per Cardiology, tolerated 500U straight rate heparin-tolerated, on LMW heparin gtt-- Plan to transition to start coumadin 4/15 if hemoglobin stable  - Transitioned amiodarone gtt to PO taper per EP/Cards recs  - ASA 81 daily  - Atorvastatin 80 mg daily  - CTs removed 4/3. TPWs removed 4/1  -Discussed Anticoagulation with Dr. Butterfield 4/14-- Will plan to anticoagulate with Brilinta and coumadin. plan to discontinue aspirin, start coumadin 4/15 and continue heparin gtt until therapeutic INR 2-3. INR 1.23 today     Resp:   Postoperative ventilator management  - Current vent settings:  Vent Mode: CMV/AC  (Continuous Mandatory Ventilation/ Assist Control) weaning in CPAP  FiO2 (%): 30 %  Resp Rate (Set): 14 breaths/min  Tidal Volume (Set, mL): 450 mL  PEEP (cm H2O): 5 cmH2O  Resp: 13  - Intubated for airway protection due to encephalopathy  - Titrate O2/peep to maintain sats >92%  - PST/trach dome as tolerated- intensivist following  - Trach/PEG done 4/12     Neuro:    Acute postoperative pain  Hx migraines  Encephalopathy   Did receive CPR on 3/31, will likely have pain from this also  Propofol turned off on 3/31 at 9 am, starting to open eyes more, unable to move extremities but withdraws BLE to pain, nothing in BUEs yet. CT head 3/31 negative for hemorrhage; CTA negative for strokes, vessels patent; per neurology, MRI stable w/o acute concern to explain clinical status.  EEG without e/o seizure activity, continues to have moderate to severe diffuse encephalopathy   4/5 EEG improved but c/f new spikes not c/w seizures but  tendency to seize  MRI head and c-spine on 4/3,  negative  - PRN dilaudid available  - PRN oxycodone and methocarbamol available  - Scheduled tylenol   - Neuro critical care consulted and following, greatly appreciate recommendations  - Keppra added   - More eye tracking, yawned a few times, wiggled toes on command starting 4/15     Renal/Electrolyte:   Lactic acidosis, resolved  Volume overload  Hypernatremia, resolved  Creat stable, below preop weight, much less edema;   Na/K  stable  - Strict I/O, daily weights  - Avoid/limit nephrotoxins as able  - Replete lytes per protocol  - No further diuresis  - Continue FWF decrease to 30 ml q 6 hrs<60 mL q6h     GI/Nutrition:    Epistaxis, resolved  Elevated LFTs, likely shock liver 2/2 arrest, resolved  - Nutrition following  - Rhino rockets removed  - Continue bowel regimen, + BMs  - PPI  -PEG placed, tolerating feeds.      :    - External wick in place for strict I/O  - Urine +, start Bactrim bid for 3 days-- completed, afebrile     Endo:  Stress induced hyperglycemia  Thyroid nodule   Preop A1c 5.7%  - Transitioned to sliding scale insulin   - Goal BG <180 for optimal healing  - Thyroid nodule noted on CT on 3/31, will have this worked up on an outpatient basis     ID:  Stress induced leukocytosis, recurrent  E.Coli UTI preoperatively  WBC 9.2<10.5<12.3<14.0<15.9<16.0<15.2<15.7<14.7<10.4, Temp (24hrs), Av.6  F (37  C), Min:98.4  F (36.9  C), Max:98.8  F (37.1  C); new leukocytosis, respiratory culture ordered/pan culturing per intensivist   - Completed perioperative ABX, Bactrim for UTI-completed  - Continue to follow fever curve, WBC and inflammatory markers as appropriate  - Repeat UA  without overt e/o infection     Heme:  Acute blood loss anemia  Acute blood loss thrombocytopenia  Hgb 8.0<8.0<6.9<7.6<7.8<8.3<7.9<7.8<8.1, Plts 350; no s/sx active bleeding  - CBC in AM  - Goal Hgb >7  - Consulted hematology given severe premature CAD and acute graft  "failure- appreciate recommendations. Multiple labs sent. Hypercoagulable workup appears to be negative but recommending repeat levels once acute issues resolve. Currently recommending antiplatelet and full anticoagulation (therapeutic heparin). D/w Dr. Butterfield. Plan will be straight rate heparin at 500 units/hr transitioned LIH gtt, consider transition to HIH after trach/PEG placed  -Transfused 4/14 for Hgb 6.9. Discussed anticoagulation with Dr. Butterfield, plan for full anticoagulation with Brilinta and coumadin. Started coumadin on 4/15, monitoring for hemoglobin stability. Continue heparin bridging until INR therapeutic     - Proph: PPI, subcutaneous heparin, SCDs     - Dispo: Continue in ICU; Planning for discharging to [facility] on Monday if medically appropriate. SW following and facilitating.            Interval History:   No acute events overnight. Tolerating PST during the day with full support overnight. Tube feeds to goal.          Medications:       acetaminophen  975 mg Oral or Feeding Tube Q6H     amiodarone  200 mg Oral or Feeding Tube Daily     amLODIPine  5 mg Oral or Feeding Tube Daily     atorvastatin  80 mg Oral or Feeding Tube Daily     carvedilol  25 mg Oral or Feeding Tube BID w/meals     ceFAZolin  2 g Intravenous Q8H     chlorhexidine  15 mL Mouth/Throat Q12H     insulin aspart  1-6 Units Subcutaneous Q4H     levETIRAcetam  1,000 mg Intravenous Q12H     pantoprazole  40 mg Oral or NG Tube Daily    Or     pantoprazole  40 mg Oral Daily     sodium chloride (PF)  3 mL Intracatheter Q8H     ticagrelor  90 mg Oral or Feeding Tube BID     warfarin ANTICOAGULANT  5 mg Oral or Feeding Tube ONCE at 18:00     @MEDSPRN-@          Physical Exam:   Vitals were reviewed  Blood pressure 111/78, pulse 72, temperature 98.8  F (37.1  C), temperature source Axillary, resp. rate 17, height 1.651 m (5' 5\"), weight 94.5 kg (208 lb 5.4 oz), last menstrual period 02/27/2022, SpO2 100 %.  Rhythm: NSR    Lungs: " course    Cardiovascular: s1/s2, no m/r/g    Abdomen: s/nt/nd    Extremeties: no LE edema    Incision: cdi    CT: na    Weight:   Vitals:    04/12/22 0421 04/13/22 0423 04/14/22 0458 04/15/22 0400   Weight: 92.6 kg (204 lb 2.3 oz) 92.5 kg (203 lb 14.8 oz) 92.3 kg (203 lb 7.8 oz) 94 kg (207 lb 3.7 oz)    04/16/22 0000   Weight: 94.5 kg (208 lb 5.4 oz)            Data:     Last Comprehensive Metabolic Panel:  Sodium   Date Value Ref Range Status   04/13/2022 138 133 - 144 mmol/L Final     Potassium   Date Value Ref Range Status   04/13/2022 4.3 3.4 - 5.3 mmol/L Final     Chloride   Date Value Ref Range Status   04/13/2022 105 94 - 109 mmol/L Final     Carbon Dioxide (CO2)   Date Value Ref Range Status   04/13/2022 27 20 - 32 mmol/L Final     Anion Gap   Date Value Ref Range Status   04/13/2022 6 3 - 14 mmol/L Final     Glucose   Date Value Ref Range Status   04/13/2022 103 (H) 70 - 99 mg/dL Final     GLUCOSE BY METER POCT   Date Value Ref Range Status   04/16/2022 142 (H) 70 - 99 mg/dL Final     Urea Nitrogen   Date Value Ref Range Status   04/13/2022 22 7 - 30 mg/dL Final     Creatinine   Date Value Ref Range Status   04/13/2022 0.98 0.52 - 1.04 mg/dL Final     GFR Estimate   Date Value Ref Range Status   04/13/2022 70 >60 mL/min/1.73m2 Final     Comment:     Effective December 21, 2021 eGFRcr in adults is calculated using the 2021 CKD-EPI creatinine equation which includes age and gender (Ashleigh et al., NEJM, DOI: 10.1056/AFSWht2904976)     Calcium   Date Value Ref Range Status   04/13/2022 9.5 8.5 - 10.1 mg/dL Final     Bilirubin Total   Date Value Ref Range Status   04/03/2022 0.2 0.2 - 1.3 mg/dL Final     Alkaline Phosphatase   Date Value Ref Range Status   04/03/2022 108 40 - 150 U/L Final     ALT   Date Value Ref Range Status   04/03/2022 52 (H) 0 - 50 U/L Final     AST   Date Value Ref Range Status   04/03/2022 26 0 - 45 U/L Final       Lab Results   Component Value Date    WBC 9.2 04/16/2022     Lab Results    Component Value Date    RBC 3.18 04/16/2022     Lab Results   Component Value Date    HGB 8.0 04/16/2022     Lab Results   Component Value Date    HCT 27.3 04/16/2022     Lab Results   Component Value Date    MCV 86 04/16/2022     Lab Results   Component Value Date    MCH 25.2 04/16/2022     Lab Results   Component Value Date    MCHC 29.3 04/16/2022     Lab Results   Component Value Date    RDW 17.8 04/16/2022     Lab Results   Component Value Date     04/16/2022           Suzi Craig PA-C  Pager #: [phone]

## 2022-04-16 NOTE — PROGRESS NOTES
[facility] ICU RESPIRATORY NOTE        Date of Admission: 3/27/2022    Date of Intubation (most recent): 3/30/22    Reason for Mechanical Ventilation: Airway protection    Number of Days on Mechanical Ventilation: 16    Met Criteria for Spontaneous Breathing Trial: YES   Reason for No Spontaneous Breathing Trial: Patient was rested on full support for the night    Significant Events Today: None  ABG Results:   Recent Labs   Lab 04/13/22  1503   O2PER 21       Current Vent Settings: Vent Mode: CMV/AC  (Continuous Mandatory Ventilation/ Assist Control)  FiO2 (%): 30 %  Resp Rate (Set): 14 breaths/min  Tidal Volume (Set, mL): 450 mL  PEEP (cm H2O): 5 cmH2O  Pressure Support (cm H2O): 5 cmH2O  Resp: 14      Plan: Will rest patient on full support overnight and will continue SBT and trach dome during the day    Vianney Villafuerte, RT

## 2022-04-17 LAB
ERYTHROCYTE [DISTWIDTH] IN BLOOD BY AUTOMATED COUNT: 17.9 % (ref 10–15)
GLUCOSE BLDC GLUCOMTR-MCNC: 105 MG/DL (ref 70–99)
GLUCOSE BLDC GLUCOMTR-MCNC: 119 MG/DL (ref 70–99)
GLUCOSE BLDC GLUCOMTR-MCNC: 123 MG/DL (ref 70–99)
GLUCOSE BLDC GLUCOMTR-MCNC: 125 MG/DL (ref 70–99)
GLUCOSE BLDC GLUCOMTR-MCNC: 134 MG/DL (ref 70–99)
HCT VFR BLD AUTO: 30.7 % (ref 35–47)
HGB BLD-MCNC: 9.3 G/DL (ref 11.7–15.7)
INR PPP: 1.85 (ref 0.85–1.15)
MCH RBC QN AUTO: 25.3 PG (ref 26.5–33)
MCHC RBC AUTO-ENTMCNC: 30.3 G/DL (ref 31.5–36.5)
MCV RBC AUTO: 84 FL (ref 78–100)
PLATELET # BLD AUTO: 365 10E3/UL (ref 150–450)
RBC # BLD AUTO: 3.67 10E6/UL (ref 3.8–5.2)
UFH PPP CHRO-ACNC: 0.25 IU/ML
WBC # BLD AUTO: 10.3 10E3/UL (ref 4–11)

## 2022-04-17 PROCEDURE — 200N000001 HC R&B ICU

## 2022-04-17 PROCEDURE — 250N000013 HC RX MED GY IP 250 OP 250 PS 637: Performed by: INTERNAL MEDICINE

## 2022-04-17 PROCEDURE — 85520 HEPARIN ASSAY: CPT | Performed by: INTERNAL MEDICINE

## 2022-04-17 PROCEDURE — 250N000013 HC RX MED GY IP 250 OP 250 PS 637: Performed by: PHYSICIAN ASSISTANT

## 2022-04-17 PROCEDURE — 250N000011 HC RX IP 250 OP 636: Performed by: PHYSICIAN ASSISTANT

## 2022-04-17 PROCEDURE — 94003 VENT MGMT INPAT SUBQ DAY: CPT

## 2022-04-17 PROCEDURE — 85027 COMPLETE CBC AUTOMATED: CPT | Performed by: PHYSICIAN ASSISTANT

## 2022-04-17 PROCEDURE — 99232 SBSQ HOSP IP/OBS MODERATE 35: CPT | Mod: 24 | Performed by: INTERNAL MEDICINE

## 2022-04-17 PROCEDURE — 85610 PROTHROMBIN TIME: CPT | Performed by: PHYSICIAN ASSISTANT

## 2022-04-17 PROCEDURE — 36415 COLL VENOUS BLD VENIPUNCTURE: CPT | Performed by: PHYSICIAN ASSISTANT

## 2022-04-17 PROCEDURE — 250N000011 HC RX IP 250 OP 636

## 2022-04-17 PROCEDURE — 999N000157 HC STATISTIC RCP TIME EA 10 MIN

## 2022-04-17 PROCEDURE — 250N000011 HC RX IP 250 OP 636: Performed by: THORACIC SURGERY (CARDIOTHORACIC VASCULAR SURGERY)

## 2022-04-17 RX ORDER — WARFARIN SODIUM 2.5 MG/1
2.5 TABLET ORAL
Status: COMPLETED | OUTPATIENT
Start: 2022-04-17 | End: 2022-04-17

## 2022-04-17 RX ORDER — LEVETIRACETAM 500 MG/1
1000 TABLET ORAL 2 TIMES DAILY
Status: DISCONTINUED | OUTPATIENT
Start: 2022-04-17 | End: 2022-04-18 | Stop reason: HOSPADM

## 2022-04-17 RX ORDER — AMLODIPINE BESYLATE 10 MG/1
10 TABLET ORAL DAILY
Status: DISCONTINUED | OUTPATIENT
Start: 2022-04-18 | End: 2022-04-18 | Stop reason: HOSPADM

## 2022-04-17 RX ORDER — AMLODIPINE BESYLATE 5 MG/1
5 TABLET ORAL ONCE
Status: COMPLETED | OUTPATIENT
Start: 2022-04-17 | End: 2022-04-17

## 2022-04-17 RX ADMIN — CARVEDILOL 25 MG: 25 TABLET, FILM COATED ORAL at 19:04

## 2022-04-17 RX ADMIN — OXYCODONE HYDROCHLORIDE 2.5 MG: 5 TABLET ORAL at 10:48

## 2022-04-17 RX ADMIN — ATORVASTATIN CALCIUM 80 MG: 40 TABLET, FILM COATED ORAL at 07:53

## 2022-04-17 RX ADMIN — HYDRALAZINE HYDROCHLORIDE 10 MG: 20 INJECTION INTRAMUSCULAR; INTRAVENOUS at 04:14

## 2022-04-17 RX ADMIN — AMIODARONE HYDROCHLORIDE 200 MG: 200 TABLET ORAL at 08:01

## 2022-04-17 RX ADMIN — Medication 40 MG: at 07:53

## 2022-04-17 RX ADMIN — HYDROGEN PEROXIDE: 30 LIQUID TOPICAL at 22:06

## 2022-04-17 RX ADMIN — OXYCODONE HYDROCHLORIDE 2.5 MG: 5 TABLET ORAL at 19:04

## 2022-04-17 RX ADMIN — LEVETIRACETAM 1000 MG: 10 INJECTION INTRAVENOUS at 04:14

## 2022-04-17 RX ADMIN — WARFARIN SODIUM 2.5 MG: 2.5 TABLET ORAL at 19:04

## 2022-04-17 RX ADMIN — TICAGRELOR 90 MG: 90 TABLET ORAL at 16:10

## 2022-04-17 RX ADMIN — TICAGRELOR 90 MG: 90 TABLET ORAL at 04:15

## 2022-04-17 RX ADMIN — CARVEDILOL 25 MG: 25 TABLET, FILM COATED ORAL at 07:53

## 2022-04-17 RX ADMIN — CHLORHEXIDINE GLUCONATE 15 ML: 1.2 RINSE ORAL at 19:04

## 2022-04-17 RX ADMIN — ACETAMINOPHEN 975 MG: 325 TABLET, FILM COATED ORAL at 22:05

## 2022-04-17 RX ADMIN — CEFAZOLIN SODIUM 2 G: 2 INJECTION, SOLUTION INTRAVENOUS at 19:04

## 2022-04-17 RX ADMIN — CHLORHEXIDINE GLUCONATE 15 ML: 1.2 RINSE ORAL at 07:53

## 2022-04-17 RX ADMIN — CEFAZOLIN SODIUM 2 G: 2 INJECTION, SOLUTION INTRAVENOUS at 12:03

## 2022-04-17 RX ADMIN — CEFAZOLIN SODIUM 2 G: 2 INJECTION, SOLUTION INTRAVENOUS at 03:01

## 2022-04-17 RX ADMIN — AMLODIPINE BESYLATE 5 MG: 5 TABLET ORAL at 07:53

## 2022-04-17 RX ADMIN — ACETAMINOPHEN 975 MG: 325 TABLET, FILM COATED ORAL at 05:22

## 2022-04-17 RX ADMIN — LEVETIRACETAM 1000 MG: 500 TABLET, FILM COATED ORAL at 15:56

## 2022-04-17 RX ADMIN — HEPARIN SODIUM AND DEXTROSE 1350 UNITS/HR: 10000; 5 INJECTION INTRAVENOUS at 03:02

## 2022-04-17 RX ADMIN — AMLODIPINE BESYLATE 5 MG: 5 TABLET ORAL at 09:19

## 2022-04-17 RX ADMIN — ACETAMINOPHEN 975 MG: 325 TABLET, FILM COATED ORAL at 15:55

## 2022-04-17 ASSESSMENT — ACTIVITIES OF DAILY LIVING (ADL)
ADLS_ACUITY_SCORE: 15

## 2022-04-17 NOTE — PROGRESS NOTES
Patient seen and care plan discussed with Dr. Jessica [facility]  Cardiovascular and Thoracic Surgery Daily Note          Assessment and Plan:   Angelica Robledo is a 51 year old female with history of MI at age 25 s/p angioplasty who presented to the hospital with hypertensive emergency and NSTEMI. Coronary angio demonstrated severe multivessel CAD. Echocardiogram preoperatively with preserved biventricular function.     PMH includes: NSTEMI, CAD with previous coronary stents, HTN  Course has been complicated by PEA arrest with ventricular fibrillation on POD 0 with ROSC after defibrillation x3, 2 mg epinephrine and one round of CPR. Was taken to cath lab where radial artery graft was found to be occluded. PCI with DANA placement to mid RCA and angioplasty of the rPLB was performed. Echocardiogram at the conclusion of PCI demonstrated LVEF 40-45% with mild to moderate global hypokinesis of the left ventricle with severe hypokinesis of the mid anterior and anteroseptal walls, the entire inferior wall and all apical segments of the left ventricle; no pericardial effusion. Additionally, due to antiplatelet therapy post PCI, patient developed epistaxis during attempted NG placement requiring placement of b/l rhino rockets. Now, diffuse severe encephalopathy with minimal neurologic response.      POD #18 s/p:  1. Coronary artery bypass grafting x 4 (radial artery graft to the right posterior descending coronary artery, reversed saphenous vein graft to the obtuse marginal branch of the left circumflex coronary artery, reversed saphenous vein graft to the first diagonal branch of the left anterior descending coronary artery, and pedicled left internal mammary artery to left anterior descending coronary artery).  2.  Endoscopic vein harvest of the greater saphenous vein from the left lower extremity.  3.  Endoscopic left radial artery harvest on 3/30/22 with Dr. Butterfield.     CVS:   NSTEMI and severe  multivessel CAD s/p CABG x4 with radial grafts as above  HTN  PEA and Vfib arrest s/p ROSC and DANA to mid RCA and angioplasty of the rPLB on 3/31  HD stable, lends to HTN; NSR rates 70-80s  - Goal MAP >65, SBP <140  - Amlodipine 5 mg daily for radial graft (at least 3-6 months)- increased to 10mg daily 4/17 for blood pressure control  - Carvedilol 25 mg BID  - PRN hydralazine available- decreased to only 10mg available PRN on 4/16 due to significant response to 20mg.   - Brilinta load and daily per Cardiology, tolerated 500U straight rate heparin-tolerated, on LMW heparin gtt-- Plan to transition to start coumadin 4/15 if hemoglobin stable  - Transitioned amiodarone gtt to PO taper per EP/Cards recs  - ASA 81 daily  - Atorvastatin 80 mg daily  - CTs removed 4/3. TPWs removed 4/1  -Discussed Anticoagulation with Dr. Butterfield 4/14-- Will plan to anticoagulate with Brilinta and coumadin. plan to discontinue aspirin, start coumadin 4/15 and continue heparin gtt until therapeutic INR 2-3. INR 1.85 today   Addendum: will plan to stop heparin gtt today d/t being close to goal.      Resp:   Postoperative ventilator management  - Current vent settings:  Vent Mode: CMV/AC  (Continuous Mandatory Ventilation/ Assist Control) weaning in CPAP  FiO2 (%): 30 %  Resp Rate (Set): 14 breaths/min  Tidal Volume (Set, mL): 450 mL  PEEP (cm H2O): 5 cmH2O  Resp: 13  - Intubated for airway protection due to encephalopathy  - Titrate O2/peep to maintain sats >92%  - PST/trach dome as tolerated- intensivist following  - Trach/PEG done 4/12     Neuro:    Acute postoperative pain  Hx migraines  Encephalopathy   Did receive CPR on 3/31, will likely have pain from this also  Propofol turned off on 3/31 at 9 am, starting to open eyes more, unable to move extremities but withdraws BLE to pain, nothing in BUEs yet. CT head 3/31 negative for hemorrhage; CTA negative for strokes, vessels patent; per neurology, MRI stable w/o acute concern to explain  clinical status.  EEG without e/o seizure activity, continues to have moderate to severe diffuse encephalopathy    EEG improved but c/f new spikes not c/w seizures but tendency to seize  MRI head and c-spine on 4/3,  negative  - PRN dilaudid available  - PRN oxycodone and methocarbamol available  - Scheduled tylenol   - Neuro critical care consulted and following, greatly appreciate recommendations  - Keppra added   - More eye tracking, yawned a few times, wiggles toes on command starting 4/15, more interactive/smiling with family at bedside on      Renal/Electrolyte:   Lactic acidosis, resolved  Volume overload  Hypernatremia, resolved  Creat stable, below preop weight, much less edema;   Na/K  stable  - Strict I/O, daily weights  - Avoid/limit nephrotoxins as able  - Replete lytes per protocol  - No further diuresis  - Continue FWF decrease to 30 ml q 6 hrs<60 mL q6h     GI/Nutrition:    Epistaxis, resolved  Elevated LFTs, likely shock liver 2/2 arrest, resolved  - Nutrition following  - Rhino rockets removed  - Continue bowel regimen, + BMs  - PPI  -PEG placed, tolerating feeds.      :    - External wick in place for strict I/O  - Urine +, start Bactrim bid for 3 days-- completed, afebrile     Endo:  Stress induced hyperglycemia  Thyroid nodule   Preop A1c 5.7%  - Transitioned to sliding scale insulin   - Goal BG <180 for optimal healing  - Thyroid nodule noted on CT on 3/31, will have this worked up on an outpatient basis     ID:  Stress induced leukocytosis, recurrent  E.Coli UTI preoperatively  WBC 10.3<9.2<10.5<12.3<14.0<15.9<16.0<15.2<15.7<14.7<10.4, Temp (24hrs), Av.6  F (37  C), Min:98.4  F (36.9  C), Max:98.8  F (37.1  C); new leukocytosis, respiratory culture ordered/pan culturing per intensivist   - Completed perioperative ABX, Bactrim for UTI-completed  - Continue to follow fever curve, WBC and inflammatory markers as appropriate  - Repeat UA  without overt e/o  infection     Heme:  Acute blood loss anemia  Acute blood loss thrombocytopenia  Hgb 8.0<8.0<6.9<7.6<7.8<8.3<7.9<7.8<8.1, Plts 350; no s/sx active bleeding  - CBC in AM  - Goal Hgb >7  - Consulted hematology given severe premature CAD and acute graft failure- appreciate recommendations. Multiple labs sent. Hypercoagulable workup appears to be negative but recommending repeat levels once acute issues resolve. Currently recommending antiplatelet and full anticoagulation (therapeutic heparin). D/w Dr. Butterfield. Plan will be straight rate heparin at 500 units/hr transitioned LIH gtt, consider transition to HIH after trach/PEG placed  -Transfused 4/14 for Hgb 6.9. Discussed anticoagulation with Dr. Butterfield, plan for full anticoagulation with Brilinta and coumadin. Started coumadin on 4/15, monitoring for hemoglobin stability. Continue heparin bridging until INR therapeutic     - Proph: PPI, subcutaneous heparin, SCDs     - Dispo: Continue in ICU; Planning for discharging to [facility] on Monday if medically appropriate. SW following and facilitating.            Interval History:   No acute events overnight. Given hydralazine overnight for hypertension. Eyes tracking more today. Wiggles toes on command. Smiling with family yesterday. Tolerated a few hours of trach dome yesterday.         Medications:       acetaminophen  975 mg Oral or Feeding Tube Q6H     amiodarone  200 mg Oral or Feeding Tube Daily     amLODIPine  5 mg Oral or Feeding Tube Daily     atorvastatin  80 mg Oral or Feeding Tube Daily     carvedilol  25 mg Oral or Feeding Tube BID w/meals     ceFAZolin  2 g Intravenous Q8H     chlorhexidine  15 mL Mouth/Throat Q12H     insulin aspart  1-6 Units Subcutaneous Q4H     levETIRAcetam  1,000 mg Intravenous Q12H     pantoprazole  40 mg Oral or NG Tube Daily    Or     pantoprazole  40 mg Oral Daily     sodium chloride (PF)  3 mL Intracatheter Q8H     ticagrelor  90 mg Oral or Feeding Tube BID     @MEDSPRN-@    "       Physical Exam:   Vitals were reviewed  Blood pressure 104/70, pulse 78, temperature 98.4  F (36.9  C), temperature source Axillary, resp. rate 12, height 1.651 m (5' 5\"), weight 94.6 kg (208 lb 8.9 oz), last menstrual period 02/27/2022, SpO2 100 %.  Rhythm: NSR    Lungs: coarse    Cardiovascular: s1/s2, no m/r/g    Abdomen: s/nt/nd    Extremeties: no LE edema    Incision: cdi    CT: na    Weight:   Vitals:    04/13/22 0423 04/14/22 0458 04/15/22 0400 04/16/22 0000   Weight: 92.5 kg (203 lb 14.8 oz) 92.3 kg (203 lb 7.8 oz) 94 kg (207 lb 3.7 oz) 94.5 kg (208 lb 5.4 oz)    04/17/22 0400   Weight: 94.6 kg (208 lb 8.9 oz)            Data:     Last Comprehensive Metabolic Panel:  Sodium   Date Value Ref Range Status   04/13/2022 138 133 - 144 mmol/L Final     Potassium   Date Value Ref Range Status   04/13/2022 4.3 3.4 - 5.3 mmol/L Final     Chloride   Date Value Ref Range Status   04/13/2022 105 94 - 109 mmol/L Final     Carbon Dioxide (CO2)   Date Value Ref Range Status   04/13/2022 27 20 - 32 mmol/L Final     Anion Gap   Date Value Ref Range Status   04/13/2022 6 3 - 14 mmol/L Final     Glucose   Date Value Ref Range Status   04/13/2022 103 (H) 70 - 99 mg/dL Final     GLUCOSE BY METER POCT   Date Value Ref Range Status   04/17/2022 119 (H) 70 - 99 mg/dL Final     Urea Nitrogen   Date Value Ref Range Status   04/13/2022 22 7 - 30 mg/dL Final     Creatinine   Date Value Ref Range Status   04/13/2022 0.98 0.52 - 1.04 mg/dL Final     GFR Estimate   Date Value Ref Range Status   04/13/2022 70 >60 mL/min/1.73m2 Final     Comment:     Effective December 21, 2021 eGFRcr in adults is calculated using the 2021 CKD-EPI creatinine equation which includes age and gender (Ashleigh addison al., NEJM, DOI: 10.1056/TSYSgd1278888)     Calcium   Date Value Ref Range Status   04/13/2022 9.5 8.5 - 10.1 mg/dL Final     Bilirubin Total   Date Value Ref Range Status   04/03/2022 0.2 0.2 - 1.3 mg/dL Final     Alkaline Phosphatase   Date Value " Ref Range Status   04/03/2022 108 40 - 150 U/L Final     ALT   Date Value Ref Range Status   04/03/2022 52 (H) 0 - 50 U/L Final     AST   Date Value Ref Range Status   04/03/2022 26 0 - 45 U/L Final       Lab Results   Component Value Date    WBC 9.2 04/16/2022     Lab Results   Component Value Date    RBC 3.18 04/16/2022     Lab Results   Component Value Date    HGB 8.0 04/16/2022     Lab Results   Component Value Date    HCT 27.3 04/16/2022     Lab Results   Component Value Date    MCV 86 04/16/2022     Lab Results   Component Value Date    MCH 25.2 04/16/2022     Lab Results   Component Value Date    MCHC 29.3 04/16/2022     Lab Results   Component Value Date    RDW 17.8 04/16/2022     Lab Results   Component Value Date     04/16/2022           Suzi Craig PA-C  Pager #: [phone]

## 2022-04-17 NOTE — PROGRESS NOTES
[facility] ICU RESPIRATORY NOTE        Date of Admission: 3/27/2022    Date of Intubation (most recent): 4/12/22 Trached    Reason for Mechanical Ventilation: Airway protection    Number of Days on Mechanical Ventilation: 17    Met Criteria for Spontaneous Breathing Trial: Yes     Significant Events Today: Pressure support 5/5 30% until 2000    ABG Results:   Recent Labs   Lab 04/13/22  1503   O2PER 21       Current Vent Settings: Vent Mode: CMV/AC  (Continuous Mandatory Ventilation/ Assist Control)  FiO2 (%): 30 %  Resp Rate (Set): 14 breaths/min  Tidal Volume (Set, mL): 450 mL  PEEP (cm H2O): 5 cmH2O  Pressure Support (cm H2O): 5 cmH2O  Resp: 10      Skin Assessment: intact, oozing secretions from stoma    Plan: Pressure support trial during the day and full support overnight    Stephanie Griffiths

## 2022-04-17 NOTE — PROGRESS NOTES
Critical Care Progress Note      04/17/2022    Name: Angelica Robledo MRN#: [id number]   Age: 51 year old YOB: 1970     [facility] Day# 21               Problem List:   Active Problems:    NSTEMI (non-ST elevated myocardial infarction) (H)    Hypertensive emergency    Paroxysmal ventricular tachycardia (H)           Summary/Hospital Course:   Angelica oRbledo is a 51 year old female with pertinent PMHx of prior MI at age 24-25 (in 1996) s/p angioplasty, HTN, anemia, and NSTEMI.  She presented on 3/27 with new onset chest pain similar to her previous MI.  Workup was consistent with NSTEMI.  Cardiac cath demonstrated severe multivessel CAD.  She was seen by CT surgery and CABG was scheduled for 3/30.  She underwent CABG x4 left radial, LIMA, left saph vein x2 (FAUSTINO taken down, but elected to not be used due to size).    No family history of clotting disorders.  No known history of DVT/PE/stroke. Siblings without cardiovascular disease. Father passed away of cardiac causes at an advanced age.  Mother had cardiac disease as well as an aortic aneurysm, but not at a young age.     4V CABG as follows: left radial artery graft to the right posterior descending coronary artery, reversed saphenous vein graft to the obtuse marginal branch of the left circumflex coronary artery, reversed saphenous vein graft to the first diagonal branch of the left anterior descending coronary artery, and pedicled left internal mammary artery to left anterior descending coronary.    POD 0 - initial hypoxia resolved with vent changes.  At 2355 patient suffered a PEA and Vfib arrest and underwent 3 shocks, 2 rounds of epi, and 1 round of CPR. ROSC obtained at 0005.  300 mg amiodarone bolus administered.  STAT cardiac cath lab performed demonstrating LIMA-LAD, VG-LADD1, and VG-LPLB were patent. The left radial artery to RDPA graft had an occlusion.  The mid RCA was treated with a DANA stent and a balloon angioplasty performed at the [facility].   Excellent flow was noted on completion coronary angiogram.  Echo demonstrated a drop in LVEF to 40-45% with aspects of hypokinesis. RV demonstrated mild-moderate RV function reduction.    POD1 - weaned from sedation with minimal arousal limited to briefly opening eyes to pain and occasionally sound, minimal extremity pain response.  CT head/CTA head and neck unremarkable for acute insult.  Ceribell placed overnight.    PD2-5: MRI x2 obtained demonstrating punctate foci of early subacute ischemic change. EEG waxes and wanes from mod-severe to severe diffuse slowing. With mixed workup results, neither of these findings portent a poor prognosis specifically; however, prognosis remains unclear.    POD6-current: Gradual clinical exam improvement. 4/7 MRI without new findings from previous. Now doing some time on trach dome.    4/12: Trach and PEG performed    4/17: No new issues overnight. Opens eyes to voice but doesn't interact with me this morning although has been more interactive with nursing staff and family        Assessment and plan :     Angelica Robledo IS a 51 year old female admitted on 3/27/2022 for chest pain with workup demonstrating severe multivessel CAD.  Patient underwent CABG x4 on 3/30.  POD#0 patient had a PEA arrest with ROSC obtained at 10 minutes.  Emergent coronary cath demonstrated radial artery occlusion.  A DANA was placed into the RCA and balloon angioplasty of the RPLB was performed with excellent completion angiography flow. Trach and PEG 4/12.     I have personally reviewed the daily labs, imaging studies, cultures and discussed the case with referring physician and consulting physicians.     My assessment and plan by system for this patient is as follows:    Neurology/Psychiatry:   1.. Encephalopathy s/p cardiac arrest - likely anoxic injury -- head CT negative -- MRI w/ punctate foci of subacute ischemic change -- MRI C spine negative -- EEG: diffuse slowing with gradual improvement,  moderate-severe encephalpathy, no indices of seizure activity however is at risk. Mental status appears to be slowly improving  -- Keppra for seizure ppx (no seizures per EEG)  -- Minimize CNS clouding medications     Cardiovascular:   1.  History of premature coronary artery disease with first MI at age 26. S/p CABG x4 - left radial, LIMA, left saph vein x2 (FAUSTINO taken down, but elected to not be used due to size)   2. POD#0 PEA and Vfib arrest -- s/p cardiac cath demonstrated radial artery graft to RPDA occlusion -- intervention with DANA to RCA and balloon angioplasty RPLA -- on Kangrelor 3/31, now transitioned to aspirin and Brilinta with discussion per CV surgery and interventional cardiology  -- cards recommends Amiodarone taper -- PO regimen scheduled  -- Brilinta needs to be continued for a minimum of 1 month w/ DANA -- per CTS/cards management  3. HTN - likely untreated prior to hospitalization - presented with hypertension emergency - PO regimen per CVS     Pulmonary/Ventilator Management/ID:   1. Airway -- trach present and now doing some time on trach dome alternating with PS  2. MSSA PNEUMONIA. Will complete a 7 day course of Ancef on 4/20.  3. UTI with E. Coli. Had 3 day course of Bactrim.   --alternate PS/trach dome. Ideally would have her off CMV completely in the next few days     GI and Nutrition :   1. Epistaxis - s/p rhino rocket removal - oozing has resolved  2. Nutrition - PEG tube present - continue to goal  3. PPI ppx    Renal/Fluids/Electrolytes:   1. Monitor UOP/creatinine -- stable labs   2. Replete electrolytes PRN per protocol  3. External wick device     Endocrine:   1. Monitor for stress induced hyperglycemia. ICU insulin protocol, goal sugar <180   2. Incidental thyroid nodule -- follow-up outpatient per discharge referral     Hematology/Oncology:   1. Acute blood loss on chronic anemia -- stable, trend  2. Acute blood loss thrombocytopenia -- resolved, trend.  3. S/P DANA placement s/p  cardiac arrest -- aspirin and brilinta  4. Rule out hypercoagulable state -- workup negative; however, hem/onc recommends full dose anticoagulation in setting of thrombus of radial graft -- discussed with CV: now at high dose heparin gtt following titration up.   -- CTS plan is to bridge to coumadin, continue brilinta, discontinue ASA. First dose of Warfarin was on 4/15.    IV/Access:    1. Venous access - peripheral  2. Arterial access - NA    ICU Prophylaxis:   1. DVT: hep gtt/mechanical  2. VAP: HOB 30 degrees, chlorhexidine rinse  3. Stress Ulcer: PPI  4. Restraints: No current indication. Will readdress daily.   5. Wound care - per unit routine   6. Feeding - enteral feeds  7. Family Update: bedside 4/16 and 4/17  8. Disposition - ICU     Tentative plan: LTACH in [place] tomorrow      Key goals for next 24 hours:   PS/trach dome, up in chair         Interim History:     No significant events         Key Medications:       acetaminophen  975 mg Oral or Feeding Tube Q6H     amiodarone  200 mg Oral or Feeding Tube Daily     [START ON 4/18/2022] amLODIPine  10 mg Oral or Feeding Tube Daily     atorvastatin  80 mg Oral or Feeding Tube Daily     carvedilol  25 mg Oral or Feeding Tube BID w/meals     ceFAZolin  2 g Intravenous Q8H     chlorhexidine  15 mL Mouth/Throat Q12H     insulin aspart  1-6 Units Subcutaneous Q4H     levETIRAcetam  1,000 mg Oral or Feeding Tube BID     pantoprazole  40 mg Oral or NG Tube Daily    Or     pantoprazole  40 mg Oral Daily     sodium chloride (PF)  3 mL Intracatheter Q8H     ticagrelor  90 mg Oral or Feeding Tube BID     warfarin ANTICOAGULANT  2.5 mg Oral ONCE at 18:00       heparin 1,350 Units/hr (04/17/22 0400)     - MEDICATION INSTRUCTIONS -       Percutaneous Coronary Intervention orders placed (this is information for BPA alerting)       sodium chloride 10 mL/hr at 04/14/22 1602     Warfarin Therapy Reminder                 Physical Examination:   Temp:  [98.4  F (36.9  C)-99  F  (37.2  C)] 98.6  F (37  C)  Pulse:  [66-83] 83  Resp:  [0-20] 14  BP: (100-172)/() 135/96  FiO2 (%):  [30 %-40 %] (P) 30 %  SpO2:  [96 %-100 %] 100 %      Intake/Output Summary (Last 24 hours) at 4/17/2022 1323  Last data filed at 4/17/2022 1200  Gross per 24 hour   Intake 2264 ml   Output 2600 ml   Net -336 ml         Wt Readings from Last 4 Encounters:   04/17/22 94.6 kg (208 lb 8.9 oz)     BP - Mean:  [] 114  Vent Mode: CMV/AC  (Continuous Mandatory Ventilation/ Assist Control)  FiO2 (%): 30 %  Resp Rate (Set): 14 breaths/min  Tidal Volume (Set, mL): 450 mL  PEEP (cm H2O): 5 cmH2O  Pressure Support (cm H2O): 5 cmH2O  Resp: 14    Recent Labs   Lab 04/13/22  1503   O2PER 21       GEN: no acute distress   HEENT: head ncat, sclera anicteric, trach present, CDI   PULM: unlabored, clear   CV/Chest: RRR. Midline incision CDI.   ABD: soft, nontender. Gastric feeding tube present. Site clean  EXT:  No significant edema,  warm  NEURO: Opens spontaneously, to stimulus, and verbal stimulus. Doesn't follow commands for me today. right sided gaze preference  SKIN: no obvious rash, healing incisions         Data:   All data and imaging reviewed     ROUTINE ICU LABS (Last four results)  CMP  Recent Labs   Lab 04/17/22  1206 04/17/22  0754 04/17/22  0439 04/16/22  2347 04/15/22  0836 04/15/22  0532 04/14/22  0757 04/14/22  0532 04/13/22  1203 04/13/22  1103 04/13/22  0407 04/13/22  0200 04/12/22  0819 04/12/22  0429 04/11/22  0752 04/11/22  0551   NA  --   --   --   --   --   --   --   --   --   --   --  138  --   --   --  136   POTASSIUM  --   --   --   --   --   --   --   --   --   --   --  4.3  --  4.6  --  4.6   CHLORIDE  --   --   --   --   --   --   --   --   --   --   --  105  --   --   --  105   CO2  --   --   --   --   --   --   --   --   --   --   --  27  --   --   --  23   ANIONGAP  --   --   --   --   --   --   --   --   --   --   --  6  --   --   --  8   * 119* 123* 139*   < >  --    < >  --    < >   --    < > 103*   < >  --    < > 136*   BUN  --   --   --   --   --   --   --   --   --   --   --  22  --   --   --  23   CR  --   --   --   --   --   --   --   --   --   --   --  0.98  --   --   --  0.96   GFRESTIMATED  --   --   --   --   --   --   --   --   --   --   --  70  --   --   --  72   TOMAS  --   --   --   --   --   --   --   --   --   --   --  9.5  --   --   --  9.1   MAG  --   --   --   --   --  2.1  --  2.3  --  2.5*  --   --   --  2.5*  --  2.5*   PHOS  --   --   --   --   --  3.8  --  4.8*  --  5.3*  --   --   --  4.7*  --  4.6*    < > = values in this interval not displayed.     CBC  Recent Labs   Lab 04/17/22 0537 04/16/22  0511 04/15/22  0532 04/14/22  1421 04/14/22  0532   WBC 10.3 9.2 10.5  --  12.3*   RBC 3.67* 3.18* 3.22*  --  2.84*   HGB 9.3* 8.0* 8.0* 8.1* 6.9*   HCT 30.7* 27.3* 26.6*  --  23.1*   MCV 84 86 83  --  81   MCH 25.3* 25.2* 24.8*  --  24.3*   MCHC 30.3* 29.3* 30.1*  --  29.9*   RDW 17.9* 17.8* 17.5*  --  18.4*    353 336  --  322     INR  Recent Labs   Lab 04/17/22 0537 04/16/22  0511 04/15/22  0532   INR 1.85* 1.23* 1.22*     Arterial Blood Gas  Recent Labs   Lab 04/13/22  1503   O2PER 21       All cultures:  No results for input(s): CULT in the last 168 hours.  No results found for this or any previous visit (from the past 24 hour(s)).          Melania Bland MD

## 2022-04-17 NOTE — PROGRESS NOTES
[facility] ICU RESPIRATORY NOTE      Date of Admission: 3/27/2022     Date of Intubation (most recent): 4/12/22 Trached     Reason for Mechanical Ventilation: Airway protection      Number of Days on Mechanical Ventilation: 16     Met Criteria for Spontaneous Breathing Trial: Yes PS 5/5 30% for about 6 hours and trach dome for 40 minutes. Pt had increased work breathing and was placed back on full ventilator support.      ABG Results:   Recent Labs   Lab 04/13/22  1503   O2PER 21       Current Vent Settings: Vent Mode: (S) CMV/AC  (Continuous Mandatory Ventilation/ Assist Control)  FiO2 (%): 40 %  Resp Rate (Set): 14 breaths/min  Tidal Volume (Set, mL): 450 mL  PEEP (cm H2O): 5 cmH2O  Pressure Support (cm H2O): 5 cmH2O  Resp: 10        Plan: trach site is clean/intact.     Chris Gutierrez, RT  4/17/2022

## 2022-04-17 NOTE — PLAN OF CARE
Neuro: PERRL, withdraws in all extremities. Flexion of lower extremities to command. Interacting with family/smiling.  CV: SR, BP stable  Resp: Trach site with oozing secretions, gauze changed multiple times  GI/: PEG tube with TF at goal. Purewick. multiple loose BMs  Skin: elvin area reddened, cream applied. Trach site erythema.   Pain/Gtts: PRN oxycodone and scheduled tylenol.  Family: updated  POC: up to chair for a few hours, tried trach dome but did not tolerate long. PS most of day.

## 2022-04-18 VITALS
WEIGHT: 206.35 LBS | SYSTOLIC BLOOD PRESSURE: 124 MMHG | RESPIRATION RATE: 11 BRPM | BODY MASS INDEX: 34.38 KG/M2 | HEIGHT: 65 IN | TEMPERATURE: 98.8 F | DIASTOLIC BLOOD PRESSURE: 111 MMHG | OXYGEN SATURATION: 100 % | HEART RATE: 73 BPM

## 2022-04-18 LAB
ANION GAP SERPL CALCULATED.3IONS-SCNC: 6 MMOL/L (ref 3–14)
BUN SERPL-MCNC: 19 MG/DL (ref 7–30)
CALCIUM SERPL-MCNC: 9.5 MG/DL (ref 8.5–10.1)
CHLORIDE BLD-SCNC: 103 MMOL/L (ref 94–109)
CO2 SERPL-SCNC: 28 MMOL/L (ref 20–32)
CREAT SERPL-MCNC: 0.69 MG/DL (ref 0.52–1.04)
ERYTHROCYTE [DISTWIDTH] IN BLOOD BY AUTOMATED COUNT: 18 % (ref 10–15)
GFR SERPL CREATININE-BSD FRML MDRD: >90 ML/MIN/1.73M2
GLUCOSE BLD-MCNC: 164 MG/DL (ref 70–99)
GLUCOSE BLDC GLUCOMTR-MCNC: 143 MG/DL (ref 70–99)
GLUCOSE BLDC GLUCOMTR-MCNC: 163 MG/DL (ref 70–99)
HCT VFR BLD AUTO: 28.4 % (ref 35–47)
HGB BLD-MCNC: 8.5 G/DL (ref 11.7–15.7)
INR PPP: 2.45 (ref 0.85–1.15)
MCH RBC QN AUTO: 25 PG (ref 26.5–33)
MCHC RBC AUTO-ENTMCNC: 29.9 G/DL (ref 31.5–36.5)
MCV RBC AUTO: 84 FL (ref 78–100)
PLATELET # BLD AUTO: 339 10E3/UL (ref 150–450)
POTASSIUM BLD-SCNC: 4.4 MMOL/L (ref 3.4–5.3)
RBC # BLD AUTO: 3.4 10E6/UL (ref 3.8–5.2)
SODIUM SERPL-SCNC: 137 MMOL/L (ref 133–144)
UFH PPP CHRO-ACNC: <0.1 IU/ML
WBC # BLD AUTO: 8.2 10E3/UL (ref 4–11)

## 2022-04-18 PROCEDURE — 250N000013 HC RX MED GY IP 250 OP 250 PS 637: Performed by: PHYSICIAN ASSISTANT

## 2022-04-18 PROCEDURE — 85520 HEPARIN ASSAY: CPT | Performed by: INTERNAL MEDICINE

## 2022-04-18 PROCEDURE — 99291 CRITICAL CARE FIRST HOUR: CPT | Mod: 24 | Performed by: INTERNAL MEDICINE

## 2022-04-18 PROCEDURE — 999N000253 HC STATISTIC WEANING TRIALS

## 2022-04-18 PROCEDURE — 36415 COLL VENOUS BLD VENIPUNCTURE: CPT | Performed by: PHYSICIAN ASSISTANT

## 2022-04-18 PROCEDURE — 82310 ASSAY OF CALCIUM: CPT | Performed by: PHYSICIAN ASSISTANT

## 2022-04-18 PROCEDURE — 82374 ASSAY BLOOD CARBON DIOXIDE: CPT | Performed by: PHYSICIAN ASSISTANT

## 2022-04-18 PROCEDURE — 85027 COMPLETE CBC AUTOMATED: CPT | Performed by: PHYSICIAN ASSISTANT

## 2022-04-18 PROCEDURE — 94003 VENT MGMT INPAT SUBQ DAY: CPT

## 2022-04-18 PROCEDURE — 999N000157 HC STATISTIC RCP TIME EA 10 MIN

## 2022-04-18 PROCEDURE — 85610 PROTHROMBIN TIME: CPT | Performed by: PHYSICIAN ASSISTANT

## 2022-04-18 PROCEDURE — 250N000011 HC RX IP 250 OP 636

## 2022-04-18 RX ORDER — LISINOPRIL 2.5 MG/1
2.5 TABLET ORAL DAILY
Qty: 30 TABLET | Refills: 3 | DISCHARGE
Start: 2022-04-18

## 2022-04-18 RX ORDER — ONDANSETRON 4 MG/1
4 TABLET, ORALLY DISINTEGRATING ORAL EVERY 6 HOURS PRN
DISCHARGE
Start: 2022-04-18

## 2022-04-18 RX ORDER — CARVEDILOL 25 MG/1
25 TABLET ORAL 2 TIMES DAILY WITH MEALS
Qty: 60 TABLET | Refills: 3 | DISCHARGE
Start: 2022-04-18

## 2022-04-18 RX ORDER — LEVETIRACETAM 1000 MG/1
1000 TABLET ORAL 2 TIMES DAILY
Qty: 60 TABLET | Refills: 3 | DISCHARGE
Start: 2022-04-18

## 2022-04-18 RX ORDER — CARBOXYMETHYLCELLULOSE SODIUM 5 MG/ML
1 SOLUTION/ DROPS OPHTHALMIC
DISCHARGE
Start: 2022-04-18

## 2022-04-18 RX ORDER — POLYETHYLENE GLYCOL 3350 17 G/17G
17 POWDER, FOR SOLUTION ORAL DAILY
Qty: 510 G | DISCHARGE
Start: 2022-04-18

## 2022-04-18 RX ORDER — AMIODARONE HYDROCHLORIDE 200 MG/1
200 TABLET ORAL DAILY
Qty: 20 TABLET | Refills: 0 | DISCHARGE
Start: 2022-04-18 | End: 2022-05-08

## 2022-04-18 RX ORDER — AMLODIPINE BESYLATE 10 MG/1
10 TABLET ORAL DAILY
Qty: 30 TABLET | Refills: 3 | DISCHARGE
Start: 2022-04-18

## 2022-04-18 RX ORDER — BISACODYL 10 MG
10 SUPPOSITORY, RECTAL RECTAL DAILY PRN
DISCHARGE
Start: 2022-04-18

## 2022-04-18 RX ORDER — CEFAZOLIN SODIUM 2 G/100ML
2 INJECTION, SOLUTION INTRAVENOUS EVERY 8 HOURS
DISCHARGE
Start: 2022-04-18 | End: 2022-04-21

## 2022-04-18 RX ORDER — OXYCODONE HYDROCHLORIDE 5 MG/1
2.5 TABLET ORAL EVERY 4 HOURS PRN
Qty: 20 TABLET | Refills: 0 | Status: SHIPPED | DISCHARGE
Start: 2022-04-18

## 2022-04-18 RX ORDER — CHLORHEXIDINE GLUCONATE ORAL RINSE 1.2 MG/ML
15 SOLUTION DENTAL EVERY 12 HOURS
DISCHARGE
Start: 2022-04-18

## 2022-04-18 RX ORDER — METHOCARBAMOL 750 MG/1
750 TABLET, FILM COATED ORAL EVERY 6 HOURS PRN
Qty: 40 TABLET | Refills: 0 | DISCHARGE
Start: 2022-04-18

## 2022-04-18 RX ORDER — ATORVASTATIN CALCIUM 80 MG/1
80 TABLET, FILM COATED ORAL DAILY
Qty: 90 TABLET | Refills: 3 | DISCHARGE
Start: 2022-04-18

## 2022-04-18 RX ORDER — ACETAMINOPHEN 160 MG
TABLET,DISINTEGRATING ORAL EVERY 8 HOURS PRN
DISCHARGE
Start: 2022-04-18

## 2022-04-18 RX ORDER — IPRATROPIUM BROMIDE AND ALBUTEROL SULFATE 2.5; .5 MG/3ML; MG/3ML
3 SOLUTION RESPIRATORY (INHALATION) EVERY 4 HOURS PRN
Qty: 90 ML | DISCHARGE
Start: 2022-04-18

## 2022-04-18 RX ORDER — WARFARIN SODIUM 1 MG/1
1.5 TABLET ORAL DAILY
DISCHARGE
Start: 2022-04-18

## 2022-04-18 RX ORDER — AMOXICILLIN 250 MG
1 CAPSULE ORAL
DISCHARGE
Start: 2022-04-18

## 2022-04-18 RX ORDER — ACETAMINOPHEN 325 MG/1
650 TABLET ORAL EVERY 4 HOURS PRN
Qty: 100 TABLET | Refills: 0 | DISCHARGE
Start: 2022-04-18

## 2022-04-18 RX ADMIN — ATORVASTATIN CALCIUM 80 MG: 40 TABLET, FILM COATED ORAL at 08:51

## 2022-04-18 RX ADMIN — CEFAZOLIN SODIUM 2 G: 2 INJECTION, SOLUTION INTRAVENOUS at 04:16

## 2022-04-18 RX ADMIN — INSULIN ASPART 1 UNITS: 100 INJECTION, SOLUTION INTRAVENOUS; SUBCUTANEOUS at 09:09

## 2022-04-18 RX ADMIN — LEVETIRACETAM 1000 MG: 500 TABLET, FILM COATED ORAL at 04:16

## 2022-04-18 RX ADMIN — INSULIN ASPART 1 UNITS: 100 INJECTION, SOLUTION INTRAVENOUS; SUBCUTANEOUS at 04:41

## 2022-04-18 RX ADMIN — AMIODARONE HYDROCHLORIDE 200 MG: 200 TABLET ORAL at 08:51

## 2022-04-18 RX ADMIN — ACETAMINOPHEN 975 MG: 325 TABLET, FILM COATED ORAL at 04:16

## 2022-04-18 RX ADMIN — TICAGRELOR 90 MG: 90 TABLET ORAL at 04:16

## 2022-04-18 RX ADMIN — CARVEDILOL 25 MG: 25 TABLET, FILM COATED ORAL at 08:51

## 2022-04-18 RX ADMIN — ACETAMINOPHEN 975 MG: 325 TABLET, FILM COATED ORAL at 09:09

## 2022-04-18 RX ADMIN — AMLODIPINE BESYLATE 10 MG: 10 TABLET ORAL at 08:51

## 2022-04-18 RX ADMIN — CHLORHEXIDINE GLUCONATE 15 ML: 1.2 RINSE ORAL at 09:36

## 2022-04-18 RX ADMIN — PANTOPRAZOLE SODIUM 40 MG: 40 TABLET, DELAYED RELEASE ORAL at 08:51

## 2022-04-18 ASSESSMENT — ACTIVITIES OF DAILY LIVING (ADL)
ADLS_ACUITY_SCORE: 15

## 2022-04-18 NOTE — PLAN OF CARE
Shift Summary    No changes occurring overnight. Vital signs within parameters. Cardiac monitor showing sinus rhythm with no episodes. Remained on vent settings overnight. Tolerating tube feedings with no residuals. Appearing to rest comfortably between rounds. Plan for discharge to LTACH today.

## 2022-04-18 NOTE — PROGRESS NOTES
Care Management Discharge Note    Discharge Date: 04/18/2022       Discharge Disposition: LTACH    Discharge Services: Transportation Services    Discharge DME: Oxygen, Other (see comment)    Discharge Transportation: health plan transportation (ACLS possible air ambulance or ground t/b/d)    Private pay costs discussed: transportation costs and insurance costs out of pocket expenses    PAS Confirmation Code:    Patient/family educated on Medicare website which has current facility and service quality ratings:      Education Provided on the Discharge Plan:    Persons Notified of Discharge Plans: Pt/bedside RN  Patient/Family in Agreement with the Plan: yes    Handoff Referral Completed: No    Additional Information:  Planned discharge to [facility] in [place], [place] to be closer to family.   [facility] liaison updated and faxing orders and discharge summary.  Packet and CD sent with patient/EMS crew.    MD-MD report to be called to [facility]- Dr. Zhu [phone]  Nurse to nurse report to be called to [phone]  ICU MD/Nurse updated.     Patient going to room 204, 2nd floor ( [phone]) [facility] ([facility]) updated of room number and emergency numbers.      updated and planned to follow in private car.     Updated [facility] care coordinator at [phone] (ext [phone]) that patient was transporting to [facility] in [place].  Numbers given for new facility.           Yessica Perez, RN

## 2022-04-18 NOTE — DISCHARGE SUMMARY
"Discharge Summary    Angelica Robledo MRN# [id number]   YOB: 1970 Age: 51 year old     Date of Admission:  3/27/2022  Date of Discharge:  4/18/2022  Admitting Physician:  Mark Butterfield MD  Discharge Physician:  Mark Butterfield MD  Discharging Service:  Cardiovascular and Thoracic Surgery     Home clinic: n/a  Primary Provider: No Ref-Primary, Physician          Admission Diagnoses:   NSTEMI (non-ST elevated myocardial infarction) (H) [I21.4]  Hypertensive emergency [I16.1]  Paroxysmal ventricular tachycardia (H) [I47.2]          Discharge Diagnosis:   Patient Active Problem List   Diagnosis     NSTEMI (non-ST elevated myocardial infarction) (H)     Hypertensive emergency     Paroxysmal ventricular tachycardia (H)                Discharge Disposition:   Discharged to long-term care facility           Condition on Discharge:   Discharge condition: Stable   Discharge vitals: Blood pressure 118/81, pulse 64, temperature 98.8  F (37.1  C), temperature source Axillary, resp. rate 9, height 1.651 m (5' 5\"), weight 93.6 kg (206 lb 5.6 oz), last menstrual period 02/27/2022, SpO2 100 %.     Code status on discharge: Full Code           Procedures:   On 3/30, Angelica Robledo underwent Coronary artery bypass grafting x 4 (radial artery graft to the right posterior descending coronary artery, reversed saphenous vein graft to the obtuse marginal branch of the left circumflex coronary artery, reversed saphenous vein graft to the first diagonal branch of the left anterior descending coronary artery, and pedicled left internal mammary artery to left anterior descending coronary artery).Endoscopic vein harvest of the greater saphenous vein from the left lower extremity.Endoscopic left radial artery harvest by Dr. Mark Butterfield.    On 3/31, Angelica Robledo underwent urgent coronary angiogram and found to have acute graft occlusion of radial artery, native right coronary artery PCI with Synergy DANA. "     On 4/12/22, Angelica Robledo underwent tracheostomy and PEG placement          Medications Prior to Admission:     Medications Prior to Admission   Medication Sig Dispense Refill Last Dose     [DISCONTINUED] aspirin-acetaminophen-caffeine (EXCEDRIN MIGRAINE) 250-250-65 MG tablet Take 1 tablet by mouth every 6 hours as needed for headaches   3/26/2022 at Unknown time     [DISCONTINUED] Multiple Vitamin (MULTIVITAMIN ADULT PO)    Past Week at Unknown time             Discharge Medications:     Current Discharge Medication List      START taking these medications    Details   acetaminophen (TYLENOL) 325 MG tablet Take 2 tablets (650 mg) by mouth every 4 hours as needed for mild pain  Qty: 100 tablet, Refills: 0    Associated Diagnoses: S/P CABG x 4      amiodarone (PACERONE) 200 MG tablet 1 tablet (200 mg) by Oral or Feeding Tube route daily for 20 days  Qty: 20 tablet, Refills: 0    Associated Diagnoses: S/P CABG x 4      amLODIPine (NORVASC) 10 MG tablet 1 tablet (10 mg) by Oral or Feeding Tube route daily  Qty: 30 tablet, Refills: 3    Associated Diagnoses: S/P CABG x 4      aspirin (ASA) 81 MG EC tablet Take 1 tablet (81 mg) by mouth daily Start tomorrow.  Qty: 30 tablet, Refills: 3    Associated Diagnoses: NSTEMI (non-ST elevated myocardial infarction) (H); S/P CABG x 4      atorvastatin (LIPITOR) 80 MG tablet Take 1 tablet (80 mg) by mouth daily  Qty: 90 tablet, Refills: 3    Associated Diagnoses: NSTEMI (non-ST elevated myocardial infarction) (H); S/P CABG x 4      bisacodyl (DULCOLAX) 10 MG suppository Place 1 suppository (10 mg) rectally daily as needed for constipation (Use if Magnesium hydroxide (MILK of MAGNESIA) not effective after 24 hours. May discontinue if patient having bowel movement.)    Associated Diagnoses: S/P CABG x 4      carboxymethylcellulose PF (REFRESH PLUS) 0.5 % ophthalmic solution Place 1 drop into both eyes every hour as needed for dry eyes    Associated Diagnoses: S/P CABG x 4       carvedilol (COREG) 25 MG tablet 1 tablet (25 mg) by Oral or Feeding Tube route 2 times daily (with meals)  Qty: 60 tablet, Refills: 3    Associated Diagnoses: S/P CABG x 4      ceFAZolin (ANCEF) intermittent infusion 2 g in 100 mL dextrose PRE-MIX Inject 100 mLs (2 g) into the vein every 8 hours for 3 days    Associated Diagnoses: S/P CABG x 4      chlorhexidine (PERIDEX) 0.12 % solution Take 15 mLs by mouth every 12 hours    Associated Diagnoses: S/P CABG x 4      hydrogen peroxide 3 % solution Apply topically every 8 hours as needed for wound care    Associated Diagnoses: S/P CABG x 4      insulin aspart (NOVOLOG PEN) 100 UNIT/ML pen Inject 1-6 Units Subcutaneous every 4 hours  Qty: 15 mL    Associated Diagnoses: S/P CABG x 4      ipratropium - albuterol 0.5 mg/2.5 mg/3 mL (DUONEB) 0.5-2.5 (3) MG/3ML neb solution Take 1 vial (3 mLs) by nebulization every 4 hours as needed for shortness of breath / dyspnea  Qty: 90 mL    Associated Diagnoses: S/P CABG x 4      levETIRAcetam (KEPPRA) 1000 MG tablet 1 tablet (1,000 mg) by Oral or Feeding Tube route 2 times daily  Qty: 60 tablet, Refills: 3    Associated Diagnoses: S/P CABG x 4      lisinopril (ZESTRIL) 2.5 MG tablet Take 1 tablet (2.5 mg) by mouth daily  Qty: 30 tablet, Refills: 3    Associated Diagnoses: S/P CABG x 4      magnesium hydroxide (MILK OF MAGNESIA) 400 MG/5ML suspension Take 30 mLs by mouth daily as needed for constipation (Use if preventive measures (senna-docusate, docusate, and polyethylene glycol) are not effective.)    Associated Diagnoses: S/P CABG x 4      melatonin 1 MG TABS tablet Take 1 tablet (1 mg) by mouth nightly as needed for sleep    Associated Diagnoses: S/P CABG x 4      methocarbamol (ROBAXIN) 750 MG tablet Take 1 tablet (750 mg) by mouth every 6 hours as needed for muscle spasms  Qty: 40 tablet, Refills: 0    Associated Diagnoses: S/P CABG x 4      ondansetron (ZOFRAN-ODT) 4 MG ODT tab Take 1 tablet (4 mg) by mouth every 6 hours as  needed for nausea or vomiting    Associated Diagnoses: S/P CABG x 4      oxyCODONE (ROXICODONE) 5 MG tablet Take 0.5 tablets (2.5 mg) by mouth every 4 hours as needed for breakthrough pain  Qty: 20 tablet, Refills: 0    Associated Diagnoses: S/P CABG x 4      pantoprazole (PROTONIX) 2 mg/mL SUSP suspension 20 mLs (40 mg) by Oral or NG Tube route daily    Associated Diagnoses: S/P CABG x 4      polyethylene glycol (MIRALAX) 17 GM/Dose powder Take 17 g by mouth daily  Qty: 510 g    Associated Diagnoses: S/P CABG x 4      senna-docusate (SENOKOT-S/PERICOLACE) 8.6-50 MG tablet Take 1 tablet by mouth nightly as needed for constipation    Associated Diagnoses: S/P CABG x 4      ticagrelor (BRILINTA) 90 MG tablet 1 tablet (90 mg) by Oral or Feeding Tube route 2 times daily  Qty: 60 tablet, Refills: 3    Associated Diagnoses: S/P CABG x 4      warfarin ANTICOAGULANT (COUMADIN) 1 MG tablet Take 1.5 tablets (1.5 mg) by mouth daily    Comments: Continue to check INR daily and adjust coumadin as needed to maintain INR 2-3  Associated Diagnoses: S/P CABG x 4         STOP taking these medications       aspirin-acetaminophen-caffeine (EXCEDRIN MIGRAINE) 250-250-65 MG tablet Comments:   Reason for Stopping:         Multiple Vitamin (MULTIVITAMIN ADULT PO) Comments:   Reason for Stopping:                     Consultations:   Nutrition, Intensivist, Neurology, cardiology, thoracic surgery, general surgery, hematology/oncology.              Brief History of Illness:   Angelica Robledo is a 51 year old female with history of MI at age 25 with angioplasty who presents with a non-ST elevation myocardial infarction. Coronary angiography demonstrates severe multivessel coronary artery disease. Echocardiography demonstrates preserved left ventricular function with severe apical hypokinesis and no significant valvular abnormality. The patient understands the risks and benefits of the procedure and wishes to undergo the operation.           Hospital Course:   On 3/30, Angelica Robledo underwent Coronary artery bypass grafting x 4 (radial artery graft to the right posterior descending coronary artery, reversed saphenous vein graft to the obtuse marginal branch of the left circumflex coronary artery, reversed saphenous vein graft to the first diagonal branch of the left anterior descending coronary artery, and pedicled left internal mammary artery to left anterior descending coronary artery).Endoscopic vein harvest of the greater saphenous vein from the left lower extremity.Endoscopic left radial artery harvest by Dr. Mark Butterfield.    On POD#0 she went into PEA arrest with ventricular fibrillation and achieved ROSC after defibrillation x3, 2mg epinephrine and one round of CPR. She went urgently to coronary angiogram and found to have acute graft occlusion of radial artery, native right coronary artery PCI with Synergy DANA to mid RCA and angioplasty of rPLB. Echocardiogram at the conclusion of the PCI demonstrated LVEF 40-45% with mild to moderate global hypokinesis of the left ventricle with severe hypokinesis of mid anterior and anteroseptal walls, entire inferior wall and all apical segments of the left ventricle; no pericardial effusion. Recommend dual antiplatelet therapy for 12 months. Aspirin discontinued due to the need for full anticoagulation with coumadin for acute thrombosis. Anticoagulation regimen will be Brilinta and coumadin with INR goal 2-3.    She developed epistaxis during attempted NG placement requiring placement of bilateral rhino rockets. Bleeding has resolved prior to discharge.    Following her arrest she had minimal neurologic response. Neurology was consulted. She was evaluated with brain MRI and continuous EEG. No evidence of extensive anoxic injury on MRI. EEG with severe encephalopathy with generalized spike/wave complexes, without seizures but suggestive of tendency to seize, started on Keppra for seizure prophylaxis 1g  BID. She has had some neurologic improvement prior to discharge, will meaningfully wiggle her toes on command and will smile and cry with family at bedside. Neurologic prognosis unclear from mixed work up results, neurology final recommendations are to give Angelica time (weeks to months) to improve from a cognitive/functional standpoint.     She was evaluated by hematology/oncology due to acute thrombosis. Hypercoag workup was completed and was negative. Hematology recommended full anticoagulation with coumadin for thrombosis. She was initially treated with heparin infusion while waiting for trach/peg. Upon completion of trach/peg she was transitioned to coumadin with goal INR 2-3. At discharge she is therapeutic with INR 2.45 and heparin gtt discontinued. Will continue to need daily INR checks with adjustment of coumadin as needed.    She had trach/peg completed as discussed above. She has been tolerating pressure support and a few hours of trach dome. Her tube feeds were also increased to goal prior to discharge and tolerating well.    She had transient hyperglycemia post operatively and was treated with insulin infusion per protocol and transitioned to sliding scale insulin with goal to maintain BG<180 for optimal healing.     Other events to note is that she completed a 3 day course of bactrim for E.colil UTI and she was found to also have MSSA pneumonia for which she was started on 7 day course of Ancef to be completed on 4/20.     Her chest tubes and temporary pacing wires were removed when appropriate. She has CAD with recent MI and per guidelines will discharge on BB, statin and ACEI. Will require brilinta for 12 months for DANA. Blood pressure to be monitored at [facility] and doses adjusted as needed for HTN. She will not discharge on aspirin due to need for coumadin for acute thrombosis with INR goal 2-3.     Patient has continued to make progress and is medically stable to discharge to [facility]. Patients family has  requested that patient be discharge to [facility] facility closer to family support. Patient has been accepted to [facility] facility in [place] and is stable for discharge to that facility today. Report to be called to physician taking patient.                Significant Results:   Lab Results   Component Value Date    WBC 8.2 04/18/2022     Lab Results   Component Value Date    RBC 3.40 04/18/2022     Lab Results   Component Value Date    HGB 8.5 04/18/2022     Lab Results   Component Value Date    HCT 28.4 04/18/2022     No components found for: MCT  Lab Results   Component Value Date    MCV 84 04/18/2022     Lab Results   Component Value Date    MCH 25.0 04/18/2022     Lab Results   Component Value Date    MCHC 29.9 04/18/2022     Lab Results   Component Value Date    RDW 18.0 04/18/2022     Lab Results   Component Value Date     04/18/2022       Last Basic Metabolic Panel:  Lab Results   Component Value Date     04/18/2022      Lab Results   Component Value Date    POTASSIUM 4.4 04/18/2022     Lab Results   Component Value Date    CHLORIDE 103 04/18/2022     Lab Results   Component Value Date    TOMAS 9.5 04/18/2022     Lab Results   Component Value Date    CO2 28 04/18/2022     Lab Results   Component Value Date    BUN 19 04/18/2022     Lab Results   Component Value Date    CR 0.69 04/18/2022     Lab Results   Component Value Date     04/18/2022     04/18/2022                  Pending Results:   None           Discharge Instructions and Follow-Up:   Discharge diet: Regular   Discharge activity: Daily weights: Call if weight gain 2-3 lbs over 24 hours or if greater than 5 lbs in 1 week.  No lifting more than 10 lbs for 8-12 weeks.  No driving for 1 month.  Call for pain medication refill.  Call for temperature greater than 101.0  Daily shower with antibacterial soap.   Discharge follow-up: Follow up locally with cardiologist upon discharge from [facility]  Continue checking daily INRs with adjustment of  coumadin as needed with INR goal 2-3   Outpatient therapy: Cardiac rehab   Home Care agency: None    Supplies and equipment: None   Lines and drains: None    Wound care: Wash incision daily with antibacterial soap   Other instructions: None

## 2022-04-18 NOTE — PROGRESS NOTES
Critical Care Progress Note      04/18/2022    Name: Angelica Robledo MRN#: [id number]   Age: 51 year old YOB: 1970     [facility] Day# 22               Problem List:   Active Problems:    NSTEMI (non-ST elevated myocardial infarction) (H)    Hypertensive emergency    Paroxysmal ventricular tachycardia (H)           Summary/Hospital Course:   Angelica Robledo is a 51 year old female with pertinent PMHx of prior MI at age 24-25 (in 1996) s/p angioplasty, HTN, anemia, and NSTEMI.  She presented on 3/27 with new onset chest pain similar to her previous MI.  Workup was consistent with NSTEMI.  Cardiac cath demonstrated severe multivessel CAD.  She was seen by CT surgery and CABG was scheduled for 3/30.  She underwent CABG x4 left radial, LIMA, left saph vein x2 (FAUSTINO taken down, but elected to not be used due to size).    No family history of clotting disorders.  No known history of DVT/PE/stroke. Siblings without cardiovascular disease. Father passed away of cardiac causes at an advanced age.  Mother had cardiac disease as well as an aortic aneurysm, but not at a young age.     4V CABG as follows: left radial artery graft to the right posterior descending coronary artery, reversed saphenous vein graft to the obtuse marginal branch of the left circumflex coronary artery, reversed saphenous vein graft to the first diagonal branch of the left anterior descending coronary artery, and pedicled left internal mammary artery to left anterior descending coronary.    POD 0 - initial hypoxia resolved with vent changes.  At 2355 patient suffered a PEA and Vfib arrest and underwent 3 shocks, 2 rounds of epi, and 1 round of CPR. ROSC obtained at 0005.  300 mg amiodarone bolus administered.  STAT cardiac cath lab performed demonstrating LIMA-LAD, VG-LADD1, and VG-LPLB were patent. The left radial artery to RDPA graft had an occlusion.  The mid RCA was treated with a DANA stent and a balloon angioplasty performed at the [facility].   Excellent flow was noted on completion coronary angiogram.  Echo demonstrated a drop in LVEF to 40-45% with aspects of hypokinesis. RV demonstrated mild-moderate RV function reduction.    POD1 - weaned from sedation with minimal arousal limited to briefly opening eyes to pain and occasionally sound, minimal extremity pain response.  CT head/CTA head and neck unremarkable for acute insult.  Ceribell placed overnight.    PD2-5: MRI x2 obtained demonstrating punctate foci of early subacute ischemic change. EEG waxes and wanes from mod-severe to severe diffuse slowing. With mixed workup results, neither of these findings portent a poor prognosis specifically; however, prognosis remains unclear.    POD6-POD12: Gradual clinical exam improvement. 4/7 MRI without new findings from previous.     4/12: Trach and PEG performed    4/18: No new events. Neuro with gradual improvements. Continues on trach dome for a few hours per day.        Assessment and plan :     Angelica Robledo IS a 51 year old female admitted on 3/27/2022 for chest pain with workup demonstrating severe multivessel CAD.  Patient underwent CABG x4 on 3/30.  POD#0 patient had a PEA arrest with ROSC obtained at 10 minutes.  Emergent coronary cath demonstrated radial artery occlusion.  A DANA was placed into the RCA and balloon angioplasty of the RPLB was performed with excellent completion angiography flow. Trach and PEG 4/12.     I have personally reviewed the daily labs, imaging studies, cultures and discussed the case with referring physician and consulting physicians.     My assessment and plan by system for this patient is as follows:    Neurology/Psychiatry:   1.. Encephalopathy s/p cardiac arrest - likely anoxic injury -- head CT negative -- MRI w/ punctate foci of subacute ischemic change -- MRI C spine negative -- EEG: diffuse slowing with gradual improvement, moderate-severe encephalpathy, no indices of seizure activity however is at risk. Mental status  appears to be slowly improving  -- Keppra for seizure ppx (no seizures per EEG)  -- Minimize CNS clouding medications     Cardiovascular:   1.  History of premature coronary artery disease with first MI at age 26. S/p CABG x4 - left radial, LIMA, left saph vein x2 (FAUSTINO taken down, but elected to not be used due to size)   2. POD#0 PEA and Vfib arrest -- s/p cardiac cath demonstrated radial artery graft to RPDA occlusion -- intervention with DANA to RCA and balloon angioplasty RPLA -- on Kangrelor 3/31, now transitioned to aspirin and Brilinta with discussion per CV surgery and interventional cardiology  -- cards recommends Amiodarone taper -- PO regimen scheduled  -- Brilinta needs to be continued for a minimum of 1 month w/ DANA -- per CTS/cards management  3. HTN - likely untreated prior to hospitalization - presented with hypertension emergency - PO regimen per CVS     Pulmonary/Ventilator Management/ID:   1. Airway -- trach present   2. MSSA PNEUMONIA. Will complete a 7 day course of Ancef on 4/20.  3. UTI with E. Coli. Had 3 day course of Bactrim.   --alternate PS/trach dome. CMV if issues; however, prefer to maximize time on trach dome, then PS.     GI and Nutrition :   1. Epistaxis - s/p rhino rocket removal - oozing has resolved  2. Nutrition - PEG tube present - continue at goal  3. PPI ppx    Renal/Fluids/Electrolytes:   1. Monitor UOP/creatinine -- stable labs   2. Replete electrolytes PRN per protocol  3. External wick device     Endocrine:   1. Monitor for stress induced hyperglycemia. ICU insulin protocol, goal sugar <180   2. Incidental thyroid nodule -- follow-up outpatient per discharge referral     Hematology/Oncology:   1. Acute blood loss on chronic anemia -- stable, trend  2. Acute blood loss thrombocytopenia -- resolved, trend.  3. S/P DANA placement s/p cardiac arrest -- aspirin and brilinta  4. Rule out hypercoagulable state -- workup negative; however, hem/onc recommends full dose  anticoagulation in setting of thrombus of radial graft -- discussed with CV: full anticoagulation. Hep gtt bridged to coumadin complete.  -- CTS plan is coumadin (therapeutic today), continue brilinta, discontinued ASA.    IV/Access:    1. Venous access - peripheral  2. Arterial access - NA    ICU Prophylaxis:   1. DVT: coumadin therapeutic/mechanical  2. VAP: HOB 30 degrees, chlorhexidine rinse  3. Stress Ulcer: PPI  4. Restraints: No current indication. Will readdress daily.   5. Wound care - per unit routine   6. Feeding - enteral feeds  7. Family Update: will update at bedside upon arrival  8. [facility]    Tentative plan: LTACH in [place] today      Key goals for next 24 hours:   [facility] [place]         Interim History:   No significant events  Tolerates trach dome for a few hours         Key Medications:       acetaminophen  975 mg Oral or Feeding Tube Q6H     amiodarone  200 mg Oral or Feeding Tube Daily     amLODIPine  10 mg Oral or Feeding Tube Daily     atorvastatin  80 mg Oral or Feeding Tube Daily     carvedilol  25 mg Oral or Feeding Tube BID w/meals     ceFAZolin  2 g Intravenous Q8H     chlorhexidine  15 mL Mouth/Throat Q12H     insulin aspart  1-6 Units Subcutaneous Q4H     levETIRAcetam  1,000 mg Oral or Feeding Tube BID     pantoprazole  40 mg Oral or NG Tube Daily    Or     pantoprazole  40 mg Oral Daily     sodium chloride (PF)  3 mL Intracatheter Q8H     ticagrelor  90 mg Oral or Feeding Tube BID       - MEDICATION INSTRUCTIONS -       Percutaneous Coronary Intervention orders placed (this is information for BPA alerting)       sodium chloride 10 mL/hr at 04/14/22 1602     Warfarin Therapy Reminder                 Physical Examination:   Temp:  [98.2  F (36.8  C)-98.8  F (37.1  C)] 98.8  F (37.1  C)  Pulse:  [63-85] 68  Resp:  [5-32] 10  BP: ()/() 98/71  FiO2 (%):  [30 %-40 %] 30 %  SpO2:  [98 %-100 %] 100 %      Intake/Output Summary (Last 24  hours) at 4/17/2022 1323  Last data filed at 4/17/2022 1200  Gross per 24 hour   Intake 2264 ml   Output 2600 ml   Net -336 ml         Wt Readings from Last 4 Encounters:   04/18/22 93.6 kg (206 lb 5.6 oz)     BP - Mean:  [] 87  Vent Mode: CPAP/PS  (Continuous positive airway pressure with Pressure Support)  FiO2 (%): 30 %  Resp Rate (Set): 14 breaths/min  Tidal Volume (Set, mL): 450 mL  PEEP (cm H2O): 5 cmH2O  Pressure Support (cm H2O): 5 cmH2O  Resp: 10    Recent Labs   Lab 04/13/22  1503   O2PER 21       GEN: no acute distress   HEENT: head ncat, sclera anicteric, trach present, CDI   PULM: unlabored, clear   CV/Chest: RRR. Midline incision CDI.   ABD: soft, nontender. Gastric feeding tube present. Site clean  EXT:  No significant edema,  warm  NEURO: Opens spontaneously, to stimulus, and verbal stimulus. Follows commands to wiggling toes, albeit slight delay.  SKIN: no obvious rash, healing incisions         Data:   All data and imaging reviewed     ROUTINE ICU LABS (Last four results)  CMP  Recent Labs   Lab 04/18/22  0517 04/18/22  0439 04/17/22  2343 04/17/22  1619 04/15/22  0836 04/15/22  0532 04/14/22  0757 04/14/22  0532 04/13/22  1203 04/13/22  1103 04/13/22  0407 04/13/22  0200 04/12/22  0819 04/12/22  0429     --   --   --   --   --   --   --   --   --   --  138  --   --    POTASSIUM 4.4  --   --   --   --   --   --   --   --   --   --  4.3  --  4.6   CHLORIDE 103  --   --   --   --   --   --   --   --   --   --  105  --   --    CO2 28  --   --   --   --   --   --   --   --   --   --  27  --   --    ANIONGAP 6  --   --   --   --   --   --   --   --   --   --  6  --   --    * 163* 105* 125*   < >  --    < >  --    < >  --    < > 103*   < >  --    BUN 19  --   --   --   --   --   --   --   --   --   --  22  --   --    CR 0.69  --   --   --   --   --   --   --   --   --   --  0.98  --   --    GFRESTIMATED >90  --   --   --   --   --   --   --   --   --   --  70  --   --    TOMAS 9.5  --    --   --   --   --   --   --   --   --   --  9.5  --   --    MAG  --   --   --   --   --  2.1  --  2.3  --  2.5*  --   --   --  2.5*   PHOS  --   --   --   --   --  3.8  --  4.8*  --  5.3*  --   --   --  4.7*    < > = values in this interval not displayed.     CBC  Recent Labs   Lab 04/18/22  0517 04/17/22  0537 04/16/22  0511 04/15/22  0532   WBC 8.2 10.3 9.2 10.5   RBC 3.40* 3.67* 3.18* 3.22*   HGB 8.5* 9.3* 8.0* 8.0*   HCT 28.4* 30.7* 27.3* 26.6*   MCV 84 84 86 83   MCH 25.0* 25.3* 25.2* 24.8*   MCHC 29.9* 30.3* 29.3* 30.1*   RDW 18.0* 17.9* 17.8* 17.5*    365 353 336     INR  Recent Labs   Lab 04/18/22  0517 04/17/22  0537 04/16/22  0511 04/15/22  0532   INR 2.45* 1.85* 1.23* 1.22*     Arterial Blood Gas  Recent Labs   Lab 04/13/22  1503   O2PER 21       All cultures:  No results for input(s): CULT in the last 168 hours.  No results found for this or any previous visit (from the past 24 hour(s)).          Adi Bazan MD  Surgical Critical Care Fellow

## 2022-04-18 NOTE — PROGRESS NOTES
Care Management Follow Up    Length of Stay (days): 22    Expected Discharge Date: 04/18/2022     Concerns to be Addressed: adjustment to diagnosis/illness, basic needs, coping/stress, decision making, discharge planning, employment/school  spouse is overwhelmed with next steps   Patient plan of care discussed at interdisciplinary rounds: Yes    Anticipated Discharge Disposition: LTACH     Anticipated Discharge Services: Transportation Services  Anticipated Discharge DME: Oxygen, Other (see comment)    Patient/family educated on Medicare website which has current facility and service quality ratings:    Education Provided on the Discharge Plan:    Patient/Family in Agreement with the Plan: yes    Referrals Placed by CM/SW: External Care Coordination, Transportation, Financial Services  Private pay costs discussed: insurance costs out of pocket expenses    Additional Information:  Following for discharge to LTACH in [place] today.  Bedside RN updated and aware of transport.  [facility] [place], and [place]    Message left for Johnnie to get numbers for report to LTACH    CC spoke with [employer] transport ([place]) who stated they have crew arranged, coming in at 10am. They will not arrive at hospital till around 10:45.  Reviewed transport.  They would recommend Tube feeding be clamped or pump sent with (to be returned by transport).  Will call CC if any changes/questions.     CC will continue to follow for transport.       Yessica Perez RN

## 2022-04-18 NOTE — PROGRESS NOTES
RN to RN report called to pal at [facility]. Patient sent with EMS. Backup trach and obturator provided.

## 2022-04-18 NOTE — PROGRESS NOTES
[facility] ICU RESPIRATORY NOTE        Date of Admission: 3/27/2022    Date of Intubation (most recent): 4/12/22 Trached    Reason for Mechanical Ventilation: Airway protection    Number of Days on Mechanical Ventilation: 17    Met Criteria for Spontaneous Breathing Trial: Yes    Significant Events Today: PS 5/5 30% for 6 hours. Trach dome for 40 min. Patient had increased work of breathing and was placed back on full support    ABG Results:   Recent Labs   Lab 04/13/22  1503   O2PER 21       Current Vent Settings: Vent Mode: CMV/AC  (Continuous Mandatory Ventilation/ Assist Control)  FiO2 (%): 30 %  Resp Rate (Set): 14 breaths/min  Tidal Volume (Set, mL): 450 mL  PEEP (cm H2O): 5 cmH2O  Resp: 16      Skin Assessment: trach site is intact. Oozing secretions from stoma    Plan: Continue pressure support and trach dome trials    Stephanie Griffiths

## 2022-04-18 NOTE — PROGRESS NOTES
Patient seen and care plan discussed with Matthew Jc and Greer    CV Surgery    S: No acute events overnight.     O: B/P: 118/81, T: 98.8, P: 64, R: 9  General: NAD  Heart: RRR normal s1 and s2  Lungs: coarse, trach with ventilator support  Abd: soft NTND +peg  Sternum: CDI  Extremities: minimal edema    Lab Results   Component Value Date    WBC 8.2 04/18/2022     Lab Results   Component Value Date    RBC 3.40 04/18/2022     Lab Results   Component Value Date    HGB 8.5 04/18/2022     Lab Results   Component Value Date    HCT 28.4 04/18/2022     No components found for: MCT  Lab Results   Component Value Date    MCV 84 04/18/2022     Lab Results   Component Value Date    MCH 25.0 04/18/2022     Lab Results   Component Value Date    MCHC 29.9 04/18/2022     Lab Results   Component Value Date    RDW 18.0 04/18/2022     Lab Results   Component Value Date     04/18/2022       Last Basic Metabolic Panel:  Lab Results   Component Value Date     04/18/2022      Lab Results   Component Value Date    POTASSIUM 4.4 04/18/2022     Lab Results   Component Value Date    CHLORIDE 103 04/18/2022     Lab Results   Component Value Date    TOMAS 9.5 04/18/2022     Lab Results   Component Value Date    CO2 28 04/18/2022     Lab Results   Component Value Date    BUN 19 04/18/2022     Lab Results   Component Value Date    CR 0.69 04/18/2022     Lab Results   Component Value Date     04/18/2022     04/18/2022       A/P: POD #19 s/p Coronary artery bypass grafting x 4 (radial artery graft to the right posterior descending coronary artery, reversed saphenous vein graft to the obtuse marginal branch of the left circumflex coronary artery, reversed saphenous vein graft to the first diagonal branch of the left anterior descending coronary artery, and pedicled left internal mammary artery to left anterior descending coronary artery).  2.  Endoscopic vein harvest of the greater saphenous vein from  the left lower extremity.  3.  Endoscopic left radial artery harvest on 3/30/22 with Dr. Greer Gilbert for discharge to [facility] in [place] today.      Suzi Craig PA-C  Pager [phone]

## 2022-05-20 PROBLEM — I25.810 CORONARY ARTERY DISEASE INVOLVING CORONARY BYPASS GRAFT OF NATIVE HEART WITHOUT ANGINA PECTORIS: Status: ACTIVE | Noted: 2022-05-20

## 2022-05-20 PROBLEM — I25.10 CAD (CORONARY ARTERY DISEASE): Status: ACTIVE | Noted: 2022-05-20

## 2022-05-20 PROBLEM — I46.9 CARDIAC ARREST (H): Status: ACTIVE | Noted: 2022-05-20

## 2022-05-23 ENCOUNTER — DOCUMENTATION ONLY (OUTPATIENT)
Dept: OTHER | Facility: CLINIC | Age: 52
End: 2022-05-23
Payer: COMMERCIAL

## 2022-05-23 NOTE — PROGRESS NOTES
I called and spoke to RN. Pt progressing very slowly. Tracking with eyes and has very weak purposeful movements at times. Is 30 % trach dome with PMV. No verbalization at this time. She had an episode of sepsis 3 weeks ago requiring pressors and emergent bronchoscopy.

## 2022-05-29 ENCOUNTER — HEALTH MAINTENANCE LETTER (OUTPATIENT)
Age: 52
End: 2022-05-29

## 2022-10-03 ENCOUNTER — HEALTH MAINTENANCE LETTER (OUTPATIENT)
Age: 52
End: 2022-10-03

## 2023-06-04 ENCOUNTER — HEALTH MAINTENANCE LETTER (OUTPATIENT)
Age: 53
End: 2023-06-04

## 2024-07-28 ENCOUNTER — HEALTH MAINTENANCE LETTER (OUTPATIENT)
Age: 54
End: 2024-07-28

## 2025-04-08 NOTE — PLAN OF CARE
Neuro: Patient opens eyes spontaneus, does not track does not fallow commands. PERRL, cough, no gag, withdraw to pain BLE slightly.  CV:  SR, HR 70s, SBP <140  Pulmonary: LSC, moderate tan creamy secretions. Trach dressing with old drainage.  Vasc: PIV x 2  Gtt: heparin gtt   GI/: TF @ 10 ml/h, restarted at 0700 , abd soft no BM this shift, pure wick in place with adequate UOP.  Integ: no changed.  .    No. MAURICIO screening performed.  STOP BANG Legend: 0-2 = LOW Risk; 3-4 = INTERMEDIATE Risk; 5-8 = HIGH Risk

## (undated) DEVICE — DRSG DRAIN 4X4" 7086

## (undated) DEVICE — KIT ART LN 6IN 18GA .025IN FEMART UM-04018

## (undated) DEVICE — SU SILK 3-0 SH 30" K832H

## (undated) DEVICE — DRAPE MINOR PROCEDURE LAP 29496

## (undated) DEVICE — SURGICEL POWDER ABSORBABLE HEMOSTAT 3GM 3013SP

## (undated) DEVICE — CATH DIAG 4FR LCB 538472

## (undated) DEVICE — PACK OPEN HEART PV12OH524

## (undated) DEVICE — KIT ENDO VASOVIEW HEMOPRO 2 VH-4000

## (undated) DEVICE — TUBING SUCTION 12"X1/4" N612

## (undated) DEVICE — PROTECTOR ARM ONE-STEP TRENDELENBURG 40418

## (undated) DEVICE — LINEN TOWEL PACK X6 WHITE 5487

## (undated) DEVICE — SYR 10ML SLIP TIP W/O NDL

## (undated) DEVICE — KIT HAND CONTROL ANGIOTOUCH ACIST 65CM AT-P65

## (undated) DEVICE — SU ETHIBOND 0 CT-1 CR 8X18" CX21D

## (undated) DEVICE — RESERVOIR CELL SAVING BLOOD COLLECTION EL2120

## (undated) DEVICE — GOWN IMPERVIOUS ZONED LG

## (undated) DEVICE — TUBE FEEDING PURPLE POLY ENFIT 8FR 42" 461800E

## (undated) DEVICE — INFL DVC KIT W/10CC NITRO IN4530

## (undated) DEVICE — SU PROLENE 4-0 RB-1DA 36" 8557H

## (undated) DEVICE — CATH ANGIO INFINITI JR4 4FRX100CM 538421

## (undated) DEVICE — LINEN LEG ROLL 5489

## (undated) DEVICE — SU SILK 1 TIE 10X30" SA87G

## (undated) DEVICE — LEAD ELEC MYOCARDIO PACING TEMPORARY MEDTRONIC

## (undated) DEVICE — SU PLEDGET SOFT TFE 3/8"X3/26"X1/16" PCP40

## (undated) DEVICE — Device

## (undated) DEVICE — SUCTION CATH 14FR ORAL TRI-FLO T60

## (undated) DEVICE — KIT WASH CELL SAVING ATL2001

## (undated) DEVICE — BLOWER/MISTER CLEARVIEW 22150

## (undated) DEVICE — SU PROLENE 6-0 C-1DA 30" 8706H

## (undated) DEVICE — WIRE GUIDE 0.035"X260CM SAFE-T-J EXCHANGE G00517

## (undated) DEVICE — SLEEVE TR BAND RADIAL COMPRESSION DEVICE 24CM TRB24-REG

## (undated) DEVICE — GUIDEWIRE VASC 0.014INX180CM RUNTHROUGH 25-1011

## (undated) DEVICE — SYR EAR BULB 3OZ 0035830

## (undated) DEVICE — CATH DIAG 4FR JL 4.5 538417

## (undated) DEVICE — SU VICRYL 2-0 CT-1 27" UND J259H

## (undated) DEVICE — SU ETHIBOND 2-0 SHDA 30" X563H

## (undated) DEVICE — DRSG KERLIX 4 1/2"X4YDS ROLL 6730

## (undated) DEVICE — PACK MINI VAC CUSTOM CARDOPULMONARY BB5Z97R15

## (undated) DEVICE — GLOVE PROTEXIS BLUE W/NEU-THERA 6.5  2D73EB65

## (undated) DEVICE — JELLY LUBRICATING SURGILUBE 4OZ TUBE

## (undated) DEVICE — ADPT PERFUSION MULTIPLE

## (undated) DEVICE — GLOVE PROTEXIS W/NEU-THERA 7.5  2D73TE75

## (undated) DEVICE — POSITIONER ASSIST ESSTECH 3S T401210S

## (undated) DEVICE — CATH ANGIO INFINITI IM 4FRX100CM 538460

## (undated) DEVICE — SU PROLENE 8-0 BV130-5 24" M8732

## (undated) DEVICE — SU STEEL 6 CCS 4X18" M654G

## (undated) DEVICE — SU VICRYL 3-0 FS-1 27" J442H

## (undated) DEVICE — CATH ANGIO INFINITI MPA2 4FRX100CM 2 SH 538442

## (undated) DEVICE — SU VICRYL 0 CTX 36" J370H

## (undated) DEVICE — PUNCH AORTIC 4.0MMX8" RCB40

## (undated) DEVICE — TUBING SUCTION MEDI-VAC SOFT 3/16"X20' N520A

## (undated) DEVICE — DEFIB PRO-PADZ LVP LQD GEL ADULT 8900-2105-01

## (undated) DEVICE — DEVICE TISSUE STABILIZATION OCTOBASE 28707

## (undated) DEVICE — CATH BALLOON NC EMERGE 4.00X8MM H7493926708400

## (undated) DEVICE — 5FR X 100CM INFINITI TL DIAGNOSTIC CATHETER, JUDKINS LEFT CORONARY, JL 3.5, FEMORAL SELECTIVE THRULUMEN, SMALL, 0.038IN MAX GUIDEWIRE (EA/1)

## (undated) DEVICE — GLOVE PROTEXIS MICRO 6.0  2D73PM60

## (undated) DEVICE — CATH ADAPTOR CHEST TUBE HEPARIN COATED 3/16"X3/8"

## (undated) DEVICE — SURGICEL HEMOSTAT 2X14" 1951

## (undated) DEVICE — LINEN GOWN OVERSIZE 5408

## (undated) DEVICE — SU PROLENE 6-0 C-1DA 30" M8706

## (undated) DEVICE — SPONGE PEANUT

## (undated) DEVICE — MANIFOLD KIT ANGIO AUTOMATED 014613

## (undated) DEVICE — PACK MINOR SBA15MIFSE

## (undated) DEVICE — CANNULA PERFUSION ARTERIAL 20FR 12" 77420

## (undated) DEVICE — SU MONOCRYL 4-0 PS-2 18" UND Y496G

## (undated) DEVICE — INTRO GLIDESHEATH SLENDER 6FR 10X45CM 60-1060

## (undated) DEVICE — RAD G/W INQWIRE .035X260CM J-TIP EXCHANGE IQ35F260J1O5RS

## (undated) DEVICE — GOWN IMPERVIOUS SPECIALTY XL/XLONG 39049

## (undated) DEVICE — SU PROLENE 7-0 BV-1DA 4X24" M8702

## (undated) DEVICE — SURGICEL FIBRILLAR HEMOSTAT 1"X2" 1961

## (undated) DEVICE — SUCTION CANISTER MEDIVAC LINER 3000ML W/LID 65651-530

## (undated) DEVICE — INTRO SHEATH 6FRX10CM PINNACLE RSS602

## (undated) DEVICE — COVER TABLE POLY 65X90" 8186

## (undated) DEVICE — DEVICE ASSEMBLY SUCTION/ANTI COAG BTC93

## (undated) DEVICE — ESU GROUND PAD UNIVERSAL W/O CORD

## (undated) DEVICE — DRSG KERLIX 4 1/2"X4YDS ROLL 6715

## (undated) DEVICE — LINEN TOWEL PACK X5 5464

## (undated) DEVICE — CANNULA PERFUSION AORTIC ROOT 22FR 20012

## (undated) DEVICE — CANNULA VESSEL DLP  30001

## (undated) DEVICE — TUBE GASTROSTOMY MIC PERC PULL PEG KIT ENFIT 20FR 8160-20

## (undated) DEVICE — SOL NACL 0.9% IRRIG 1000ML BOTTLE 2F7124

## (undated) DEVICE — CANNULA VENOUS 2 STAGE 32-40FR

## (undated) DEVICE — INTRODUCER CATH VASC 5FRX10CM  MPIS-501-NT-U-SST

## (undated) DEVICE — LINEN TOWEL PACK X30 5481

## (undated) DEVICE — SYR ANGIOGRAPHY MULTIUSE KIT ACIST 014612

## (undated) DEVICE — CATH BALLOON EMERGE 2.0X12MM H7493918912200

## (undated) DEVICE — CATH LAUNCHER 6FR JR 4.0 LA6JR40

## (undated) DEVICE — CATH ANGIO INFINITI 3DRC 4FRX100CM 538476

## (undated) DEVICE — CONNECTOR DRAIN CHEST Y EXTENSION SET 19909

## (undated) DEVICE — ESU PENCIL W/HOLSTER E2350H

## (undated) DEVICE — CATH US OD 5FR OD SEC 2.9FR EAGLE EYE PLATINUM 0.014 85900P

## (undated) DEVICE — SU ETHILON 3-0 FS-1 18" 669H

## (undated) DEVICE — SU ETHIBOND 3-0 BBDA 36" X588H

## (undated) DEVICE — SU PROLENE 2-0 RB-1DA 36" 8559H

## (undated) DEVICE — INTRO SHEATH 4FRX10CM PINNACLE RSS402

## (undated) DEVICE — PACK TUBING MINI VAC CUSTOM 1/2X3/8T BB9J78R4

## (undated) DEVICE — DRAIN CHEST TUBE 28FR STR 8028

## (undated) DEVICE — ESU ELEC BLADE 2.75" COATED/INSULATED E1455

## (undated) DEVICE — CABLE MYO/LEAD PACING WHITE DISP 019-530

## (undated) DEVICE — RX SURGIFLO HEMOSTATIC MATRIX W/THROMBIN 8ML 2994

## (undated) DEVICE — PREP CHLORAPREP W/ORANGE TINT 10.5ML 930715

## (undated) DEVICE — SOL WATER IRRIG 1000ML BOTTLE 2F7114

## (undated) DEVICE — TOTE ANGIO CORP PC15AT SAN32CC83O

## (undated) DEVICE — GLOVE PROTEXIS W/NEU-THERA 6.5  2D73TE65

## (undated) RX ORDER — VANCOMYCIN HYDROCHLORIDE 500 MG/10ML
INJECTION, POWDER, LYOPHILIZED, FOR SOLUTION INTRAVENOUS
Status: DISPENSED
Start: 2022-03-30

## (undated) RX ORDER — NITROGLYCERIN 5 MG/ML
VIAL (ML) INTRAVENOUS
Status: DISPENSED
Start: 2022-03-31

## (undated) RX ORDER — PROPOFOL 10 MG/ML
INJECTION, EMULSION INTRAVENOUS
Status: DISPENSED
Start: 2022-03-30

## (undated) RX ORDER — HEPARIN SODIUM 1000 [USP'U]/ML
INJECTION, SOLUTION INTRAVENOUS; SUBCUTANEOUS
Status: DISPENSED
Start: 2022-03-31

## (undated) RX ORDER — NITROGLYCERIN 5 MG/ML
VIAL (ML) INTRAVENOUS
Status: DISPENSED
Start: 2022-03-28

## (undated) RX ORDER — HEPARIN SODIUM 200 [USP'U]/100ML
INJECTION, SOLUTION INTRAVENOUS
Status: DISPENSED
Start: 2022-03-31

## (undated) RX ORDER — VERAPAMIL HYDROCHLORIDE 2.5 MG/ML
INJECTION, SOLUTION INTRAVENOUS
Status: DISPENSED
Start: 2022-03-30

## (undated) RX ORDER — HEPARIN SODIUM 1000 [USP'U]/ML
INJECTION, SOLUTION INTRAVENOUS; SUBCUTANEOUS
Status: DISPENSED
Start: 2022-03-30

## (undated) RX ORDER — NICARDIPINE HYDROCHLORIDE 2.5 MG/ML
INJECTION INTRAVENOUS
Status: DISPENSED
Start: 2022-03-30

## (undated) RX ORDER — NEOSTIGMINE METHYLSULFATE 1 MG/ML
VIAL (ML) INJECTION
Status: DISPENSED
Start: 2022-03-30

## (undated) RX ORDER — FENTANYL CITRATE 0.05 MG/ML
INJECTION, SOLUTION INTRAMUSCULAR; INTRAVENOUS
Status: DISPENSED
Start: 2022-03-30

## (undated) RX ORDER — HEPARIN SODIUM 1000 [USP'U]/ML
INJECTION, SOLUTION INTRAVENOUS; SUBCUTANEOUS
Status: DISPENSED
Start: 2022-03-28

## (undated) RX ORDER — LIDOCAINE HYDROCHLORIDE 10 MG/ML
INJECTION, SOLUTION EPIDURAL; INFILTRATION; INTRACAUDAL; PERINEURAL
Status: DISPENSED
Start: 2022-03-31

## (undated) RX ORDER — LIDOCAINE HYDROCHLORIDE 10 MG/ML
INJECTION, SOLUTION EPIDURAL; INFILTRATION; INTRACAUDAL; PERINEURAL
Status: DISPENSED
Start: 2022-03-30

## (undated) RX ORDER — PAPAVERINE HYDROCHLORIDE 30 MG/ML
INJECTION INTRAMUSCULAR; INTRAVENOUS
Status: DISPENSED
Start: 2022-03-30

## (undated) RX ORDER — GLYCOPYRROLATE 0.2 MG/ML
INJECTION, SOLUTION INTRAMUSCULAR; INTRAVENOUS
Status: DISPENSED
Start: 2022-03-30

## (undated) RX ORDER — VERAPAMIL HYDROCHLORIDE 2.5 MG/ML
INJECTION, SOLUTION INTRAVENOUS
Status: DISPENSED
Start: 2022-03-31

## (undated) RX ORDER — HEPARIN SODIUM 200 [USP'U]/100ML
INJECTION, SOLUTION INTRAVENOUS
Status: DISPENSED
Start: 2022-03-28

## (undated) RX ORDER — DEXMEDETOMIDINE HYDROCHLORIDE 4 UG/ML
INJECTION, SOLUTION INTRAVENOUS
Status: DISPENSED
Start: 2022-03-30

## (undated) RX ORDER — CEFAZOLIN SODIUM/WATER 2 G/20 ML
SYRINGE (ML) INTRAVENOUS
Status: DISPENSED
Start: 2022-03-30

## (undated) RX ORDER — LIDOCAINE HYDROCHLORIDE 20 MG/ML
JELLY TOPICAL
Status: DISPENSED
Start: 2022-04-04

## (undated) RX ORDER — VERAPAMIL HYDROCHLORIDE 2.5 MG/ML
INJECTION, SOLUTION INTRAVENOUS
Status: DISPENSED
Start: 2022-03-28

## (undated) RX ORDER — FENTANYL CITRATE 50 UG/ML
INJECTION, SOLUTION INTRAMUSCULAR; INTRAVENOUS
Status: DISPENSED
Start: 2022-04-12

## (undated) RX ORDER — LIDOCAINE HYDROCHLORIDE 10 MG/ML
INJECTION, SOLUTION INFILTRATION; PERINEURAL
Status: DISPENSED
Start: 2022-03-30

## (undated) RX ORDER — LIDOCAINE HYDROCHLORIDE 10 MG/ML
INJECTION, SOLUTION EPIDURAL; INFILTRATION; INTRACAUDAL; PERINEURAL
Status: DISPENSED
Start: 2022-03-28

## (undated) RX ORDER — CEFAZOLIN SODIUM 1 G/3ML
INJECTION, POWDER, FOR SOLUTION INTRAMUSCULAR; INTRAVENOUS
Status: DISPENSED
Start: 2022-03-30

## (undated) RX ORDER — CLINDAMYCIN PHOSPHATE 900 MG/50ML
INJECTION, SOLUTION INTRAVENOUS
Status: DISPENSED
Start: 2022-03-30

## (undated) RX ORDER — SODIUM CHLORIDE, SODIUM GLUCONATE, SODIUM ACETATE, POTASSIUM CHLORIDE AND MAGNESIUM CHLORIDE 526; 502; 368; 37; 30 MG/100ML; MG/100ML; MG/100ML; MG/100ML; MG/100ML
INJECTION, SOLUTION INTRAVENOUS
Status: DISPENSED
Start: 2022-03-30

## (undated) RX ORDER — PROTAMINE SULFATE 10 MG/ML
INJECTION, SOLUTION INTRAVENOUS
Status: DISPENSED
Start: 2022-03-30

## (undated) RX ORDER — FENTANYL CITRATE 50 UG/ML
INJECTION, SOLUTION INTRAMUSCULAR; INTRAVENOUS
Status: DISPENSED
Start: 2022-03-28

## (undated) RX ORDER — FENTANYL CITRATE 50 UG/ML
INJECTION, SOLUTION INTRAMUSCULAR; INTRAVENOUS
Status: DISPENSED
Start: 2022-03-30